# Patient Record
Sex: FEMALE | Race: WHITE | NOT HISPANIC OR LATINO | Employment: OTHER | RURAL
[De-identification: names, ages, dates, MRNs, and addresses within clinical notes are randomized per-mention and may not be internally consistent; named-entity substitution may affect disease eponyms.]

---

## 2017-01-06 ENCOUNTER — LAB (OUTPATIENT)
Dept: FAMILY MEDICINE CLINIC | Facility: CLINIC | Age: 67
End: 2017-01-06

## 2017-01-06 DIAGNOSIS — D64.9 ANEMIA, UNSPECIFIED TYPE: ICD-10-CM

## 2017-01-06 DIAGNOSIS — E87.6 HYPOPOTASSEMIA: ICD-10-CM

## 2017-01-06 DIAGNOSIS — D75.89 MACROCYTOSIS: Chronic | ICD-10-CM

## 2017-01-06 DIAGNOSIS — I26.09 OTHER ACUTE PULMONARY EMBOLISM WITH ACUTE COR PULMONALE (HCC): ICD-10-CM

## 2017-01-06 DIAGNOSIS — I10 ESSENTIAL HYPERTENSION: Primary | Chronic | ICD-10-CM

## 2017-01-06 LAB
BASOPHILS # BLD AUTO: 0.03 10*3/MM3 (ref 0–0.2)
BASOPHILS NFR BLD AUTO: 0.6 % (ref 0–2)
BUN SERPL-MCNC: 14 MG/DL (ref 5–21)
BUN/CREAT SERPL: 17.3 (ref 7–25)
CALCIUM SERPL-MCNC: 9.7 MG/DL (ref 8.4–10.4)
CHLORIDE SERPL-SCNC: 101 MMOL/L (ref 98–110)
CO2 SERPL-SCNC: 31 MMOL/L (ref 24–31)
CREAT SERPL-MCNC: 0.81 MG/DL (ref 0.5–1.4)
EOSINOPHIL # BLD AUTO: 0.14 10*3/MM3 (ref 0–0.7)
EOSINOPHIL NFR BLD AUTO: 2.9 % (ref 0–4)
ERYTHROCYTE [DISTWIDTH] IN BLOOD BY AUTOMATED COUNT: 13.4 % (ref 12–15)
FOLATE SERPL-MCNC: >20 NG/ML
GLUCOSE SERPL-MCNC: 98 MG/DL (ref 70–100)
HCT VFR BLD AUTO: 41.7 % (ref 37–47)
HGB BLD-MCNC: 13.5 G/DL (ref 12–16)
IMM GRANULOCYTES # BLD: 0.01 10*3/MM3 (ref 0–0.03)
IMM GRANULOCYTES NFR BLD: 0.2 % (ref 0–5)
IRON SERPL-MCNC: 69 MCG/DL (ref 42–180)
LYMPHOCYTES # BLD AUTO: 1.54 10*3/MM3 (ref 0.72–4.86)
LYMPHOCYTES NFR BLD AUTO: 32.1 % (ref 15–45)
MCH RBC QN AUTO: 33.1 PG (ref 28–32)
MCHC RBC AUTO-ENTMCNC: 32.4 G/DL (ref 33–36)
MCV RBC AUTO: 102.2 FL (ref 82–98)
MONOCYTES # BLD AUTO: 0.46 10*3/MM3 (ref 0.19–1.3)
MONOCYTES NFR BLD AUTO: 9.6 % (ref 4–12)
NEUTROPHILS # BLD AUTO: 2.62 10*3/MM3 (ref 1.87–8.4)
NEUTROPHILS NFR BLD AUTO: 54.6 % (ref 39–78)
PLATELET # BLD AUTO: 282 10*3/MM3 (ref 130–400)
POTASSIUM SERPL-SCNC: 4.1 MMOL/L (ref 3.5–5.3)
RBC # BLD AUTO: 4.08 10*6/MM3 (ref 4.2–5.4)
SODIUM SERPL-SCNC: 140 MMOL/L (ref 135–145)
VIT B12 SERPL-MCNC: 887 PG/ML (ref 239–931)
WBC # BLD AUTO: 4.8 10*3/MM3 (ref 4.8–10.8)

## 2017-01-09 ENCOUNTER — TELEPHONE (OUTPATIENT)
Dept: FAMILY MEDICINE CLINIC | Facility: CLINIC | Age: 67
End: 2017-01-09

## 2017-01-09 DIAGNOSIS — I26.09 OTHER ACUTE PULMONARY EMBOLISM WITH ACUTE COR PULMONALE (HCC): ICD-10-CM

## 2017-01-09 DIAGNOSIS — D64.9 ANEMIA, UNSPECIFIED TYPE: Primary | ICD-10-CM

## 2017-01-09 NOTE — TELEPHONE ENCOUNTER
----- Message from Rosas Berg MD sent at 10/25/2016  5:06 PM CDT -----  Be sure she knows mammogram B2 and sbe monthly; report problems and otherwise   Mammogram yearly    ----- Message -----     From: Sandy Wright Chon     Sent: 10/25/2016   4:01 PM       To: Rosas Berg MD

## 2017-01-11 ENCOUNTER — OFFICE VISIT (OUTPATIENT)
Dept: CARDIOLOGY | Facility: CLINIC | Age: 67
End: 2017-01-11

## 2017-01-11 VITALS
SYSTOLIC BLOOD PRESSURE: 124 MMHG | HEART RATE: 78 BPM | BODY MASS INDEX: 36.14 KG/M2 | HEIGHT: 63 IN | WEIGHT: 204 LBS | DIASTOLIC BLOOD PRESSURE: 80 MMHG

## 2017-01-11 DIAGNOSIS — I26.09 OTHER PULMONARY EMBOLISM WITH ACUTE COR PULMONALE, UNSPECIFIED CHRONICITY (HCC): Primary | ICD-10-CM

## 2017-01-11 DIAGNOSIS — I10 ESSENTIAL HYPERTENSION: Chronic | ICD-10-CM

## 2017-01-11 DIAGNOSIS — R53.82 CHRONIC FATIGUE: ICD-10-CM

## 2017-01-11 PROCEDURE — 99213 OFFICE O/P EST LOW 20 MIN: CPT | Performed by: INTERNAL MEDICINE

## 2017-01-11 NOTE — MR AVS SNAPSHOT
Lucy Harringtonell   1/11/2017 9:45 AM   Office Visit    Dept Phone:  [phone]   Encounter #:  [id number]    Provider:  Daniel Underwood MD   Department:  [facility] HEART [facility]                Your Full Care Plan              Today's Medication Changes          These changes are accurate as of: 1/11/17 10:25 AM.  If you have any questions, ask your nurse or doctor.               Stop taking medication(s)listed here:     amoxicillin 500 MG capsule   Commonly known as:  AMOXIL   Stopped by:  Daniel Underwood MD           HYDROcodone-acetaminophen 5-325 MG per tablet   Commonly known as:  NORCO   Stopped by:  Daniel Underwood MD                      Your Updated Medication List          This list is accurate as of: 1/11/17 10:25 AM.  Always use your most recent med list.                ALPRAZolam 0.5 MG tablet   Commonly known as:  XANAX   Take 1 tablet by mouth 2 (Two) Times a Day As Needed for anxiety.       cyanocobalamin 50 MCG tablet tablet   Commonly known as:  VITAMIN B-12       furosemide 20 MG tablet   Commonly known as:  LASIX   Take 1 tablet by mouth Daily.       losartan 50 MG tablet   Commonly known as:  COZAAR   Take 1 tablet by mouth Daily.       MULTI VITAMIN DAILY PO       omeprazole 20 MG capsule   Commonly known as:  priLOSEC       potassium chloride 10 MEQ CR capsule   Commonly known as:  MICRO-K   Take 2 capsules by mouth 2 (Two) Times a Day With Meals.       rivaroxaban 20 MG tablet   Commonly known as:  XARELTO   Take 1 tablet by mouth Daily With Dinner.       triamterene-hydrochlorothiazide 37.5-25 MG per capsule   Commonly known as:  DYAZIDE   Take 1 capsule by mouth Every Morning.       Vitamin D 2000 UNITS tablet       vitamin E 600 UNIT capsule               Instructions     None    Patient Instructions History      Upcoming Appointments     Visit Type Date Time Department    HOSPITAL FOLLOW UP 1/11/2017  9:45 AM [facility] HEART [facility]  "PAD    FOLLOW UP 3/2/2017  1:15 PM [facility]      [employer] Signup     [facility] [employer] allows you to send messages to your doctor, view your test results, renew your prescriptions, schedule appointments, and more. To sign up, go to [employer] and click on the Sign Up Now link in the New User? box. Enter your [employer] Activation Code exactly as it appears below along with the last four digits of your Social Security Number and your Date of Birth () to complete the sign-up process. If you do not sign up before the expiration date, you must request a new code.    [employer] Activation Code: [email or web address]  Expires: 2017  9:53 AM    If you have questions, you can email [email or web address] or call [phone] to talk to our [employer] staff. Remember, [employer] is NOT to be used for urgent needs. For medical emergencies, dial 911.               Other Info from Your Visit           Your Appointments     Mar 02, 2017  1:15 PM CST   Follow Up with Rosas Berg MD   [facility] (--)    [address] [place] [id number] [phone]           Arrive 15 minutes prior to appointment.              Allergies     No Known Allergies      Reason for Visit     Fatigue Says she stays tired all the time.    Congestive Heart Failure Follow up from 2016 hospital stay.      Vital Signs     Blood Pressure Pulse Height Weight Body Mass Index Smoking Status    124/80 (BP Location: Right arm, Patient Position: Sitting) 78 63\" (160 cm) 204 lb (92.5 kg) 36.14 kg/m2 Never Smoker        "

## 2017-01-17 PROBLEM — R53.82 CHRONIC FATIGUE: Status: ACTIVE | Noted: 2017-01-17

## 2017-01-17 PROCEDURE — 93000 ELECTROCARDIOGRAM COMPLETE: CPT | Performed by: INTERNAL MEDICINE

## 2017-01-18 NOTE — PROGRESS NOTES
Subjective:     Encounter Date:01/11/2017      Patient ID: Lucy Sousa is a 67 y.o. female.with history of bilateral, submassive PE and cardiac arrest, in November, s/p EKOS, here for routine follow-up.    Chief Complaint: Routine follow-up    Fatigue   This is a chronic problem. The problem occurs intermittently. The problem has been gradually improving. Associated symptoms include fatigue. Pertinent negatives include no abdominal pain, change in bowel habit, chest pain, chills, congestion, coughing, diaphoresis, fever, headaches, joint swelling, myalgias, nausea, numbness, sore throat, vomiting or weakness. Nothing aggravates the symptoms. She has tried nothing for the symptoms.     Pulmonary Embolism:  Diagnosed as submassive PE, during hospitalization - treated with EKOS.  No significant problems at that time with therapy.  Placed on long term Xarelto and doing well with this.    Patient notes that she has done well since being hospitalized with PE and EKOS therapy.   Breathing has improved.  Still with some generalized fatigue, but this seems to be improving over time.  No problems with anticoagulation.  No lightheadedness, dizziness, syncope.  No chest pain.        Current Outpatient Prescriptions:   •  ALPRAZolam (XANAX) 0.5 MG tablet, Take 1 tablet by mouth 2 (Two) Times a Day As Needed for anxiety., Disp: 30 tablet, Rfl: 0  •  Cholecalciferol (VITAMIN D) 2000 UNITS tablet, Take 2,000 Units by mouth daily., Disp: , Rfl:   •  cyanocobalamin (VITAMIN B-12) 50 MCG tablet tablet, Take 50 mcg by mouth 1 (one) time., Disp: , Rfl:   •  furosemide (LASIX) 20 MG tablet, Take 1 tablet by mouth Daily., Disp: 90 tablet, Rfl: 1  •  losartan (COZAAR) 50 MG tablet, Take 1 tablet by mouth Daily., Disp: 90 tablet, Rfl: 1  •  Multiple Vitamin (MULTI VITAMIN DAILY PO), Take 1 tablet by mouth daily., Disp: , Rfl:   •  omeprazole (PriLOSEC) 20 MG capsule, Take 20 mg by mouth daily as needed., Disp: , Rfl:   •  potassium  chloride (MICRO-K) 10 MEQ CR capsule, Take 2 capsules by mouth 2 (Two) Times a Day With Meals. (Patient taking differently: Take 10 mEq by mouth 2 (Two) Times a Day With Meals.), Disp: 30 capsule, Rfl: 5  •  rivaroxaban (XARELTO) 20 MG tablet, Take 1 tablet by mouth Daily With Dinner., Disp: 30 tablet, Rfl: 5  •  triamterene-hydrochlorothiazide (DYAZIDE) 37.5-25 MG per capsule, Take 1 capsule by mouth Every Morning., Disp: 90 capsule, Rfl: 1  •  vitamin E 600 UNIT capsule, Take 600 Units by mouth daily., Disp: , Rfl:     Social History   Substance Use Topics   • Smoking status: Never Smoker   • Smokeless tobacco: Never Used   • Alcohol use No     Family History   Problem Relation Age of Onset   • Coronary artery disease Mother    • Coronary artery disease Father      No Known Allergies    Review of Systems   Constitution: Positive for fatigue and malaise/fatigue. Negative for chills, diaphoresis, fever, weakness, night sweats and weight loss.   HENT: Negative for congestion, headaches and sore throat.    Cardiovascular: Negative for chest pain, claudication, dyspnea on exertion, irregular heartbeat, leg swelling, orthopnea, palpitations, paroxysmal nocturnal dyspnea and syncope.   Respiratory: Negative for cough, hemoptysis, shortness of breath and wheezing.    Endocrine: Negative for cold intolerance, heat intolerance, polydipsia and polyuria.   Hematologic/Lymphatic: Negative for bleeding problem. Does not bruise/bleed easily.   Musculoskeletal: Negative for joint swelling and myalgias.   Gastrointestinal: Negative for abdominal pain, change in bowel habit, hematemesis, hematochezia, melena, nausea and vomiting.   Genitourinary: Negative for bladder incontinence, dysuria and hematuria.   Neurological: Negative for dizziness, loss of balance, numbness, paresthesias and seizures.       ECG 12 Lead  Date/Time: 1/17/2017 9:52 PM  Performed by: RUDI WHITE  Authorized by: RUDI WHITE   Comparison:  "compared with previous ECG   Comparison to previous ECG: RBBB no longer present (compared to 11/2016)  Rhythm: sinus rhythm  Rate: normal  QRS axis: normal  Clinical impression: non-specific ECG  Comments: Nonspecific ST-T changes             Objective:     Physical Exam   Constitutional: She is oriented to person, place, and time. She appears well-developed and well-nourished. No distress.   HENT:   Head: Normocephalic and atraumatic.   Mouth/Throat: Oropharynx is clear and moist.   Eyes: EOM are normal. Pupils are equal, round, and reactive to light.   Neck: Normal range of motion. Neck supple. No JVD present. No thyromegaly present.   Cardiovascular: Normal rate, regular rhythm, S1 normal, S2 normal, normal heart sounds and intact distal pulses.  Exam reveals no gallop and no friction rub.    No murmur heard.  Pulmonary/Chest: Effort normal and breath sounds normal.   Abdominal: Soft. Bowel sounds are normal. She exhibits no distension. There is no tenderness.   Musculoskeletal: Normal range of motion. She exhibits no edema.   Neurological: She is alert and oriented to person, place, and time. No cranial nerve deficit.   Skin: Skin is warm and dry. No rash noted. No cyanosis or erythema. Nails show no clubbing.   Psychiatric: She has a normal mood and affect.   Vitals reviewed.    Visit Vitals   • /80 (BP Location: Right arm, Patient Position: Sitting)   • Pulse 78   • Ht 63\" (160 cm)   • Wt 204 lb (92.5 kg)   • BMI 36.14 kg/m2     Lab Review:       Echocardiogram 11/18/2016  · Left ventricular wall thickness is consistent with moderate concentric hypertrophy.  · Left ventricular function is normal.  · Left ventricular diastolic dysfunction (grade I) consistent with impaired relaxation.  · Right ventricular cavity is mildly dilated; however, RV function appears normal.  · No significant valvular abnormalities.    Assessment:          Diagnosis Plan   1. Other pulmonary embolism with acute cor pulmonale, " unspecified chronicity     2. Essential hypertension     3. Chronic fatigue          Plan:       1. History of bilateral submassive PE with cardiac arrest due to this - s/p EKOS:  Doing well at this time.  No clinically significant symptoms.  Tolerating Xarelto well at this time.  Given normal RV function s/p EKOS, no significant need for long term cardiac follow-up.  Will release back to Dr. Berg for further care.    2. Essential HTN:  BP is well controlled on current medications.  Continue current meds at this time.    3. Chronic fatigue: Seems to be improving over time.  Advised continued exercise and staying active.    Follow-up as needed    EMR Dragon/Transcription disclaimer: Much of this encounter note is an electronic transcription/translation of spoken language to printed text. The electronic translation of spoken language may permit erroneous, or at times, nonsensical words or phrases to be inadvertently transcribed; although I have reviewed the note for such errors, some may still exist.

## 2017-02-14 ENCOUNTER — TELEPHONE (OUTPATIENT)
Dept: FAMILY MEDICINE CLINIC | Facility: CLINIC | Age: 67
End: 2017-02-14

## 2017-02-14 RX ORDER — ALPRAZOLAM 0.5 MG/1
0.5 TABLET ORAL 2 TIMES DAILY PRN
Qty: 30 TABLET | Refills: 0 | OUTPATIENT
Start: 2017-02-14 | End: 2017-08-28 | Stop reason: SDUPTHER

## 2017-02-16 LAB
BASOPHILS # BLD AUTO: 0.04 10*3/MM3 (ref 0–0.2)
BASOPHILS NFR BLD AUTO: 0.9 % (ref 0–2)
EOSINOPHIL # BLD AUTO: 0.15 10*3/MM3 (ref 0–0.7)
EOSINOPHIL NFR BLD AUTO: 3.2 % (ref 0–4)
ERYTHROCYTE [DISTWIDTH] IN BLOOD BY AUTOMATED COUNT: 13.3 % (ref 12–15)
HCT VFR BLD AUTO: 44 % (ref 37–47)
HGB BLD-MCNC: 14.3 G/DL (ref 12–16)
IMM GRANULOCYTES # BLD: 0.01 10*3/MM3 (ref 0–0.03)
IMM GRANULOCYTES NFR BLD: 0.2 % (ref 0–5)
IRON SERPL-MCNC: 85 MCG/DL (ref 42–180)
LYMPHOCYTES # BLD AUTO: 1.46 10*3/MM3 (ref 0.72–4.86)
LYMPHOCYTES NFR BLD AUTO: 31.3 % (ref 15–45)
MCH RBC QN AUTO: 32.6 PG (ref 28–32)
MCHC RBC AUTO-ENTMCNC: 32.5 G/DL (ref 33–36)
MCV RBC AUTO: 100.2 FL (ref 82–98)
MONOCYTES # BLD AUTO: 0.48 10*3/MM3 (ref 0.19–1.3)
MONOCYTES NFR BLD AUTO: 10.3 % (ref 4–12)
NEUTROPHILS # BLD AUTO: 2.53 10*3/MM3 (ref 1.87–8.4)
NEUTROPHILS NFR BLD AUTO: 54.1 % (ref 39–78)
PLATELET # BLD AUTO: 274 10*3/MM3 (ref 130–400)
RBC # BLD AUTO: 4.39 10*6/MM3 (ref 4.2–5.4)
WBC # BLD AUTO: 4.67 10*3/MM3 (ref 4.8–10.8)

## 2017-03-01 PROBLEM — Z00.00 WELLNESS EXAMINATION: Status: ACTIVE | Noted: 2017-03-01

## 2017-03-02 ENCOUNTER — OFFICE VISIT (OUTPATIENT)
Dept: FAMILY MEDICINE CLINIC | Facility: CLINIC | Age: 67
End: 2017-03-02

## 2017-03-02 VITALS
RESPIRATION RATE: 18 BRPM | WEIGHT: 207 LBS | DIASTOLIC BLOOD PRESSURE: 84 MMHG | TEMPERATURE: 98.4 F | HEIGHT: 63 IN | HEART RATE: 112 BPM | BODY MASS INDEX: 36.68 KG/M2 | OXYGEN SATURATION: 98 % | SYSTOLIC BLOOD PRESSURE: 122 MMHG

## 2017-03-02 DIAGNOSIS — K21.9 GASTROESOPHAGEAL REFLUX DISEASE, ESOPHAGITIS PRESENCE NOT SPECIFIED: Chronic | ICD-10-CM

## 2017-03-02 DIAGNOSIS — E66.9 OBESITY, UNSPECIFIED OBESITY SEVERITY, UNSPECIFIED OBESITY TYPE: ICD-10-CM

## 2017-03-02 DIAGNOSIS — F41.9 ANXIETY: Chronic | ICD-10-CM

## 2017-03-02 DIAGNOSIS — D64.9 ANEMIA, UNSPECIFIED TYPE: ICD-10-CM

## 2017-03-02 DIAGNOSIS — F32.A DEPRESSION, UNSPECIFIED DEPRESSION TYPE: Chronic | ICD-10-CM

## 2017-03-02 DIAGNOSIS — I10 ESSENTIAL HYPERTENSION: Primary | Chronic | ICD-10-CM

## 2017-03-02 DIAGNOSIS — I26.09 OTHER PULMONARY EMBOLISM WITH ACUTE COR PULMONALE, UNSPECIFIED CHRONICITY (HCC): ICD-10-CM

## 2017-03-02 PROBLEM — Z79.01 ANTICOAGULATED: Status: ACTIVE | Noted: 2017-03-02

## 2017-03-02 PROCEDURE — 99213 OFFICE O/P EST LOW 20 MIN: CPT | Performed by: FAMILY MEDICINE

## 2017-03-02 NOTE — PATIENT INSTRUCTIONS
Willing to refer to cardiac rehab    Lab work 6m.     Control heartburn. (at least 8 weeks of prilosec and change Rx something stronger if does not work).

## 2017-03-03 NOTE — PROGRESS NOTES
Subjective   Lucy Sousa is a 67 y.o. female presenting with chief complaint of:   Chief Complaint   Patient presents with   • Hypertension   • Hyperlipidemia   • Anemia   • Anxiety       History of Present Illness :  Alone.  Has multiple chronic problems; interval appointment.  One of these problems is HTN.  The HTN has been present for years/it is chronic.  The HTN is assumed essential/without testing needed to look for other.  The HTN is controlled manifest by todays blood pressure and same home monitoring.   Other chronic problem/s to consider:  Anemia: This has been present for years/over a year.  It is chronic.  These has been GI evaulation in the past. There is no current melena, hematochezia. It has been bening to date and stable.   Anxiety: This has been present for years/over a year.  It is chronic.  It is stable.  It is associated with stressors-doing better.  Medications being used help   Depression: This has been present for years/over a year.  It is chronic.  It is stable.  It is associated with stressors-same  Medications being used help. No suicide ideation/intent.   GE reflux: This has been present for years/over a year.  It is chronic.   It is stable as there is no change in more frequent heartburn and no dysphagia  Obesity: This has been present for years/over a year.  It is chronic.     It is stable; there has been no recent major change in weight, or dieting  PE: cardiac arrest 11.2016 with PE; had to have EKOS.  Slowly less tired; no SOB.     The following portions of the patient's history were reviewed and updated as appropriate: allergies, current medications, past family history, past medical history, past social history, past surgical history and problem list.  TCC migrated if needed/reviewed.      Current Outpatient Prescriptions:   •  ALPRAZolam (XANAX) 0.5 MG tablet, Take 1 tablet by mouth 2 (Two) Times a Day As Needed for anxiety., Disp: 30 tablet, Rfl: 0  •  Cholecalciferol  (VITAMIN D) 2000 UNITS tablet, Take 1,000 Units by mouth Daily., Disp: , Rfl:   •  cyanocobalamin (VITAMIN B-12) 50 MCG tablet tablet, Take 1,000 mcg by mouth 1 (One) Time., Disp: , Rfl:   •  furosemide (LASIX) 20 MG tablet, Take 1 tablet by mouth Daily., Disp: 90 tablet, Rfl: 1  •  losartan (COZAAR) 50 MG tablet, Take 1 tablet by mouth Daily., Disp: 90 tablet, Rfl: 1  •  Multiple Vitamin (MULTI VITAMIN DAILY PO), Take 1 tablet by mouth daily., Disp: , Rfl:   •  omeprazole (PriLOSEC) 20 MG capsule, Take 20 mg by mouth daily as needed., Disp: , Rfl:   •  potassium chloride (MICRO-K) 10 MEQ CR capsule, Take 2 capsules by mouth 2 (Two) Times a Day With Meals. (Patient taking differently: Take 10 mEq by mouth 2 (Two) Times a Day With Meals.), Disp: 30 capsule, Rfl: 5  •  rivaroxaban (XARELTO) 20 MG tablet, Take 1 tablet by mouth Daily With Dinner., Disp: 30 tablet, Rfl: 5  •  triamterene-hydrochlorothiazide (DYAZIDE) 37.5-25 MG per capsule, Take 1 capsule by mouth Every Morning., Disp: 90 capsule, Rfl: 1  •  vitamin E 600 UNIT capsule, Take 400 Units by mouth Daily., Disp: , Rfl:     No Known Allergies    Review of Systems  GENERAL:  Inactive/slower with limits, speed, samni for age; working 3/d week. Sleep is ok; apnea denied. No fever.  ENDO:  No syncope, near or diaphoretic sweaty spells.  HEENT: No head injury  headache,  No vision change, Nohearing loss.  Ears without pain/drainage.  No sore throat.  No nasal/sinus congestion/drainage. No epistaxis.  CHEST: No chest wall tenderness or mass. No cough,  without wheeze, SOB; no hemoptysis.  CV: No chest pain, palpatations, trace LE ankle edema.  GI: No dysphagia.  No abdominal pain, diarrhea, constipation, rectal bleeding, or melena.  Frequent heartburn but not using Rx all the time.   :  Voids without dysuria, or incontience to completion.  ORTHO: No painful/swollen joints but various on /off sore.  No change rare sore neck or back.  No acute neck or back pain  without recent injury.   NEURO: No dizziness, weakness of extremities.  No numbness/parethesias.   PSYCH: No memory loss.  Mood good; less anxious, depressed but/and not suicidal.  Tolerated stress.     Results for orders placed or performed in visit on 01/09/17   Iron level   Result Value Ref Range    Iron 85 42 - 180 mcg/dL   CBC & AUTO Differential   Result Value Ref Range    WBC 4.67 (L) 4.80 - 10.80 10*3/mm3    RBC 4.39 4.20 - 5.40 10*6/mm3    Hemoglobin 14.3 12.0 - 16.0 g/dL    Hematocrit 44.0 37.0 - 47.0 %    .2 (H) 82.0 - 98.0 fL    MCH 32.6 (H) 28.0 - 32.0 pg    MCHC 32.5 (L) 33.0 - 36.0 g/dL    RDW 13.3 12.0 - 15.0 %    Platelets 274 130 - 400 10*3/mm3    Neutrophil Rel % 54.1 39.0 - 78.0 %    Lymphocyte Rel % 31.3 15.0 - 45.0 %    Monocyte Rel % 10.3 4.0 - 12.0 %    Eosinophil Rel % 3.2 0.0 - 4.0 %    Basophil Rel % 0.9 0.0 - 2.0 %    Neutrophils Absolute 2.53 1.87 - 8.40 10*3/mm3    Lymphocytes Absolute 1.46 0.72 - 4.86 10*3/mm3    Monocytes Absolute 0.48 0.19 - 1.30 10*3/mm3    Eosinophils Absolute 0.15 0.00 - 0.70 10*3/mm3    Basophils Absolute 0.04 0.00 - 0.20 10*3/mm3    Immature Granulocyte Rel % 0.2 0.0 - 5.0 %    Immature Grans Absolute 0.01 0.00 - 0.03 10*3/mm3       No results found for: HGBA1C    Lab Results   Component Value Date     01/06/2017     11/30/2016     11/21/2016    K 4.1 01/06/2017    K 4.0 11/30/2016    K 3.9 11/21/2016     01/06/2017    CL 95 (L) 11/30/2016    CL 99 11/21/2016    CO2 31.0 01/06/2017    CO2 29.0 11/30/2016    CO2 31.0 11/21/2016    GLUCOSE 93 11/21/2016    GLUCOSE 89 11/20/2016    GLUCOSE 89 11/19/2016    BUN 14 01/06/2017    BUN 16 11/30/2016    BUN 14 11/21/2016    CREATININE 0.81 01/06/2017    CREATININE 0.80 11/30/2016    CREATININE 0.75 11/21/2016    CALCIUM 9.7 01/06/2017    CALCIUM 10.0 11/30/2016    CALCIUM 8.8 11/21/2016       No results found for: PSA     Objective   Visit Vitals   • /84 (BP Location: Left arm,  "Patient Position: Sitting, Cuff Size: Large Adult)   • Pulse 112   • Temp 98.4 °F (36.9 °C) (Oral)   • Resp 18   • Ht 63\" (160 cm)   • Wt 207 lb (93.9 kg)   • SpO2 98%   • BMI 36.67 kg/m2       Physical Exam  GENERAL:  Well nourished/developed in no acute distress. Obese   SKIN: Turgor excellent, without wound, rash, lesion.  HEENT: Normal cephalic without trauma.  Pupils equal round reactive to light. Extraocular motions full without nystagmus.   External canals nonobstructive nontender without reddness. Tymphatic membranes sharri with shanika structures intact.   Oral cavity without growths, exudates, and moist.  Posterior pharnyx without mass, obstruction, reddness.  No thyroidmegaly, mass, tenderness, lymphadenopathy and supple.  CV: Regular rhythm.  No murmur, gallop, trace equal LE edema. Posterior pulses intact.  No carotid bruits.  CHEST: No chest wall tenderness or mass.   LUNGS: Symmetric motion with clear to auscultation.  No dullness to percussion  ABD: Soft, nontender without mass.   ORTHO: Symmetric extremities without swelling/point tenderness.  Full gross range of motion.    NEURO: CN 2-12 grossly intact.  Symmetric facies. 1/4 x bicpe knee equal reflexes.  UE/LE   3/5 strength throughout.  Nonfocal use extremities. Speech clear.    PSYCH: Oriented x 3.  Pleasant calm, well kept.  Purposeful/directed conservation with intact short/long gross memory.     Assessment/Plan     1. Essential hypertension  controlled    2. Other pulmonary embolism with acute cor pulmonale, unspecified chronicity  11.2016    3. Gastroesophageal reflux disease, esophagitis presence not specified  Too much heartburn    4. Depression, unspecified depression type  Chronic/better    5. Anxiety  same    6. Anemia, unspecified type  With above    7. Obesity, unspecified obesity severity, unspecified obesity type  ongoing      Rx: reviewed.  Same  LAB: reviewed/above; 6/12  Wrap-up/other instructions  Regular cardio exercise something " everyone should consider and try to do; discussed  Normal weight a goal for everyone; discussed  Healthy diet helpful for weight management, illness prevention; discussed  If over 50-screening exams include men PSA/rectal exam, women mammograms, and  everyone colonoscopy screening for colon cancer.   Patient Instructions   Willing to refer to cardiac rehab    Lab work 6m.     Control heartburn. (at least 8 weeks of prilosec and change Rx something stronger if does not work).       Follow up: Return in about 6 months (around 9/2/2017).

## 2017-03-22 DIAGNOSIS — I46.9 CARDIAC ARREST (HCC): Primary | ICD-10-CM

## 2017-05-01 ENCOUNTER — HOSPITAL ENCOUNTER (OUTPATIENT)
Dept: HOSPITAL 58 - CAR.REHAB | Age: 67
LOS: 30 days | End: 2017-05-31
Attending: FAMILY MEDICINE

## 2017-05-01 VITALS — BODY MASS INDEX: 36.8 KG/M2

## 2017-05-01 VITALS — TEMPERATURE: 98.1 F

## 2017-05-01 DIAGNOSIS — I46.9: Primary | ICD-10-CM

## 2017-05-01 PROCEDURE — 93797 PHYS/QHP OP CAR RHAB WO ECG: CPT

## 2017-05-05 ENCOUNTER — TELEPHONE (OUTPATIENT)
Dept: FAMILY MEDICINE CLINIC | Facility: CLINIC | Age: 67
End: 2017-05-05

## 2017-05-05 RX ORDER — TRIAMTERENE AND HYDROCHLOROTHIAZIDE 37.5; 25 MG/1; MG/1
1 CAPSULE ORAL EVERY MORNING
Qty: 90 CAPSULE | Refills: 1 | Status: SHIPPED | OUTPATIENT
Start: 2017-05-05 | End: 2017-11-14 | Stop reason: SDUPTHER

## 2017-05-05 RX ORDER — LOSARTAN POTASSIUM 50 MG/1
50 TABLET ORAL DAILY
Qty: 90 TABLET | Refills: 1 | Status: SHIPPED | OUTPATIENT
Start: 2017-05-05 | End: 2017-11-14 | Stop reason: SDUPTHER

## 2017-05-26 RX ORDER — POTASSIUM CHLORIDE 1500 MG/1
20 TABLET, FILM COATED, EXTENDED RELEASE ORAL 2 TIMES DAILY
Qty: 60 TABLET | Refills: 5 | Status: SHIPPED | OUTPATIENT
Start: 2017-05-26 | End: 2018-04-13 | Stop reason: SDUPTHER

## 2017-05-30 VITALS — SYSTOLIC BLOOD PRESSURE: 128 MMHG | DIASTOLIC BLOOD PRESSURE: 62 MMHG

## 2017-06-01 ENCOUNTER — HOSPITAL ENCOUNTER (OUTPATIENT)
Dept: HOSPITAL 58 - CAR.REHAB | Age: 67
LOS: 29 days | End: 2017-06-30
Attending: INTERNAL MEDICINE

## 2017-06-01 VITALS — BODY MASS INDEX: 36.8 KG/M2

## 2017-06-01 DIAGNOSIS — I46.9: Primary | ICD-10-CM

## 2017-06-01 PROCEDURE — 93797 PHYS/QHP OP CAR RHAB WO ECG: CPT

## 2017-06-30 VITALS — DIASTOLIC BLOOD PRESSURE: 58 MMHG | SYSTOLIC BLOOD PRESSURE: 128 MMHG

## 2017-07-03 ENCOUNTER — HOSPITAL ENCOUNTER (OUTPATIENT)
Dept: HOSPITAL 58 - CAR.REHAB | Age: 67
LOS: 28 days | End: 2017-07-31
Attending: INTERNAL MEDICINE

## 2017-07-03 VITALS — BODY MASS INDEX: 36.8 KG/M2

## 2017-07-03 DIAGNOSIS — I46.9: Primary | ICD-10-CM

## 2017-07-03 PROCEDURE — 93797 PHYS/QHP OP CAR RHAB WO ECG: CPT

## 2017-07-28 VITALS — DIASTOLIC BLOOD PRESSURE: 72 MMHG | SYSTOLIC BLOOD PRESSURE: 128 MMHG

## 2017-08-01 ENCOUNTER — HOSPITAL ENCOUNTER (OUTPATIENT)
Dept: HOSPITAL 58 - CAR.REHAB | Age: 67
LOS: 10 days | Discharge: HOME | End: 2017-08-11
Attending: INTERNAL MEDICINE

## 2017-08-01 VITALS — BODY MASS INDEX: 36.8 KG/M2

## 2017-08-01 DIAGNOSIS — I46.9: Primary | ICD-10-CM

## 2017-08-01 PROCEDURE — 93797 PHYS/QHP OP CAR RHAB WO ECG: CPT

## 2017-08-02 RX ORDER — FUROSEMIDE 20 MG/1
TABLET ORAL
Qty: 90 TABLET | Refills: 1 | Status: ON HOLD | OUTPATIENT
Start: 2017-08-02 | End: 2018-03-28

## 2017-08-11 VITALS — DIASTOLIC BLOOD PRESSURE: 62 MMHG | SYSTOLIC BLOOD PRESSURE: 118 MMHG

## 2017-08-28 DIAGNOSIS — F41.9 ANXIETY: Primary | Chronic | ICD-10-CM

## 2017-08-28 RX ORDER — ALPRAZOLAM 0.5 MG/1
0.5 TABLET ORAL 2 TIMES DAILY PRN
Qty: 30 TABLET | Refills: 0 | OUTPATIENT
Start: 2017-08-28 | End: 2017-11-20 | Stop reason: SDUPTHER

## 2017-08-29 DIAGNOSIS — F41.9 ANXIETY: Primary | Chronic | ICD-10-CM

## 2017-08-29 DIAGNOSIS — Z00.00 WELLNESS EXAMINATION: ICD-10-CM

## 2017-08-29 DIAGNOSIS — F32.A DEPRESSION, UNSPECIFIED DEPRESSION TYPE: Chronic | ICD-10-CM

## 2017-08-31 ENCOUNTER — RESULTS ENCOUNTER (OUTPATIENT)
Dept: FAMILY MEDICINE CLINIC | Facility: CLINIC | Age: 67
End: 2017-08-31

## 2017-08-31 DIAGNOSIS — F32.A DEPRESSION, UNSPECIFIED DEPRESSION TYPE: Chronic | ICD-10-CM

## 2017-08-31 DIAGNOSIS — F41.9 ANXIETY: Chronic | ICD-10-CM

## 2017-08-31 DIAGNOSIS — Z00.00 WELLNESS EXAMINATION: ICD-10-CM

## 2017-09-01 PROBLEM — Z01.89 LABORATORY TEST: Status: ACTIVE | Noted: 2017-09-01

## 2017-09-01 LAB
HCV AB S/CO SERPL IA: <0.1 S/CO RATIO (ref 0–0.9)
T4 SERPL-MCNC: 7.7 UG/DL (ref 4.5–12)
TSH SERPL DL<=0.005 MIU/L-ACNC: 3.47 MIU/ML (ref 0.47–4.68)

## 2017-09-05 ENCOUNTER — OFFICE VISIT (OUTPATIENT)
Dept: FAMILY MEDICINE CLINIC | Facility: CLINIC | Age: 67
End: 2017-09-05

## 2017-09-05 VITALS
TEMPERATURE: 98.3 F | OXYGEN SATURATION: 99 % | RESPIRATION RATE: 16 BRPM | BODY MASS INDEX: 37.56 KG/M2 | HEIGHT: 63 IN | HEART RATE: 106 BPM | WEIGHT: 212 LBS | SYSTOLIC BLOOD PRESSURE: 138 MMHG | DIASTOLIC BLOOD PRESSURE: 86 MMHG

## 2017-09-05 DIAGNOSIS — K21.9 GASTROESOPHAGEAL REFLUX DISEASE, ESOPHAGITIS PRESENCE NOT SPECIFIED: Chronic | ICD-10-CM

## 2017-09-05 DIAGNOSIS — Z86.711 HISTORY OF PULMONARY EMBOLISM: ICD-10-CM

## 2017-09-05 DIAGNOSIS — M19.90 OSTEOARTHRITIS, UNSPECIFIED OSTEOARTHRITIS TYPE, UNSPECIFIED SITE: Chronic | ICD-10-CM

## 2017-09-05 DIAGNOSIS — M54.9 CHRONIC BACK PAIN, UNSPECIFIED BACK LOCATION, UNSPECIFIED BACK PAIN LATERALITY: ICD-10-CM

## 2017-09-05 DIAGNOSIS — R06.02 SHORTNESS OF BREATH: ICD-10-CM

## 2017-09-05 DIAGNOSIS — D64.9 ANEMIA, UNSPECIFIED TYPE: ICD-10-CM

## 2017-09-05 DIAGNOSIS — I10 ESSENTIAL HYPERTENSION: Primary | Chronic | ICD-10-CM

## 2017-09-05 DIAGNOSIS — G89.29 CHRONIC BACK PAIN, UNSPECIFIED BACK LOCATION, UNSPECIFIED BACK PAIN LATERALITY: ICD-10-CM

## 2017-09-05 PROCEDURE — 99213 OFFICE O/P EST LOW 20 MIN: CPT | Performed by: FAMILY MEDICINE

## 2017-09-05 NOTE — PROGRESS NOTES
Subjective   Lucy Sousa is a 67 y.o. female presenting with chief complaint of:   Chief Complaint   Patient presents with   • Hypertension   • Anxiety       History of Present Illness :  Alone.   Has multiple chronic problems; interval appointment.  One of these problems is HTN.  The HTN has been present for years/it is chronic.  The HTN is assumed essential/without testing needed to look for other.  The HTN is controlled manifest by todays blood pressure and no home monitoring.   Other chronic problem/s to consider:   Anemia/ in past with PE: This has been present for years/over a year.  It is chronic.  These has been GI evaulation in the past. There is no current melena, hematochezia. It has been benign to date and stable/watching; recent iron normal when stopped. .  Anxiety: This has been present for years/over a year.  It is chronic.  It is stable.  It is associated with stressors on/off.  Medications being used help   Depression: This has been present for years/over a year.  It is chronic.  It is stable.  It is associated with stressors; same on/off.  Medications being used help. No suicide ideation/intent.   Chronic back pain:  The pain has been present for years/over a year.   It is chronic.   The pain is stable.  0-3 /10 pain most of time and usually is worse after activities.  The pain limits activities.  The problem has been evaulated and the patient has no desire for more testing/ change in approach to occ chiropracter.   Degenerative Joint Disease/arthritis:  This has been present for years/over a year.  It is chronic.  It causes on/off pain and joint stiffness .  There is no joint swelling.  GE reflux/heartburn: This has been present for years/over a year.  It is chronic.   It is stable as there is no change in infrequent heartburn and no dysphagia.  prilosec treated  Obesity/overweight: This has been present for years/over a year.  It is chronic.     It is stable; there has been no recent major  change in weight, or dieting  Anticoagulation: Requires anticoagulation with Xarelto for PE.   This needs to be continually monitored for risk/benefit.   SOB: On/off for years.  Worse with exertion.  No cough, wheeze.     Has an/another acute issue today: .    The following portions of the patient's history were reviewed and updated as appropriate: allergies, current medications, past family history, past medical history, past social history, past surgical history and problem list.  Records acquired and reviewed; TCC migrated.      Current Outpatient Prescriptions:   •  ALPRAZolam (XANAX) 0.5 MG tablet, Take 1 tablet by mouth 2 (Two) Times a Day As Needed for Anxiety., Disp: 30 tablet, Rfl: 0  •  Cholecalciferol (VITAMIN D) 2000 UNITS tablet, Take 1,000 Units by mouth Daily., Disp: , Rfl:   •  cyanocobalamin (VITAMIN B-12) 50 MCG tablet tablet, Take 1,000 mcg by mouth 1 (One) Time., Disp: , Rfl:   •  furosemide (LASIX) 20 MG tablet, TAKE ONE TABLET DAILY GENERIC FOR LASIX, Disp: 90 tablet, Rfl: 1  •  losartan (COZAAR) 50 MG tablet, Take 1 tablet by mouth Daily., Disp: 90 tablet, Rfl: 1  •  Multiple Vitamin (MULTI VITAMIN DAILY PO), Take 1 tablet by mouth daily., Disp: , Rfl:   •  omeprazole (PriLOSEC) 20 MG capsule, Take 20 mg by mouth daily as needed., Disp: , Rfl:   •  potassium chloride ER (K-TAB) 20 MEQ tablet controlled-release ER tablet, Take 1 tablet by mouth 2 (Two) Times a Day., Disp: 60 tablet, Rfl: 5  •  rivaroxaban (XARELTO) 20 MG tablet, Take 1 tablet by mouth Daily With Dinner., Disp: 90 tablet, Rfl: 1  •  triamterene-hydrochlorothiazide (DYAZIDE) 37.5-25 MG per capsule, Take 1 capsule by mouth Every Morning., Disp: 90 capsule, Rfl: 1  •  vitamin E 600 UNIT capsule, Take 400 Units by mouth Daily., Disp: , Rfl:     No Known Allergies    Review of Systems  GENERAL:  Inactive/slower with limits, speed, samni for age sob with exertion. Sleep is not good; sometimes difficult staying asleep; admits sometimes  worry. Denies apnea.   ENDO:  No syncope, near or diaphoretic sweaty spells. .  HEENT: No head injury headache,  No vision change, No hearing loss.  Ears without pain/drainage.  No sore throat.  No  nasal/sinus congestion/drainage. No epistaxis.  CHEST: No chest wall tenderness or mass. Occ cough,  without wheeze; frequent feels SOB; no hemoptysis.  CV: No chest pain, palpatations, occ end of of day bother legs ankle edema.  GI: No  dysphagia.  No abdominal pain, diarrhea, constipation, rectal bleeding, or melena.  Occ heartburn.   EGD+neg-healing ulcer/[facility]//1-22-10  Colonoscopy+polyp-div/[facility]//2.13.14/5y    GYN: Dr Billings for mammogram.   :  Voids without dysuria, or incontience to completion.  ORTHO: No painful/swollen joints but various on /off sore.  No sore neck or back.  No acute neck or back pain without recent injury.   NEURO: No dizziness, weakness of extremities.  No numbness/parethesias.   PSYCH: No memory loss.  Mood good; occ anxious, depressed but/and not suicidal.  Tolerated stress.     Results for orders placed or performed in visit on 08/31/17   T4   Result Value Ref Range    T4, Total 7.7 4.5 - 12.0 ug/dL   TSH   Result Value Ref Range    TSH 3.470 0.470 - 4.680 mIU/mL   Hepatitis C antibody   Result Value Ref Range    Hep C Virus Ab <0.1 0.0 - 0.9 s/co ratio       No results found for: HGBA1C    Lab Results   Component Value Date     01/06/2017     11/30/2016     11/21/2016    K 4.1 01/06/2017    K 4.0 11/30/2016    K 3.9 11/21/2016     01/06/2017    CL 95 (L) 11/30/2016    CL 99 11/21/2016    CO2 31.0 01/06/2017    CO2 29.0 11/30/2016    CO2 31.0 11/21/2016    GLUCOSE 93 11/21/2016    GLUCOSE 89 11/20/2016    GLUCOSE 89 11/19/2016    BUN 14 01/06/2017    BUN 16 11/30/2016    BUN 14 11/21/2016    CREATININE 0.81 01/06/2017    CREATININE 0.80 11/30/2016    CREATININE 0.75 11/21/2016    CALCIUM 9.7 01/06/2017    CALCIUM 10.0 11/30/2016    CALCIUM 8.8 11/21/2016       No  "results found for: PSA     Lab Results:  CBC:  Lab Results - Last 18 Months  Lab Units 02/16/17  0721 01/06/17  0733 11/30/16  1050 11/21/16  0908 11/19/16  1737 11/19/16  0503 11/18/16  0411 11/17/16  1843  08/29/16  0820   WBC 10*3/mm3 4.67* 4.80 7.55  --   --  8.87 8.03 10.78  --  4.93   HEMATOCRIT % 44.0 41.7 37.9 27.6* 25.4* 25.0* 31.9* 41.5  < > 45.1   < > = values in this interval not displayed.  CMP:  Lab Results - Last 18 Months  Lab Units 01/06/17  0733 11/30/16  1050 11/21/16  0326 11/20/16  0248 11/19/16  0503 11/18/16  0411 11/17/16  2241  08/29/16  0820   SODIUM mmol/L 140 137 137 137 138 138 136  < > 143   CHLORIDE mmol/L 101 95* 99 103 104 104 100  < > 102   CO2 mmol/L  --   --  31.0 29.0 27.0 28.0 27.0  < >  --    TOTAL CO2, ARTERIAL mmol/L 31.0 29.0  --   --   --   --   --   --  29   BUN mg/dL 14 16 14 11 12 12 11  < > 20   CREATININE mg/dL 0.81 0.80 0.75 0.77 0.72 0.74 0.69  < > 0.92   EGFR IF NONAFRICN AM mL/min/1.73 70 71 77 75 81 78 85  < > 61   EGFR IF AFRICN AM mL/min/1.73 85 86  --   --   --   --   --   --  74   CALCIUM mg/dL 9.7 10.0 8.8 8.5 8.4 8.1* 8.5  < > 10.2   < > = values in this interval not displayed.  THYROID:    Lab Results - Last 18 Months  Lab Units 08/31/17  0718 08/29/16  0820   TSH mIU/mL 3.470 5.220*     A1C:No results for input(s): HGBA1C in the last 03721 hours.    Objective   /86 (BP Location: Left arm, Patient Position: Sitting, Cuff Size: Large Adult)  Pulse 106  Temp 98.3 °F (36.8 °C) (Oral)   Resp 16  Ht 63\" (160 cm)  Wt 212 lb (96.2 kg)  SpO2 99%  BMI 37.55 kg/m2    Physical Exam  GENERAL:  Well nourished/developed in no acute distress. Obese   SKIN: Turgor excellent, without wound, rash, lesion.  HEENT: Normal cephalic without trauma.  Pupils equal round reactive to light. Extraocular motions full without nystagmus.   External canals nonobstructive nontender without reddness. Tymphatic membranes sharri with shanika structures intact.   Oral cavity without " growths, exudates, and moist.  Posterior pharnyx without mass, obstruction, reddness.  No thyroidmegaly, mass, tenderness, lymphadenopathy and supple.  CV: Regular rhythm.  No murmur, gallop,  edema. Posterior pulses intact.  No carotid bruits.  CHEST: No chest wall tenderness or mass.   LUNGS: Symmetric motion with clear to auscultation.  No dullness to percussion  ABD: Soft, nontender without mass.   ORTHO: Symmetric extremities without swelling/point tenderness.  Full gross range of motion.  NEURO: CN 2-12 grossly intact.  Symmetric facies. 2/4 x bicep knee equal reflexes.  UE/LE   3/5 strength throughout.  Nonfocal use extremities. Speech clear.  Intact light touch with monofilament, vibratory sensation with tuning fork; equal toes/distal feet.    PSYCH: Oriented x 3.  Pleasant calm, well kept.  Purposeful/directed conservation with intact short/long gross memory.     Assessment/Plan     1. Essential hypertension  controlled    2. Gastroesophageal reflux disease, esophagitis presence not specified  controlled    3. History of pulmonary embolism  xarelto treated; decision longer term    4. Osteoarthritis, unspecified osteoarthritis type, unspecified site  Mod/diffuse    5. Chronic back pain, unspecified back location, unspecified back pain laterality  ddd/djd    6. Anemia, unspecified type  With PE  - CBC & Differential  - Comprehensive Metabolic Panel  - Iron Profile  - Adult Transthoracic Echo Complete; Future    7. Shortness of breath  ? Cardiac status  - CBC & Differential  - Comprehensive Metabolic Panel  - Iron Profile  - Adult Transthoracic Echo Complete; Future      Rx: reviewed.  Any other changes above and:   LAB: reviewed/above.  Orders above and:   Wrap-up/other instructions: discussed as applicable  Regular cardio exercise something everyone should consider and try to do; even if health limitations (ie find that exercise UE/LE/cardio that they can tolerate).  Normal weight a goal for  everyone.  Healthy diet helpful for weight management, illness prevention.    Patient Instructions   Remember mammogram October      Follow up: Return for 1) Dr Berg 6m; lab today.  Future Appointments  Date Time Provider Department Center   2/27/2018 8:15 AM LAB [facility] None   3/6/2018 4:00 PM Rosas Berg MD [facility] None

## 2017-09-06 LAB
ALBUMIN SERPL-MCNC: 4.8 G/DL (ref 3.5–5)
ALBUMIN/GLOB SERPL: 1.8 G/DL (ref 1.1–2.5)
ALP SERPL-CCNC: 97 U/L (ref 24–120)
ALT SERPL-CCNC: 31 U/L (ref 0–54)
AST SERPL-CCNC: 29 U/L (ref 7–45)
BASOPHILS # BLD AUTO: 0.05 10*3/MM3 (ref 0–0.2)
BASOPHILS NFR BLD AUTO: 0.7 % (ref 0–2)
BILIRUB SERPL-MCNC: 0.5 MG/DL (ref 0.1–1)
BUN SERPL-MCNC: 13 MG/DL (ref 5–21)
BUN/CREAT SERPL: 17.8 (ref 7–25)
CALCIUM SERPL-MCNC: 10.1 MG/DL (ref 8.4–10.4)
CHLORIDE SERPL-SCNC: 99 MMOL/L (ref 98–110)
CO2 SERPL-SCNC: 26 MMOL/L (ref 24–31)
CREAT SERPL-MCNC: 0.73 MG/DL (ref 0.5–1.4)
EOSINOPHIL # BLD AUTO: 0.24 10*3/MM3 (ref 0–0.7)
EOSINOPHIL NFR BLD AUTO: 3.2 % (ref 0–4)
ERYTHROCYTE [DISTWIDTH] IN BLOOD BY AUTOMATED COUNT: 14.7 % (ref 12–15)
GLOBULIN SER CALC-MCNC: 2.7 GM/DL
GLUCOSE SERPL-MCNC: 35 MG/DL (ref 70–100)
HCT VFR BLD AUTO: 46.5 % (ref 37–47)
HGB BLD-MCNC: 14 G/DL (ref 12–16)
IMM GRANULOCYTES # BLD: 0.01 10*3/MM3 (ref 0–0.03)
IMM GRANULOCYTES NFR BLD: 0.1 % (ref 0–5)
IRON SATN MFR SERPL: 31 % (ref 20–45)
IRON SERPL-MCNC: 102 MCG/DL (ref 42–180)
LYMPHOCYTES # BLD AUTO: 2.02 10*3/MM3 (ref 0.72–4.86)
LYMPHOCYTES NFR BLD AUTO: 26.8 % (ref 15–45)
MCH RBC QN AUTO: 32.5 PG (ref 28–32)
MCHC RBC AUTO-ENTMCNC: 30.1 G/DL (ref 33–36)
MCV RBC AUTO: 107.9 FL (ref 82–98)
MONOCYTES # BLD AUTO: 0.75 10*3/MM3 (ref 0.19–1.3)
MONOCYTES NFR BLD AUTO: 10 % (ref 4–12)
NEUTROPHILS # BLD AUTO: 4.46 10*3/MM3 (ref 1.87–8.4)
NEUTROPHILS NFR BLD AUTO: 59.2 % (ref 39–78)
NRBC BLD AUTO-RTO: 0 /100 WBC (ref 0–0)
PLATELET # BLD AUTO: 319 10*3/MM3 (ref 130–400)
POTASSIUM SERPL-SCNC: 3.5 MMOL/L (ref 3.5–5.3)
PROT SERPL-MCNC: 7.5 G/DL (ref 6.3–8.7)
RBC # BLD AUTO: 4.31 10*6/MM3 (ref 4.2–5.4)
SODIUM SERPL-SCNC: 142 MMOL/L (ref 135–145)
TIBC SERPL-MCNC: 334 MCG/DL (ref 225–420)
UIBC SERPL-MCNC: 232 MCG/DL
WBC # BLD AUTO: 7.53 10*3/MM3 (ref 4.8–10.8)

## 2017-09-12 ENCOUNTER — HOSPITAL ENCOUNTER (OUTPATIENT)
Dept: CARDIOLOGY | Facility: HOSPITAL | Age: 67
Discharge: HOME | End: 2017-09-12
Attending: FAMILY MEDICINE | Admitting: FAMILY MEDICINE

## 2017-09-12 VITALS
DIASTOLIC BLOOD PRESSURE: 70 MMHG | WEIGHT: 212 LBS | HEIGHT: 63 IN | BODY MASS INDEX: 37.56 KG/M2 | SYSTOLIC BLOOD PRESSURE: 120 MMHG

## 2017-09-12 DIAGNOSIS — R79.9 ABNORMAL BLOOD CHEMISTRY: ICD-10-CM

## 2017-09-12 DIAGNOSIS — R06.02 SHORTNESS OF BREATH: ICD-10-CM

## 2017-09-12 DIAGNOSIS — D64.9 ANEMIA, UNSPECIFIED TYPE: ICD-10-CM

## 2017-09-12 DIAGNOSIS — E16.2 LOW BLOOD SUGAR: Primary | ICD-10-CM

## 2017-09-12 LAB
BH CV ECHO MEAS - AO MAX PG (FULL): 2.9 MMHG
BH CV ECHO MEAS - AO MAX PG: 6.7 MMHG
BH CV ECHO MEAS - AO MEAN PG (FULL): 1 MMHG
BH CV ECHO MEAS - AO MEAN PG: 3 MMHG
BH CV ECHO MEAS - AO ROOT AREA (BSA CORRECTED): 1.5
BH CV ECHO MEAS - AO ROOT AREA: 7.1 CM^2
BH CV ECHO MEAS - AO ROOT DIAM: 3 CM
BH CV ECHO MEAS - AO V2 MAX: 129 CM/SEC
BH CV ECHO MEAS - AO V2 MEAN: 78.1 CM/SEC
BH CV ECHO MEAS - AO V2 VTI: 29.5 CM
BH CV ECHO MEAS - AVA(I,A): 2.6 CM^2
BH CV ECHO MEAS - AVA(I,D): 2.6 CM^2
BH CV ECHO MEAS - AVA(V,A): 2.4 CM^2
BH CV ECHO MEAS - AVA(V,D): 2.4 CM^2
BH CV ECHO MEAS - BSA(HAYCOCK): 2.1 M^2
BH CV ECHO MEAS - BSA: 2 M^2
BH CV ECHO MEAS - BZI_BMI: 37.6 KILOGRAMS/M^2
BH CV ECHO MEAS - BZI_METRIC_HEIGHT: 160 CM
BH CV ECHO MEAS - BZI_METRIC_WEIGHT: 96.2 KG
BH CV ECHO MEAS - EDV(CUBED): 78.4 ML
BH CV ECHO MEAS - EDV(TEICH): 82.2 ML
BH CV ECHO MEAS - EF(CUBED): 82.8 %
BH CV ECHO MEAS - EF(TEICH): 76 %
BH CV ECHO MEAS - ESV(CUBED): 13.5 ML
BH CV ECHO MEAS - ESV(TEICH): 19.7 ML
BH CV ECHO MEAS - FS: 44.4 %
BH CV ECHO MEAS - IVS/LVPW: 0.93
BH CV ECHO MEAS - IVSD: 0.92 CM
BH CV ECHO MEAS - LA DIMENSION: 3.5 CM
BH CV ECHO MEAS - LA/AO: 1.2
BH CV ECHO MEAS - LAT PEAK E' VEL: 9 CM/SEC
BH CV ECHO MEAS - LV MASS(C)D: 133.3 GRAMS
BH CV ECHO MEAS - LV MASS(C)DI: 67.2 GRAMS/M^2
BH CV ECHO MEAS - LV MAX PG: 3.7 MMHG
BH CV ECHO MEAS - LV MEAN PG: 2 MMHG
BH CV ECHO MEAS - LV V1 MAX: 96.8 CM/SEC
BH CV ECHO MEAS - LV V1 MEAN: 65.4 CM/SEC
BH CV ECHO MEAS - LV V1 VTI: 24 CM
BH CV ECHO MEAS - LVIDD: 4.3 CM
BH CV ECHO MEAS - LVIDS: 2.4 CM
BH CV ECHO MEAS - LVOT AREA (M): 3.1 CM^2
BH CV ECHO MEAS - LVOT AREA: 3.1 CM^2
BH CV ECHO MEAS - LVOT DIAM: 2 CM
BH CV ECHO MEAS - LVPWD: 0.99 CM
BH CV ECHO MEAS - MED PEAK E' VEL: 6.31 CM/SEC
BH CV ECHO MEAS - MV A MAX VEL: 63.5 CM/SEC
BH CV ECHO MEAS - MV DEC TIME: 0.15 SEC
BH CV ECHO MEAS - MV E MAX VEL: 92.6 CM/SEC
BH CV ECHO MEAS - MV E/A: 1.5
BH CV ECHO MEAS - RAP SYSTOLE: 5 MMHG
BH CV ECHO MEAS - RVSP: 20.2 MMHG
BH CV ECHO MEAS - SI(AO): 105.2 ML/M^2
BH CV ECHO MEAS - SI(CUBED): 32.8 ML/M^2
BH CV ECHO MEAS - SI(LVOT): 38 ML/M^2
BH CV ECHO MEAS - SI(TEICH): 31.5 ML/M^2
BH CV ECHO MEAS - SV(AO): 208.5 ML
BH CV ECHO MEAS - SV(CUBED): 64.9 ML
BH CV ECHO MEAS - SV(LVOT): 75.4 ML
BH CV ECHO MEAS - SV(TEICH): 62.4 ML
BH CV ECHO MEAS - TR MAX VEL: 195 CM/SEC
E/E' RATIO: 14.7
LEFT ATRIUM VOLUME INDEX: 35.5 ML/M2
LEFT ATRIUM VOLUME: 70.2 CM3

## 2017-09-12 PROCEDURE — 93306 TTE W/DOPPLER COMPLETE: CPT | Performed by: INTERNAL MEDICINE

## 2017-09-12 PROCEDURE — 93306 TTE W/DOPPLER COMPLETE: CPT

## 2017-09-13 LAB
FOLATE SERPL-MCNC: >20 NG/ML
GLUCOSE 2H P MEAL SERPL-MCNC: 84 MG/DL (ref 70–100)
VIT B12 SERPL-MCNC: 724 PG/ML (ref 239–931)

## 2017-09-14 DIAGNOSIS — R06.02 SHORTNESS OF BREATH: Primary | ICD-10-CM

## 2017-09-14 NOTE — PROGRESS NOTES
Pt informed of all this. Referrals being done by [facility]. Pt informed to get an appt with Dr. Berg after all this is done.

## 2017-09-15 ENCOUNTER — RESULTS ENCOUNTER (OUTPATIENT)
Dept: FAMILY MEDICINE CLINIC | Facility: CLINIC | Age: 67
End: 2017-09-15

## 2017-09-15 DIAGNOSIS — E16.2 LOW BLOOD SUGAR: ICD-10-CM

## 2017-09-15 DIAGNOSIS — R79.9 ABNORMAL BLOOD CHEMISTRY: ICD-10-CM

## 2017-09-22 ENCOUNTER — HOSPITAL ENCOUNTER (OUTPATIENT)
Dept: PULMONOLOGY | Facility: HOSPITAL | Age: 67
Discharge: HOME | End: 2017-09-22
Attending: FAMILY MEDICINE | Admitting: FAMILY MEDICINE

## 2017-09-22 ENCOUNTER — HOSPITAL ENCOUNTER (OUTPATIENT)
Dept: CARDIOLOGY | Facility: HOSPITAL | Age: 67
Discharge: HOME | End: 2017-09-22
Attending: FAMILY MEDICINE

## 2017-09-22 VITALS — BODY MASS INDEX: 37.56 KG/M2 | HEIGHT: 63 IN | WEIGHT: 212 LBS

## 2017-09-22 VITALS — DIASTOLIC BLOOD PRESSURE: 70 MMHG | HEART RATE: 62 BPM | SYSTOLIC BLOOD PRESSURE: 122 MMHG

## 2017-09-22 DIAGNOSIS — R06.02 SHORTNESS OF BREATH: ICD-10-CM

## 2017-09-22 PROCEDURE — 93350 STRESS TTE ONLY: CPT | Performed by: INTERNAL MEDICINE

## 2017-09-22 PROCEDURE — 94060 EVALUATION OF WHEEZING: CPT

## 2017-09-22 PROCEDURE — 93352 ADMIN ECG CONTRAST AGENT: CPT | Performed by: INTERNAL MEDICINE

## 2017-09-22 PROCEDURE — 93017 CV STRESS TEST TRACING ONLY: CPT

## 2017-09-22 PROCEDURE — 93018 CV STRESS TEST I&R ONLY: CPT | Performed by: INTERNAL MEDICINE

## 2017-09-22 PROCEDURE — 25010000002 PERFLUTREN 6.52 MG/ML SUSPENSION: Performed by: INTERNAL MEDICINE

## 2017-09-22 PROCEDURE — 93350 STRESS TTE ONLY: CPT

## 2017-09-22 RX ORDER — ALBUTEROL SULFATE 2.5 MG/3ML
2.5 SOLUTION RESPIRATORY (INHALATION) ONCE
Status: COMPLETED | OUTPATIENT
Start: 2017-09-22 | End: 2017-09-22

## 2017-09-22 RX ADMIN — PERFLUTREN 8.48 MG: 6.52 INJECTION, SUSPENSION INTRAVENOUS at 10:46

## 2017-09-22 RX ADMIN — ALBUTEROL SULFATE 2.5 MG: 2.5 SOLUTION RESPIRATORY (INHALATION) at 10:25

## 2017-09-25 LAB
BH CV STRESS BP STAGE 2: NORMAL
BH CV STRESS DURATION MIN STAGE 1: 3
BH CV STRESS DURATION MIN STAGE 2: 1
BH CV STRESS DURATION SEC STAGE 1: 0
BH CV STRESS DURATION SEC STAGE 2: 8
BH CV STRESS GRADE STAGE 1: 10
BH CV STRESS GRADE STAGE 2: 12
BH CV STRESS HR STAGE 1: 135
BH CV STRESS HR STAGE 2: 150
BH CV STRESS METS STAGE 1: 5
BH CV STRESS METS STAGE 2: 7.5
BH CV STRESS PROTOCOL 1: NORMAL
BH CV STRESS RECOVERY BP: NORMAL MMHG
BH CV STRESS RECOVERY HR: 83 BPM
BH CV STRESS SPEED STAGE 1: 1.7
BH CV STRESS SPEED STAGE 2: 2.5
BH CV STRESS STAGE 1: 1
BH CV STRESS STAGE 2: 2
MAXIMAL PREDICTED HEART RATE: 151 BPM
PERCENT MAX PREDICTED HR: 99.34 %
STRESS BASELINE BP: NORMAL MMHG
STRESS BASELINE HR: 63 BPM
STRESS PERCENT HR: 117 %
STRESS POST EXERCISE DUR MIN: 4 MIN
STRESS POST EXERCISE DUR SEC: 8 SEC
STRESS POST PEAK BP: NORMAL MMHG
STRESS POST PEAK HR: 150 BPM
STRESS TARGET HR: 128 BPM

## 2017-09-27 ENCOUNTER — OFFICE VISIT (OUTPATIENT)
Dept: CARDIOLOGY | Facility: CLINIC | Age: 67
End: 2017-09-27

## 2017-09-27 VITALS
BODY MASS INDEX: 36.86 KG/M2 | HEART RATE: 60 BPM | DIASTOLIC BLOOD PRESSURE: 70 MMHG | SYSTOLIC BLOOD PRESSURE: 110 MMHG | WEIGHT: 208 LBS | HEIGHT: 63 IN

## 2017-09-27 DIAGNOSIS — Z79.01 ANTICOAGULATED: ICD-10-CM

## 2017-09-27 DIAGNOSIS — R06.02 SHORTNESS OF BREATH: Primary | ICD-10-CM

## 2017-09-27 DIAGNOSIS — I10 ESSENTIAL HYPERTENSION: Chronic | ICD-10-CM

## 2017-09-27 PROCEDURE — 99214 OFFICE O/P EST MOD 30 MIN: CPT | Performed by: INTERNAL MEDICINE

## 2017-09-27 PROCEDURE — 93000 ELECTROCARDIOGRAM COMPLETE: CPT | Performed by: INTERNAL MEDICINE

## 2017-09-27 NOTE — PROGRESS NOTES
Subjective:     Encounter Date:09/27/2017      Patient ID: Lucy Sousa is a 67 y.o. female with history of bilateral, submassive PE and cardiac arrest, in November 2016, s/p EKOS, here for routine follow-up.    Chief Complaint: Shortness of breath    Shortness of Breath   This is a chronic problem. The problem occurs intermittently. The problem has been waxing and waning. Associated symptoms include orthopnea (since 11/2016) and wheezing. Pertinent negatives include no abdominal pain, chest pain, claudication, fever, headaches, hemoptysis, leg swelling, neck pain, PND, sore throat, syncope or vomiting. The symptoms are aggravated by exercise. She has tried rest for the symptoms. The treatment provided significant relief. Her past medical history is significant for PE. There is no history of CAD, chronic lung disease, COPD, a heart failure, pneumonia or a recent surgery.   Hypertension   This is a chronic problem. The problem is unchanged. The problem is controlled. Associated symptoms include orthopnea (since 11/2016). Pertinent negatives include no anxiety, blurred vision, chest pain, headaches, malaise/fatigue, neck pain, palpitations, peripheral edema, PND, shortness of breath or sweats. Past treatments include angiotensin blockers and diuretics. The current treatment provides significant improvement. There are no compliance problems.  There is no history of angina, CAD/MI, CVA or heart failure.     The patient presents today for routine follow-up.  She has history of bilateral submassive pulmonary embolism with cardiac arrest at that time, now status post catheter directed thrombolytic is not chronically anticoagulated and she is tolerating this medication well.  She notes that since her PE diagnosis last November, she has had shortness of breath and dyspnea on exertion that has not necessarily gotten much better.  In addition, she continues to have what she describes as orthopnea, sleeping with the head  of the bed elevated.  She denies any PND or significant lower extremity edema.  She does have an intermittent cough and some intermittent wheezing.  She denies any lightheadedness, dizziness, sleeping, palpitations.  She was recently ordered a stress test and echocardiogram as well as pulmonary function tests by her primary care physician.  The results of these are referenced below.  She now returns today to discuss these results and to investigate for potential further cardiac causes of her shortness of breath.    Current Outpatient Prescriptions:   •  ALPRAZolam (XANAX) 0.5 MG tablet, Take 1 tablet by mouth 2 (Two) Times a Day As Needed for Anxiety., Disp: 30 tablet, Rfl: 0  •  Cholecalciferol (VITAMIN D) 2000 UNITS tablet, Take 1,000 Units by mouth Daily., Disp: , Rfl:   •  cyanocobalamin (VITAMIN B-12) 50 MCG tablet tablet, Take 1,000 mcg by mouth 1 (One) Time., Disp: , Rfl:   •  furosemide (LASIX) 20 MG tablet, TAKE ONE TABLET DAILY GENERIC FOR LASIX, Disp: 90 tablet, Rfl: 1  •  losartan (COZAAR) 50 MG tablet, Take 1 tablet by mouth Daily., Disp: 90 tablet, Rfl: 1  •  Multiple Vitamin (MULTI VITAMIN DAILY PO), Take 1 tablet by mouth daily., Disp: , Rfl:   •  omeprazole (PriLOSEC) 20 MG capsule, Take 20 mg by mouth daily as needed., Disp: , Rfl:   •  potassium chloride ER (K-TAB) 20 MEQ tablet controlled-release ER tablet, Take 1 tablet by mouth 2 (Two) Times a Day., Disp: 60 tablet, Rfl: 5  •  rivaroxaban (XARELTO) 20 MG tablet, Take 1 tablet by mouth Daily With Dinner., Disp: 90 tablet, Rfl: 1  •  triamterene-hydrochlorothiazide (DYAZIDE) 37.5-25 MG per capsule, Take 1 capsule by mouth Every Morning., Disp: 90 capsule, Rfl: 1  •  vitamin E 600 UNIT capsule, Take 400 Units by mouth Daily., Disp: , Rfl:     No Known Allergies    Social History   Substance Use Topics   • Smoking status: Never Smoker   • Smokeless tobacco: Never Used   • Alcohol use No     Review of Systems   Constitution: Negative for chills,  fever, weakness, malaise/fatigue, night sweats and weight loss.   HENT: Negative for congestion and sore throat.    Eyes: Negative for blurred vision.   Cardiovascular: Positive for dyspnea on exertion and orthopnea (since 11/2016). Negative for chest pain, claudication, irregular heartbeat, leg swelling, palpitations, paroxysmal nocturnal dyspnea and syncope.   Respiratory: Positive for wheezing. Negative for cough, hemoptysis and shortness of breath.    Endocrine: Negative for cold intolerance, heat intolerance, polydipsia and polyuria.   Hematologic/Lymphatic: Negative for bleeding problem. Does not bruise/bleed easily.   Musculoskeletal: Negative for neck pain.   Gastrointestinal: Negative for abdominal pain, hematemesis, hematochezia, melena, nausea and vomiting.   Genitourinary: Negative for bladder incontinence, dysuria and hematuria.   Neurological: Negative for dizziness, headaches, loss of balance, numbness, paresthesias and seizures.       ECG 12 Lead  Date/Time: 9/27/2017 9:31 AM  Performed by: RUDI WHITE  Authorized by: RUDI WHITE   Comparison: compared with previous ECG from 1/11/2017  Similar to previous ECG  Rhythm: sinus rhythm  Rate: normal  BPM: 60  Conduction: conduction normal  QRS axis: normal  Clinical impression: non-specific ECG  Comments: NSTT             Objective:     Physical Exam   Constitutional: She is oriented to person, place, and time. She appears well-developed and well-nourished. No distress.   HENT:   Head: Normocephalic and atraumatic.   Mouth/Throat: Oropharynx is clear and moist.   Eyes: EOM are normal. Pupils are equal, round, and reactive to light.   Neck: Normal range of motion. Neck supple. No JVD present. No thyromegaly present.   Cardiovascular: Normal rate, regular rhythm, S1 normal, S2 normal, normal heart sounds and intact distal pulses.  Exam reveals no gallop and no friction rub.    No murmur heard.  Pulmonary/Chest: Effort normal and breath  "sounds normal.   Abdominal: Soft. Bowel sounds are normal. She exhibits no distension. There is no tenderness.   Musculoskeletal: Normal range of motion. She exhibits no edema.   Neurological: She is alert and oriented to person, place, and time. No cranial nerve deficit.   Skin: Skin is warm and dry. No rash noted. No cyanosis or erythema. Nails show no clubbing.   Psychiatric: She has a normal mood and affect.   Vitals reviewed.    /70 (BP Location: Left arm, Patient Position: Sitting)  Pulse 60  Ht 63\" (160 cm)  Wt 208 lb (94.3 kg)  BMI 36.85 kg/m2    Data/Lab Review:     Stress echo 9/22/2017  · Normal stress echo with no significant echocardiographic evidence for myocardial ischemia.  · The patient reported shortness of breath during the stress test. The patient experienced no angina during the stress test.  · There was no ST segment deviation noted during stress.  · Fair exercise tolerance.    Echocardiogram 9/12/2017:  · Left ventricular systolic function is normal. Estimated EF appears to be in the range of 56 - 60%.  · Trace aortic valve regurgitation is present.  · Trace tricuspid valve regurgitation is present. Estimated right ventricular systolic pressure from tricuspid regurgitation is normal (<35 mmHg).      Assessment:          Diagnosis Plan   1. Shortness of breath     2. Essential hypertension     3. Anticoagulated-PE/cardiac arrest/xarelto            Plan:       1.  Shortness of breath: The patient's symptomatology may very well be multifactorial.  She has had recent pulmonary function test with no significant findings.  Symbicort was recommended by her primary care physician which she has not yet started.  Her echocardiogram shows no significant findings and no suggestion of pulmonary hypertension.  Her recent stress test showed fair exercise tolerance but a low risk for ischemia.  At this point, I do not feel that any further cardiac workup is indicated.  I think that given her history " of pulmonary embolus, there could be some concern for pulmonary hypertension that may be under appreciated by echocardiography however, I am not overly inclined at this point to go further with something like a right heart catheterization.  She is not in favor of this at the present time.  She says that she will talk with her primary care physician about starting Symbicort and if this helps, we would not go any further but if this does not help and she is still short of breath, I would suggest possibly a right heart catheterization to either rule in or out significant pulmonary hypertension.  She will call us an update us on her progress and we will plan to see her back in about 6 months otherwise.  In addition, I would encourage her to try to increase her activity level as much as possible, improve her diet, try to achieve some degree of weight loss, all in an effort to improve her overall respiratory status.    2.  Essential hypertension: The patient's blood pressure remains well-controlled.  Continue current medications.    3.  History of bilateral submassive pulmonary embolism status post catheter directed from lysis on chronic anticoagulation: The patient is tolerating anticoagulation well at this time without any significant bleeding difficulties.    Follow-up: 6 months unless needed sooner however the patient should call or return sooner if symptoms worsen or if she would like to proceed with right heart catheterization.    EMR Dragon/Transcription disclaimer: Much of this encounter note is an electronic transcription/translation of spoken language to printed text. The electronic translation of spoken language may permit erroneous, or at times, nonsensical words or phrases to be inadvertently transcribed; although I have reviewed the note for such errors, some may still exist.

## 2017-09-29 RX ORDER — BUDESONIDE AND FORMOTEROL FUMARATE DIHYDRATE 160; 4.5 UG/1; UG/1
2 AEROSOL RESPIRATORY (INHALATION)
Qty: 1 INHALER | Refills: 5 | Status: SHIPPED | OUTPATIENT
Start: 2017-09-29 | End: 2019-04-03

## 2017-10-09 ENCOUNTER — OFFICE VISIT (OUTPATIENT)
Dept: FAMILY MEDICINE CLINIC | Facility: CLINIC | Age: 67
End: 2017-10-09

## 2017-10-09 VITALS
RESPIRATION RATE: 18 BRPM | TEMPERATURE: 98.5 F | OXYGEN SATURATION: 98 % | SYSTOLIC BLOOD PRESSURE: 120 MMHG | HEIGHT: 63 IN | WEIGHT: 210 LBS | DIASTOLIC BLOOD PRESSURE: 80 MMHG | BODY MASS INDEX: 37.21 KG/M2 | HEART RATE: 110 BPM

## 2017-10-09 DIAGNOSIS — R06.02 SHORTNESS OF BREATH: ICD-10-CM

## 2017-10-09 DIAGNOSIS — Z23 NEED FOR VACCINATION: Primary | ICD-10-CM

## 2017-10-09 PROCEDURE — 99213 OFFICE O/P EST LOW 20 MIN: CPT | Performed by: FAMILY MEDICINE

## 2017-10-09 PROCEDURE — 90471 IMMUNIZATION ADMIN: CPT | Performed by: FAMILY MEDICINE

## 2017-10-09 PROCEDURE — 90472 IMMUNIZATION ADMIN EACH ADD: CPT | Performed by: FAMILY MEDICINE

## 2017-10-09 PROCEDURE — 90670 PCV13 VACCINE IM: CPT | Performed by: FAMILY MEDICINE

## 2017-10-09 PROCEDURE — 90686 IIV4 VACC NO PRSV 0.5 ML IM: CPT | Performed by: FAMILY MEDICINE

## 2017-10-09 NOTE — PROGRESS NOTES
Lucy Sousa, 1950,  Is here today to receive a Prevnar 13 vaccine.      PREVNAR 13 VACCINE CONSENT (VERBAL)      Verbal Consent:  I have been given a copy of the current Vaccine Information Statement published by the CDC and Prevention CDC and I have read the information about Prevnar 13 and the vaccine.  I have had an opportunity to ask questions, which were answered to my satisfaction.  I understand the benefits and risk of the Prevnar 13 vaccine as described and request that the Prevnar 13 vaccine be administered to me (or to the patient named below for whom I am legal guardian or for whom I hold power of  to make healthcare decisions).        Verbal Consent Give? [x] YES                [] NO      QUESTIONS    1. Do you have a moderate or severe acute illness with or without a fever?   No                                                                                                                              ,  2. For women, are you pregnant or do you expect to become pregnant during the month after your Prevnar 13 vaccination?   No    3. Are you allergic to eggs or egg products?   No    4. Are you allergic to latex?   No,    5. Are you allergic to thimerosal?   No    6.   After receiving a vaccination in the past, have you ever had a serious reation?   No,     7.   Have you developed a neurological illness (such as Guillain -[place] Syndrome) after getting vaccinated?   No     8.  Do you have any drug allergies? If the answer is YES, please inform Doctor or Nurse   No        Pt here for flu shot written consent given

## 2017-10-09 NOTE — PROGRESS NOTES
Subjective   Lucy Sousa is a 67 y.o. female presenting with chief complaint of:   Chief Complaint   Patient presents with   • Hypertension   • Shortness of Breath   • Anemia   • Depression   • Anxiety       History of Present Illness :  Alone.  Here for primarily an acute issue today; f/u sob eval.   Has multiple chronic problems to consider; not interval appointment.    Had visit with cardiology/roth and given ok to watch; he ? PH missed by echo and later need for R heart cath; EST/echo ok.  Echo:     · Left ventricular systolic function is normal. Estimated EF appears to be in the range of 56 - 60%.  · Trace aortic valve regurgitation is present.  · Trace tricuspid valve regurgitation is present. Estimated right ventricular systolic pressure from tricuspid regurgitation is normal (<35 mmHg).     PFTs minor changes; though she feels better with emperic symbicort    Other chronic problem/s to consider:   HTN.  The HTN has been present for years/it is chronic.  The HTN is assumed essential/without testing needed to look for other.  The HTN is controlled manifest by todays blood pressure and same home monitoring.  Associated illness below.   Anemia/iron deficiency: This has been present for years/over a year.  It is chronic.  These has been GI evaulation in the past. There is no current melena, hematochezia. It has been benign to date and stable/watching; it is not resolved.  Anxiety: This has been present for years/over a year.  It is chronic.  It is stable.  It is associated with stressors: more so in the past.  Medications being used help.  Medications/Rx change not requested.  Depression: This has been present for years/over a year.  It is chronic.  It is stable.  It is associated with stressors: same as anxiety.   Medications being used help.Medications/Rx change not requested.   No suicide ideation/intent.   GE reflux/heartburn: This has been present for years/over a year.  It is a chronic condition.    It is stable as there is no change in infrequent heartburn and no dysphagia.  Medication required to control symptoms.  Prilosec treated  Obesity/overweight: This has been present for years/over a year.  It is chronic.     It is stable; there has been no recent major change in weight, or dieting  Associated illnesses noted that are affected by this illness.     Has an/another acute issue today: .    The following portions of the patient's history were reviewed and updated as appropriate: allergies, current medications, past family history, past medical history, past social history, past surgical history and problem list.  Records acquired and reviewed; TCC migrated.      Current Outpatient Prescriptions:   •  ALPRAZolam (XANAX) 0.5 MG tablet, Take 1 tablet by mouth 2 (Two) Times a Day As Needed for Anxiety., Disp: 30 tablet, Rfl: 0  •  budesonide-formoterol (SYMBICORT) 160-4.5 MCG/ACT inhaler, Inhale 2 puffs 2 (Two) Times a Day., Disp: 1 inhaler, Rfl: 5  •  Cholecalciferol (VITAMIN D) 2000 UNITS tablet, Take 1,000 Units by mouth Daily., Disp: , Rfl:   •  cyanocobalamin (VITAMIN B-12) 50 MCG tablet tablet, Take 1,000 mcg by mouth 1 (One) Time., Disp: , Rfl:   •  furosemide (LASIX) 20 MG tablet, TAKE ONE TABLET DAILY GENERIC FOR LASIX, Disp: 90 tablet, Rfl: 1  •  losartan (COZAAR) 50 MG tablet, Take 1 tablet by mouth Daily., Disp: 90 tablet, Rfl: 1  •  Multiple Vitamin (MULTI VITAMIN DAILY PO), Take 1 tablet by mouth daily., Disp: , Rfl:   •  omeprazole (PriLOSEC) 20 MG capsule, Take 20 mg by mouth daily as needed., Disp: , Rfl:   •  potassium chloride ER (K-TAB) 20 MEQ tablet controlled-release ER tablet, Take 1 tablet by mouth 2 (Two) Times a Day., Disp: 60 tablet, Rfl: 5  •  rivaroxaban (XARELTO) 20 MG tablet, Take 1 tablet by mouth Daily With Dinner., Disp: 90 tablet, Rfl: 1  •  triamterene-hydrochlorothiazide (DYAZIDE) 37.5-25 MG per capsule, Take 1 capsule by mouth Every Morning., Disp: 90 capsule, Rfl: 1  •   vitamin E 600 UNIT capsule, Take 400 Units by mouth Daily., Disp: , Rfl:   No current facility-administered medications for this visit.     No Known Allergies    Review of Systems  GENERAL:  Active/slower with limits, speed, stamina for age; no regular exercise. Sleep is ok. No fever now.  ENDO:  No syncope, near or diaphoretic sweaty spells.  HEENT: No head injury or headache,  No vision change, No hearing loss.  Ears without pain/drainage.  No sore throat.  No significant nasal/sinus congestion/drainage. No epistaxis.  CHEST: No chest wall tenderness or mass. No choking/cough,  without wheeze, SOB; no hemoptysis.  CV: No chest pain, palpitations, ankle edema.  GI: No heartburn, dysphagia.  No abdominal pain, diarrhea, constipation, rectal bleeding, or melena.    :  Voids without dysuria, or incontinence to completion.  ORTHO: No painful/swollen joints but various on /off sore.  No sore neck or back.  No acute neck or back pain without recent injury.   NEURO: No dizziness, weakness of extremities.  No numbness/paresthesias.   PSYCH: No memory loss.  Mood good; not that anxious, depressed but/and not suicidal.  Tolerated stress.     Results for orders placed or performed during the hospital encounter of 09/22/17   Adult Stress Echo Only   Result Value Ref Range    BH CV STRESS PROTOCOL 1 Pramod     Stage 1 1     HR Stage 1 135     Duration Min Stage 1 3     Duration Sec Stage 1 0     Grade Stage 1 10     Speed Stage 1 1.7     BH CV STRESS METS STAGE 1 5     Stage 2 2     HR Stage 2 150     BP Stage 2 167/60     Duration Min Stage 2 1     Duration Sec Stage 2 8     Grade Stage 2 12     Speed Stage 2 2.5     BH CV STRESS METS STAGE 2 7.5     Baseline HR 63 bpm    Baseline /70 mmHg    Peak  bpm    Percent Max Pred HR 99.34 %    Percent Target  %    Peak /60 mmHg    Recovery HR 83 bpm    Recovery /65 mmHg    Target HR (85%) 128 bpm    Max. Pred. HR (100%) 151 bpm    Exercise duration (min)  4 min    Exercise duration (sec) 8 sec       No results found for: HGBA1C    Lab Results   Component Value Date     09/06/2017     01/06/2017     11/30/2016    K 3.5 09/06/2017    K 4.1 01/06/2017    K 4.0 11/30/2016    CL 99 09/06/2017     01/06/2017    CL 95 (L) 11/30/2016    CO2 26.0 09/06/2017    CO2 31.0 01/06/2017    CO2 29.0 11/30/2016    GLUCOSE 93 11/21/2016    GLUCOSE 89 11/20/2016    GLUCOSE 89 11/19/2016    BUN 13 09/06/2017    BUN 14 01/06/2017    BUN 16 11/30/2016    CREATININE 0.73 09/06/2017    CREATININE 0.81 01/06/2017    CREATININE 0.80 11/30/2016    CALCIUM 10.1 09/06/2017    CALCIUM 9.7 01/06/2017    CALCIUM 10.0 11/30/2016       No results found for: PSA     Lab Results:  CBC:  Lab Results - Last 18 Months  Lab Units 09/06/17  0657 02/16/17  0721 01/06/17  0733 11/30/16  1050 11/21/16  0908 11/19/16  1737 11/19/16  0503 11/18/16  0411 11/17/16  1843   WBC 10*3/mm3 7.53 4.67* 4.80 7.55  --   --  8.87 8.03 10.78   HEMATOCRIT % 46.5 44.0 41.7 37.9 27.6* 25.4* 25.0* 31.9* 41.5     CMP:  Lab Results - Last 18 Months  Lab Units 09/06/17  0657 01/06/17  0733 11/30/16  1050 11/21/16  0326 11/20/16  0248 11/19/16  0503 11/18/16  0411  08/29/16  0820   SODIUM mmol/L 142 140 137 137 137 138 138  < > 143   CHLORIDE mmol/L 99 101 95* 99 103 104 104  < > 102   CO2 mmol/L  --   --   --  31.0 29.0 27.0 28.0  < >  --    TOTAL CO2, ARTERIAL mmol/L 26.0 31.0 29.0  --   --   --   --   --  29   BUN mg/dL 13 14 16 14 11 12 12  < > 20   CREATININE mg/dL 0.73 0.81 0.80 0.75 0.77 0.72 0.74  < > 0.92   EGFR IF NONAFRICN AM mL/min/1.73 79 70 71 77 75 81 78  < > 61   EGFR IF AFRICN AM mL/min/1.73 96 85 86  --   --   --   --   --  74   CALCIUM mg/dL 10.1 9.7 10.0 8.8 8.5 8.4 8.1*  < > 10.2   < > = values in this interval not displayed.  THYROID:  Lab Results - Last 18 Months  Lab Units 08/31/17  0718 08/29/16  0820   TSH mIU/mL 3.470 5.220*     A1C:No results for input(s): HGBA1C in the last 54084  "hours.    Objective   /80  Pulse 110  Temp 98.5 °F (36.9 °C) (Oral)   Resp 18  Ht 63\" (160 cm)  Wt 210 lb (95.3 kg)  SpO2 98%  Breastfeeding? No  BMI 37.2 kg/m2    Physical Exam  GENERAL:  Well nourished/developed in no acute distress. Obese   SKIN: Turgor excellent, without wound, rash, lesion.  HEENT: Normal cephalic without trauma.  Pupils equal round reactive to light. Extraocular motions full without nystagmus.   External canals nonobstructive nontender without reddness. Tymphatic membranes sharri with shanika structures intact.   Oral cavity without growths, exudates, and moist.  Posterior pharynx without mass, obstruction, redness.  No thyromegaly, mass, tenderness, lymphadenopathy and supple.  CV: Regular rhythm.  No murmur, gallop, trace ankle edema. Posterior pulses intact.  No carotid bruits.  CHEST: No chest wall tenderness or mass.   LUNGS: Symmetric motion with clear to auscultation.    ABD: Soft, nontender without mass.   ORTHO: Symmetric extremities without swelling/point tenderness.  Full gross range of motion.  NEURO: CN 2-12 grossly intact.  Symmetric facies. 14 x bicep knee equal reflexes.  UE/LE   3/5 strength throughout.  Nonfocal use extremities. Speech clear.     PSYCH: Oriented x 3.  Pleasant calm, well kept.  Purposeful/directed conservation with intact short/long gross memory.     Assessment/Plan     1. Need for vaccination  - Flu Vaccine Quad PF 3YR+ (FLUARIX/FLUZONE [id number])  - pneumococcal conj. 13-valent (PREVNAR-13) vaccine 0.5 mL; Inject 0.5 mL into the shoulder, thigh, or buttocks 1 (One) Time.    2. Shortness of breath  Echo missed PH?; to watch.       Rx: reviewed.  Any other changes above and:   LAB: reviewed/above.  Orders above and:   Wrap-up/other instructions: discussed as applicable  Regular cardio exercise something everyone should consider and try to do; even if health limitations (ie find that exercise UE/LE/cardio that they can tolerate).  Normal weight a " goal for everyone.  Healthy diet helpful for weight management, illness prevention.  If over 50-screening exams include men PSA/rectal exam, women mammograms, and  everyone colonoscopy screening for colon cancer.     There are no Patient Instructions on file for this visit.    Follow up: No Follow-up on file.  Future Appointments  Date Time Provider Department Center   2/27/2018 8:15 AM [facility] None   3/6/2018 4:00 PM Rosas Berg MD [facility] None   3/27/2018 2:45 PM Daniel Underwood MD [facility] None

## 2017-10-30 ENCOUNTER — OFFICE VISIT (OUTPATIENT)
Dept: SURGERY | Age: 67
End: 2017-10-30
Payer: COMMERCIAL

## 2017-10-30 ENCOUNTER — HOSPITAL ENCOUNTER (OUTPATIENT)
Dept: WOMENS IMAGING | Age: 67
Discharge: HOME | End: 2017-10-30
Payer: COMMERCIAL

## 2017-10-30 VITALS
HEIGHT: 63 IN | DIASTOLIC BLOOD PRESSURE: 72 MMHG | HEART RATE: 80 BPM | BODY MASS INDEX: 37.03 KG/M2 | SYSTOLIC BLOOD PRESSURE: 124 MMHG | WEIGHT: 209 LBS

## 2017-10-30 DIAGNOSIS — Z12.31 VISIT FOR SCREENING MAMMOGRAM: ICD-10-CM

## 2017-10-30 DIAGNOSIS — Z12.31 VISIT FOR SCREENING MAMMOGRAM: Primary | ICD-10-CM

## 2017-10-30 PROCEDURE — 3014F SCREEN MAMMO DOC REV: CPT | Performed by: PHYSICIAN ASSISTANT

## 2017-10-30 PROCEDURE — G8484 FLU IMMUNIZE NO ADMIN: HCPCS | Performed by: PHYSICIAN ASSISTANT

## 2017-10-30 PROCEDURE — 1036F TOBACCO NON-USER: CPT | Performed by: PHYSICIAN ASSISTANT

## 2017-10-30 PROCEDURE — G8417 CALC BMI ABV UP PARAM F/U: HCPCS | Performed by: PHYSICIAN ASSISTANT

## 2017-10-30 PROCEDURE — 3017F COLORECTAL CA SCREEN DOC REV: CPT | Performed by: PHYSICIAN ASSISTANT

## 2017-10-30 PROCEDURE — G8400 PT W/DXA NO RESULTS DOC: HCPCS | Performed by: PHYSICIAN ASSISTANT

## 2017-10-30 PROCEDURE — G8427 DOCREV CUR MEDS BY ELIG CLIN: HCPCS | Performed by: PHYSICIAN ASSISTANT

## 2017-10-30 PROCEDURE — 77063 BREAST TOMOSYNTHESIS BI: CPT

## 2017-10-30 PROCEDURE — 1090F PRES/ABSN URINE INCON ASSESS: CPT | Performed by: PHYSICIAN ASSISTANT

## 2017-10-30 PROCEDURE — 1123F ACP DISCUSS/DSCN MKR DOCD: CPT | Performed by: PHYSICIAN ASSISTANT

## 2017-10-30 PROCEDURE — 99212 OFFICE O/P EST SF 10 MIN: CPT | Performed by: PHYSICIAN ASSISTANT

## 2017-10-30 PROCEDURE — 4040F PNEUMOC VAC/ADMIN/RCVD: CPT | Performed by: PHYSICIAN ASSISTANT

## 2017-10-30 RX ORDER — FUROSEMIDE 20 MG/1
TABLET ORAL
COMMUNITY
Start: 2017-08-02

## 2017-10-30 RX ORDER — BUDESONIDE AND FORMOTEROL FUMARATE DIHYDRATE 160; 4.5 UG/1; UG/1
2 AEROSOL RESPIRATORY (INHALATION)
COMMUNITY
Start: 2017-09-29

## 2017-10-30 RX ORDER — LOSARTAN POTASSIUM 50 MG/1
50 TABLET ORAL
COMMUNITY
Start: 2017-05-05

## 2017-10-31 ENCOUNTER — TELEPHONE (OUTPATIENT)
Dept: FAMILY MEDICINE CLINIC | Facility: CLINIC | Age: 67
End: 2017-10-31

## 2017-11-14 DIAGNOSIS — I10 ESSENTIAL HYPERTENSION: Primary | Chronic | ICD-10-CM

## 2017-11-14 RX ORDER — TRIAMTERENE AND HYDROCHLOROTHIAZIDE 37.5; 25 MG/1; MG/1
1 CAPSULE ORAL EVERY MORNING
Qty: 90 CAPSULE | Refills: 1 | Status: SHIPPED | OUTPATIENT
Start: 2017-11-14 | End: 2018-05-24 | Stop reason: SDUPTHER

## 2017-11-14 RX ORDER — LOSARTAN POTASSIUM 50 MG/1
50 TABLET ORAL DAILY
Qty: 90 TABLET | Refills: 1 | Status: SHIPPED | OUTPATIENT
Start: 2017-11-14 | End: 2018-05-24 | Stop reason: SDUPTHER

## 2017-11-20 DIAGNOSIS — F41.9 ANXIETY: Chronic | ICD-10-CM

## 2017-11-20 RX ORDER — ALPRAZOLAM 0.5 MG/1
0.5 TABLET ORAL 2 TIMES DAILY PRN
Qty: 30 TABLET | Refills: 0 | OUTPATIENT
Start: 2017-11-20 | End: 2017-12-22 | Stop reason: SDUPTHER

## 2017-12-01 ENCOUNTER — TELEPHONE (OUTPATIENT)
Dept: FAMILY MEDICINE CLINIC | Facility: CLINIC | Age: 67
End: 2017-12-01

## 2017-12-01 RX ORDER — BENZONATATE 100 MG/1
100 CAPSULE ORAL 3 TIMES DAILY PRN
Qty: 30 CAPSULE | Refills: 0 | Status: SHIPPED | OUTPATIENT
Start: 2017-12-01 | End: 2017-12-26 | Stop reason: SDUPTHER

## 2017-12-01 RX ORDER — AZITHROMYCIN 250 MG/1
TABLET, FILM COATED ORAL
Qty: 6 TABLET | Refills: 0 | Status: SHIPPED | OUTPATIENT
Start: 2017-12-01 | End: 2017-12-26 | Stop reason: SDUPTHER

## 2017-12-01 NOTE — TELEPHONE ENCOUNTER
PHONE CALL-NURSE       Lucy Sousa  1950      1. Your problem is , sinus drainage with clear to yellow with some blood, hoarseness, coughing non productive    2. Onset first sxs , last weekend    3. Fever , 100.0 at night mostly    4. If yes, taken or felt feverish    5. How high has it gone    6. Coming down now    7. Wants to be seen    8. Prefers per phone    9. Would come in if provider requests    10. Rx/Allergy list correct , yes    11. Cough , dry hack     12. SOB , with excertion    13. Wheezing , no    14. Nausea/Vomiting , just upset stomach    15. Diarrhea , no    16. If female, any chance of pregnancy    17. If no chance of pregnancy/why    18. Breast feeding    19. Anything else they want us to know , need something for the cough, the pearles?    20. Pharmacy , MD2      9.18.13 sore throat, later cough-dry, ear fullness, nasal congestion ? low grade temp  no wheeze, SOB, n&v, diarrhea  9.23.13 ph/ro encounter  Z pack      Verbal per Dr Berg ok to repeat last and tessalon perles 100mg tid prn #30, Zpack  notified pt if not better or worse to go to ER

## 2017-12-22 DIAGNOSIS — F41.9 ANXIETY: Chronic | ICD-10-CM

## 2017-12-22 RX ORDER — RIVAROXABAN 20 MG/1
TABLET, FILM COATED ORAL
Qty: 90 TABLET | Refills: 1 | Status: ON HOLD | OUTPATIENT
Start: 2017-12-22 | End: 2018-03-28

## 2017-12-22 RX ORDER — ALPRAZOLAM 0.5 MG/1
TABLET ORAL
Qty: 30 TABLET | Refills: 0 | Status: ON HOLD | OUTPATIENT
Start: 2017-12-22 | End: 2018-03-28

## 2017-12-26 ENCOUNTER — CLINICAL SUPPORT (OUTPATIENT)
Dept: FAMILY MEDICINE CLINIC | Facility: CLINIC | Age: 67
End: 2017-12-26

## 2017-12-26 ENCOUNTER — TELEPHONE (OUTPATIENT)
Dept: FAMILY MEDICINE CLINIC | Facility: CLINIC | Age: 67
End: 2017-12-26

## 2017-12-26 DIAGNOSIS — R68.89 FLU-LIKE SYMPTOMS: Primary | ICD-10-CM

## 2017-12-26 LAB
EXPIRATION DATE: NORMAL
FLUAV AG NPH QL: NORMAL
FLUBV AG NPH QL: NORMAL
INTERNAL CONTROL: NORMAL
Lab: NORMAL

## 2017-12-26 PROCEDURE — 87804 INFLUENZA ASSAY W/OPTIC: CPT | Performed by: FAMILY MEDICINE

## 2017-12-26 RX ORDER — AZITHROMYCIN 250 MG/1
TABLET, FILM COATED ORAL
Qty: 6 TABLET | Refills: 0 | Status: SHIPPED | OUTPATIENT
Start: 2017-12-26 | End: 2018-03-06

## 2017-12-26 RX ORDER — BENZONATATE 100 MG/1
100 CAPSULE ORAL 3 TIMES DAILY PRN
Qty: 30 CAPSULE | Refills: 0 | Status: SHIPPED | OUTPATIENT
Start: 2017-12-26 | End: 2018-03-06

## 2018-01-11 RX ORDER — CEFDINIR 300 MG/1
300 CAPSULE ORAL
Qty: 20 CAPSULE | Refills: 0 | Status: SHIPPED | OUTPATIENT
Start: 2018-01-11 | End: 2018-03-06

## 2018-02-26 DIAGNOSIS — F41.9 ANXIETY: Chronic | ICD-10-CM

## 2018-02-26 DIAGNOSIS — M54.9 CHRONIC BACK PAIN, UNSPECIFIED BACK LOCATION, UNSPECIFIED BACK PAIN LATERALITY: ICD-10-CM

## 2018-02-26 DIAGNOSIS — Z86.711 HISTORY OF PULMONARY EMBOLISM: ICD-10-CM

## 2018-02-26 DIAGNOSIS — R06.02 SHORTNESS OF BREATH: ICD-10-CM

## 2018-02-26 DIAGNOSIS — K92.1 BLOOD IN STOOL: ICD-10-CM

## 2018-02-26 DIAGNOSIS — D64.9 ANEMIA, UNSPECIFIED TYPE: ICD-10-CM

## 2018-02-26 DIAGNOSIS — F32.A DEPRESSION, UNSPECIFIED DEPRESSION TYPE: Chronic | ICD-10-CM

## 2018-02-26 DIAGNOSIS — Z00.00 WELLNESS EXAMINATION: ICD-10-CM

## 2018-02-26 DIAGNOSIS — M19.90 OSTEOARTHRITIS, UNSPECIFIED OSTEOARTHRITIS TYPE, UNSPECIFIED SITE: Chronic | ICD-10-CM

## 2018-02-26 DIAGNOSIS — R53.82 CHRONIC FATIGUE: ICD-10-CM

## 2018-02-26 DIAGNOSIS — E89.40 SURGICAL MENOPAUSE: Chronic | ICD-10-CM

## 2018-02-26 DIAGNOSIS — E55.9 VITAMIN D DEFICIENCY: ICD-10-CM

## 2018-02-26 DIAGNOSIS — Z79.01 ANTICOAGULATED: ICD-10-CM

## 2018-02-26 DIAGNOSIS — E87.6 HYPOPOTASSEMIA: ICD-10-CM

## 2018-02-26 DIAGNOSIS — G89.29 CHRONIC BACK PAIN, UNSPECIFIED BACK LOCATION, UNSPECIFIED BACK PAIN LATERALITY: ICD-10-CM

## 2018-02-26 DIAGNOSIS — I10 ESSENTIAL HYPERTENSION: Primary | Chronic | ICD-10-CM

## 2018-02-26 DIAGNOSIS — D75.89 MACROCYTOSIS: Chronic | ICD-10-CM

## 2018-02-26 DIAGNOSIS — B35.1 ONYCHOMYCOSIS: Chronic | ICD-10-CM

## 2018-02-27 ENCOUNTER — RESULTS ENCOUNTER (OUTPATIENT)
Dept: FAMILY MEDICINE CLINIC | Facility: CLINIC | Age: 68
End: 2018-02-27

## 2018-02-27 DIAGNOSIS — F32.A DEPRESSION, UNSPECIFIED DEPRESSION TYPE: Chronic | ICD-10-CM

## 2018-02-27 DIAGNOSIS — Z79.01 ANTICOAGULATED: ICD-10-CM

## 2018-02-27 DIAGNOSIS — E87.6 HYPOPOTASSEMIA: ICD-10-CM

## 2018-02-27 DIAGNOSIS — B35.1 ONYCHOMYCOSIS: Chronic | ICD-10-CM

## 2018-02-27 DIAGNOSIS — F41.9 ANXIETY: Chronic | ICD-10-CM

## 2018-02-27 DIAGNOSIS — I10 ESSENTIAL HYPERTENSION: Chronic | ICD-10-CM

## 2018-02-27 DIAGNOSIS — G89.29 CHRONIC BACK PAIN, UNSPECIFIED BACK LOCATION, UNSPECIFIED BACK PAIN LATERALITY: ICD-10-CM

## 2018-02-27 DIAGNOSIS — Z86.711 HISTORY OF PULMONARY EMBOLISM: ICD-10-CM

## 2018-02-27 DIAGNOSIS — Z00.00 WELLNESS EXAMINATION: ICD-10-CM

## 2018-02-27 DIAGNOSIS — E89.40 SURGICAL MENOPAUSE: Chronic | ICD-10-CM

## 2018-02-27 DIAGNOSIS — K92.1 BLOOD IN STOOL: ICD-10-CM

## 2018-02-27 DIAGNOSIS — R06.02 SHORTNESS OF BREATH: ICD-10-CM

## 2018-02-27 DIAGNOSIS — D75.89 MACROCYTOSIS: Chronic | ICD-10-CM

## 2018-02-27 DIAGNOSIS — D64.9 ANEMIA, UNSPECIFIED TYPE: ICD-10-CM

## 2018-02-27 DIAGNOSIS — M19.90 OSTEOARTHRITIS, UNSPECIFIED OSTEOARTHRITIS TYPE, UNSPECIFIED SITE: Chronic | ICD-10-CM

## 2018-02-27 DIAGNOSIS — R53.82 CHRONIC FATIGUE: ICD-10-CM

## 2018-02-27 DIAGNOSIS — M54.9 CHRONIC BACK PAIN, UNSPECIFIED BACK LOCATION, UNSPECIFIED BACK PAIN LATERALITY: ICD-10-CM

## 2018-02-27 DIAGNOSIS — E55.9 VITAMIN D DEFICIENCY: ICD-10-CM

## 2018-02-27 LAB
25(OH)D3+25(OH)D2 SERPL-MCNC: 26.8 NG/ML (ref 30–100)
ALBUMIN SERPL-MCNC: 3.7 G/DL (ref 3.5–5)
ALBUMIN/GLOB SERPL: 1.5 G/DL (ref 1.1–2.5)
ALP SERPL-CCNC: 66 U/L (ref 24–120)
ALT SERPL-CCNC: 30 U/L (ref 0–54)
AST SERPL-CCNC: 19 U/L (ref 7–45)
BASOPHILS # BLD AUTO: 0.05 10*3/MM3 (ref 0–0.2)
BASOPHILS NFR BLD AUTO: 0.8 % (ref 0–2)
BILIRUB SERPL-MCNC: 0.4 MG/DL (ref 0.1–1)
BUN SERPL-MCNC: 12 MG/DL (ref 5–21)
BUN/CREAT SERPL: 15.8 (ref 7–25)
CALCIUM SERPL-MCNC: 10 MG/DL (ref 8.4–10.4)
CHLORIDE SERPL-SCNC: 102 MMOL/L (ref 98–110)
CHOLEST SERPL-MCNC: 181 MG/DL (ref 130–200)
CO2 SERPL-SCNC: 29 MMOL/L (ref 24–31)
CREAT SERPL-MCNC: 0.76 MG/DL (ref 0.5–1.4)
EOSINOPHIL # BLD AUTO: 0.2 10*3/MM3 (ref 0–0.7)
EOSINOPHIL NFR BLD AUTO: 3.2 % (ref 0–4)
ERYTHROCYTE [DISTWIDTH] IN BLOOD BY AUTOMATED COUNT: 12.8 % (ref 12–15)
FERRITIN SERPL-MCNC: 52.9 NG/ML (ref 11.1–264)
GFR SERPLBLD CREATININE-BSD FMLA CKD-EPI: 75 ML/MIN/1.73
GFR SERPLBLD CREATININE-BSD FMLA CKD-EPI: 91 ML/MIN/1.73
GLOBULIN SER CALC-MCNC: 2.4 GM/DL
GLUCOSE SERPL-MCNC: 94 MG/DL (ref 70–100)
HCT VFR BLD AUTO: 41.5 % (ref 37–47)
HDLC SERPL-MCNC: 39 MG/DL
HGB BLD-MCNC: 13.8 G/DL (ref 12–16)
IMM GRANULOCYTES # BLD: 0.01 10*3/MM3 (ref 0–0.03)
IMM GRANULOCYTES NFR BLD: 0.2 % (ref 0–5)
IRON SATN MFR SERPL: 32 % (ref 20–45)
IRON SERPL-MCNC: 102 MCG/DL (ref 42–180)
LDLC SERPL CALC-MCNC: 93 MG/DL (ref 0–99)
LYMPHOCYTES # BLD AUTO: 1.62 10*3/MM3 (ref 0.72–4.86)
LYMPHOCYTES NFR BLD AUTO: 25.6 % (ref 15–45)
MCH RBC QN AUTO: 32.2 PG (ref 28–32)
MCHC RBC AUTO-ENTMCNC: 33.3 G/DL (ref 33–36)
MCV RBC AUTO: 96.7 FL (ref 82–98)
MONOCYTES # BLD AUTO: 0.5 10*3/MM3 (ref 0.19–1.3)
MONOCYTES NFR BLD AUTO: 7.9 % (ref 4–12)
NEUTROPHILS # BLD AUTO: 3.96 10*3/MM3 (ref 1.87–8.4)
NEUTROPHILS NFR BLD AUTO: 62.3 % (ref 39–78)
NRBC BLD AUTO-RTO: 0 /100 WBC (ref 0–0)
PLATELET # BLD AUTO: 300 10*3/MM3 (ref 130–400)
POTASSIUM SERPL-SCNC: 4.1 MMOL/L (ref 3.5–5.3)
PROT SERPL-MCNC: 6.1 G/DL (ref 6.3–8.7)
RBC # BLD AUTO: 4.29 10*6/MM3 (ref 4.2–5.4)
SODIUM SERPL-SCNC: 141 MMOL/L (ref 135–145)
T4 FREE SERPL-MCNC: 1 NG/DL (ref 0.78–2.19)
TIBC SERPL-MCNC: 314 MCG/DL (ref 225–420)
TRIGL SERPL-MCNC: 247 MG/DL (ref 0–149)
TSH SERPL DL<=0.005 MIU/L-ACNC: 3.04 MIU/ML (ref 0.47–4.68)
UIBC SERPL-MCNC: 212 MCG/DL
VLDLC SERPL CALC-MCNC: 49.4 MG/DL
WBC # BLD AUTO: 6.34 10*3/MM3 (ref 4.8–10.8)

## 2018-03-06 ENCOUNTER — OFFICE VISIT (OUTPATIENT)
Dept: FAMILY MEDICINE CLINIC | Facility: CLINIC | Age: 68
End: 2018-03-06

## 2018-03-06 VITALS
HEIGHT: 63 IN | TEMPERATURE: 98.4 F | SYSTOLIC BLOOD PRESSURE: 120 MMHG | BODY MASS INDEX: 37.56 KG/M2 | RESPIRATION RATE: 18 BRPM | DIASTOLIC BLOOD PRESSURE: 80 MMHG | WEIGHT: 212 LBS | HEART RATE: 102 BPM | OXYGEN SATURATION: 92 %

## 2018-03-06 DIAGNOSIS — F32.A DEPRESSION, UNSPECIFIED DEPRESSION TYPE: Chronic | ICD-10-CM

## 2018-03-06 DIAGNOSIS — Z79.01 ANTICOAGULATED: ICD-10-CM

## 2018-03-06 DIAGNOSIS — M19.90 OSTEOARTHRITIS, UNSPECIFIED OSTEOARTHRITIS TYPE, UNSPECIFIED SITE: Chronic | ICD-10-CM

## 2018-03-06 DIAGNOSIS — I10 HYPERTENSION, UNSPECIFIED TYPE: Primary | Chronic | ICD-10-CM

## 2018-03-06 DIAGNOSIS — Z86.711 HISTORY OF PULMONARY EMBOLISM: ICD-10-CM

## 2018-03-06 DIAGNOSIS — K21.9 GASTROESOPHAGEAL REFLUX DISEASE, ESOPHAGITIS PRESENCE NOT SPECIFIED: Chronic | ICD-10-CM

## 2018-03-06 DIAGNOSIS — D64.9 ANEMIA, UNSPECIFIED TYPE: ICD-10-CM

## 2018-03-06 DIAGNOSIS — F41.9 ANXIETY: Chronic | ICD-10-CM

## 2018-03-06 DIAGNOSIS — I50.30 DIASTOLIC CONGESTIVE HEART FAILURE, UNSPECIFIED CONGESTIVE HEART FAILURE CHRONICITY: Chronic | ICD-10-CM

## 2018-03-06 PROCEDURE — 99213 OFFICE O/P EST LOW 20 MIN: CPT | Performed by: FAMILY MEDICINE

## 2018-03-06 NOTE — PROGRESS NOTES
Subjective   Lucy Sousa is a 68 y.o. female presenting with chief complaint of:   Chief Complaint   Patient presents with   • Hypertension   • Sore Throat   • Shortness of Breath   • Heartburn   • Anemia   • Anxiety   • Depression       History of Present Illness :  Alone.   Has multiple chronic problems to consider that might have a bearing on today's issues;  an interval appointment.       1. Hypertension, unspecified type    2. History of PE-ekos tx    3. Gastroesophageal reflux disease, esophagitis presence not specified    4. Anticoagulated-PE/cardiac arrest/xarelto    5. Anxiety    6. Anemia, unspecified type    7. Osteoarthritis, unspecified osteoarthritis type, unspecified site    8. Depression, unspecified depression type    9. Diastolic congestive heart failure, unspecified congestive heart failure chronicity        Other chronic problem/s to consider:   HTN.  The HTN has been present for years/it is chronic.  The HTN is assumed essential/without testing needed to look for other.  The HTN is controlled manifest by todays blood pressure and same home monitoring.  Associated illness below.   Anxiety: This has been present for years/over a year.  It is chronic.  It is variable.  It is associated with stressors: home/work; son has had a lot of depression worrying her.  Medications being used help.   Medications/Rx change not requested.  Depression: This has been present for years/over a year.  It is chronic.  It is worse.  It is associated with stressors: same as anxiety.  Medications being used help.Medications/Rx change not requested. No suicide ideation/intent.   Congestive heart failure/echo changes: This has been present for years/over a year.  It is chronic.  The CHF had features on echo of diastolic dysfunction.  This has been associated with SOB, LE edema on/off.   Degenerative Joint Disease/arthritis:  This has been present for years/over a year.  It is a chronic condition.  It causes on/off pain  and joint stiffness.  There is no joint swelling;  mostly knees.   GE reflux/heartburn: This has been present for years/over a year.  It is a chronic condition.   It is stable as there is no change in infrequent heartburn and no dysphagia.  Medication required to control symptoms. Prilosec treated  Obesity/overweight: This has been present for years/over a year.  It is chronic.     It is stable; there has been no recent major change in weight, or dieting  Associated illnesses noted that are affected by this illness.   Anticoagulated:  Xarelto for PE/DVT; decision to leave on long term if/as tolerated. Hx EKOS    Has an/another acute issue today: none.    The following portions of the patient's history were reviewed and updated as appropriate: allergies, current medications, past family history, past medical history, past social history, past surgical history and problem list.  Records acquired and reviewed; TCC migrated.      Current Outpatient Prescriptions:   •  ALPRAZolam (XANAX) 0.5 MG tablet, TAKE ONE TABLET TWICE DAILY AS NEEDED FOR ANXIETY GENERIC FOR XANAX, Disp: 30 tablet, Rfl: 0  •  budesonide-formoterol (SYMBICORT) 160-4.5 MCG/ACT inhaler, Inhale 2 puffs 2 (Two) Times a Day. (Patient taking differently: Inhale 2 puffs 2 (Two) Times a Day As Needed (SOB/WHEEZE).), Disp: 1 inhaler, Rfl: 5  •  Cholecalciferol (VITAMIN D) 2000 UNITS tablet, Take 1,000 Units by mouth Daily., Disp: , Rfl:   •  cyanocobalamin (VITAMIN B-12) 50 MCG tablet tablet, Take 1,000 mcg by mouth 1 (One) Time., Disp: , Rfl:   •  furosemide (LASIX) 20 MG tablet, TAKE ONE TABLET DAILY GENERIC FOR LASIX, Disp: 90 tablet, Rfl: 1  •  losartan (COZAAR) 50 MG tablet, Take 1 tablet by mouth Daily., Disp: 90 tablet, Rfl: 1  •  Multiple Vitamin (MULTI VITAMIN DAILY PO), Take 1 tablet by mouth daily., Disp: , Rfl:   •  omeprazole (PriLOSEC) 20 MG capsule, Take 20 mg by mouth daily as needed., Disp: , Rfl:   •  potassium chloride ER (K-TAB) 20 MEQ  tablet controlled-release ER tablet, Take 1 tablet by mouth 2 (Two) Times a Day., Disp: 60 tablet, Rfl: 5  •  triamterene-hydrochlorothiazide (DYAZIDE) 37.5-25 MG per capsule, Take 1 capsule by mouth Every Morning., Disp: 90 capsule, Rfl: 1  •  vitamin E 600 UNIT capsule, Take 400 Units by mouth Daily., Disp: , Rfl:   •  XARELTO 20 MG tablet, TAKE ONE TABLET DAILY WITH DINNER, Disp: 90 tablet, Rfl: 1    No problems with medications.  Refills if needed done    No Known Allergies    Review of Systems  GENERAL:  Active/slower with limits, speed, stamina for age and desire; no regular exercise. Sleep is  Ok without apnea. No fever now.  SKIN: No  rash/skin lesion of concern:   ENDO:  No syncope, near or diaphoretic sweaty spells.  HEENT: No recent head injury; or change occ headache,  No vision change, No significant hearing loss.  Ears without pain/drainage. No significant nasal/sinus congestion/drainage. No epistaxis.  CHEST: No chest wall tenderness or mass. No cough,  without wheeze.  No SOB; no hemoptysis.  CV: No chest pain, palpitations, ankle edema.  GI: No heartburn, dysphagia.  No abdominal pain, diarrhea, constipation.  No rectal bleeding, or melena.    EGD+gastric ulcer-biop/[facility]//10-21-09  EGD+neg-healing ulcer/[facility]//1-22-10  Colonoscopy+polyp-div/[facility]//2.13.14/5y     :  Voids without dysuria, or  incontinence to completion.  ORTHO: No painful/swollen joints but various on /off sore.  No change occ sore neck or back.  No acute neck or back pain without recent injury.  NEURO: No dizziness, weakness of extremities.  No numbness/paresthesias.   PSYCH: No memory loss.  Mood good; often anxious, depressed but/and not suicidal.  Tries to tolerate stress .     Results for orders placed or performed in visit on 02/27/18   Comprehensive metabolic panel   Result Value Ref Range    Glucose 94 70 - 100 mg/dL    BUN 12 5 - 21 mg/dL    Creatinine 0.76 0.50 - 1.40 mg/dL    eGFR Non African Am 75 >60 mL/min/1.73     eGFR African Am 91 >60 mL/min/1.73    BUN/Creatinine Ratio 15.8 7.0 - 25.0    Sodium 141 135 - 145 mmol/L    Potassium 4.1 3.5 - 5.3 mmol/L    Chloride 102 98 - 110 mmol/L    Total CO2 29.0 24.0 - 31.0 mmol/L    Calcium 10.0 8.4 - 10.4 mg/dL    Total Protein 6.1 (L) 6.3 - 8.7 g/dL    Albumin 3.70 3.50 - 5.00 g/dL    Globulin 2.4 gm/dL    A/G Ratio 1.5 1.1 - 2.5 g/dL    Total Bilirubin 0.4 0.1 - 1.0 mg/dL    Alkaline Phosphatase 66 24 - 120 U/L    AST (SGOT) 19 7 - 45 U/L    ALT (SGPT) 30 0 - 54 U/L   Lipid panel   Result Value Ref Range    Total Cholesterol 181 130 - 200 mg/dL    Triglycerides 247 (H) 0 - 149 mg/dL    HDL Cholesterol 39 (L) >=50 mg/dL    VLDL Cholesterol 49.4 mg/dL    LDL Cholesterol  93 0 - 99 mg/dL   TSH   Result Value Ref Range    TSH 3.040 0.470 - 4.680 mIU/mL   T4, Free   Result Value Ref Range    Free T4 1.00 0.78 - 2.19 ng/dL   Vitamin D 25 Hydroxy   Result Value Ref Range    25 Hydroxy, Vitamin D 26.8 (L) 30.0 - 100.0 ng/ml   Iron and TIBC   Result Value Ref Range    TIBC 314 225 - 420 mcg/dL    UIBC 212 mcg/dL    Iron 102 42 - 180 mcg/dL    Iron Saturation 32 20 - 45 %   Ferritin   Result Value Ref Range    Ferritin 52.90 11.10 - 264.00 ng/mL   CBC and Differential   Result Value Ref Range    WBC 6.34 4.80 - 10.80 10*3/mm3    RBC 4.29 4.20 - 5.40 10*6/mm3    Hemoglobin 13.8 12.0 - 16.0 g/dL    Hematocrit 41.5 37.0 - 47.0 %    MCV 96.7 82.0 - 98.0 fL    MCH 32.2 (H) 28.0 - 32.0 pg    MCHC 33.3 33.0 - 36.0 g/dL    RDW 12.8 12.0 - 15.0 %    Platelets 300 130 - 400 10*3/mm3    Neutrophil Rel % 62.3 39.0 - 78.0 %    Lymphocyte Rel % 25.6 15.0 - 45.0 %    Monocyte Rel % 7.9 4.0 - 12.0 %    Eosinophil Rel % 3.2 0.0 - 4.0 %    Basophil Rel % 0.8 0.0 - 2.0 %    Neutrophils Absolute 3.96 1.87 - 8.40 10*3/mm3    Lymphocytes Absolute 1.62 0.72 - 4.86 10*3/mm3    Monocytes Absolute 0.50 0.19 - 1.30 10*3/mm3    Eosinophils Absolute 0.20 0.00 - 0.70 10*3/mm3    Basophils Absolute 0.05 0.00 - 0.20 10*3/mm3  "   Immature Granulocyte Rel % 0.2 0.0 - 5.0 %    Immature Grans Absolute 0.01 0.00 - 0.03 10*3/mm3    nRBC 0.0 0.0 - 0.0 /100 WBC       No results found for: PSA     Lab Results:  CBC:    Lab Results - Last 18 Months  Lab Units 02/27/18  0719 09/06/17  0657 02/16/17  0721 01/06/17  0733 11/30/16  1050 11/21/16  0908 11/19/16  1737 11/19/16  0503 11/18/16  0411   WBC 10*3/mm3 6.34 7.53 4.67* 4.80 7.55  --   --  8.87 8.03   HEMATOCRIT % 41.5 46.5 44.0 41.7 37.9 27.6* 25.4* 25.0* 31.9*     CMP:    Lab Results - Last 18 Months  Lab Units 02/27/18  0719 09/06/17  0657 01/06/17  0733 11/30/16  1050 11/21/16  0326 11/20/16  0248 11/19/16  0503   SODIUM mmol/L 141 142 140 137 137 137 138   CHLORIDE mmol/L 102 99 101 95* 99 103 104   CO2 mmol/L  --   --   --   --  31.0 29.0 27.0   TOTAL CO2, ARTERIAL mmol/L 29.0 26.0 31.0 29.0  --   --   --    BUN mg/dL 12 13 14 16 14 11 12   CREATININE mg/dL 0.76 0.73 0.81 0.80 0.75 0.77 0.72   EGFR IF NONAFRICN AM mL/min/1.73 75 79 70 71 77 75 81   EGFR IF AFRICN AM mL/min/1.73 91 96 85 86  --   --   --    CALCIUM mg/dL 10.0 10.1 9.7 10.0 8.8 8.5 8.4     HEPATIC:    Lab Results - Last 18 Months  Lab Units 02/27/18  0719 09/06/17  0657   ALT (SGPT) U/L 30 31   AST (SGOT) U/L 19 29     THYROID:    Lab Results - Last 18 Months  Lab Units 02/27/18  0719 08/31/17  0718   TSH mIU/mL 3.040 3.470   FREE T4 ng/dL 1.00  --      A1C:No results for input(s): HGBA1C in the last 74233 hours.  PSA:No results for input(s): PSA in the last 18801 hours.    Objective   /80  Pulse 102  Temp 98.4 °F (36.9 °C) (Oral)   Resp 18  Ht 160 cm (63\")  Wt 96.2 kg (212 lb)  SpO2 92%  Breastfeeding? No  BMI 37.55 kg/m2    Physical Exam  GENERAL:  Well nourished/developed in no acute distress. Body mass index is 37.55 kg/(m^2).  SKIN: Turgor excellent, without wound, rash, lesion  HEENT: Normal cephalic without trauma.  Pupils equal round reactive to light. Extraocular motions full without nystagmus.   " External canals nonobstructive nontender without reddness. Tymphatic membranes sharri with shanika structures intact.   Oral cavity without growths, exudates, and moist.  Posterior pharynx without mass, obstruction, redness.  No thyromegaly, mass, tenderness, lymphadenopathy and supple.  CV: Regular rhythm.  No murmur, gallop,  edema. Posterior pulses intact.  No carotid bruits.  CHEST: No chest wall tenderness or mass.   LUNGS: Symmetric motion with clear to auscultation.  No dullness to percussion  ABD: Soft, nontender without mass.   ORTHO: Symmetric extremities without swelling/point tenderness.  Full gross range of motion.  NEURO: CN 2-12 grossly intact.  Symmetric facies. 1/4 x bicep knee equal reflexes.  UE/LE   3/5 strength throughout.  Nonfocal use extremities. Speech clear. Intact light touch with monofilament, vibratory sensation with tuning fork; equal toes/distal feet.    PSYCH: Oriented x 3.  Pleasant calm, well kept.  Purposeful/directed conservation with intact short/long gross memory.     Assessment/Plan     1. Hypertension, unspecified type    2. History of PE-ekos tx    3. Gastroesophageal reflux disease, esophagitis presence not specified    4. Anticoagulated-PE/cardiac arrest/xarelto    5. Anxiety    6. Anemia, unspecified type    7. Osteoarthritis, unspecified osteoarthritis type, unspecified site    8. Depression, unspecified depression type    9. Diastolic congestive heart failure, unspecified congestive heart failure chronicity        Rx: reviewed/changes:  Same  Discussed; as long as risk/benefit there stay on Xarelto    LAB: reviewed/orders:   Drop 3m  6m CBC, CMP, TSH, fT4  12m CBC, CMP, LIPID, TSH, fT4, Vit D iron, ferritin, B12, folate     Discussions:   Patient's BMI is above normal parameters. Follow-up plan includes:  exercise counseling and nutrition counseling.  Advised weight loss/exercise  Non-smoker      There are no Patient Instructions on file for this visit.    Follow up: Return  for lab during/or just before next apt;, Dr Berg-, 6 m;.  Future Appointments  Date Time Provider Department Center   3/27/2018 2:45 PM Daniel Underwood MD [facility] None   9/7/2018 9:15 AM Rosas Berg MD [facility] None

## 2018-03-27 ENCOUNTER — APPOINTMENT (OUTPATIENT)
Dept: CT IMAGING | Facility: HOSPITAL | Age: 68
End: 2018-03-27

## 2018-03-27 ENCOUNTER — HOSPITAL ENCOUNTER (OUTPATIENT)
Facility: HOSPITAL | Age: 68
Setting detail: OBSERVATION
Discharge: HOME | End: 2018-03-28
Attending: FAMILY MEDICINE | Admitting: FAMILY MEDICINE

## 2018-03-27 DIAGNOSIS — R42 DIZZINESS: Primary | ICD-10-CM

## 2018-03-27 DIAGNOSIS — R11.0 NAUSEA: ICD-10-CM

## 2018-03-27 DIAGNOSIS — R51.9 HEADACHE ABOVE THE EYE REGION: ICD-10-CM

## 2018-03-27 LAB
ALBUMIN SERPL-MCNC: 3.9 G/DL (ref 3.5–5)
ALBUMIN/GLOB SERPL: 1.3 G/DL (ref 1.1–2.5)
ALP SERPL-CCNC: 67 U/L (ref 24–120)
ALT SERPL W P-5'-P-CCNC: 29 U/L (ref 0–54)
ANION GAP SERPL CALCULATED.3IONS-SCNC: 11 MMOL/L (ref 4–13)
APTT PPP: 36.5 SECONDS (ref 24.1–34.8)
AST SERPL-CCNC: 26 U/L (ref 7–45)
BASOPHILS # BLD AUTO: 0.05 10*3/MM3 (ref 0–0.2)
BASOPHILS NFR BLD AUTO: 0.8 % (ref 0–2)
BILIRUB SERPL-MCNC: 0.3 MG/DL (ref 0.1–1)
BUN BLD-MCNC: 16 MG/DL (ref 5–21)
BUN/CREAT SERPL: 20 (ref 7–25)
CALCIUM SPEC-SCNC: 9.9 MG/DL (ref 8.4–10.4)
CHLORIDE SERPL-SCNC: 104 MMOL/L (ref 98–110)
CO2 SERPL-SCNC: 26 MMOL/L (ref 24–31)
CREAT BLD-MCNC: 0.8 MG/DL (ref 0.5–1.4)
CRP SERPL-MCNC: 0.74 MG/DL (ref 0–0.99)
DEPRECATED RDW RBC AUTO: 45.4 FL (ref 40–54)
EOSINOPHIL # BLD AUTO: 0.2 10*3/MM3 (ref 0–0.7)
EOSINOPHIL NFR BLD AUTO: 3.4 % (ref 0–4)
ERYTHROCYTE [DISTWIDTH] IN BLOOD BY AUTOMATED COUNT: 13 % (ref 12–15)
ERYTHROCYTE [SEDIMENTATION RATE] IN BLOOD: 10 MM/HR (ref 0–20)
GFR SERPL CREATININE-BSD FRML MDRD: 71 ML/MIN/1.73
GLOBULIN UR ELPH-MCNC: 2.9 GM/DL
GLUCOSE BLD-MCNC: 108 MG/DL (ref 70–100)
HCT VFR BLD AUTO: 41.5 % (ref 37–47)
HGB BLD-MCNC: 14.2 G/DL (ref 12–16)
HOLD SPECIMEN: NORMAL
HOLD SPECIMEN: NORMAL
IMM GRANULOCYTES # BLD: 0.02 10*3/MM3 (ref 0–0.03)
IMM GRANULOCYTES NFR BLD: 0.3 % (ref 0–5)
INR PPP: 1.11 (ref 0.91–1.09)
LYMPHOCYTES # BLD AUTO: 1.76 10*3/MM3 (ref 0.72–4.86)
LYMPHOCYTES NFR BLD AUTO: 29.7 % (ref 15–45)
MCH RBC QN AUTO: 32.6 PG (ref 28–32)
MCHC RBC AUTO-ENTMCNC: 34.2 G/DL (ref 33–36)
MCV RBC AUTO: 95.2 FL (ref 82–98)
MONOCYTES # BLD AUTO: 0.56 10*3/MM3 (ref 0.19–1.3)
MONOCYTES NFR BLD AUTO: 9.4 % (ref 4–12)
NEUTROPHILS # BLD AUTO: 3.34 10*3/MM3 (ref 1.87–8.4)
NEUTROPHILS NFR BLD AUTO: 56.4 % (ref 39–78)
NRBC BLD MANUAL-RTO: 0 /100 WBC (ref 0–0)
PLATELET # BLD AUTO: 310 10*3/MM3 (ref 130–400)
PMV BLD AUTO: 9.3 FL (ref 6–12)
POTASSIUM BLD-SCNC: 3.7 MMOL/L (ref 3.5–5.3)
PROT SERPL-MCNC: 6.8 G/DL (ref 6.3–8.7)
PROTHROMBIN TIME: 14.7 SECONDS (ref 11.9–14.6)
RBC # BLD AUTO: 4.36 10*6/MM3 (ref 4.2–5.4)
SODIUM BLD-SCNC: 141 MMOL/L (ref 135–145)
TROPONIN I SERPL-MCNC: <0.012 NG/ML (ref 0–0.03)
WBC NRBC COR # BLD: 5.93 10*3/MM3 (ref 4.8–10.8)
WHOLE BLOOD HOLD SPECIMEN: NORMAL
WHOLE BLOOD HOLD SPECIMEN: NORMAL

## 2018-03-27 PROCEDURE — 36415 COLL VENOUS BLD VENIPUNCTURE: CPT

## 2018-03-27 PROCEDURE — 93010 ELECTROCARDIOGRAM REPORT: CPT | Performed by: INTERNAL MEDICINE

## 2018-03-27 PROCEDURE — 70450 CT HEAD/BRAIN W/O DYE: CPT

## 2018-03-27 PROCEDURE — G0378 HOSPITAL OBSERVATION PER HR: HCPCS

## 2018-03-27 PROCEDURE — 99284 EMERGENCY DEPT VISIT MOD MDM: CPT

## 2018-03-27 PROCEDURE — 96374 THER/PROPH/DIAG INJ IV PUSH: CPT

## 2018-03-27 PROCEDURE — 80053 COMPREHEN METABOLIC PANEL: CPT | Performed by: FAMILY MEDICINE

## 2018-03-27 PROCEDURE — 25010000002 ONDANSETRON PER 1 MG: Performed by: FAMILY MEDICINE

## 2018-03-27 PROCEDURE — 96375 TX/PRO/DX INJ NEW DRUG ADDON: CPT

## 2018-03-27 PROCEDURE — 84484 ASSAY OF TROPONIN QUANT: CPT | Performed by: FAMILY MEDICINE

## 2018-03-27 PROCEDURE — 85610 PROTHROMBIN TIME: CPT | Performed by: FAMILY MEDICINE

## 2018-03-27 PROCEDURE — 85025 COMPLETE CBC W/AUTO DIFF WBC: CPT | Performed by: FAMILY MEDICINE

## 2018-03-27 PROCEDURE — 85730 THROMBOPLASTIN TIME PARTIAL: CPT | Performed by: FAMILY MEDICINE

## 2018-03-27 PROCEDURE — 86140 C-REACTIVE PROTEIN: CPT | Performed by: FAMILY MEDICINE

## 2018-03-27 PROCEDURE — 93005 ELECTROCARDIOGRAM TRACING: CPT | Performed by: FAMILY MEDICINE

## 2018-03-27 PROCEDURE — 85651 RBC SED RATE NONAUTOMATED: CPT | Performed by: FAMILY MEDICINE

## 2018-03-27 RX ORDER — SODIUM CHLORIDE 0.9 % (FLUSH) 0.9 %
1-10 SYRINGE (ML) INJECTION AS NEEDED
Status: DISCONTINUED | OUTPATIENT
Start: 2018-03-27 | End: 2018-03-28 | Stop reason: HOSPADM

## 2018-03-27 RX ORDER — MORPHINE SULFATE 2 MG/ML
1 INJECTION, SOLUTION INTRAMUSCULAR; INTRAVENOUS EVERY 4 HOURS PRN
Status: DISCONTINUED | OUTPATIENT
Start: 2018-03-27 | End: 2018-03-28 | Stop reason: HOSPADM

## 2018-03-27 RX ORDER — PANTOPRAZOLE SODIUM 40 MG/1
40 TABLET, DELAYED RELEASE ORAL
Status: DISCONTINUED | OUTPATIENT
Start: 2018-03-28 | End: 2018-03-28 | Stop reason: HOSPADM

## 2018-03-27 RX ORDER — ONDANSETRON 2 MG/ML
4 INJECTION INTRAMUSCULAR; INTRAVENOUS ONCE
Status: COMPLETED | OUTPATIENT
Start: 2018-03-27 | End: 2018-03-27

## 2018-03-27 RX ORDER — LORAZEPAM 2 MG/ML
0.5 INJECTION INTRAMUSCULAR EVERY 6 HOURS PRN
Status: DISCONTINUED | OUTPATIENT
Start: 2018-03-27 | End: 2018-03-28 | Stop reason: HOSPADM

## 2018-03-27 RX ORDER — ALPRAZOLAM 0.5 MG/1
0.5 TABLET ORAL 2 TIMES DAILY PRN
Status: DISCONTINUED | OUTPATIENT
Start: 2018-03-27 | End: 2018-03-28 | Stop reason: HOSPADM

## 2018-03-27 RX ORDER — BUDESONIDE AND FORMOTEROL FUMARATE DIHYDRATE 160; 4.5 UG/1; UG/1
2 AEROSOL RESPIRATORY (INHALATION)
Status: DISCONTINUED | OUTPATIENT
Start: 2018-03-28 | End: 2018-03-28 | Stop reason: HOSPADM

## 2018-03-27 RX ORDER — HYDROCODONE BITARTRATE AND ACETAMINOPHEN 5; 325 MG/1; MG/1
1 TABLET ORAL EVERY 4 HOURS PRN
Status: DISCONTINUED | OUTPATIENT
Start: 2018-03-27 | End: 2018-03-28 | Stop reason: HOSPADM

## 2018-03-27 RX ORDER — SODIUM CHLORIDE 9 MG/ML
100 INJECTION, SOLUTION INTRAVENOUS CONTINUOUS
Status: DISCONTINUED | OUTPATIENT
Start: 2018-03-27 | End: 2018-03-28

## 2018-03-27 RX ORDER — PROMETHAZINE HYDROCHLORIDE 25 MG/1
12.5 TABLET ORAL EVERY 6 HOURS PRN
Status: DISCONTINUED | OUTPATIENT
Start: 2018-03-27 | End: 2018-03-28 | Stop reason: HOSPADM

## 2018-03-27 RX ORDER — NALOXONE HCL 0.4 MG/ML
0.4 VIAL (ML) INJECTION
Status: DISCONTINUED | OUTPATIENT
Start: 2018-03-27 | End: 2018-03-28 | Stop reason: HOSPADM

## 2018-03-27 RX ADMIN — ONDANSETRON HYDROCHLORIDE 4 MG: 2 INJECTION INTRAMUSCULAR; INTRAVENOUS at 22:19

## 2018-03-28 VITALS
TEMPERATURE: 97.6 F | HEIGHT: 63 IN | SYSTOLIC BLOOD PRESSURE: 102 MMHG | DIASTOLIC BLOOD PRESSURE: 52 MMHG | WEIGHT: 211.13 LBS | HEART RATE: 62 BPM | RESPIRATION RATE: 18 BRPM | BODY MASS INDEX: 37.41 KG/M2 | OXYGEN SATURATION: 96 %

## 2018-03-28 DIAGNOSIS — R42 DIZZINESS: Primary | ICD-10-CM

## 2018-03-28 LAB
ANION GAP SERPL CALCULATED.3IONS-SCNC: 7 MMOL/L (ref 4–13)
BACTERIA UR QL AUTO: ABNORMAL /HPF
BACTERIA UR QL AUTO: ABNORMAL /HPF
BASOPHILS # BLD AUTO: 0.04 10*3/MM3 (ref 0–0.2)
BASOPHILS NFR BLD AUTO: 0.7 % (ref 0–2)
BILIRUB UR QL STRIP: NEGATIVE
BILIRUB UR QL STRIP: NEGATIVE
BUN BLD-MCNC: 14 MG/DL (ref 5–21)
BUN/CREAT SERPL: 18.4 (ref 7–25)
CALCIUM SPEC-SCNC: 9.3 MG/DL (ref 8.4–10.4)
CHLORIDE SERPL-SCNC: 105 MMOL/L (ref 98–110)
CLARITY UR: ABNORMAL
CLARITY UR: CLEAR
CO2 SERPL-SCNC: 32 MMOL/L (ref 24–31)
COLOR UR: ABNORMAL
COLOR UR: YELLOW
CREAT BLD-MCNC: 0.76 MG/DL (ref 0.5–1.4)
DEPRECATED RDW RBC AUTO: 46.6 FL (ref 40–54)
EOSINOPHIL # BLD AUTO: 0.18 10*3/MM3 (ref 0–0.7)
EOSINOPHIL NFR BLD AUTO: 3 % (ref 0–4)
ERYTHROCYTE [DISTWIDTH] IN BLOOD BY AUTOMATED COUNT: 13 % (ref 12–15)
GFR SERPL CREATININE-BSD FRML MDRD: 75 ML/MIN/1.73
GLUCOSE BLD-MCNC: 79 MG/DL (ref 70–100)
GLUCOSE UR STRIP-MCNC: NEGATIVE MG/DL
GLUCOSE UR STRIP-MCNC: NEGATIVE MG/DL
GRAN CASTS URNS QL MICRO: ABNORMAL /LPF
HCT VFR BLD AUTO: 39.8 % (ref 37–47)
HGB BLD-MCNC: 13.2 G/DL (ref 12–16)
HGB UR QL STRIP.AUTO: NEGATIVE
HGB UR QL STRIP.AUTO: NEGATIVE
HYALINE CASTS UR QL AUTO: ABNORMAL /LPF
HYALINE CASTS UR QL AUTO: ABNORMAL /LPF
IMM GRANULOCYTES # BLD: 0.01 10*3/MM3 (ref 0–0.03)
IMM GRANULOCYTES NFR BLD: 0.2 % (ref 0–5)
KETONES UR QL STRIP: NEGATIVE
KETONES UR QL STRIP: NEGATIVE
LEUKOCYTE ESTERASE UR QL STRIP.AUTO: NEGATIVE
LEUKOCYTE ESTERASE UR QL STRIP.AUTO: NEGATIVE
LYMPHOCYTES # BLD AUTO: 1.94 10*3/MM3 (ref 0.72–4.86)
LYMPHOCYTES NFR BLD AUTO: 32.3 % (ref 15–45)
MCH RBC QN AUTO: 32.3 PG (ref 28–32)
MCHC RBC AUTO-ENTMCNC: 33.2 G/DL (ref 33–36)
MCV RBC AUTO: 97.3 FL (ref 82–98)
MONOCYTES # BLD AUTO: 0.66 10*3/MM3 (ref 0.19–1.3)
MONOCYTES NFR BLD AUTO: 11 % (ref 4–12)
NEUTROPHILS # BLD AUTO: 3.18 10*3/MM3 (ref 1.87–8.4)
NEUTROPHILS NFR BLD AUTO: 52.8 % (ref 39–78)
NITRITE UR QL STRIP: POSITIVE
NITRITE UR QL STRIP: POSITIVE
NRBC BLD MANUAL-RTO: 0 /100 WBC (ref 0–0)
PH UR STRIP.AUTO: 6 [PH] (ref 5–8)
PH UR STRIP.AUTO: <=5 [PH] (ref 5–8)
PLATELET # BLD AUTO: 283 10*3/MM3 (ref 130–400)
PMV BLD AUTO: 9.2 FL (ref 6–12)
POTASSIUM BLD-SCNC: 3.3 MMOL/L (ref 3.5–5.3)
PROT UR QL STRIP: NEGATIVE
PROT UR QL STRIP: NEGATIVE
RBC # BLD AUTO: 4.09 10*6/MM3 (ref 4.2–5.4)
RBC # UR: ABNORMAL /HPF
RBC # UR: ABNORMAL /HPF
REF LAB TEST METHOD: ABNORMAL
REF LAB TEST METHOD: ABNORMAL
SODIUM BLD-SCNC: 144 MMOL/L (ref 135–145)
SP GR UR STRIP: 1.02 (ref 1–1.03)
SP GR UR STRIP: 1.03 (ref 1–1.03)
SQUAMOUS #/AREA URNS HPF: ABNORMAL /HPF
SQUAMOUS #/AREA URNS HPF: ABNORMAL /HPF
UROBILINOGEN UR QL STRIP: ABNORMAL
UROBILINOGEN UR QL STRIP: ABNORMAL
WBC NRBC COR # BLD: 6.01 10*3/MM3 (ref 4.8–10.8)
WBC UR QL AUTO: ABNORMAL /HPF
WBC UR QL AUTO: ABNORMAL /HPF

## 2018-03-28 PROCEDURE — 25810000003 SODIUM CHLORIDE 0.9 % WITH KCL 20 MEQ 20-0.9 MEQ/L-% SOLUTION: Performed by: FAMILY MEDICINE

## 2018-03-28 PROCEDURE — 81001 URINALYSIS AUTO W/SCOPE: CPT | Performed by: FAMILY MEDICINE

## 2018-03-28 PROCEDURE — 80048 BASIC METABOLIC PNL TOTAL CA: CPT | Performed by: FAMILY MEDICINE

## 2018-03-28 PROCEDURE — 36415 COLL VENOUS BLD VENIPUNCTURE: CPT | Performed by: FAMILY MEDICINE

## 2018-03-28 PROCEDURE — 85025 COMPLETE CBC W/AUTO DIFF WBC: CPT | Performed by: FAMILY MEDICINE

## 2018-03-28 PROCEDURE — P9612 CATHETERIZE FOR URINE SPEC: HCPCS

## 2018-03-28 PROCEDURE — 87086 URINE CULTURE/COLONY COUNT: CPT | Performed by: FAMILY MEDICINE

## 2018-03-28 PROCEDURE — 96361 HYDRATE IV INFUSION ADD-ON: CPT

## 2018-03-28 PROCEDURE — 87077 CULTURE AEROBIC IDENTIFY: CPT | Performed by: FAMILY MEDICINE

## 2018-03-28 PROCEDURE — G0378 HOSPITAL OBSERVATION PER HR: HCPCS

## 2018-03-28 PROCEDURE — 87186 SC STD MICRODIL/AGAR DIL: CPT | Performed by: FAMILY MEDICINE

## 2018-03-28 PROCEDURE — 99204 OFFICE O/P NEW MOD 45 MIN: CPT | Performed by: PSYCHIATRY & NEUROLOGY

## 2018-03-28 PROCEDURE — 99235 HOSP IP/OBS SAME DATE MOD 70: CPT | Performed by: FAMILY MEDICINE

## 2018-03-28 RX ORDER — MECLIZINE HYDROCHLORIDE 25 MG/1
25 TABLET ORAL 3 TIMES DAILY PRN
Qty: 30 TABLET | Refills: 0 | Status: SHIPPED | OUTPATIENT
Start: 2018-03-28 | End: 2019-03-12

## 2018-03-28 RX ORDER — FUROSEMIDE 20 MG/1
20 TABLET ORAL 2 TIMES DAILY
COMMUNITY
End: 2018-05-02 | Stop reason: SDUPTHER

## 2018-03-28 RX ORDER — POTASSIUM CHLORIDE 750 MG/1
20 CAPSULE, EXTENDED RELEASE ORAL DAILY
Status: DISCONTINUED | OUTPATIENT
Start: 2018-03-28 | End: 2018-03-28 | Stop reason: HOSPADM

## 2018-03-28 RX ORDER — ALPRAZOLAM 0.5 MG/1
0.5 TABLET ORAL 2 TIMES DAILY PRN
COMMUNITY
End: 2018-08-08 | Stop reason: SDUPTHER

## 2018-03-28 RX ORDER — SODIUM CHLORIDE AND POTASSIUM CHLORIDE 150; 900 MG/100ML; MG/100ML
50 INJECTION, SOLUTION INTRAVENOUS CONTINUOUS
Status: DISCONTINUED | OUTPATIENT
Start: 2018-03-28 | End: 2018-03-28 | Stop reason: HOSPADM

## 2018-03-28 RX ADMIN — POTASSIUM CHLORIDE 20 MEQ: 750 CAPSULE, EXTENDED RELEASE ORAL at 10:04

## 2018-03-28 RX ADMIN — RIVAROXABAN 20 MG: 20 TABLET, FILM COATED ORAL at 17:08

## 2018-03-28 RX ADMIN — HYDROCODONE BITARTRATE AND ACETAMINOPHEN 1 TABLET: 5; 325 TABLET ORAL at 10:04

## 2018-03-28 RX ADMIN — RIVAROXABAN 20 MG: 20 TABLET, FILM COATED ORAL at 00:41

## 2018-03-28 RX ADMIN — PANTOPRAZOLE SODIUM 40 MG: 40 TABLET, DELAYED RELEASE ORAL at 06:09

## 2018-03-28 RX ADMIN — SODIUM CHLORIDE 100 ML/HR: 9 INJECTION, SOLUTION INTRAVENOUS at 00:41

## 2018-03-28 RX ADMIN — POTASSIUM CHLORIDE AND SODIUM CHLORIDE 50 ML/HR: 900; 150 INJECTION, SOLUTION INTRAVENOUS at 10:04

## 2018-03-28 NOTE — PLAN OF CARE
Problem: Patient Care Overview  Goal: Plan of Care Review  Outcome: Ongoing (interventions implemented as appropriate)   03/28/18 0342   Coping/Psychosocial   Plan of Care Reviewed With patient   Plan of Care Review   Progress no change   OTHER   Outcome Summary VSS, c/o mild headache rated 2 out of 10, has mild dizziness, SB-S 56-68 down to 45 on telemetry.       Problem: Arrhythmia/Dysrhythmia (Symptomatic) (Adult)  Goal: Signs and Symptoms of Listed Potential Problems Will be Absent, Minimized or Managed (Arrhythmia/Dysrhythmia)  Outcome: Ongoing (interventions implemented as appropriate)      Problem: Fall Risk (Adult)  Goal: Identify Related Risk Factors and Signs and Symptoms  Outcome: Ongoing (interventions implemented as appropriate)    Goal: Absence of Fall  Outcome: Ongoing (interventions implemented as appropriate)

## 2018-03-28 NOTE — DISCHARGE SUMMARY
"Patient ID: Lucy Sousa  MRN: [id number]     Acct:  [id number]    Admit Date: 3/27/2018   Discharge Date: 3/28/18  Date of service: 3/28/18    Consults:    IP CONSULT TO NEUROLOGY    The patient was seen and examined on the day of discharge and this discharge summary is in conjunction with any earlier note from day of discharge.     PATIENT PROFILE: The patient is a 67 y/o white  female resident of [place]; she was cooperative.      CHIEF COMPLAINT: \"dizziness\"     HISTORY OF PRESENT ILLNESS: Woke up acute feeling of unsteady, dizziness, lightheaded when trying to walk; had to hold on to things.  Persisted entire day and came to ED with neg CT head, labs and concern for CVA.   Admitted.   No recent head injury, fever.  No associated extremity numbness, weakness, slurred speech, facial-body asymmetry.  No palpitations, chest pain, diarrhea, vomiting.  Mild nausea on/off with this.  Hearing ok; occ tinnitis not an issue.  BP ED ? Mild orthostasis.      No Known Allergies     HOME MEDICATIONS:          Prior to Admission medications    Medication Sig Start Date End Date Taking? Authorizing Provider   ALPRAZolam (XANAX) 0.5 MG tablet TAKE ONE TABLET TWICE DAILY AS NEEDED FOR ANXIETY GENERIC FOR XANAX 12/22/17     Rosas Berg MD   budesonide-formoterol (SYMBICORT) 160-4.5 MCG/ACT inhaler Inhale 2 puffs 2 (Two) Times a Day.  Patient taking differently: Inhale 2 puffs 2 (Two) Times a Day As Needed (SOB/WHEEZE). 9/29/17     Rosas Berg MD   Cholecalciferol (VITAMIN D) 2000 UNITS tablet Take 1,000 Units by mouth Daily.       Historical Provider, MD   cyanocobalamin (VITAMIN B-12) 50 MCG tablet tablet Take 1,000 mcg by mouth 1 (One) Time.       Historical Provider, MD   furosemide (LASIX) 20 MG tablet TAKE ONE TABLET DAILY GENERIC FOR LASIX 8/2/17     Rosas Berg MD   losartan (COZAAR) 50 MG tablet Take 1 tablet by mouth Daily. 11/14/17     Rosas Berg MD   Multiple Vitamin " (MULTI VITAMIN DAILY PO) Take 1 tablet by mouth daily.       Historical Provider, MD   omeprazole (PriLOSEC) 20 MG capsule Take 20 mg by mouth daily as needed.       Historical Provider, MD   potassium chloride ER (K-TAB) 20 MEQ tablet controlled-release ER tablet Take 1 tablet by mouth 2 (Two) Times a Day. 17     Rosas Berg MD   triamterene-hydrochlorothiazide (DYAZIDE) 37.5-25 MG per capsule Take 1 capsule by mouth Every Morning. 17     Rosas Berg MD   vitamin E 600 UNIT capsule Take 400 Units by mouth Daily.       Historical Provider, MD   XARELTO 20 MG tablet TAKE ONE TABLET DAILY WITH DINNER 17     Rosas Berg MD         PAST HISTORY:  CHILDHOOD: unremarkable.      PROCEDURES:   SCANNED  [id number]  Cath/[facility]/2001  Colonoscopy-lipoma/[facility]/  EGD-distal ring-gastric dysmotility/  EGD+wzgd-hluqiwodo-re-/[facility]//08  Colonoscopy+div/[facility]//3-30-/5y  Cath-neg/[place]/[facility]/09  EGD+gastric ulcer-biop/[facility]//10-21-09  EGD+neg-healing ulcer/[facility]//1-22-10  Colonoscopy+polyp-div/[facility]//2.13.14/5y     SURGERIES:  R Inguinal Hernia/  SHUN+BSO/[place]/[facility]  Mammoplasty-reduction/[place]/[facility]/  L total knee/[facility]//08     FAMILY HISTORY:  Heart/m,f  DM/gf-p  HTN/m  CA-breast/gm-m  CA-colon/none  CA-other/none     HABITS:  Tobacco-smoker/none  Tobacco-2nd handed/28 yr/dc age 28  Alcohol/none  Drugs/none     SOCIAL HISTORY:  Children/2-1  /1969  Employment/sec- office/     HOSPITAL ADMITS:   [facility]:   9..07-9.07- cath  5.1.07-5.4.07-buttock cellulitis  8.21.09-8.21.09-cath  11.17.16-11.21.16 PE/cardiac arrest/ekos     [facility]:   none     [facility]:   None     GENERAL:  Active/slower with limits, speed, stamina for age before this; laid around yesterday. Sleep is ok. No fever.  ENDO:  No syncope, near or diaphoretic sweaty spells.  HEENT: No head injury same on/off headache,  No vision change, Nohearing loss.  Ears  "without pain/drainage.  No sore throat.  No significant  nasal/sinus congestion/drainage. No epistaxis.  CHEST: No chest wall tenderness or mass. No cough, wheeze, SOB; no hemoptysis.  CV: No chest pain, palpitations, ankle edema.  GI: No heartburn, dysphagia.  No abdominal pain, diarrhea, constipation, rectal bleeding, or melena.  On/off mild nausea  :  Voids without dysuria, or incontinence to completion.  ORTHO: No painful/swollen joints but various on /off sore.  No sore neck or back.  No acute neck or back pain without recent injury.   NEURO: No dizziness, weakness of extremities.  No numbness/paresthesias.   PSYCH: No memory loss.  Mood good; not that anxious, depressed but/and not suicidal.  Tolerated stress.         PHYSICAL EXAMINATION:  /61 (BP Location: Left arm, Patient Position: Lying)   Pulse 54   Temp 98.1 °F (36.7 °C) (Temporal Artery )   Resp 18   Ht 160 cm (63\")   Wt 95.8 kg (211 lb 2 oz)   SpO2 98%   BMI 37.40 kg/m²      GENERAL:  Well nourished/developed in no acute distress. Obese   SKIN: Turgor excellent, without wound, rash, lesion.  HEENT: Normal cephalic without trauma.  Pupils equal round reactive to light. Extraocular motions full without nystagmus.     External canals nonobstructive nontender without reddness.  Oral cavity without growths, exudates, and moist.  Posterior pharynx without mass, obstruction, redness.  No thyromegaly, mass, tenderness, lymphadenopathy and supple.  CV: Regular rhythm.  No murmur, gallop,  edema. Posterior pulses intact.  No carotid bruits.   CHEST: No chest wall tenderness or mass.   LUNGS: Symmetric motion with clear to auscultation.   ABD: Soft, nontender without mass.   ORTHO: Symmetric extremities without swelling/point tenderness.  Full gross range of motion.    NEURO: CN 2-12 grossly intact.  Symmetric facies. 1/4 x bicep  equal reflexes.  UE/LE   3/5 strength throughout.  Nonfocal use extremities. Speech clear.  Finger/nose " intact.  PSYCH: Oriented x 3.  Pleasant calm, well kept.  Purposeful/directed conservation with intact short/long gross memory.     ASSESSMENT/PROBLEM LIST:   A 68-year-old white female.   Allergies/intolerances, see above.   Procedural history, see above.   Family history, see above.   Hypertension, since age 36  Hx PE-EKOS  Anticoagulation PE,cardiac arrest/xarelto  CHF-diastolic changes echo  Obesity.   Previous hormone therapy.   Surgical menopause  History of gastric dysmotility.  Diverticular disease by colonoscopy.  Lipoma of the colon by colonoscopy 06/2003.  Irritable bowel,” Dr. Mckeon opinion.   Peptic disease.   GE reflux  Degenerative joint disease.  Chronic back pain   Congestive heart failure, diastolic on echocardiogram.   Chronic depression.   Chronic anxiety  History of headaches, at one time thought to be mixed pattern.   History of macromastia, status post breast reduction.  Onychomycosis  Hypopotassemia-diuretic influenced  macrocytosis     REASON FOR ADMISSION:    Dizziness  Ataxia-to decide  Vertigo-to decide  Gait difficulty-acute  Hypopotassemia 3.3-diuretic influenced (watch for arrthymia)  Abnormal UA-without symptoms    HOSPITAL COARSE: This really proved to be more vertigo and mild; resolving a great deal in the time she was here.  An MRI was considered; and then cancelled as neuro did not feel this was needed.  We attempted to get PT to teach Eply (Aamir) exercises but we were not able to get her seen; she did not want to stay another night to have this done. Her diuretics were held; she was give fluids with increase in KCL. She had no dysuria and a urine was requested by cath; cultures are pending.   All issues however were felt to be amendable to outpatient f/u.    Labs included:     Labs  Lab Results (last 24 hours)     Procedure Component Value Units Date/Time    Urinalysis With / Culture If Indicated - Urine, Catheter [[id number]]  (Abnormal) Collected:  03/28/18 1139     Specimen:  Urine from Urine, Catheter Updated:  03/28/18 1210     Color, UA Dark Yellow (A)     Appearance, UA Clear     pH, UA <=5.0     Specific Gravity, UA 1.027     Glucose, UA Negative     Ketones, UA Negative     Bilirubin, UA Negative     Blood, UA Negative     Protein, UA Negative     Leuk Esterase, UA Negative     Nitrite, UA Positive (A)     Urobilinogen, UA 0.2 E.U./dL    Urinalysis, Microscopic Only - Urine, Clean Catch [[id number]]  (Abnormal) Collected:  03/28/18 1131    Specimen:  Urine from Urine, Catheter Updated:  03/28/18 1210     RBC, UA 0-2 (A) /HPF      WBC, UA 3-5 (A) /HPF      Bacteria, UA 3+ (A) /HPF      Squamous Epithelial Cells, UA 0-2 /HPF      Hyaline Casts, UA 3-6 /LPF      Methodology Automated Microscopy    Urine Culture - Urine, Urine, Clean Catch [[id number]] Collected:  03/28/18 1131    Specimen:  Urine from Urine, Catheter Updated:  03/28/18 1143    Basic Metabolic Panel [[id number]]  (Abnormal) Collected:  03/28/18 0356    Specimen:  Blood Updated:  03/28/18 0459     Glucose 79 mg/dL      BUN 14 mg/dL      Creatinine 0.76 mg/dL      Sodium 144 mmol/L      Potassium 3.3 (L) mmol/L      Chloride 105 mmol/L      CO2 32.0 (H) mmol/L      Calcium 9.3 mg/dL      eGFR Non African Amer 75 mL/min/1.73      BUN/Creatinine Ratio 18.4     Anion Gap 7.0 mmol/L     Narrative:       GFR Normal >60  Chronic Kidney Disease <60  Kidney Failure <15    CBC & Differential [[id number]] Collected:  03/28/18 0356    Specimen:  Blood Updated:  03/28/18 0444    Narrative:       The following orders were created for panel order CBC & Differential.  Procedure                               Abnormality         Status                     ---------                               -----------         ------                     CBC Auto Differential[[id number]]        Abnormal            Final result                 Please view results for these tests on the individual orders.    CBC Auto Differential [[id number]]   (Abnormal) Collected:  03/28/18 0356    Specimen:  Blood Updated:  03/28/18 0444     WBC 6.01 10*3/mm3      RBC 4.09 (L) 10*6/mm3      Hemoglobin 13.2 g/dL      Hematocrit 39.8 %      MCV 97.3 fL      MCH 32.3 (H) pg      MCHC 33.2 g/dL      RDW 13.0 %      RDW-SD 46.6 fl      MPV 9.2 fL      Platelets 283 10*3/mm3      Neutrophil % 52.8 %      Lymphocyte % 32.3 %      Monocyte % 11.0 %      Eosinophil % 3.0 %      Basophil % 0.7 %      Immature Grans % 0.2 %      Neutrophils, Absolute 3.18 10*3/mm3      Lymphocytes, Absolute 1.94 10*3/mm3      Monocytes, Absolute 0.66 10*3/mm3      Eosinophils, Absolute 0.18 10*3/mm3      Basophils, Absolute 0.04 10*3/mm3      Immature Grans, Absolute 0.01 10*3/mm3      nRBC 0.0 /100 WBC     Urinalysis With / Culture If Indicated - [[id number]]  (Abnormal) Collected:  03/28/18 0041    Specimen:  Urine Updated:  03/28/18 0056     Color, UA Yellow     Appearance, UA Turbid (A)     pH, UA 6.0     Specific Gravity, UA 1.025     Glucose, UA Negative     Ketones, UA Negative     Bilirubin, UA Negative     Blood, UA Negative     Protein, UA Negative     Leuk Esterase, UA Negative     Nitrite, UA Positive (A)     Urobilinogen, UA 0.2 E.U./dL    Urinalysis, Microscopic Only - Urine, Clean Catch [[id number]]  (Abnormal) Collected:  03/28/18 0041    Specimen:  Urine from Urine, Clean Catch Updated:  03/28/18 0056     RBC, UA None Seen /HPF      WBC, UA 3-5 (A) /HPF      Bacteria, UA 4+ (A) /HPF      Squamous Epithelial Cells, UA 3-6 (A) /HPF      Hyaline Casts, UA 0-2 /LPF      Granular Casts, UA 0-2 /LPF      Methodology Automated Microscopy    Urine Culture - Urine, Urine, Clean Catch [[id number]] Collected:  03/28/18 0041    Specimen:  Urine from Urine, Clean Catch Updated:  03/28/18 0045    Troponin [[id number]]  (Normal) Collected:  03/27/18 2140    Specimen:  Blood Updated:  03/27/18 2257     Troponin I <0.012 ng/mL     C-reactive Protein [[id number]]  (Normal) Collected:  03/27/18 2140     Specimen:  Blood Updated:  03/27/18 2158     C-Reactive Protein 0.74 mg/dL     Sedimentation Rate [[id number]]  (Normal) Collected:  03/27/18 2140    Specimen:  Blood Updated:  03/27/18 2148     Sed Rate 10 mm/hr     [place] Draw [[id number]] Collected:  03/27/18 1810    Specimen:  Blood Updated:  03/27/18 1916    Narrative:       The following orders were created for panel order [place] Draw.  Procedure                               Abnormality         Status                     ---------                               -----------         ------                     Light Blue Top[[id number]]                                   Final result               Green Top (Gel)[[id number]]                                  Final result               Lavender Top[[id number]]                                     Final result               Red Top[[id number]]                                          Final result                 Please view results for these tests on the individual orders.    Lavender Top [[id number]] Collected:  03/27/18 1810    Specimen:  Blood Updated:  03/27/18 1916     Extra Tube hold for add-on     Comment: Auto resulted       Light Blue Top [[id number]] Collected:  03/27/18 1810    Specimen:  Blood Updated:  03/27/18 1916     Extra Tube hold for add-on     Comment: Auto resulted       Green Top (Gel) [[id number]] Collected:  03/27/18 1810    Specimen:  Blood Updated:  03/27/18 1916     Extra Tube Hold for add-ons.     Comment: Auto resulted.       Red Top [[id number]] Collected:  03/27/18 1810    Specimen:  Blood Updated:  03/27/18 1916     Extra Tube Hold for add-ons.     Comment: Auto resulted.       Comprehensive Metabolic Panel [[id number]]  (Abnormal) Collected:  03/27/18 1810    Specimen:  Blood Updated:  03/27/18 1849     Glucose 108 (H) mg/dL      BUN 16 mg/dL      Creatinine 0.80 mg/dL      Sodium 141 mmol/L      Potassium 3.7 mmol/L      Chloride 104 mmol/L      CO2 26.0 mmol/L      Calcium 9.9 mg/dL       Total Protein 6.8 g/dL      Albumin 3.90 g/dL      ALT (SGPT) 29 U/L      AST (SGOT) 26 U/L      Alkaline Phosphatase 67 U/L      Total Bilirubin 0.3 mg/dL      eGFR Non African Amer 71 mL/min/1.73      Globulin 2.9 gm/dL      A/G Ratio 1.3 g/dL      BUN/Creatinine Ratio 20.0     Anion Gap 11.0 mmol/L     Protime-INR [[id number]]  (Abnormal) Collected:  03/27/18 1810    Specimen:  Blood Updated:  03/27/18 1847     Protime 14.7 (H) Seconds      INR 1.11 (H)    aPTT [[id number]]  (Abnormal) Collected:  03/27/18 1810    Specimen:  Blood Updated:  03/27/18 1847     PTT 36.5 (H) seconds     CBC & Differential [[id number]] Collected:  03/27/18 1810    Specimen:  Blood Updated:  03/27/18 1843    Narrative:       The following orders were created for panel order CBC & Differential.  Procedure                               Abnormality         Status                     ---------                               -----------         ------                     CBC Auto Differential[[id number]]        Abnormal            Final result                 Please view results for these tests on the individual orders.    CBC Auto Differential [[id number]]  (Abnormal) Collected:  03/27/18 1810    Specimen:  Blood Updated:  03/27/18 1843     WBC 5.93 10*3/mm3      RBC 4.36 10*6/mm3      Hemoglobin 14.2 g/dL      Hematocrit 41.5 %      MCV 95.2 fL      MCH 32.6 (H) pg      MCHC 34.2 g/dL      RDW 13.0 %      RDW-SD 45.4 fl      MPV 9.3 fL      Platelets 310 10*3/mm3      Neutrophil % 56.4 %      Lymphocyte % 29.7 %      Monocyte % 9.4 %      Eosinophil % 3.4 %      Basophil % 0.8 %      Immature Grans % 0.3 %      Neutrophils, Absolute 3.34 10*3/mm3      Lymphocytes, Absolute 1.76 10*3/mm3      Monocytes, Absolute 0.56 10*3/mm3      Eosinophils, Absolute 0.20 10*3/mm3      Basophils, Absolute 0.05 10*3/mm3      Immature Grans, Absolute 0.02 10*3/mm3      nRBC 0.0 /100 WBC           Lab Results:  CBC:   Results from last 7 days  Lab Units  03/28/18  0356 03/27/18  1810   WBC 10*3/mm3 6.01 5.93   HEMOGLOBIN g/dL 13.2 14.2   HEMATOCRIT % 39.8 41.5   PLATELETS 10*3/mm3 283 310     BMP:  Results from last 7 days  Lab Units 03/28/18  0356 03/27/18  1810   SODIUM mmol/L 144 141   POTASSIUM mmol/L 3.3* 3.7   CHLORIDE mmol/L 105 104   CO2 mmol/L 32.0* 26.0   BUN mg/dL 14 16   CREATININE mg/dL 0.76 0.80   GLUCOSE mg/dL 79 108*   CALCIUM mg/dL 9.3 9.9   ALT (SGPT) U/L  --  29       Culture Results:  pending    DISCHARGE ASSESSMENT:  Reasons for admit/problems address while here:   Dizziness-BPV  Vertigo-BPV  Benign positional vertigo  Gait difficulty-acute  Hypopotassemia 3.3-diuretic influenced (watch for arrthymia)  Abnormal UA-bacteruria-without symptoms    Chronic problems affecting stay:    see above    PLAN:   See AVS    Discharge Disposition:  Home or Self Care    Discharge Medications:   Lucy Sousa   Home Medication Instructions LANA:[id number]    Printed on:03/28/18 5551   Medication Information                      ALPRAZolam (XANAX) 0.5 MG tablet  Take 0.5 mg by mouth 2 (Two) Times a Day As Needed for Anxiety.             budesonide-formoterol (SYMBICORT) 160-4.5 MCG/ACT inhaler  Inhale 2 puffs 2 (Two) Times a Day.             Cholecalciferol (VITAMIN D) 2000 UNITS tablet  Take 2,000 Units by mouth Daily.             cyanocobalamin (VITAMIN B-12) 50 MCG tablet tablet  Take 50 mcg by mouth 1 (One) Time.             furosemide (LASIX) 20 MG tablet  Take 20 mg by mouth 2 (Two) Times a Day.             losartan (COZAAR) 50 MG tablet  Take 1 tablet by mouth Daily.             meclizine (ANTIVERT) 25 MG tablet  Take 1 tablet by mouth 3 (Three) Times a Day As Needed for dizziness.             Multiple Vitamin (MULTI VITAMIN DAILY PO)  Take 1 tablet by mouth daily.             omeprazole (PriLOSEC) 20 MG capsule  Take 20 mg by mouth daily as needed.             potassium chloride ER (K-TAB) 20 MEQ tablet controlled-release ER tablet  Take 1 tablet by  mouth 2 (Two) Times a Day.             rivaroxaban (XARELTO) 20 MG tablet  Take 20 mg by mouth Daily With Dinner.             triamterene-hydrochlorothiazide (DYAZIDE) 37.5-25 MG per capsule  Take 1 capsule by mouth Every Morning.             vitamin E 600 UNIT capsule  Take 400 Units by mouth Daily.                 Discharge Care Plan/Instructions:   ACTIVITY:  Gradually increase activity   No driving until dizziness resolved 24 hr  No ladders, heights, operation of machinery until dizziness resolves  Elevate legs as much as can/avoid legs handing down   Return to work 4.2.18; call Dr Berg if problems and note can be extended    DIET:  Cardiac  Suggest no alcohol   Avoid caffeine   Fluid 60 oz/24 hr    MEDICATIONS:   Per AVS  If you have Rx that need signature; you should come to Dr Berg office to get that Rx   (nursing-you need to realize this Rx will be written at Dr Berg office)    EXPECT:   LAB with Dr Berg f/u    CALL:   If problems/questions    Follow-up Appointments:   Dr Berg one week; call for apt    Future Appointments  Date Time Provider Department Center   4/30/2018 3:15 PM Daniel Underwood MD [facility] None   9/7/2018 9:15 AM MD [facility] None       CONDITION: stable/improved    PROGNOSIS: good

## 2018-03-28 NOTE — DISCHARGE INSTRUCTIONS
ACTIVITY:  Gradually increase activity   No driving until dizziness resolved 24 hr  No ladders, heights, operation of machinery until dizziness resolves  Elevate legs as much as can/avoid legs handing down   Return to work 4.2.18; call Dr Berg if problems and note can be extended    DIET:  Cardiac  Suggest no alcohol   Avoid caffeine   Fluid 60 oz/24 hr    MEDICATIONS:   Per AVS  If you have Rx that need signature; you should come to Dr Berg office to get that Rx   (nursing-you need to realize this Rx will be written at Dr Berg office)    EXPECT:   LAB with Dr Berg f/u    CALL:   If problems/questions

## 2018-03-28 NOTE — PLAN OF CARE
Problem: Patient Care Overview  Goal: Plan of Care Review  Outcome: Ongoing (interventions implemented as appropriate)   03/28/18 0342 03/28/18 1628   Coping/Psychosocial   Plan of Care Reviewed With patient --    Plan of Care Review   Progress no change --    OTHER   Outcome Summary --  VSS. pt c/o headache- gave PRN med with relief. IVF with K+ ordered and infusing. MRI cancelled per Dr. Schultz- dizziness per inner ear. possible d/c tomorrow.       Problem: Arrhythmia/Dysrhythmia (Symptomatic) (Adult)  Goal: Signs and Symptoms of Listed Potential Problems Will be Absent, Minimized or Managed (Arrhythmia/Dysrhythmia)  Outcome: Ongoing (interventions implemented as appropriate)      Problem: Fall Risk (Adult)  Goal: Identify Related Risk Factors and Signs and Symptoms  Outcome: Ongoing (interventions implemented as appropriate)    Goal: Absence of Fall  Outcome: Ongoing (interventions implemented as appropriate)

## 2018-03-28 NOTE — H&P
"Patient ID: Lucy Sousa  MRN: [id number]     Acct:  [id number]  Admit Date: 3/27/2018   Date of service: 3/27/2018    SOURCE: The source of this information is prior knowledge of the patient, review of her office records, her current chart, as well as discussion with she,  and ER personal; she is considered reliable.      PATIENT PROFILE: The patient is a 67 y/o white  female resident of [facility]; she was cooperative.      CHIEF COMPLAINT: \"dizziness\"     HISTORY OF PRESENT ILLNESS: Woke up acute feeling of unsteady, dizziness, lightheaded when trying to walk; had to hold on to things.  Persisted entire day and came to ED with neg CT head, labs and concern for CVA.   Admitted.   No recent head injury, fever.  No associated extremity numbness, weakness, slurred speech, facial-body asymmetry.  No palpitations, chest pain, diarrhea, vomiting.  Mild nausea on/off with this.  Hearing ok; occ tinnitis not an issue.  BP ED ? Mild orthostasis.     No Known Allergies    HOME MEDICATIONS:  Prior to Admission medications    Medication Sig Start Date End Date Taking? Authorizing Provider   ALPRAZolam (XANAX) 0.5 MG tablet TAKE ONE TABLET TWICE DAILY AS NEEDED FOR ANXIETY GENERIC FOR XANAX 12/22/17   Rosas Berg MD   budesonide-formoterol (SYMBICORT) 160-4.5 MCG/ACT inhaler Inhale 2 puffs 2 (Two) Times a Day.  Patient taking differently: Inhale 2 puffs 2 (Two) Times a Day As Needed (SOB/WHEEZE). 9/29/17   Rosas Berg MD   Cholecalciferol (VITAMIN D) 2000 UNITS tablet Take 1,000 Units by mouth Daily.    Historical Provider, MD   cyanocobalamin (VITAMIN B-12) 50 MCG tablet tablet Take 1,000 mcg by mouth 1 (One) Time.    Historical Provider, MD   furosemide (LASIX) 20 MG tablet TAKE ONE TABLET DAILY GENERIC FOR LASIX 8/2/17   Rosas Berg MD   losartan (COZAAR) 50 MG tablet Take 1 tablet by mouth Daily. 11/14/17   Rosas Berg MD   Multiple Vitamin (MULTI VITAMIN DAILY PO) " Take 1 tablet by mouth daily.    Historical Provider, MD   omeprazole (PriLOSEC) 20 MG capsule Take 20 mg by mouth daily as needed.    Historical Provider, MD   potassium chloride ER (K-TAB) 20 MEQ tablet controlled-release ER tablet Take 1 tablet by mouth 2 (Two) Times a Day. 17   Rosas Berg MD   triamterene-hydrochlorothiazide (DYAZIDE) 37.5-25 MG per capsule Take 1 capsule by mouth Every Morning. 17   Rosas Berg MD   vitamin E 600 UNIT capsule Take 400 Units by mouth Daily.    Historical Provider, MD   XARELTO 20 MG tablet TAKE ONE TABLET DAILY WITH DINNER 17   Rosas Berg MD       PAST HISTORY:  CHILDHOOD: unremarkable.     PROCEDURES:   SCANNED  [id number]  Cath/[place]/2001  Colonoscopy-lipoma/[place]/  EGD-distal ring-gastric dysmotility/  EGD+byoy-xjbjpihti-qx-/[facility]//08  Colonoscopy+div/[facility]//3-30-/5y  Cath-neg/[place]/[facility]/09  EGD+gastric ulcer-biop/[facility]//10-21-09  EGD+neg-healing ulcer/[facility]//1-22-10  Colonoscopy+polyp-div/[facility]//2.13.14/5y    SURGERIES:  R Inguinal Hernia/  SHUN+BSO/[place]/[facility]  Mammoplasty-reduction/[place]/[facility]/  L total knee/[place]/[facility]/08    FAMILY HISTORY:  Heart/m,f  DM/gf-p  HTN/m  CA-breast/gm-m  CA-colon/none  CA-other/none    HABITS:  Tobacco-smoker/none  Tobacco-2nd handed/28 yr/dc age 28  Alcohol/none  Drugs/none    SOCIAL HISTORY:  Children/2-1  /  Employment/sec- office/[employer]    HOSPITAL ADMITS:   [facility]:   9.21.07-9..07- cath  5.1.07-5.4.07-buttock cellulitis  8.21.09-8.21.09-cath  11.17.16-11.21.16 PE/cardiac arrest/ekos    [facility]:   none    [facility]:   None    GENERAL:  Active/slower with limits, speed, stamina for age before this; laid around yesterday. Sleep is ok. No fever.  ENDO:  No syncope, near or diaphoretic sweaty spells.  HEENT: No head injury same on/off headache,  No vision change, Nohearing loss.  Ears without pain/drainage.  No sore throat.  No  "significant  nasal/sinus congestion/drainage. No epistaxis.  CHEST: No chest wall tenderness or mass. No cough, wheeze, SOB; no hemoptysis.  CV: No chest pain, palpitations, ankle edema.  GI: No heartburn, dysphagia.  No abdominal pain, diarrhea, constipation, rectal bleeding, or melena.  On/off mild nausea  :  Voids without dysuria, or incontinence to completion.  ORTHO: No painful/swollen joints but various on /off sore.  No sore neck or back.  No acute neck or back pain without recent injury.   NEURO: No dizziness, weakness of extremities.  No numbness/paresthesias.   PSYCH: No memory loss.  Mood good; not that anxious, depressed but/and not suicidal.  Tolerated stress.       PHYSICAL EXAMINATION:  /61 (BP Location: Left arm, Patient Position: Lying)   Pulse 54   Temp 98.1 °F (36.7 °C) (Temporal Artery )   Resp 18   Ht 160 cm (63\")   Wt 95.8 kg (211 lb 2 oz)   SpO2 98%   BMI 37.40 kg/m²     GENERAL:  Well nourished/developed in no acute distress. Obese   SKIN: Turgor excellent, without wound, rash, lesion.  HEENT: Normal cephalic without trauma.  Pupils equal round reactive to light. Extraocular motions full without nystagmus.     External canals nonobstructive nontender without reddness.  Oral cavity without growths, exudates, and moist.  Posterior pharynx without mass, obstruction, redness.  No thyromegaly, mass, tenderness, lymphadenopathy and supple.  CV: Regular rhythm.  No murmur, gallop,  edema. Posterior pulses intact.  No carotid bruits.   CHEST: No chest wall tenderness or mass.   LUNGS: Symmetric motion with clear to auscultation.   ABD: Soft, nontender without mass.   ORTHO: Symmetric extremities without swelling/point tenderness.  Full gross range of motion.    NEURO: CN 2-12 grossly intact.  Symmetric facies. 1/4 x bicep  equal reflexes.  UE/LE   3/5 strength throughout.  Nonfocal use extremities. Speech clear.  Finger/nose intact.  PSYCH: Oriented x 3.  Pleasant calm, well kept.  " Purposeful/directed conservation with intact short/long gross memory.     ASSESSMENT/PROBLEM LIST:   A 68-year-old white female.   Allergies/intolerances, see above.   Procedural history, see above.   Family history, see above.   Hypertension, since age 36  Hx PE-EKOS  Anticoagulation PE,cardiac arrest/xarelto  CHF-diastolic changes echo  Obesity.   Previous hormone therapy.   Surgical menopause  History of gastric dysmotility.  Diverticular disease by colonoscopy.  Lipoma of the colon by colonoscopy 06/2003.  Irritable bowel,” Dr. Mckeon opinion.   Peptic disease.   GE reflux  Degenerative joint disease.  Chronic back pain   Congestive heart failure, diastolic on echocardiogram.   Chronic depression.   Chronic anxiety  History of headaches, at one time thought to be mixed pattern.   History of macromastia, status post breast reduction.  Onychomycosis  Hypopotassemia-diuretic influenced  macrocytosis    REASON FOR ADMISSION:    Dizziness  Ataxia-to decide  Vertigo-to decide  Gait difficulty-acute  Hypopotassemia 3.3-diuretic influenced (watch for arrthymia)  Abnormal UA-without symptoms    PLANS:   Rx-reviewed; ordered as needed and will review daily/as needed.   Includes anticoagulation for DVT prevention/on xarelto  LAB-reviewed; ordered as needed and will review daily.  Particular:  Daily monitor  Imaging-reviewed; ordered as needed and will review daily.  Particular:  MRI head  Consults neuro  Diet: cardiac  Fluids: initial; slow and add KCL  Special issues: assure safe gait  DC planning: she expects home  Code status: full

## 2018-03-28 NOTE — CONSULTS
Neurology Consult Note    Referring Provider: Dr. Rosas Berg  Reason for Consultation: Dizzy      History of present illness:      This is a 68-year-old right-handed  female who complains of a 3 to four-day history of 5 out of 10 left retro-orbital headache.  It is associated with mild photophobia but no phonophobia.  She has no fevers, chills, nor neck stiffness associated with it.  She does get headaches rather frequently.  Currently the headache has improved to a 3 out of 10.  Yesterday morning when she woke up laying in bed she felt fine.  She set up on the side of the bed she had extreme vertigo.  She attempted to stand and walk but had some difficulty secondary to the vertigo.  This continued throughout the day and improved overnight.  She had no significant tinnitus.  She denies vision changes, facial drooping, speech changes, swallowing difficulties, unilateral weakness, unilateral numbness, and discoordination.  She currently is back to baseline.      Past Medical History:   Diagnosis Date   • Anxiety    • Depression    • DJD (degenerative joint disease)    • GERD (gastroesophageal reflux disease)    • HTN (hypertension)    • Pulmonary embolism     post surgical with cardiac arrest       No Known Allergies  No current facility-administered medications on file prior to encounter.      Current Outpatient Prescriptions on File Prior to Encounter   Medication Sig   • ALPRAZolam (XANAX) 0.5 MG tablet TAKE ONE TABLET TWICE DAILY AS NEEDED FOR ANXIETY GENERIC FOR XANAX   • budesonide-formoterol (SYMBICORT) 160-4.5 MCG/ACT inhaler Inhale 2 puffs 2 (Two) Times a Day. (Patient taking differently: Inhale 2 puffs 2 (Two) Times a Day As Needed (SOB/WHEEZE).)   • Cholecalciferol (VITAMIN D) 2000 UNITS tablet Take 1,000 Units by mouth Daily.   • cyanocobalamin (VITAMIN B-12) 50 MCG tablet tablet Take 1,000 mcg by mouth 1 (One) Time.   • furosemide (LASIX) 20 MG tablet TAKE ONE TABLET DAILY GENERIC FOR LASIX   •  losartan (COZAAR) 50 MG tablet Take 1 tablet by mouth Daily.   • Multiple Vitamin (MULTI VITAMIN DAILY PO) Take 1 tablet by mouth daily.   • omeprazole (PriLOSEC) 20 MG capsule Take 20 mg by mouth daily as needed.   • potassium chloride ER (K-TAB) 20 MEQ tablet controlled-release ER tablet Take 1 tablet by mouth 2 (Two) Times a Day.   • triamterene-hydrochlorothiazide (DYAZIDE) 37.5-25 MG per capsule Take 1 capsule by mouth Every Morning.   • vitamin E 600 UNIT capsule Take 400 Units by mouth Daily.   • XARELTO 20 MG tablet TAKE ONE TABLET DAILY WITH DINNER       Social History     Social History   • Marital status:      Spouse name: Nima   • Number of children: 1   • Years of education: 13     Occupational History   • [employer] de[employer]      Social History Main Topics   • Smoking status: Never Smoker   • Smokeless tobacco: Never Used   • Alcohol use No   • Drug use: No   • Sexual activity: Defer     Other Topics Concern   • Not on file     Social History Narrative   • No narrative on file     Family History   Problem Relation Age of Onset   • Coronary artery disease Mother    • Coronary artery disease Father    • Diabetes Brother    • Hypertension Brother    • Heart disease Maternal Grandfather        Review of Systems  A 14 point review of systems was reviewed and was negative except for dizziness    Vital Signs   Temp:  [97.2 °F (36.2 °C)-98.1 °F (36.7 °C)] 98.1 °F (36.7 °C)  Heart Rate:  [52-83] 54  Resp:  [13-18] 18  BP: (112-151)/(54-86) 112/61    General Exam:  Head:  Normal cephalic, atraumatic  HEENT:  Neck supple  Fundoscopic Exam:  No signs of disc edema  CVS:  Regular rate and rhythm.  No murmurs  Carotid Examination:  No bruits  Lungs:  Clear to auscultation  Abdomen:  Non-tender, Non-distended  Extremities:  No signs of peripheral edema  Skin:  No rashes    Neurologic Exam:    Mental Status:    -Awake, Alert, Oriented X 3  -No word finding difficulties  -No aphasia  -No  dysarthria  -Follows simple and complex commands    CN II:  Visual fields full.  Pupils equally reactive to light  CN III, IV, VI:  Extraocular Muscles full with no signs of nystagmus  CN V:  Facial sensory is symmetric with no asymetries.  CN VII:  Facial motor symmetric  CN VIII:  Gross hearing intact bilaterally  CN IX:  Palate elevates symmetrically  CN X:  Palate elevates symmetrically  CN XI:  Shoulder shrug symmetric  CN XII:  Tongue is midline on protrusion    Motor: (strength out of 5:  1= minimal movement, 2 = movement in plane of gravity, 3 = movement against gravity, 4 = movement against some resistance, 5 = full strength)    -Right Upper Ext: Proximal: 5 Distal: 5  -Left Upper Ext: Proximal: 5 Distal: 5    -Right Lower Ext: Proximal: 5 Distal: 5  -Left Lower Ext: Proximal: 5 Distal: 5    DTR:  -Right   Bicep: 2+ Tricep: 2+ Brachoradialis: 2+   Patella: 2+ Ankle: 2+ Neg Babinski  -Left   Bicep: 2+ Tricep: 2+ Brachoradialis: 2+   Patella: 2+ Ankle: 2+ Neg Babinski    Sensory:  -Intact to light touch, pinprick, temperature, pain, and proprioception    Coordination:  -Finger to nose intact  -Heel to shin intact  -No ataxia    Gait  -No signs of ataxia  -ambulates unassisted    Arnold-Hallpike maneuver is performed and is negative going to the right and positive going to the left.  When the Arnold-Hallpike is performed to the left she has some mild rotational nystagmus.  Results Review:  Lab Results (last 24 hours)     Procedure Component Value Units Date/Time    Basic Metabolic Panel [[id number]]  (Abnormal) Collected:  03/28/18 0356    Specimen:  Blood Updated:  03/28/18 0459     Glucose 79 mg/dL      BUN 14 mg/dL      Creatinine 0.76 mg/dL      Sodium 144 mmol/L      Potassium 3.3 (L) mmol/L      Chloride 105 mmol/L      CO2 32.0 (H) mmol/L      Calcium 9.3 mg/dL      eGFR Non African Amer 75 mL/min/1.73      BUN/Creatinine Ratio 18.4     Anion Gap 7.0 mmol/L     Narrative:       GFR Normal >60  Chronic Kidney  Disease <60  Kidney Failure <15    CBC & Differential [[id number]] Collected:  03/28/18 0356    Specimen:  Blood Updated:  03/28/18 0444    Narrative:       The following orders were created for panel order CBC & Differential.  Procedure                               Abnormality         Status                     ---------                               -----------         ------                     CBC Auto Differential[[id number]]        Abnormal            Final result                 Please view results for these tests on the individual orders.    CBC Auto Differential [[id number]]  (Abnormal) Collected:  03/28/18 0356    Specimen:  Blood Updated:  03/28/18 0444     WBC 6.01 10*3/mm3      RBC 4.09 (L) 10*6/mm3      Hemoglobin 13.2 g/dL      Hematocrit 39.8 %      MCV 97.3 fL      MCH 32.3 (H) pg      MCHC 33.2 g/dL      RDW 13.0 %      RDW-SD 46.6 fl      MPV 9.2 fL      Platelets 283 10*3/mm3      Neutrophil % 52.8 %      Lymphocyte % 32.3 %      Monocyte % 11.0 %      Eosinophil % 3.0 %      Basophil % 0.7 %      Immature Grans % 0.2 %      Neutrophils, Absolute 3.18 10*3/mm3      Lymphocytes, Absolute 1.94 10*3/mm3      Monocytes, Absolute 0.66 10*3/mm3      Eosinophils, Absolute 0.18 10*3/mm3      Basophils, Absolute 0.04 10*3/mm3      Immature Grans, Absolute 0.01 10*3/mm3      nRBC 0.0 /100 WBC     Urinalysis With / Culture If Indicated - [[id number]]  (Abnormal) Collected:  03/28/18 0041    Specimen:  Urine Updated:  03/28/18 0056     Color, UA Yellow     Appearance, UA Turbid (A)     pH, UA 6.0     Specific Gravity, UA 1.025     Glucose, UA Negative     Ketones, UA Negative     Bilirubin, UA Negative     Blood, UA Negative     Protein, UA Negative     Leuk Esterase, UA Negative     Nitrite, UA Positive (A)     Urobilinogen, UA 0.2 E.U./dL    Urinalysis, Microscopic Only - Urine, Clean Catch [[id number]]  (Abnormal) Collected:  03/28/18 0041    Specimen:  Urine from Urine, Clean Catch Updated:  03/28/18 0056      RBC, UA None Seen /HPF      WBC, UA 3-5 (A) /HPF      Bacteria, UA 4+ (A) /HPF      Squamous Epithelial Cells, UA 3-6 (A) /HPF      Hyaline Casts, UA 0-2 /LPF      Granular Casts, UA 0-2 /LPF      Methodology Automated Microscopy    Urine Culture - Urine, Urine, Clean Catch [[id number]] Collected:  03/28/18 0041    Specimen:  Urine from Urine, Clean Catch Updated:  03/28/18 0045    Troponin [[id number]]  (Normal) Collected:  03/27/18 2140    Specimen:  Blood Updated:  03/27/18 2257     Troponin I <0.012 ng/mL     C-reactive Protein [[id number]]  (Normal) Collected:  03/27/18 2140    Specimen:  Blood Updated:  03/27/18 2158     C-Reactive Protein 0.74 mg/dL     Sedimentation Rate [[id number]]  (Normal) Collected:  03/27/18 2140    Specimen:  Blood Updated:  03/27/18 2148     Sed Rate 10 mm/hr     [place] Draw [[id number]] Collected:  03/27/18 1810    Specimen:  Blood Updated:  03/27/18 1916    Narrative:       The following orders were created for panel order [place] Draw.  Procedure                               Abnormality         Status                     ---------                               -----------         ------                     Light Blue Top[[id number]]                                   Final result               Green Top (Gel)[[id number]]                                  Final result               Lavender Top[[id number]]                                     Final result               Red Top[[id number]]                                          Final result                 Please view results for these tests on the individual orders.    Lavender Top [[id number]] Collected:  03/27/18 1810    Specimen:  Blood Updated:  03/27/18 1916     Extra Tube hold for add-on     Comment: Auto resulted       Light Blue Top [[id number]] Collected:  03/27/18 1810    Specimen:  Blood Updated:  03/27/18 1916     Extra Tube hold for add-on     Comment: Auto resulted       Green Top (Gel) [[id number]] Collected:  03/27/18 1810     Specimen:  Blood Updated:  03/27/18 1916     Extra Tube Hold for add-ons.     Comment: Auto resulted.       Red Top [[id number]] Collected:  03/27/18 1810    Specimen:  Blood Updated:  03/27/18 1916     Extra Tube Hold for add-ons.     Comment: Auto resulted.       Comprehensive Metabolic Panel [[id number]]  (Abnormal) Collected:  03/27/18 1810    Specimen:  Blood Updated:  03/27/18 1849     Glucose 108 (H) mg/dL      BUN 16 mg/dL      Creatinine 0.80 mg/dL      Sodium 141 mmol/L      Potassium 3.7 mmol/L      Chloride 104 mmol/L      CO2 26.0 mmol/L      Calcium 9.9 mg/dL      Total Protein 6.8 g/dL      Albumin 3.90 g/dL      ALT (SGPT) 29 U/L      AST (SGOT) 26 U/L      Alkaline Phosphatase 67 U/L      Total Bilirubin 0.3 mg/dL      eGFR Non African Amer 71 mL/min/1.73      Globulin 2.9 gm/dL      A/G Ratio 1.3 g/dL      BUN/Creatinine Ratio 20.0     Anion Gap 11.0 mmol/L     Protime-INR [[id number]]  (Abnormal) Collected:  03/27/18 1810    Specimen:  Blood Updated:  03/27/18 1847     Protime 14.7 (H) Seconds      INR 1.11 (H)    aPTT [[id number]]  (Abnormal) Collected:  03/27/18 1810    Specimen:  Blood Updated:  03/27/18 1847     PTT 36.5 (H) seconds     CBC & Differential [[id number]] Collected:  03/27/18 1810    Specimen:  Blood Updated:  03/27/18 1843    Narrative:       The following orders were created for panel order CBC & Differential.  Procedure                               Abnormality         Status                     ---------                               -----------         ------                     CBC Auto Differential[[id number]]        Abnormal            Final result                 Please view results for these tests on the individual orders.    CBC Auto Differential [[id number]]  (Abnormal) Collected:  03/27/18 1810    Specimen:  Blood Updated:  03/27/18 1843     WBC 5.93 10*3/mm3      RBC 4.36 10*6/mm3      Hemoglobin 14.2 g/dL      Hematocrit 41.5 %      MCV 95.2 fL      MCH 32.6 (H) pg       MCHC 34.2 g/dL      RDW 13.0 %      RDW-SD 45.4 fl      MPV 9.3 fL      Platelets 310 10*3/mm3      Neutrophil % 56.4 %      Lymphocyte % 29.7 %      Monocyte % 9.4 %      Eosinophil % 3.4 %      Basophil % 0.8 %      Immature Grans % 0.3 %      Neutrophils, Absolute 3.34 10*3/mm3      Lymphocytes, Absolute 1.76 10*3/mm3      Monocytes, Absolute 0.56 10*3/mm3      Eosinophils, Absolute 0.20 10*3/mm3      Basophils, Absolute 0.05 10*3/mm3      Immature Grans, Absolute 0.02 10*3/mm3      nRBC 0.0 /100 WBC           .  Imaging Results (last 24 hours)     Procedure Component Value Units Date/Time    CT Head Without Contrast [[id number]] Collected:  03/27/18 1943     Updated:  03/27/18 1946    Narrative:       CT HEAD WO CONTRAST- 3/27/2018 7:30 PM CDT     HISTORY: headache       COMPARISON: 11/17/2016      DOSE LENGTH PRODUCT: 616 mGy cm. Automated exposure control was also  utilized to decrease patient radiation dose.     TECHNIQUE: Helical tomographic images of the brain were obtained without  the use of intravenous contrast.      FINDINGS:   Hemorrhage: None.     Mass effect: No midline shift or mass effect.     Ventricles: Normal and symmetric without hydrocephalus.     Basilar cisterns: Normal.     Sulci: Normal in configuration. No sulcal effacement.     Extra-axial fluid: No abnormal extra-axial fluid collections.     Grey and white matter differentiation: Normal.     Posterior fossa and brainstem: Normal.     Bones: No fractures or lesions.     Soft tissues: No acute process.     Sinuses and mastoid air cells: Clear.       Impression:       1. No acute intracranial process.        This report was finalized on 03/27/2018 19:43 by Dr. Lalito Downing MD.        CT Head reviewed by me:  No acute findings      Impression    1.  Benign paroxysmal positional vertigo  --No focality on examination to suggest an ischemic etiology  --Head CT without contrast shows no focal findings as well  --Given normal exam and a lack of  focal features to her history I would suggest against pursuing an MRI the brain.  --Her symptoms have already resolved.  If they were to return I would recommend Levi-Daroff exercises.  2.  Urinary Tract Infection    Plan    · Recommend Levi-Daroff exercises of symptoms do not improve  · Cancel MRI of brain  · Clear from neurology standpoint to be discharged home with follow-up as needed.    I discussed the patients findings and my recommendations with patient and family    Henry Schultz MD  03/28/18  9:48 AM

## 2018-03-28 NOTE — ED PROVIDER NOTES
[facility]  eMERGENCY dEPARTMENT eNCOUnter      Pt Name: Lucy Sousa  MRN: [id number]  Birthdate 1950  Date of evaluation: 3/27/2018      CHIEF COMPLAINT       Chief Complaint   Patient presents with   • Headache       Nurses Notes reviewed and I agree except as noted in the HPI.      HISTORY OF PRESENT ILLNESS    Lucy Sousa is a 68 y.o. female who presents     Patient presents for headache, dizziness and nausea.  Patient reports that she has had actually had a headache over the last several days.  Originally started in the left temporal area and then moved across her forehead it was persistent.  She states is not the worst headache of her life but was unusual for her most of her headaches are behind her eyes and her sinus in nature.  She reports now the headache is most completely resolved however this morning when she tried to get up out of the bed she became violently dizzy and very nauseous.  She states that when she sits still it is not quite as bad although she still dizzy.  She denies any movement making this worse other than if she is lying down she feels more nauseous and if she tries to sit up from laying down she becomes more dizzy.       REVIEW OF SYSTEMS     Review of Systems  CONSTITUION: No Fever, No chills, No activity change, No diaphoresis, No unexpected wt change.    HENT: No congestion, no dental problems, no ear pain or discharge,   EYES: No drainage, no itching, no photophobia, no visual disturbance  RESPIRATORY: No apnea, no chest tightness, no cough, no shortness of breath, no stridor, no wheezing  CARDIOVASCULAR: No chest pain, no palpitations, no leg swelling  GI: No abdominal distention, no abdominal pain, no rectal bleeding, no melena, no hematachezia, no diarrhea, no nausea, no vomiting  ENDOCRINE: No polydipsia, no polyphagia, no polyuria, no cold or heat intolerance  : No difficulty urinating, no dyspareunia, nodysuria, no flank pain, no frequency, no  genital sore, no hematuria, no menstrual problem if female, no decreased urniation, no hesitation of urination, no vaginal discharge if female, no vaginal pain if female no penile pain if male  ALLERG/IMMUNO:  No env or food allergies, not immunocompromised  NEUROLOGICAL: As per the HPI otherwise negative.  HEMATOLOGIC: No adenopathy, no unusual bleeding or bruising  MUSC: No arthralgia, no back pain, no joint swelling, no myalgias, no neck pain, no neck stiffness  SKIN: No rash, no color change, no pallor, no wound  PSYCH: No agitation, no behavior problem, no confusion, no decr concentration, no hallucinations, no suicidal ideation, no homicidal ideation, no self injury, no sleep disturbance      PAST MEDICAL HISTORY     Past Medical History:   Diagnosis Date   • Anxiety    • Depression    • DJD (degenerative joint disease)    • GERD (gastroesophageal reflux disease)    • HTN (hypertension)    • Pulmonary embolism     post surgical with cardiac arrest       SURGICAL HISTORY      has a past surgical history that includes Foot surgery; Hysterectomy; Cholecystectomy; Replacement total knee; EKOS Catheter Placement (Bilateral, 11/17/2016); and EKOS Catheter Removal (Bilateral, 11/18/2016).    CURRENT MEDICATIONS        Medication List      ASK your doctor about these medications    ALPRAZolam 0.5 MG tablet  Commonly known as:  XANAX  TAKE ONE TABLET TWICE DAILY AS NEEDED FOR ANXIETY GENERIC FOR XANAX     budesonide-formoterol 160-4.5 MCG/ACT inhaler  Commonly known as:  SYMBICORT  Inhale 2 puffs 2 (Two) Times a Day.     cyanocobalamin 50 MCG tablet tablet  Commonly known as:  VITAMIN B-12     furosemide 20 MG tablet  Commonly known as:  LASIX  TAKE ONE TABLET DAILY GENERIC FOR LASIX     losartan 50 MG tablet  Commonly known as:  COZAAR  Take 1 tablet by mouth Daily.     MULTI VITAMIN DAILY PO     omeprazole 20 MG capsule  Commonly known as:  priLOSEC     potassium chloride ER 20 MEQ tablet controlled-release ER  "tablet  Commonly known as:  K-TAB  Take 1 tablet by mouth 2 (Two) Times a Day.     triamterene-hydrochlorothiazide 37.5-25 MG per capsule  Commonly known as:  DYAZIDE  Take 1 capsule by mouth Every Morning.     Vitamin D 2000 units tablet     vitamin E 600 UNIT capsule     XARELTO 20 MG tablet  Generic drug:  rivaroxaban  TAKE ONE TABLET DAILY WITH DINNER          ALLERGIES     has No Known Allergies.    FAMILY HISTORY     indicated that her mother is . She indicated that her father is . She indicated that her brother is alive. She indicated that her maternal grandfather is .    family history includes Coronary artery disease in her father and mother; Diabetes in her brother; Heart disease in her maternal grandfather; Hypertension in her brother.    SOCIAL HISTORY      reports that she has never smoked. She has never used smokeless tobacco. She reports that she does not drink alcohol or use drugs.    PHYSICAL EXAM     INITIAL VITALS:  height is 160 cm (63\") and weight is 95.3 kg (210 lb). Her temporal artery  temperature is 97.5 °F (36.4 °C). Her blood pressure is 132/82 and her pulse is 56. Her respiration is 18 and oxygen saturation is 98%.    Physical Exam    CONSTITUTIONAL: Well developed, well nourished, not diaphoretic nor distressed  HENT: Normocephalic, atraumatic, oropharynx clear and moist  EYES: PERRL, EOM normal, no discharge, no scleral icterus  NECK: ROM normal, supple, no tracheal deviation nor JVD, no stridor  CARDIOVASCULAR: Normal rate and rhythm, heart sounds normal, no rub no gallop, intact distal pulses, normal cap refill  PULMONARY: Normal effort and breath sounds, no distress, no wheezes, rhonci or rales, no chest tenderness  ABDOMINAL: Soft, nontender, no guarding, no mass, no rebound, no hernia  GENITOURINARY/ANORECTAL: deferred  MUSCKULOSKELETAL: ROM normal, no tenderness nor deformity, no edema  NEUROLOGICAL: Alert, oriented x 3, DTRS normal, CN x 10 normal, normal " tone Hallpike negative  SKIN: Warm, dry, no erythema, no rash, normal color  PSYCH: Mood and affect normal, behavior normal, thought content and judgement normal.    DIFFERENTIAL DIAGNOSIS:       DIAGNOSTIC RESULTS     EKG: All EKG's are interpreted by the Emergency Department Physician who either signs or Co-signs this chart in the absence of a cardiologist.      RADIOLOGY: non-plain film images(s) such as CT, Ultrasound and MRI are read by the radiologist.  Plain radiographic images are visualized and preliminarily interpreted by the emergency physician unless otherwise stated below.  Ct Head Without Contrast    Result Date: 3/27/2018  Narrative: CT HEAD WO CONTRAST- 3/27/2018 7:30 PM CDT  HISTORY: headache   COMPARISON: 11/17/2016  DOSE LENGTH PRODUCT: 616 mGy cm. Automated exposure control was also utilized to decrease patient radiation dose.  TECHNIQUE: Helical tomographic images of the brain were obtained without the use of intravenous contrast.  FINDINGS: Hemorrhage: None.  Mass effect: No midline shift or mass effect.  Ventricles: Normal and symmetric without hydrocephalus.  Basilar cisterns: Normal.  Sulci: Normal in configuration. No sulcal effacement.  Extra-axial fluid: No abnormal extra-axial fluid collections.  Grey and white matter differentiation: Normal.  Posterior fossa and brainstem: Normal.  Bones: No fractures or lesions.  Soft tissues: No acute process.  Sinuses and mastoid air cells: Clear.      Impression: 1. No acute intracranial process.   This report was finalized on 03/27/2018 19:43 by Dr. Lalito Downing MD.             LABS:   Lab Results (last 24 hours)     Procedure Component Value Units Date/Time    CBC & Differential [[id number]] Collected:  03/27/18 1810    Specimen:  Blood Updated:  03/27/18 1843    Narrative:       The following orders were created for panel order CBC & Differential.  Procedure                               Abnormality         Status                     ---------                                -----------         ------                     CBC Auto Differential[[id number]]        Abnormal            Final result                 Please view results for these tests on the individual orders.    Comprehensive Metabolic Panel [[id number]]  (Abnormal) Collected:  03/27/18 1810    Specimen:  Blood Updated:  03/27/18 1849     Glucose 108 (H) mg/dL      BUN 16 mg/dL      Creatinine 0.80 mg/dL      Sodium 141 mmol/L      Potassium 3.7 mmol/L      Chloride 104 mmol/L      CO2 26.0 mmol/L      Calcium 9.9 mg/dL      Total Protein 6.8 g/dL      Albumin 3.90 g/dL      ALT (SGPT) 29 U/L      AST (SGOT) 26 U/L      Alkaline Phosphatase 67 U/L      Total Bilirubin 0.3 mg/dL      eGFR Non African Amer 71 mL/min/1.73      Globulin 2.9 gm/dL      A/G Ratio 1.3 g/dL      BUN/Creatinine Ratio 20.0     Anion Gap 11.0 mmol/L     Protime-INR [[id number]]  (Abnormal) Collected:  03/27/18 1810    Specimen:  Blood Updated:  03/27/18 1847     Protime 14.7 (H) Seconds      INR 1.11 (H)    aPTT [[id number]]  (Abnormal) Collected:  03/27/18 1810    Specimen:  Blood Updated:  03/27/18 1847     PTT 36.5 (H) seconds     CBC Auto Differential [[id number]]  (Abnormal) Collected:  03/27/18 1810    Specimen:  Blood Updated:  03/27/18 1843     WBC 5.93 10*3/mm3      RBC 4.36 10*6/mm3      Hemoglobin 14.2 g/dL      Hematocrit 41.5 %      MCV 95.2 fL      MCH 32.6 (H) pg      MCHC 34.2 g/dL      RDW 13.0 %      RDW-SD 45.4 fl      MPV 9.3 fL      Platelets 310 10*3/mm3      Neutrophil % 56.4 %      Lymphocyte % 29.7 %      Monocyte % 9.4 %      Eosinophil % 3.4 %      Basophil % 0.8 %      Immature Grans % 0.3 %      Neutrophils, Absolute 3.34 10*3/mm3      Lymphocytes, Absolute 1.76 10*3/mm3      Monocytes, Absolute 0.56 10*3/mm3      Eosinophils, Absolute 0.20 10*3/mm3      Basophils, Absolute 0.05 10*3/mm3      Immature Grans, Absolute 0.02 10*3/mm3      nRBC 0.0 /100 WBC     Sedimentation Rate [[id number]]  (Normal)  Collected:  03/27/18 2140    Specimen:  Blood Updated:  03/27/18 2148     Sed Rate 10 mm/hr     C-reactive Protein [[id number]]  (Normal) Collected:  03/27/18 2140    Specimen:  Blood Updated:  03/27/18 2158     C-Reactive Protein 0.74 mg/dL           EMERGENCY DEPARTMENT COURSE:   Vitals:    Vitals:    03/27/18 1846 03/27/18 1850 03/27/18 2031 03/27/18 2222   BP: 119/66 119/66 136/66 132/82   BP Location:  Right arm Right arm Left arm   Patient Position:  Sitting Sitting Sitting   Pulse: 52 52 55 56   Resp:  14 13 18   Temp:       TempSrc:       SpO2: 100% 100% 98% 98%   Weight:       Height:           The patient was given the following medications:  Medications   ondansetron (ZOFRAN) injection 4 mg (4 mg Intravenous Given 3/27/18 2219)       ED Course       CRITICAL CARE:  none    CONSULTS:  none    PROCEDURES:  None    FINAL IMPRESSION      1. Dizziness    2. Nausea    3. Headache above the eye region          DISPOSITION/PLAN   Patiently admitted to the service of Dr. Berg in stable condition.      PATIENT REFERRED TO:  No follow-up provider specified.    DISCHARGE MEDICATIONS:     Medication List      ASK your doctor about these medications    ALPRAZolam 0.5 MG tablet  Commonly known as:  XANAX  TAKE ONE TABLET TWICE DAILY AS NEEDED FOR ANXIETY GENERIC FOR XANAX     budesonide-formoterol 160-4.5 MCG/ACT inhaler  Commonly known as:  SYMBICORT  Inhale 2 puffs 2 (Two) Times a Day.     cyanocobalamin 50 MCG tablet tablet  Commonly known as:  VITAMIN B-12     furosemide 20 MG tablet  Commonly known as:  LASIX  TAKE ONE TABLET DAILY GENERIC FOR LASIX     losartan 50 MG tablet  Commonly known as:  COZAAR  Take 1 tablet by mouth Daily.     MULTI VITAMIN DAILY PO     omeprazole 20 MG capsule  Commonly known as:  priLOSEC     potassium chloride ER 20 MEQ tablet controlled-release ER tablet  Commonly known as:  K-TAB  Take 1 tablet by mouth 2 (Two) Times a Day.     triamterene-hydrochlorothiazide 37.5-25 MG per  capsule  Commonly known as:  DYAZIDE  Take 1 capsule by mouth Every Morning.     Vitamin D 2000 units tablet     vitamin E 600 UNIT capsule     XARELTO 20 MG tablet  Generic drug:  rivaroxaban  TAKE ONE TABLET DAILY WITH DINNER          (Please note that portions of this note were completed with a voice recognition program.)    Kaitlin Avila, DO Kaitlin Avila,   03/27/18 1294

## 2018-03-30 LAB
BACTERIA SPEC AEROBE CULT: ABNORMAL

## 2018-03-30 RX ORDER — AMOXICILLIN AND CLAVULANATE POTASSIUM 875; 125 MG/1; MG/1
1 TABLET, FILM COATED ORAL EVERY 12 HOURS SCHEDULED
Qty: 14 TABLET | Refills: 0 | Status: SHIPPED | OUTPATIENT
Start: 2018-03-30 | End: 2018-04-05 | Stop reason: SDUPTHER

## 2018-04-05 ENCOUNTER — OFFICE VISIT (OUTPATIENT)
Dept: FAMILY MEDICINE CLINIC | Facility: CLINIC | Age: 68
End: 2018-04-05

## 2018-04-05 VITALS
BODY MASS INDEX: 37.92 KG/M2 | OXYGEN SATURATION: 98 % | SYSTOLIC BLOOD PRESSURE: 122 MMHG | TEMPERATURE: 97.8 F | DIASTOLIC BLOOD PRESSURE: 74 MMHG | HEIGHT: 63 IN | RESPIRATION RATE: 18 BRPM | HEART RATE: 100 BPM | WEIGHT: 214 LBS

## 2018-04-05 DIAGNOSIS — I50.30 DIASTOLIC CONGESTIVE HEART FAILURE, UNSPECIFIED CONGESTIVE HEART FAILURE CHRONICITY: Chronic | ICD-10-CM

## 2018-04-05 DIAGNOSIS — Z79.01 ANTICOAGULATED: ICD-10-CM

## 2018-04-05 DIAGNOSIS — I10 HYPERTENSION, UNSPECIFIED TYPE: Chronic | ICD-10-CM

## 2018-04-05 DIAGNOSIS — R42 DIZZINESS: ICD-10-CM

## 2018-04-05 DIAGNOSIS — E87.6 HYPOPOTASSEMIA: ICD-10-CM

## 2018-04-05 DIAGNOSIS — R42 VERTIGO: Primary | ICD-10-CM

## 2018-04-05 PROCEDURE — 99214 OFFICE O/P EST MOD 30 MIN: CPT | Performed by: FAMILY MEDICINE

## 2018-04-05 RX ORDER — AMOXICILLIN AND CLAVULANATE POTASSIUM 875; 125 MG/1; MG/1
1 TABLET, FILM COATED ORAL EVERY 12 HOURS SCHEDULED
Qty: 14 TABLET | Refills: 0 | Status: SHIPPED | OUTPATIENT
Start: 2018-03-30 | End: 2018-04-06

## 2018-04-05 NOTE — PROGRESS NOTES
Subjective   Lucy Sousa is a 68 y.o. female presenting with chief complaint of:   Chief Complaint   Patient presents with   • Dizziness     [facility] F/U       History of Present Illness :  Alone.  Here for primarily an acute issue today; [facility] f/u.       Admit Date:    3/27/2018   Discharge Date: 3/28/18  Date of service: 3/28/18Reasons for admit/problems address while here:   Dizziness-BPV  Vertigo-BPV  Benign positional vertigo  Gait difficulty-acute  Hypopotassemia 3.3-diuretic influenced (watch for arrthymia)  Abnormal UA-bacteruria-without symptoms    Has multiple chronic problems to consider that might have a bearing on today's issues; not an interval appointment.       1. Vertigo    2. Hypopotassemia    3. Hypertension, unspecified type    4. Dizziness    5. Diastolic congestive heart failure, unspecified congestive heart failure chronicity    6. Anticoagulated-PE/cardiac arrest/xarelto         Other chronic problem/s to consider:   HTN.  The HTN has been present for years/it is chronic.  The HTN is assumed essential/without testing needed to look for other.  The HTN is controlled manifest by todays blood pressure and same home monitoring.  Associated illness below.   Congestive heart failure/echo changes: This has been present for years/over a year.  It is chronic.  The CHF had features on echo of diastolic dysfunction.  This has been associated with SOB, LE edema on/off. Sees cardiology.  Anticoagulation: Requires anticoagulation with xarelto for PE-cardiac arrest.   This needs to be continually monitored for risk/benefit.     Has an/another acute issue today: fatigue.    The following portions of the patient's history were reviewed and updated as appropriate: allergies, current medications, past family history, past medical history, past social history, past surgical history and problem list.  Records acquired and reviewed; TCC migrated.      Current Outpatient Prescriptions:   •  ALPRAZolam (XANAX) 0.5 MG  tablet, Take 0.5 mg by mouth 2 (Two) Times a Day As Needed for Anxiety., Disp: , Rfl:   •  amoxicillin-clavulanate (AUGMENTIN) 875-125 MG per tablet, Take 1 tablet by mouth Every 12 (Twelve) Hours for 7 days., Disp: 14 tablet, Rfl: 0  •  budesonide-formoterol (SYMBICORT) 160-4.5 MCG/ACT inhaler, Inhale 2 puffs 2 (Two) Times a Day., Disp: 1 inhaler, Rfl: 5  •  Cholecalciferol (VITAMIN D) 2000 UNITS tablet, Take 2,000 Units by mouth Daily., Disp: , Rfl:   •  cyanocobalamin (VITAMIN B-12) 50 MCG tablet tablet, Take 50 mcg by mouth 1 (One) Time., Disp: , Rfl:   •  furosemide (LASIX) 20 MG tablet, Take 20 mg by mouth 2 (Two) Times a Day., Disp: , Rfl:   •  losartan (COZAAR) 50 MG tablet, Take 1 tablet by mouth Daily., Disp: 90 tablet, Rfl: 1  •  meclizine (ANTIVERT) 25 MG tablet, Take 1 tablet by mouth 3 (Three) Times a Day As Needed for dizziness., Disp: 30 tablet, Rfl: 0  •  Multiple Vitamin (MULTI VITAMIN DAILY PO), Take 1 tablet by mouth daily., Disp: , Rfl:   •  omeprazole (PriLOSEC) 20 MG capsule, Take 20 mg by mouth daily as needed., Disp: , Rfl:   •  potassium chloride ER (K-TAB) 20 MEQ tablet controlled-release ER tablet, Take 1 tablet by mouth 2 (Two) Times a Day., Disp: 60 tablet, Rfl: 5  •  rivaroxaban (XARELTO) 20 MG tablet, Take 20 mg by mouth Daily With Dinner., Disp: , Rfl:   •  triamterene-hydrochlorothiazide (DYAZIDE) 37.5-25 MG per capsule, Take 1 capsule by mouth Every Morning., Disp: 90 capsule, Rfl: 1  •  vitamin E 600 UNIT capsule, Take 400 Units by mouth Daily., Disp: , Rfl:     No problems with medications for sure  Refills if needed done    No Known Allergies    Review of Systems  GENERAL:  Active/slower with limits, speed, stamina for age and exertional fatigue. Sleep is ok. No fever now.  SKIN: No  rash/skin lesion of concern:   ENDO:  No syncope, near or diaphoretic sweaty spells.  HEENT: No recent head injury; or change in occ headache,  No vision change, No significant hearing loss.  Ears  without pain/drainage.  No sore throat.  No significant nasal/sinus congestion/drainage. No epistaxis.  CHEST: No chest wall tenderness or mass. No cough,  without wheeze.  No SOB; no hemoptysis.  CV: No chest pain; same occ palpitations, ankle edema.  GI: No heartburn, dysphagia.  No abdominal pain, diarrhea, constipation.  No rectal bleeding, or melena.    :  Voids without dysuria, or  incontinence to completion.  ORTHO: No painful/swollen joints but various on /off sore.  No change occ/on-off  sore neck or back.  No acute neck or back pain without recent injury.  NEURO: Still on/off dizzy; less vertigo.  No weakness of extremities.  No numbness/paresthesias.   PSYCH: No memory loss.  Mood good; not anxious, depressed but/and not suicidal.  Tries to tolerate stress .     Results for orders placed or performed during the hospital encounter of 03/27/18   Urine Culture - Urine, Urine, Clean Catch   Result Value Ref Range    Urine Culture      Urine Culture (A)      >100,000 CFU/mL Klebsiella pneumoniae ssp pneumoniae       Susceptibility    Klebsiella pneumoniae ssp pneumoniae - MANOJ     Ampicillin >=32 Resistant ug/ml     Ampicillin + Sulbactam 4 Susceptible ug/ml     Cefazolin <=4 Susceptible ug/ml     Cefepime <=1 Susceptible ug/ml     Ceftriaxone <=1 Susceptible ug/ml     Ertapenem <=0.5 Susceptible ug/ml     ESBL Confirmation Test NEG Negative      Gentamicin <=1 Susceptible ug/ml     Levofloxacin <=0.12 Susceptible ug/ml     Meropenem <=0.25 Susceptible ug/ml     Nitrofurantoin 64 Intermediate ug/ml     Piperacillin + Tazobactam <=4 Susceptible ug/ml     Trimethoprim + Sulfamethoxazole <=20 Susceptible ug/ml   Urine Culture - Urine, Urine, Clean Catch   Result Value Ref Range    Urine Culture      Urine Culture (A)      >100,000 CFU/mL Klebsiella pneumoniae ssp pneumoniae       Susceptibility    Klebsiella pneumoniae ssp pneumoniae - MANOJ     Ampicillin >=32 Resistant ug/ml     Ampicillin + Sulbactam 4  Susceptible ug/ml     Cefazolin <=4 Susceptible ug/ml     Cefepime <=1 Susceptible ug/ml     Ceftriaxone <=1 Susceptible ug/ml     Ertapenem <=0.5 Susceptible ug/ml     ESBL Confirmation Test NEG Negative      Gentamicin <=1 Susceptible ug/ml     Levofloxacin <=0.12 Susceptible ug/ml     Meropenem <=0.25 Susceptible ug/ml     Nitrofurantoin 64 Intermediate ug/ml     Piperacillin + Tazobactam <=4 Susceptible ug/ml     Trimethoprim + Sulfamethoxazole <=20 Susceptible ug/ml   Comprehensive Metabolic Panel   Result Value Ref Range    Glucose 108 (H) 70 - 100 mg/dL    BUN 16 5 - 21 mg/dL    Creatinine 0.80 0.50 - 1.40 mg/dL    Sodium 141 135 - 145 mmol/L    Potassium 3.7 3.5 - 5.3 mmol/L    Chloride 104 98 - 110 mmol/L    CO2 26.0 24.0 - 31.0 mmol/L    Calcium 9.9 8.4 - 10.4 mg/dL    Total Protein 6.8 6.3 - 8.7 g/dL    Albumin 3.90 3.50 - 5.00 g/dL    ALT (SGPT) 29 0 - 54 U/L    AST (SGOT) 26 7 - 45 U/L    Alkaline Phosphatase 67 24 - 120 U/L    Total Bilirubin 0.3 0.1 - 1.0 mg/dL    eGFR Non African Amer 71 >60 mL/min/1.73    Globulin 2.9 gm/dL    A/G Ratio 1.3 1.1 - 2.5 g/dL    BUN/Creatinine Ratio 20.0 7.0 - 25.0    Anion Gap 11.0 4.0 - 13.0 mmol/L   Protime-INR   Result Value Ref Range    Protime 14.7 (H) 11.9 - 14.6 Seconds    INR 1.11 (H) 0.91 - 1.09   aPTT   Result Value Ref Range    PTT 36.5 (H) 24.1 - 34.8 seconds   CBC Auto Differential   Result Value Ref Range    WBC 5.93 4.80 - 10.80 10*3/mm3    RBC 4.36 4.20 - 5.40 10*6/mm3    Hemoglobin 14.2 12.0 - 16.0 g/dL    Hematocrit 41.5 37.0 - 47.0 %    MCV 95.2 82.0 - 98.0 fL    MCH 32.6 (H) 28.0 - 32.0 pg    MCHC 34.2 33.0 - 36.0 g/dL    RDW 13.0 12.0 - 15.0 %    RDW-SD 45.4 40.0 - 54.0 fl    MPV 9.3 6.0 - 12.0 fL    Platelets 310 130 - 400 10*3/mm3    Neutrophil % 56.4 39.0 - 78.0 %    Lymphocyte % 29.7 15.0 - 45.0 %    Monocyte % 9.4 4.0 - 12.0 %    Eosinophil % 3.4 0.0 - 4.0 %    Basophil % 0.8 0.0 - 2.0 %    Immature Grans % 0.3 0.0 - 5.0 %    Neutrophils,  Absolute 3.34 1.87 - 8.40 10*3/mm3    Lymphocytes, Absolute 1.76 0.72 - 4.86 10*3/mm3    Monocytes, Absolute 0.56 0.19 - 1.30 10*3/mm3    Eosinophils, Absolute 0.20 0.00 - 0.70 10*3/mm3    Basophils, Absolute 0.05 0.00 - 0.20 10*3/mm3    Immature Grans, Absolute 0.02 0.00 - 0.03 10*3/mm3    nRBC 0.0 0.0 - 0.0 /100 WBC   Sedimentation Rate   Result Value Ref Range    Sed Rate 10 0 - 20 mm/hr   C-reactive Protein   Result Value Ref Range    C-Reactive Protein 0.74 0.00 - 0.99 mg/dL   Troponin   Result Value Ref Range    Troponin I <0.012 0.000 - 0.034 ng/mL   Urinalysis With / Culture If Indicated -   Result Value Ref Range    Color, UA Yellow Yellow, Straw    Appearance, UA Turbid (A) Clear    pH, UA 6.0 5.0 - 8.0    Specific [place], UA 1.025 >1.005-<1.030    Glucose, UA Negative Negative    Ketones, UA Negative Negative    Bilirubin, UA Negative Negative    Blood, UA Negative Negative    Protein, UA Negative Negative    Leuk Esterase, UA Negative Negative    Nitrite, UA Positive (A) Negative    Urobilinogen, UA 0.2 E.U./dL 0.2 - 1.0 E.U./dL   Basic Metabolic Panel   Result Value Ref Range    Glucose 79 70 - 100 mg/dL    BUN 14 5 - 21 mg/dL    Creatinine 0.76 0.50 - 1.40 mg/dL    Sodium 144 135 - 145 mmol/L    Potassium 3.3 (L) 3.5 - 5.3 mmol/L    Chloride 105 98 - 110 mmol/L    CO2 32.0 (H) 24.0 - 31.0 mmol/L    Calcium 9.3 8.4 - 10.4 mg/dL    eGFR Non African Amer 75 >60 mL/min/1.73    BUN/Creatinine Ratio 18.4 7.0 - 25.0    Anion Gap 7.0 4.0 - 13.0 mmol/L   CBC Auto Differential   Result Value Ref Range    WBC 6.01 4.80 - 10.80 10*3/mm3    RBC 4.09 (L) 4.20 - 5.40 10*6/mm3    Hemoglobin 13.2 12.0 - 16.0 g/dL    Hematocrit 39.8 37.0 - 47.0 %    MCV 97.3 82.0 - 98.0 fL    MCH 32.3 (H) 28.0 - 32.0 pg    MCHC 33.2 33.0 - 36.0 g/dL    RDW 13.0 12.0 - 15.0 %    RDW-SD 46.6 40.0 - 54.0 fl    MPV 9.2 6.0 - 12.0 fL    Platelets 283 130 - 400 10*3/mm3    Neutrophil % 52.8 39.0 - 78.0 %    Lymphocyte % 32.3 15.0 - 45.0 %     Monocyte % 11.0 4.0 - 12.0 %    Eosinophil % 3.0 0.0 - 4.0 %    Basophil % 0.7 0.0 - 2.0 %    Immature Grans % 0.2 0.0 - 5.0 %    Neutrophils, Absolute 3.18 1.87 - 8.40 10*3/mm3    Lymphocytes, Absolute 1.94 0.72 - 4.86 10*3/mm3    Monocytes, Absolute 0.66 0.19 - 1.30 10*3/mm3    Eosinophils, Absolute 0.18 0.00 - 0.70 10*3/mm3    Basophils, Absolute 0.04 0.00 - 0.20 10*3/mm3    Immature Grans, Absolute 0.01 0.00 - 0.03 10*3/mm3    nRBC 0.0 0.0 - 0.0 /100 WBC   Urinalysis, Microscopic Only - Urine, Clean Catch   Result Value Ref Range    RBC, UA None Seen None Seen /HPF    WBC, UA 3-5 (A) None Seen /HPF    Bacteria, UA 4+ (A) None Seen /HPF    Squamous Epithelial Cells, UA 3-6 (A) None Seen, 0-2 /HPF    Hyaline Casts, UA 0-2 None Seen /LPF    Granular Casts, UA 0-2 None Seen /LPF    Methodology Automated Microscopy    Urinalysis With / Culture If Indicated - Urine, Catheter   Result Value Ref Range    Color, UA Dark Yellow (A) Yellow, Straw    Appearance, UA Clear Clear    pH, UA <=5.0 5.0 - 8.0    Specific Gravity, UA 1.027 1.005 - 1.030    Glucose, UA Negative Negative    Ketones, UA Negative Negative    Bilirubin, UA Negative Negative    Blood, UA Negative Negative    Protein, UA Negative Negative    Leuk Esterase, UA Negative Negative    Nitrite, UA Positive (A) Negative    Urobilinogen, UA 0.2 E.U./dL 0.2 - 1.0 E.U./dL   Urinalysis, Microscopic Only - Urine, Clean Catch   Result Value Ref Range    RBC, UA 0-2 (A) None Seen /HPF    WBC, UA 3-5 (A) None Seen /HPF    Bacteria, UA 3+ (A) None Seen /HPF    Squamous Epithelial Cells, UA 0-2 None Seen, 0-2 /HPF    Hyaline Casts, UA 3-6 None Seen /LPF    Methodology Automated Microscopy    Light Blue Top   Result Value Ref Range    Extra Tube hold for add-on    Green Top (Gel)   Result Value Ref Range    Extra Tube Hold for add-ons.    Lavender Top   Result Value Ref Range    Extra Tube hold for add-on    Red Top   Result Value Ref Range    Extra Tube Hold for add-ons.   "      No results found for: PSA     Lab Results:  CBC:  Lab Results - Last 18 Months  Lab Units 03/28/18  0356 03/27/18  1810 02/27/18  0719 09/06/17  0657 02/16/17  0721 01/06/17  0733 11/30/16  1050  11/20/16  0954   WBC 10*3/mm3 6.01 5.93 6.34 7.53 4.67* 4.80 7.55  --   --    HEMOGLOBIN g/dL 13.2 14.2 13.8 14.0 14.3 13.5 11.9*  < >  --    HEMATOCRIT % 39.8 41.5 41.5 46.5 44.0 41.7 37.9  < >  --    PLATELETS 10*3/mm3 283 310 300 319 274 282 423*  --   --    IRON mcg/dL  --   --  102 102 85 69  --   --  72   < > = values in this interval not displayed.   BMP/CMP:    Lab Results - Last 18 Months  Lab Units 03/28/18  0356 03/27/18 1810 02/27/18 0719 09/06/17  0657 01/06/17  0733 11/30/16  1050 11/21/16  0326   SODIUM mmol/L 144 141 141 142 140 137 137   POTASSIUM mmol/L 3.3* 3.7 4.1 3.5 4.1 4.0 3.9   CHLORIDE mmol/L 105 104 102 99 101 95* 99   CO2 mmol/L 32.0* 26.0  --   --   --   --  31.0   TOTAL CO2, ARTERIAL mmol/L  --   --  29.0 26.0 31.0 29.0  --    GLUCOSE mg/dL  --   --  94 35* 98 92  --    BUN mg/dL 14 16 12 13 14 16 14   CREATININE mg/dL 0.76 0.80 0.76 0.73 0.81 0.80 0.75   EGFR IF NONAFRICN AM mL/min/1.73 75 71 75 79 70 71 77   EGFR IF AFRICN AM mL/min/1.73  --   --  91 96 85 86  --    CALCIUM mg/dL 9.3 9.9 10.0 10.1 9.7 10.0 8.8     HEPATIC:    Lab Results - Last 18 Months  Lab Units 03/27/18 1810 02/27/18 0719 09/06/17  0657   ALT (SGPT) U/L 29 30 31   AST (SGOT) U/L 26 19 29   ALK PHOS U/L 67 66 97     THYROID:    Lab Results - Last 18 Months  Lab Units 02/27/18  0719 08/31/17  0718   TSH mIU/mL 3.040 3.470   FREE T4 ng/dL 1.00  --      A1C:No results for input(s): HGBA1C in the last 39643 hours.  PSA:No results for input(s): PSA in the last 65286 hours.    Objective   /74   Pulse 100   Temp 97.8 °F (36.6 °C) (Oral)   Resp 18   Ht 160 cm (63\")   Wt 97.1 kg (214 lb)   SpO2 98%   Breastfeeding? No   BMI 37.91 kg/m²   Body mass index is 37.91 kg/m².    Physical Exam  GENERAL:  Well " nourished/developed in no acute distress.   SKIN: Turgor excellent, without wound, rash, lesion  HEENT: Normal cephalic without trauma.  Pupils equal round reactive to light. Extraocular motions full without nystagmus.   External canals nonobstructive nontender without reddness. Tymphatic membranes sharri with shanika structures intact.   Oral cavity without growths, exudates, and moist.  Posterior pharynx without mass, obstruction, redness.  No thyromegaly, mass, tenderness, lymphadenopathy and supple.  CV: Regular rhythm.  No murmur, gallop, trace LE edema. Posterior pulses intact.  No carotid bruits.  CHEST: No chest wall tenderness or mass.   LUNGS: Symmetric motion with clear to auscultation.   ABD: Soft, nontender without mass.   ORTHO: Symmetric extremities without swelling/point tenderness.  Full gross range of motion.  NEURO: CN 2-12 grossly intact.  Symmetric facies and UE/LE. 3/5 strength throughout. 1/4 x bicep knee equal reflexes.  Nonfocal use extremities. Speech clear.  Reduced minimal light touch with monofilament, vibratory sensation with tuning fork; equal toes/distal feet.    PSYCH: Oriented x 3.  Pleasant calm, well kept.  Purposeful/directed conservation with intact short/long gross memory.     Assessment/Plan     1. Vertigo -neuro opinion migraine related.   From vertigo to more dizzy   2. Hypopotassemia -diuretic influenced/replacing   3. Hypertension, unspecified type -controlled   4. Dizziness -possible some affect polypharmacy with orthostasis/side effects   5. Diastolic congestive heart failure, unspecified congestive heart failure chronicity-    6. Anticoagulated-PE/cardiac arrest/xarelto        Rx: reviewed/changes:  Same  Decision that if forced; Rx changes could be tried but she agrees better as is for now.     LAB/Testing/Referrals: reviewed/orders:   Today: K soon  Orders Placed This Encounter   Procedures   • Ambulatory Referral to Physical Therapy Evaluate and treat (teach eply),  Vestibular     Usual:   6m CBC, CMP, TSH, fT4  12m CBC, CMP, LIPID, TSH, fT4, Vit D iron, ferritin, B12, folate     Discussions:   Body mass index is 37.91 kg/m².   Discussed the patient's BMI with her. BMI is above normal parameters. Follow-up plan includes:  exercise counseling and nutrition counseling.  Non-smoker      Patient Instructions   60 oz fluid a day.        Follow up: Return for lab/after antibiotic done.   otherwise lab/dr berg as planned.  Future Appointments  Date Time Provider Department Center   4/11/2018 8:40 AM LAB [facility] None   4/30/2018 3:15 PM Daniel Underwood MD [facility] None   9/7/2018 9:15 AM Rosas Berg MD [facility] None

## 2018-04-11 DIAGNOSIS — E87.6 HYPOPOTASSEMIA: Primary | ICD-10-CM

## 2018-04-12 DIAGNOSIS — N39.0 URINARY TRACT INFECTION WITHOUT HEMATURIA, SITE UNSPECIFIED: Primary | ICD-10-CM

## 2018-04-13 ENCOUNTER — RESULTS ENCOUNTER (OUTPATIENT)
Dept: FAMILY MEDICINE CLINIC | Facility: CLINIC | Age: 68
End: 2018-04-13

## 2018-04-13 DIAGNOSIS — N39.0 URINARY TRACT INFECTION WITHOUT HEMATURIA, SITE UNSPECIFIED: ICD-10-CM

## 2018-04-13 RX ORDER — POTASSIUM CHLORIDE 1500 MG/1
20 TABLET, FILM COATED, EXTENDED RELEASE ORAL 2 TIMES DAILY
Qty: 60 TABLET | Refills: 5 | Status: SHIPPED | OUTPATIENT
Start: 2018-04-13 | End: 2019-01-29 | Stop reason: SDUPTHER

## 2018-04-19 LAB
APPEARANCE UR: CLEAR
BACTERIA #/AREA URNS HPF: NORMAL /HPF
BACTERIA UR CULT: ABNORMAL
BACTERIA UR CULT: ABNORMAL
BILIRUB UR QL STRIP: NEGATIVE
COLOR UR: YELLOW
EPI CELLS #/AREA URNS HPF: NORMAL /HPF
GLUCOSE UR QL: NEGATIVE
HGB UR QL STRIP: NEGATIVE
KETONES UR QL STRIP: NEGATIVE
LEUKOCYTE ESTERASE UR QL STRIP: ABNORMAL
MICRO URNS: ABNORMAL
MUCOUS THREADS URNS QL MICRO: PRESENT /HPF
NITRITE UR QL STRIP: NEGATIVE
OTHER ANTIBIOTIC SUSC ISLT: ABNORMAL
PH UR STRIP: 7.5 [PH] (ref 5–7.5)
PROT UR QL STRIP: ABNORMAL
RBC #/AREA URNS HPF: NORMAL /HPF
SP GR UR: 1.02 (ref 1–1.03)
URINALYSIS REFLEX: ABNORMAL
UROBILINOGEN UR STRIP-MCNC: 1 MG/DL (ref 0.2–1)
WBC #/AREA URNS HPF: NORMAL /HPF

## 2018-04-30 ENCOUNTER — OFFICE VISIT (OUTPATIENT)
Dept: CARDIOLOGY | Facility: CLINIC | Age: 68
End: 2018-04-30

## 2018-04-30 VITALS
DIASTOLIC BLOOD PRESSURE: 82 MMHG | HEIGHT: 63 IN | WEIGHT: 208 LBS | SYSTOLIC BLOOD PRESSURE: 130 MMHG | HEART RATE: 83 BPM | BODY MASS INDEX: 36.86 KG/M2 | OXYGEN SATURATION: 99 %

## 2018-04-30 DIAGNOSIS — I10 ESSENTIAL HYPERTENSION: Chronic | ICD-10-CM

## 2018-04-30 DIAGNOSIS — R06.02 SHORTNESS OF BREATH: Primary | ICD-10-CM

## 2018-04-30 DIAGNOSIS — Z86.711 HISTORY OF PULMONARY EMBOLISM: ICD-10-CM

## 2018-04-30 PROCEDURE — 99214 OFFICE O/P EST MOD 30 MIN: CPT | Performed by: INTERNAL MEDICINE

## 2018-04-30 PROCEDURE — 93000 ELECTROCARDIOGRAM COMPLETE: CPT | Performed by: INTERNAL MEDICINE

## 2018-04-30 NOTE — PROGRESS NOTES
Subjective:     Encounter Date:04/30/2018      Patient ID: Lucy Sousa is a 68 y.o. female with history of bilateral, submassive PE and cardiac arrest, in November 2016, s/p EKOS, here for routine follow-up.    Chief Complaint: Follow-up    Shortness of Breath   This is a chronic problem. The problem occurs intermittently. The problem has been gradually improving. Pertinent negatives include no abdominal pain, chest pain, claudication, fever, headaches, hemoptysis, leg swelling, orthopnea, PND, syncope, vomiting or wheezing. The symptoms are aggravated by exercise. Treatments tried: weight loss. The treatment provided mild relief. There is no history of CAD or a heart failure.   Hypertension   This is a chronic problem. The problem is controlled. Associated symptoms include shortness of breath. Pertinent negatives include no chest pain, headaches, orthopnea, palpitations, peripheral edema or PND. Risk factors for coronary artery disease include obesity. Current antihypertension treatment includes diuretics and angiotensin blockers. The current treatment provides significant improvement. There are no compliance problems.  There is no history of angina, CAD/MI or heart failure.     The patient presents today for routine follow-up.  She has a history of a submassive pulmonary embolism, status post catheter directed from the lysis, now chronically anticoagulated with some degree of chronic shortness of breath and dyspnea on exertion.  Patient notes that she is essentially about the same as she was at last office visit 6 months ago.  She continues to have some mild shortness of breath and dyspnea on exertion.  She has lost about 8 pounds of weight through diet and exercise and notices that her energy level is somewhat better and her shortness of breath is less pronounced.  She denies orthopnea, PND, edema, lightheadedness, dizziness, syncope.  She also denies any chest pain.  She says that she has very rare  episodes of occasional wheezing but nothing significant.  Overall, she says that she is feeling reasonably well at this time.  Her blood pressure is well-controlled.  She has not had any trouble with her medications, including long-term anticoagulants.  She was recently hospitalized after an episode of dizziness but she has not had any recurrent dizziness since that time.  It was unclear of the exact etiology however she has been feeling well since that time.  There was some concern about possible inner ear issues versus orthostasis.  In any event, her symptoms have resolved.      Current Outpatient Prescriptions:   •  ALPRAZolam (XANAX) 0.5 MG tablet, Take 0.5 mg by mouth 2 (Two) Times a Day As Needed for Anxiety., Disp: , Rfl:   •  budesonide-formoterol (SYMBICORT) 160-4.5 MCG/ACT inhaler, Inhale 2 puffs 2 (Two) Times a Day., Disp: 1 inhaler, Rfl: 5  •  Cholecalciferol (VITAMIN D) 2000 UNITS tablet, Take 1,000 Units by mouth Daily., Disp: , Rfl:   •  cyanocobalamin (VITAMIN B-12) 50 MCG tablet tablet, Take 50 mcg by mouth 1 (One) Time., Disp: , Rfl:   •  furosemide (LASIX) 20 MG tablet, Take 20 mg by mouth 2 (Two) Times a Day., Disp: , Rfl:   •  losartan (COZAAR) 50 MG tablet, Take 1 tablet by mouth Daily., Disp: 90 tablet, Rfl: 1  •  meclizine (ANTIVERT) 25 MG tablet, Take 1 tablet by mouth 3 (Three) Times a Day As Needed for dizziness., Disp: 30 tablet, Rfl: 0  •  Multiple Vitamin (MULTI VITAMIN DAILY PO), Take 1 tablet by mouth daily., Disp: , Rfl:   •  omeprazole (PriLOSEC) 20 MG capsule, Take 20 mg by mouth daily as needed., Disp: , Rfl:   •  potassium chloride ER (K-TAB) 20 MEQ tablet controlled-release ER tablet, Take 1 tablet by mouth 2 (Two) Times a Day., Disp: 60 tablet, Rfl: 5  •  rivaroxaban (XARELTO) 20 MG tablet, Take 20 mg by mouth Daily With Dinner., Disp: , Rfl:   •  triamterene-hydrochlorothiazide (DYAZIDE) 37.5-25 MG per capsule, Take 1 capsule by mouth Every Morning., Disp: 90 capsule, Rfl:  1  •  vitamin E 600 UNIT capsule, Take 400 Units by mouth Daily., Disp: , Rfl:     No Known Allergies    Social History   Substance Use Topics   • Smoking status: Never Smoker   • Smokeless tobacco: Never Used   • Alcohol use No     Review of Systems   Constitution: Negative for chills, fever, weakness, night sweats and weight loss.   Cardiovascular: Positive for dyspnea on exertion. Negative for chest pain, claudication, irregular heartbeat, leg swelling, orthopnea, palpitations, paroxysmal nocturnal dyspnea and syncope.   Respiratory: Positive for shortness of breath. Negative for cough, hemoptysis and wheezing.    Hematologic/Lymphatic: Negative for bleeding problem. Does not bruise/bleed easily.   Gastrointestinal: Negative for abdominal pain, hematemesis, hematochezia, melena, nausea and vomiting.   Genitourinary: Negative for dysuria and hematuria.   Neurological: Negative for dizziness, headaches, loss of balance and numbness.       ECG 12 Lead  Date/Time: 4/30/2018 4:37 PM  Performed by: RUDI WHITE  Authorized by: RUDI WHITE   Comparison: compared with previous ECG from 3/27/2018  Similar to previous ECG  Rhythm: sinus rhythm  Rate: normal  BPM: 83  Conduction: conduction normal  QRS axis: normal  Other findings: PRWP  Clinical impression: abnormal ECG  Comments: NSTT             Objective:     Physical Exam   Constitutional: She is oriented to person, place, and time. She appears well-developed and well-nourished. No distress.   HENT:   Head: Normocephalic and atraumatic.   Mouth/Throat: Oropharynx is clear and moist.   Eyes: EOM are normal. Pupils are equal, round, and reactive to light.   Neck: Normal range of motion. Neck supple. No JVD present. No thyromegaly present.   Cardiovascular: Normal rate, regular rhythm, S1 normal, S2 normal, normal heart sounds and intact distal pulses.  Exam reveals no gallop and no friction rub.    No murmur heard.  Pulmonary/Chest: Effort normal and  "breath sounds normal.   Abdominal: Soft. Bowel sounds are normal. She exhibits no distension. There is no tenderness.   Musculoskeletal: Normal range of motion. She exhibits no edema.   Neurological: She is alert and oriented to person, place, and time. No cranial nerve deficit.   Skin: Skin is warm and dry. No rash noted. No cyanosis or erythema. Nails show no clubbing.   Psychiatric: She has a normal mood and affect.   Vitals reviewed.    /82 (BP Location: Left arm, Patient Position: Sitting)   Pulse 83   Ht 160 cm (63\")   Wt 94.3 kg (208 lb)   SpO2 99%   BMI 36.85 kg/m²     Lab Review:     Lipid panel on 2/27/18: LDL cholesterol 93, HDL cholesterol 39  TSH on 2/27/18:3.04 which is within normal limits and free T4 of 1.0 which is also within normal limits      Assessment:          Diagnosis Plan   1. Shortness of breath  ECG 12 Lead   2. Essential hypertension     3. History of PE-ekos tx            Plan:       1.  Shortness of breath: The patient continues to have some degree of shortness of breath and dyspnea on exertion although she notes that her symptoms are stable.  She has noticed some improvement after some weight loss recently.  She has lost about 8 pounds.  She was able to achieve this with diet and exercise.  Given no significant change in the patient's symptoms, no further cardiac workup at this particular time.     2.  Essential hypertension: Pressure is well-controlled.  Continue current medications.     3.  History of bilateral submassive pulmonary embolism status post catheter directed from lysis on chronic anticoagulation: The patient remains on anticoagulation and is tolerating this therapy well with no significant side effects.     Follow-up: Given the patient's clinical stability since last office visit, we will see her back in 12 months unless needed sooner.         "

## 2018-05-02 RX ORDER — FUROSEMIDE 20 MG/1
TABLET ORAL
Qty: 90 TABLET | Refills: 1 | Status: SHIPPED | OUTPATIENT
Start: 2018-05-02 | End: 2019-01-29 | Stop reason: SDUPTHER

## 2018-05-24 DIAGNOSIS — I10 ESSENTIAL HYPERTENSION: Chronic | ICD-10-CM

## 2018-05-24 RX ORDER — TRIAMTERENE AND HYDROCHLOROTHIAZIDE 37.5; 25 MG/1; MG/1
1 CAPSULE ORAL EVERY MORNING
Qty: 90 CAPSULE | Refills: 1 | Status: SHIPPED | OUTPATIENT
Start: 2018-05-24 | End: 2018-07-25 | Stop reason: SDUPTHER

## 2018-05-24 RX ORDER — LOSARTAN POTASSIUM 50 MG/1
50 TABLET ORAL DAILY
Qty: 90 TABLET | Refills: 1 | Status: SHIPPED | OUTPATIENT
Start: 2018-05-24 | End: 2018-11-27 | Stop reason: SDUPTHER

## 2018-07-25 DIAGNOSIS — I10 ESSENTIAL HYPERTENSION: Chronic | ICD-10-CM

## 2018-07-25 RX ORDER — TRIAMTERENE AND HYDROCHLOROTHIAZIDE 37.5; 25 MG/1; MG/1
CAPSULE ORAL
Qty: 30 CAPSULE | Refills: 5 | Status: SHIPPED | OUTPATIENT
Start: 2018-07-25 | End: 2019-01-29 | Stop reason: SDUPTHER

## 2018-08-08 DIAGNOSIS — F41.9 ANXIETY: Chronic | ICD-10-CM

## 2018-08-08 DIAGNOSIS — Z79.01 ANTICOAGULATED: Primary | ICD-10-CM

## 2018-08-08 RX ORDER — ALPRAZOLAM 0.5 MG/1
0.5 TABLET ORAL 2 TIMES DAILY PRN
Qty: 30 TABLET | Refills: 1 | Status: SHIPPED | OUTPATIENT
Start: 2018-08-08 | End: 2019-01-29 | Stop reason: SDUPTHER

## 2018-08-31 ENCOUNTER — TELEPHONE (OUTPATIENT)
Dept: SURGERY | Age: 68
End: 2018-08-31

## 2018-08-31 NOTE — TELEPHONE ENCOUNTER
Called patient and told her it was time to scheduled her mammogram, she is currently using [facility] and I told her we are not in network with them at this time. She will call her agent to verify and then she will try and schedule her mammogram in a location that is in network.

## 2018-09-07 ENCOUNTER — OFFICE VISIT (OUTPATIENT)
Dept: FAMILY MEDICINE CLINIC | Facility: CLINIC | Age: 68
End: 2018-09-07

## 2018-09-07 VITALS
HEIGHT: 63 IN | OXYGEN SATURATION: 96 % | SYSTOLIC BLOOD PRESSURE: 112 MMHG | TEMPERATURE: 97.7 F | WEIGHT: 192 LBS | DIASTOLIC BLOOD PRESSURE: 88 MMHG | RESPIRATION RATE: 16 BRPM | HEART RATE: 94 BPM | BODY MASS INDEX: 34.02 KG/M2

## 2018-09-07 DIAGNOSIS — M25.559 ARTHRALGIA OF HIP, UNSPECIFIED LATERALITY: ICD-10-CM

## 2018-09-07 DIAGNOSIS — E66.9 OBESITY, UNSPECIFIED CLASSIFICATION, UNSPECIFIED OBESITY TYPE, UNSPECIFIED WHETHER SERIOUS COMORBIDITY PRESENT: ICD-10-CM

## 2018-09-07 DIAGNOSIS — F41.9 ANXIETY: Chronic | ICD-10-CM

## 2018-09-07 DIAGNOSIS — I50.30 DIASTOLIC CONGESTIVE HEART FAILURE, UNSPECIFIED CONGESTIVE HEART FAILURE CHRONICITY: Chronic | ICD-10-CM

## 2018-09-07 DIAGNOSIS — K30 PEPTIC DISEASE: Chronic | ICD-10-CM

## 2018-09-07 DIAGNOSIS — Z12.31 ENCOUNTER FOR SCREENING MAMMOGRAM FOR BREAST CANCER: Primary | ICD-10-CM

## 2018-09-07 DIAGNOSIS — Z13.820 SCREENING FOR OSTEOPOROSIS: ICD-10-CM

## 2018-09-07 DIAGNOSIS — Z79.01 ANTICOAGULATED: ICD-10-CM

## 2018-09-07 DIAGNOSIS — F32.A DEPRESSION, UNSPECIFIED DEPRESSION TYPE: Chronic | ICD-10-CM

## 2018-09-07 DIAGNOSIS — M19.90 OSTEOARTHRITIS, UNSPECIFIED OSTEOARTHRITIS TYPE, UNSPECIFIED SITE: Chronic | ICD-10-CM

## 2018-09-07 DIAGNOSIS — K21.9 GASTROESOPHAGEAL REFLUX DISEASE, ESOPHAGITIS PRESENCE NOT SPECIFIED: Chronic | ICD-10-CM

## 2018-09-07 DIAGNOSIS — Z86.711 HISTORY OF PULMONARY EMBOLISM: ICD-10-CM

## 2018-09-07 DIAGNOSIS — I10 ESSENTIAL HYPERTENSION: Chronic | ICD-10-CM

## 2018-09-07 PROCEDURE — 99214 OFFICE O/P EST MOD 30 MIN: CPT | Performed by: FAMILY MEDICINE

## 2018-09-07 NOTE — PROGRESS NOTES
"Beka Sousa is a 68 y.o. female presenting with chief complaint of:   Chief Complaint   Patient presents with   • Congestive Heart Failure   • Hypertension   • Anxiety   • Hip Pain     \"my left hip, about a couple months\"       History of Present Illness :  Alone.   Has multiple chronic problems to consider that might have a bearing on today's issues;  an interval appointment.       Chronic/acute problems to review today:   1. Encounter for screening mammogram for breast cancer: chronic/ongoing risk with yearly mammogram need.  No breast lumps, nipple discharge.    2. Osteoarthritis, unspecified osteoarthritis type, unspecified site: chronic/variable joint discomforts tolerated; no joint swelling.     3. Peptic disease: chronic/stable as no heartburn, indigestion, abdominal/chest pain.  No melena/hematochezia with prilosec.    4. Diastolic congestive heart failure, unspecified congestive heart failure chronicity (CMS/HCC): chronic/asymptomatic echo findings as no sob, leg edema, orthopnea.    5. 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 Anxiety: chronic/variable same as depression.    7. Gastroesophageal reflux disease, esophagitis presence not specified: chronic/same as peptic   8. Essential hypertension: chronic/stable as bps here/home are same/normal pattern.    9. History of PE-ekos tx: chronic/stable as no signs of chest pain, sob   10. Anticoagulated-PE/cardiac arrest/xarelto: chronic/same reasons without significant bruising and no bleeding.    11. Obesity, unspecified classification, unspecified obesity type, unspecified whether serious comorbidity present: chronic/improved with years advised weight loss.    12. Arthralgia of hip, unspecified laterality: chronic/acute on/off more L hip pains without injury.     13. Screening for osteoporosis: chronic/ongoing risks.      Has an/another acute issue today: " none.    The following portions of the patient's history were reviewed and updated as appropriate: allergies, current medications, past family history, past medical history, past social history, past surgical history and problem list.  Records acquired and reviewed; TCC migrated.      Current Outpatient Prescriptions:   •  ALPRAZolam (XANAX) 0.5 MG tablet, Take 1 tablet by mouth 2 (Two) Times a Day As Needed for Anxiety., Disp: 30 tablet, Rfl: 1  •  budesonide-formoterol (SYMBICORT) 160-4.5 MCG/ACT inhaler, Inhale 2 puffs 2 (Two) Times a Day., Disp: 1 inhaler, Rfl: 5  •  Cholecalciferol (VITAMIN D) 2000 UNITS tablet, Take 1,000 Units by mouth Daily., Disp: , Rfl:   •  cyanocobalamin (VITAMIN B-12) 50 MCG tablet tablet, Take 50 mcg by mouth 1 (One) Time., Disp: , Rfl:   •  furosemide (LASIX) 20 MG tablet, TAKE ONE TABLET DAILY GENERIC FOR LASIX, Disp: 90 tablet, Rfl: 1  •  losartan (COZAAR) 50 MG tablet, Take 1 tablet by mouth Daily., Disp: 90 tablet, Rfl: 1  •  meclizine (ANTIVERT) 25 MG tablet, Take 1 tablet by mouth 3 (Three) Times a Day As Needed for dizziness., Disp: 30 tablet, Rfl: 0  •  Multiple Vitamin (MULTI VITAMIN DAILY PO), Take 1 tablet by mouth daily., Disp: , Rfl:   •  omeprazole (PriLOSEC) 20 MG capsule, Take 20 mg by mouth daily as needed., Disp: , Rfl:   •  potassium chloride ER (K-TAB) 20 MEQ tablet controlled-release ER tablet, Take 1 tablet by mouth 2 (Two) Times a Day., Disp: 60 tablet, Rfl: 5  •  rivaroxaban (XARELTO) 20 MG tablet, Take 1 tablet by mouth Daily With Dinner., Disp: 30 tablet, Rfl: 5  •  triamterene-hydrochlorothiazide (DYAZIDE) 37.5-25 MG per capsule, TAKE 1 CAPSULE EVERY MORNING GENERIC FOR DYAZIDE, Disp: 30 capsule, Rfl: 5  •  vitamin E 600 UNIT capsule, Take 400 Units by mouth Daily., Disp: , Rfl:     No problems with medications.  Refills if needed done    No Known Allergies    Review of Systems  GENERAL:  Active/slower with limits, speed, stamina for age and exertional  fatigue. Sleep is ok. No fever now.  SKIN: No  rash/skin lesion of concern:   ENDO:  No syncope, near or diaphoretic sweaty spells.  HEENT: No recent head injury; or change in occ headache,  No vision change, No significant hearing loss.  Ears without pain/drainage.  No sore throat.  No significant nasal/sinus congestion/drainage. No epistaxis.  CHEST: No chest wall tenderness or mass. No cough,  without wheeze.  No SOB; no hemoptysis.  CV: No chest pain; same occ palpitations, ankle edema.  GI: No heartburn, dysphagia.  No abdominal pain, diarrhea, constipation.  No rectal bleeding, or melena.    :  Voids without dysuria, or  incontinence to completion.  ORTHO: No painful/swollen joints but various on /off sore; especially L hip.  No change occ/on-off  sore neck or back.  No acute neck or back pain without recent injury.  NEURO: Still on/off dizzy; less vertigo.  No weakness of extremities.  No numbness/paresthesias.   PSYCH: No memory loss.  Mood good but variable anxious, depressed but/and not suicidal.  Tries to tolerate stress .   Screening:  Mammogram: 10.30.17-[facility]  Bone density: no bd-advised  Low dose CT chest: NA  GI:   EGD+neg-healing ulcer/WB//1-22-10  Colonoscopy+polyp-div/WB//2.13.14/5y  Prostate: NA  Usual lab order  6m CBC, CMP, TSH, fT4  12m CBC, CMP, LIPID, TSH, fT4, Vit D iron, ferritin, B12, folate     Results for orders placed or performed in visit on 04/13/18   Urine culture, Comprehensive   Result Value Ref Range    Urine Culture Final report (A)     Result 1 Comment (A)     Susceptibility Testing Comment    Urinalysis With / Culture If Indicated - Urine, Clean Catch   Result Value Ref Range    Specific Gravity, UA 1.024 1.005 - 1.030    pH, UA 7.5 5.0 - 7.5    Color, UA Yellow Yellow    Appearance, UA Clear Clear    Leukocytes, UA Trace (A) Negative    Protein Trace Negative/Trace    Glucose, UA Negative Negative    Ketones Negative Negative    Blood, UA Negative Negative     "Bilirubin, UA Negative Negative    Urobilinogen, UA 1.0 0.2 - 1.0 mg/dL    Nitrite, UA Negative Negative    Microscopic Examination See below:     Urinalysis Reflex Comment    Microscopic Examination   Result Value Ref Range    WBC, UA 0-5 0 - 5 /hpf    RBC, UA None seen 0 - 2 /hpf    Epithelial Cells (non renal) 0-10 0 - 10 /hpf    Mucus, UA Present Not Estab. /hpf    Bacteria, UA None seen None seen/Few /hpf       No results found for: PSA     Lab Results:  CBC:  Lab Results - Last 18 Months  Lab Units 03/28/18 0356 03/27/18 1810 02/27/18 0719 09/06/17  0657   WBC 10*3/mm3 6.01 5.93 6.34 7.53   HEMOGLOBIN g/dL 13.2 14.2 13.8 14.0   HEMATOCRIT % 39.8 41.5 41.5 46.5   PLATELETS 10*3/mm3 283 310 300 319   IRON mcg/dL  --   --  102 102      BMP/CMP:  Lab Results - Last 18 Months  Lab Units 03/28/18 0356 03/27/18 1810 02/27/18 0719 09/06/17  0657   SODIUM mmol/L 144 141 141 142   POTASSIUM mmol/L 3.3* 3.7 4.1 3.5   CHLORIDE mmol/L 105 104 102 99   CO2 mmol/L 32.0* 26.0  --   --    TOTAL CO2, ARTERIAL mmol/L  --   --  29.0 26.0   GLUCOSE mg/dL  --   --  94 35*   BUN mg/dL 14 16 12 13   CREATININE mg/dL 0.76 0.80 0.76 0.73   EGFR IF NONAFRICN AM mL/min/1.73 75 71 75 79   EGFR IF AFRICN AM mL/min/1.73  --   --  91 96   CALCIUM mg/dL 9.3 9.9 10.0 10.1     HEPATIC:  Lab Results - Last 18 Months  Lab Units 03/27/18 1810 02/27/18 0719 09/06/17  0657   ALT (SGPT) U/L 29 30 31   AST (SGOT) U/L 26 19 29   ALK PHOS U/L 67 66 97     THYROID:  Lab Results - Last 18 Months  Lab Units 02/27/18 0719 08/31/17  0718   TSH mIU/mL 3.040 3.470     A1C:No results for input(s): HGBA1C in the last 09556 hours.  PSA:No results for input(s): PSA in the last 11587 hours.    Objective   /88 (BP Location: Right arm, Patient Position: Sitting, Cuff Size: Large Adult)   Pulse 94   Temp 97.7 °F (36.5 °C) (Oral)   Resp 16   Ht 160 cm (63\")   Wt 87.1 kg (192 lb)   SpO2 96%   BMI 34.01 kg/m²   Body mass index is 34.01 " kg/m².    Physical Exam  GENERAL:  Well nourished/developed in no acute distress.   SKIN: Turgor excellent, without wound, rash, lesion  HEENT: Normal cephalic without trauma.  Pupils equal round reactive to light. Extraocular motions full without nystagmus.   External canals nonobstructive nontender without reddness. Tymphatic membranes sharri with shanika structures intact.   Oral cavity without growths, exudates, and moist.  Posterior pharynx without mass, obstruction, redness.  No thyromegaly, mass, tenderness, lymphadenopathy and supple.  CV: Regular rhythm.  No murmur, gallop, trace LE edema. Posterior pulses intact.  No carotid bruits.  CHEST: No chest wall tenderness or mass.   LUNGS: Symmetric motion with clear to auscultation.   ABD: Soft, nontender without mass.   ORTHO: Symmetric extremities without swelling/point tenderness.  Full gross range of motion.  Symmetric LE.  Neg straight leg raising.  Neg Patricks maneuver.  Back is straight/mild lordosis.  Lower back sore; no point tender.    NEURO: CN 2-12 grossly intact.  Symmetric facies and UE/LE. 3/5 strength throughout. 1/4 x bicep knee equal reflexes.  Nonfocal use extremities. Speech clear.  Reduced minimal light touch with monofilament, vibratory sensation with tuning fork; equal toes/distal feet.    PSYCH: Oriented x 3.  Pleasant calm, well kept.  Purposeful/directed conservation with intact short/long gross memory.     Assessment/Plan     1. Encounter for screening mammogram for breast cancer    2. Osteoarthritis, unspecified osteoarthritis type, unspecified site    3. Peptic disease    4. Diastolic congestive heart failure, unspecified congestive heart failure chronicity (CMS/HCC)    5. Depression, unspecified depression type    6. Anxiety    7. Gastroesophageal reflux disease, esophagitis presence not specified    8. Essential hypertension    9. History of PE-ekos tx    10. Anticoagulated-PE/cardiac arrest/xarelto    11. Obesity, unspecified  classification, unspecified obesity type, unspecified whether serious comorbidity present    12. Arthralgia of hip, unspecified laterality    13. Screening for osteoporosis        Rx: reviewed/changes:  same    LAB/Testing/Referrals: reviewed/orders:   Today: offered xrays ap pelvis, hips; will watch for now  Orders Placed This Encounter   Procedures   • DEXA Bone Density Axial   • Mammo Screening Digital Tomosynthesis Bilateral With CAD       Discussions:     Body mass index is 34.01 kg/m².   Patient's Body mass index is 34.01 kg/m². BMI is above normal parameters. Recommendations include: exercise counseling, nutrition counseling and as before.  Non-smoker      There are no Patient Instructions on file for this visit.    Follow up: Return for fasting lab time.   , lab during/or just before next apt;, Dr Berg-, 6 m;.  Future Appointments  Date Time Provider Department Center   10/5/2018 8:30 AM [facility] [place] [facility] None   3/5/2019 8:20 AM [facility] [place] [facility] None   3/7/2019 2:00 PM Rosas Berg MD [facility] None   4/30/2019 3:00 PM Daniel Underwood MD [facility]

## 2018-10-05 DIAGNOSIS — D75.89 MACROCYTOSIS: Chronic | ICD-10-CM

## 2018-10-05 DIAGNOSIS — R53.82 CHRONIC FATIGUE: Primary | ICD-10-CM

## 2018-10-05 DIAGNOSIS — E87.6 HYPOPOTASSEMIA: ICD-10-CM

## 2018-10-05 DIAGNOSIS — I50.9 CONGESTIVE HEART FAILURE, UNSPECIFIED HF CHRONICITY, UNSPECIFIED HEART FAILURE TYPE (HCC): Chronic | ICD-10-CM

## 2018-10-06 LAB
ALBUMIN SERPL-MCNC: 3.8 G/DL (ref 3.5–5)
ALBUMIN/GLOB SERPL: 1.5 G/DL (ref 1.1–2.5)
ALP SERPL-CCNC: 59 U/L (ref 24–120)
ALT SERPL-CCNC: 22 U/L (ref 0–54)
AST SERPL-CCNC: 20 U/L (ref 7–45)
BASOPHILS # BLD AUTO: 0.05 10*3/MM3 (ref 0–0.2)
BASOPHILS NFR BLD AUTO: 1.2 % (ref 0–2)
BILIRUB SERPL-MCNC: 0.6 MG/DL (ref 0.1–1)
BUN SERPL-MCNC: 15 MG/DL (ref 5–21)
BUN/CREAT SERPL: 19.7 (ref 7–25)
CALCIUM SERPL-MCNC: 9.7 MG/DL (ref 8.4–10.4)
CHLORIDE SERPL-SCNC: 100 MMOL/L (ref 98–110)
CO2 SERPL-SCNC: 31 MMOL/L (ref 24–31)
CREAT SERPL-MCNC: 0.76 MG/DL (ref 0.5–1.4)
EOSINOPHIL # BLD AUTO: 0.12 10*3/MM3 (ref 0–0.7)
EOSINOPHIL NFR BLD AUTO: 2.8 % (ref 0–4)
ERYTHROCYTE [DISTWIDTH] IN BLOOD BY AUTOMATED COUNT: 13.3 % (ref 12–15)
GLOBULIN SER CALC-MCNC: 2.5 GM/DL
GLUCOSE SERPL-MCNC: 95 MG/DL (ref 70–100)
HCT VFR BLD AUTO: 41.8 % (ref 37–47)
HGB BLD-MCNC: 14 G/DL (ref 12–16)
IMM GRANULOCYTES # BLD: 0.01 10*3/MM3 (ref 0–0.03)
IMM GRANULOCYTES NFR BLD: 0.2 % (ref 0–5)
LYMPHOCYTES # BLD AUTO: 1.36 10*3/MM3 (ref 0.72–4.86)
LYMPHOCYTES NFR BLD AUTO: 31.6 % (ref 15–45)
MCH RBC QN AUTO: 33.7 PG (ref 28–32)
MCHC RBC AUTO-ENTMCNC: 33.5 G/DL (ref 33–36)
MCV RBC AUTO: 100.7 FL (ref 82–98)
MONOCYTES # BLD AUTO: 0.45 10*3/MM3 (ref 0.19–1.3)
MONOCYTES NFR BLD AUTO: 10.4 % (ref 4–12)
NEUTROPHILS # BLD AUTO: 2.32 10*3/MM3 (ref 1.87–8.4)
NEUTROPHILS NFR BLD AUTO: 53.8 % (ref 39–78)
NRBC BLD AUTO-RTO: 0 /100 WBC (ref 0–0)
PLATELET # BLD AUTO: 290 10*3/MM3 (ref 130–400)
POTASSIUM SERPL-SCNC: 3.7 MMOL/L (ref 3.5–5.3)
PROT SERPL-MCNC: 6.3 G/DL (ref 6.3–8.7)
RBC # BLD AUTO: 4.15 10*6/MM3 (ref 4.2–5.4)
SODIUM SERPL-SCNC: 142 MMOL/L (ref 135–145)
T4 SERPL-MCNC: 8 UG/DL (ref 4.5–12)
TSH SERPL DL<=0.005 MIU/L-ACNC: 2.37 MIU/ML (ref 0.47–4.68)
WBC # BLD AUTO: 4.31 10*3/MM3 (ref 4.8–10.8)

## 2018-10-31 DIAGNOSIS — Z12.31 ENCOUNTER FOR SCREENING MAMMOGRAM FOR BREAST CANCER: ICD-10-CM

## 2018-11-27 ENCOUNTER — TELEPHONE (OUTPATIENT)
Dept: FAMILY MEDICINE CLINIC | Facility: CLINIC | Age: 68
End: 2018-11-27

## 2018-11-27 DIAGNOSIS — I10 ESSENTIAL HYPERTENSION: Chronic | ICD-10-CM

## 2018-11-27 RX ORDER — LOSARTAN POTASSIUM 50 MG/1
50 TABLET ORAL DAILY
Qty: 90 TABLET | Refills: 1 | Status: SHIPPED | OUTPATIENT
Start: 2018-11-27 | End: 2019-06-03 | Stop reason: SDUPTHER

## 2018-12-06 DIAGNOSIS — Z13.820 SCREENING FOR OSTEOPOROSIS: ICD-10-CM

## 2019-01-25 RX ORDER — POTASSIUM CHLORIDE 20 MEQ/1
TABLET, EXTENDED RELEASE ORAL
Qty: 60 TABLET | Refills: 5 | Status: SHIPPED | OUTPATIENT
Start: 2019-01-25 | End: 2020-03-24 | Stop reason: SDUPTHER

## 2019-01-29 DIAGNOSIS — F41.9 ANXIETY: Chronic | ICD-10-CM

## 2019-01-29 DIAGNOSIS — I10 ESSENTIAL HYPERTENSION: Chronic | ICD-10-CM

## 2019-01-29 RX ORDER — FUROSEMIDE 20 MG/1
TABLET ORAL
Qty: 90 TABLET | Refills: 1 | Status: SHIPPED | OUTPATIENT
Start: 2019-01-29 | End: 2019-09-23 | Stop reason: SDUPTHER

## 2019-01-29 RX ORDER — ALPRAZOLAM 0.5 MG/1
0.5 TABLET ORAL 2 TIMES DAILY PRN
Qty: 30 TABLET | Refills: 1 | Status: SHIPPED | OUTPATIENT
Start: 2019-01-29 | End: 2021-01-07

## 2019-01-29 RX ORDER — TRIAMTERENE AND HYDROCHLOROTHIAZIDE 37.5; 25 MG/1; MG/1
CAPSULE ORAL
Qty: 30 CAPSULE | Refills: 5 | Status: SHIPPED | OUTPATIENT
Start: 2019-01-29 | End: 2019-08-08 | Stop reason: SDUPTHER

## 2019-01-29 NOTE — TELEPHONE ENCOUNTER
marcella- pt called stating she is needing a refill on her xanax to MD[facility]2    Protocol last refill 8/8/18    ilpmp lena

## 2019-02-14 ENCOUNTER — OFFICE VISIT (OUTPATIENT)
Dept: GASTROENTEROLOGY | Facility: CLINIC | Age: 69
End: 2019-02-14

## 2019-02-14 VITALS
WEIGHT: 189 LBS | HEART RATE: 85 BPM | OXYGEN SATURATION: 98 % | SYSTOLIC BLOOD PRESSURE: 122 MMHG | BODY MASS INDEX: 33.49 KG/M2 | DIASTOLIC BLOOD PRESSURE: 80 MMHG | HEIGHT: 63 IN

## 2019-02-14 DIAGNOSIS — Z86.010 HX OF COLONIC POLYPS: Primary | ICD-10-CM

## 2019-02-14 DIAGNOSIS — K21.00 GASTROESOPHAGEAL REFLUX DISEASE WITH ESOPHAGITIS: ICD-10-CM

## 2019-02-14 DIAGNOSIS — R13.19 ESOPHAGEAL DYSPHAGIA: ICD-10-CM

## 2019-02-14 DIAGNOSIS — I10 HTN (HYPERTENSION), BENIGN: ICD-10-CM

## 2019-02-14 PROBLEM — Z86.0100 HX OF COLONIC POLYPS: Status: ACTIVE | Noted: 2019-02-14

## 2019-02-14 PROCEDURE — 99204 OFFICE O/P NEW MOD 45 MIN: CPT | Performed by: CLINICAL NURSE SPECIALIST

## 2019-02-14 RX ORDER — SODIUM, POTASSIUM,MAG SULFATES 17.5-3.13G
SOLUTION, RECONSTITUTED, ORAL ORAL
Qty: 2 BOTTLE | Refills: 0 | Status: SHIPPED | OUTPATIENT
Start: 2019-02-14 | End: 2019-03-12

## 2019-02-14 NOTE — PROGRESS NOTES
Lucy Sousa  1950 2/14/2019  Chief Complaint   Patient presents with   • Colonoscopy     Subjective   HPI  Lucy Sousa is a 69 y.o. female who presents as a referral for preventative maintenance. She has no complaints of nausea or vomiting. No change in bowels. No wt loss. No BRBPR. No melena. There is NO family hx for colon cancer. No abdominal pain.  She is also having GERD with dysphagia symptoms as well. She is getting choked more on bread and meat and then her liquids wont go down. She has not had any vomiting usually in time it will go on down. It is located mid esophageal region. Moderate to severe at times occurring daily. She is having more reflux and indigest as well with this. Moderate to severe at times. Burning up to her throat. No melena. No wt loss. Nausea or vomiting. She is on Prilosec for the management of this. Her last Endoscopy was in 2010. She has a hx of dilatation in the past which did help her.   Past Medical History:   Diagnosis Date   • Anxiety    • Depression    • DJD (degenerative joint disease)    • GERD (gastroesophageal reflux disease)    • HTN (hypertension)    • Hx of colonic polyp    • Pulmonary embolism (CMS/HCC)     post surgical with cardiac arrest     Past Surgical History:   Procedure Laterality Date   • CHOLECYSTECTOMY     • COLONOSCOPY W/ POLYPECTOMY  02/13/2014    Hyperplastic polyp at 20 cm, Diverticulosis repeat exam in 5 years   • Ekos catheter placement Bilateral 11/17/2016    Performed by Daniel Underwood MD at  [facility]   • Ekos catheter removal with stent placement Bilateral 11/18/2016    Performed by Daniel Underwood MD at  [facility]   • ENDOSCOPY  01/22/2010    Negative endoscopy noting a healed gastric ulcer   • ENDOSCOPY  10/21/2009    Superficial mucosal erosion, chronic active gastritis   • FOOT SURGERY     • HYSTERECTOMY     • REPLACEMENT TOTAL KNEE       Outpatient Medications Marked as  Taking for the 2/14/19 encounter (Office Visit) with Kathy Lainez APRN   Medication Sig Dispense Refill   • ALPRAZolam (XANAX) 0.5 MG tablet Take 1 tablet by mouth 2 (Two) Times a Day As Needed for Anxiety. 30 tablet 1   • budesonide-formoterol (SYMBICORT) 160-4.5 MCG/ACT inhaler Inhale 2 puffs 2 (Two) Times a Day. (Patient taking differently: Inhale 2 puffs 2 (Two) Times a Day As Needed (SOB).) 1 inhaler 5   • Cholecalciferol (VITAMIN D) 2000 UNITS tablet Take 1,000 Units by mouth Daily.     • cyanocobalamin (VITAMIN B-12) 50 MCG tablet tablet Take 50 mcg by mouth 1 (One) Time.     • furosemide (LASIX) 20 MG tablet TAKE ONE TABLET DAILY GENERIC FOR LASIX 90 tablet 1   • losartan (COZAAR) 50 MG tablet Take 1 tablet by mouth Daily. 90 tablet 1   • meclizine (ANTIVERT) 25 MG tablet Take 1 tablet by mouth 3 (Three) Times a Day As Needed for dizziness. 30 tablet 0   • Multiple Vitamin (MULTI VITAMIN DAILY PO) Take 1 tablet by mouth daily.     • omeprazole (PriLOSEC) 20 MG capsule Take 20 mg by mouth daily as needed.     • potassium chloride (K-DUR,KLOR-CON) 20 MEQ CR tablet TAKE 1 TABLET TWICE DAILY GENERIC FOR KDUR 60 tablet 5   • rivaroxaban (XARELTO) 20 MG tablet Take 1 tablet by mouth Daily With Dinner. 30 tablet 5   • triamterene-hydrochlorothiazide (DYAZIDE) 37.5-25 MG per capsule TAKE 1 CAPSULE EVERY MORNING GENERIC FOR DYAZIDE 30 capsule 5   • vitamin E 600 UNIT capsule Take 400 Units by mouth Daily.       No Known Allergies  Social History     Socioeconomic History   • Marital status:      Spouse name: Nima   • Number of children: 1   • Years of education: 13   • Highest education level: Not on file   Social Needs   • Financial resource strain: Not on file   • Food insecurity - worry: Not on file   • Food insecurity - inability: Not on file   • Transportation needs - medical: Not on file   • Transportation needs - non-medical: Not on file   Occupational History   • Occupation: [employer]  " dept   Tobacco Use   • Smoking status: Never Smoker   • Smokeless tobacco: Never Used   Substance and Sexual Activity   • Alcohol use: No   • Drug use: No   • Sexual activity: Defer   Other Topics Concern   • Not on file   Social History Narrative   • Not on file     Family History   Problem Relation Age of Onset   • Coronary artery disease Mother    • Coronary artery disease Father    • Diabetes Brother    • Hypertension Brother    • Colon polyps Brother    • Heart disease Maternal Grandfather    • Colon cancer Neg Hx      Health Maintenance   Topic Date Due   • ANNUAL PHYSICAL  01/09/1953   • TDAP/TD VACCINES (1 - Tdap) 01/09/1967   • ZOSTER VACCINE (1 of 2) 01/09/1998   • INFLUENZA VACCINE  08/01/2018   • PNEUMOCOCCAL VACCINES (65+ LOW/MEDIUM RISK) (2 of 2 - PPSV23) 10/09/2018   • MAMMOGRAM  10/31/2020   • COLONOSCOPY  02/13/2024   • HEPATITIS C SCREENING  Completed       REVIEW OF SYSTEMS  General: well appearing, no fever chills or sweats, no unexplained wt loss  HEENT: no acute visual or hearing disturbances  Cardiovascular: No chest pain or palpitations  Pulmonary: No shortness of breath, coughing, wheezing or hemoptysis  : No burning, urgency, hematuria, or dysuria  Musculoskeletal: No joint pain or stiffness  Peripheral: no edema  Skin: No lesions or rashes  Neuro: No dizziness, headaches, stroke, syncope  Endocrine: No hot or cold intolerances  Hematological: No blood dyscrasias    Objective   Vitals:    02/14/19 0900   BP: 122/80   Pulse: 85   SpO2: 98%   Weight: 85.7 kg (189 lb)   Height: 160 cm (63\")     Body mass index is 33.48 kg/m².  Patient's Body mass index is 33.48 kg/m². BMI is above normal parameters. Recommendations include: nutrition counseling.      PHYSICAL EXAM  General: age appropriate well nourished well appearing, no acute distress  Head: normocephalic and atraumatic  Global assessment-supple  Neck-No JVD noted, no lymphadenopathy  Pulmonary-clear to auscultation bilaterally, " normal respiratory effort  Cardiovascular-normal rate and rhythm, normal heart sounds, S1 and S2 noted  Abdomen-soft, non tender, non distended, normal bowel sounds all 4 quadrants, no hepatosplenomegaly noted  Extremities-No clubbing cyanosis or edema  Neuro-Non focal, converses appropriately, awake, alert, oriented    Assessment/Plan     Lucy was seen today for colonoscopy.    Diagnoses and all orders for this visit:    Hx of colonic polyps  -     Case Request; Standing  -     Follow Anesthesia Guidelines / Standing Orders; Future  -     Implement Anesthesia Orders Day of Procedure; Standing  -     Obtain Informed Consent; Standing  -     Verify bowel prep was successful; Standing  -     Case Request  -     SUPREP BOWEL PREP KIT 17.5-3.13-1.6 GM/177ML solution oral solution; Take as directed by office instructions provided    HTN (hypertension), benign  Comments:  cont BP medication the day of procedure    Esophageal dysphagia    Gastroesophageal reflux disease with esophagitis  Comments:  increase Prilosec to BID    I discussed non pharmaceutical treatment of gerd.  This includes gradually losing weight to achieve ideal body wt., elevation of the head of bed by 4-6 inches, nothing to eat or drink 3 hours prior to lying down, avoiding tight clothing, stress reduction, tobacco cessation, reduction of alcohol intake, and dietary restrictions (avoiding caffeine, coffee, fatty foods, mints, chocolate, spicy foods and tomato based sauces as much as possible).      Dante to hold per Dr Berg she takes for hx of PE  COLONOSCOPY WITH ANESTHESIA (N/A)  Body mass index is 33.48 kg/m².    Patient instructions on prep prior to procedure provided to the patient.    All risks, benefits, alternatives, and indications of colonoscopy procedure have been discussed with the patient. Risks to include perforation of the colon requiring possible surgery or colostomy, risk of bleeding from biopsies or removal of colon tissue,  possibility of missing a colon polyp or cancer, or adverse drug reaction.  Benefits to include the diagnosis and management of disease of the colon and rectum. Alternatives to include barium enema, radiographic evaluation, lab testing or no intervention. Pt verbalizes understanding and agrees.     Kathy Lainez, APRN  2019  9:31 AM      IF YOU SMOKE OR USE TOBACCO PLEASE READ THE FOLLOWIN minutes reading provided    Why is smoking bad for me?  Smoking increases the risk of heart disease, lung disease, vascular disease, stroke, and cancer.     If you smoke, STOP!    If you would like more information on quitting smoking, please visit the [facility] website: [email or web address]   This link will provide additional resources including the QUIT line and the [employer] the Pack support groups.     For more information:    Quit Now [place]  QUIT-NOW  [email or web address]/en-[email or web address]    Obesity, Adult  Obesity is the condition of having too much total body fat. Being overweight or obese means that your weight is greater than what is considered healthy for your body size. Obesity is determined by a measurement called BMI. BMI is an estimate of body fat and is calculated from height and weight. For adults, a BMI of 30 or higher is considered obese.  Obesity can eventually lead to other health concerns and major illnesses, including:  · Stroke.  · Coronary artery disease (CAD).  · Type 2 diabetes.  · Some types of cancer, including cancers of the colon, breast, uterus, and gallbladder.  · Osteoarthritis.  · High blood pressure (hypertension).  · High cholesterol.  · Sleep apnea.  · Gallbladder stones.  · Infertility problems.  What are the causes?  The main cause of obesity is taking in (consuming) more calories than your body uses for energy. Other factors that contribute to this condition may include:  · Being born with genes that make you more likely to  become obese.  · Having a medical condition that causes obesity. These conditions include:  ¨ Hypothyroidism.  ¨ Polycystic ovarian syndrome (PCOS).  ¨ Binge-eating disorder.  ¨ Cushing syndrome.  · Taking certain medicines, such as steroids, antidepressants, and seizure medicines.  · Not being physically active (sedentary lifestyle).  · Living where there are limited places to exercise safely or buy healthy foods.  · Not getting enough sleep.  What increases the risk?  The following factors may increase your risk of this condition:  · Having a family history of obesity.  · Being a woman of -American descent.  · Being a man of  descent.  What are the signs or symptoms?  Having excessive body fat is the main symptom of this condition.  How is this diagnosed?  This condition may be diagnosed based on:  · Your symptoms.  · Your medical history.  · A physical exam. Your health care provider may measure:  ¨ Your BMI. If you are an adult with a BMI between 25 and less than 30, you are considered overweight. If you are an adult with a BMI of 30 or higher, you are considered obese.  ¨ The distances around your hips and your waist (circumferences). These may be compared to each other to help diagnose your condition.  ¨ Your skinfold thickness. Your health care provider may gently pinch a fold of your skin and measure it.  How is this treated?  Treatment for this condition often includes changing your lifestyle. Treatment may include some or all of the following:  · Dietary changes. Work with your health care provider and a dietitian to set a weight-loss goal that is healthy and reasonable for you. Dietary changes may include eating:  ¨ Smaller portions. A portion size is the amount of a particular food that is healthy for you to eat at one time. This varies from person to person.  ¨ Low-calorie or low-fat options.  ¨ More whole grains, fruits, and vegetables.  · Regular physical activity. This may include  aerobic activity (cardio) and strength training.  · Medicine to help you lose weight. Your health care provider may prescribe medicine if you are unable to lose 1 pound a week after 6 weeks of eating more healthily and doing more physical activity.  · Surgery. Surgical options may include gastric banding and gastric bypass. Surgery may be done if:  ¨ Other treatments have not helped to improve your condition.  ¨ You have a BMI of 40 or higher.  ¨ You have life-threatening health problems related to obesity.  Follow these instructions at home:     Eating and drinking     · Follow recommendations from your health care provider about what you eat and drink. Your health care provider may advise you to:  ¨ Limit fast foods, sweets, and processed snack foods.  ¨ Choose low-fat options, such as low-fat milk instead of whole milk.  ¨ Eat 5 or more servings of fruits or vegetables every day.  ¨ Eat at home more often. This gives you more control over what you eat.  ¨ Choose healthy foods when you eat out.  ¨ Learn what a healthy portion size is.  ¨ Keep low-fat snacks on hand.  ¨ Avoid sugary drinks, such as soda, fruit juice, iced tea sweetened with sugar, and flavored milk.  ¨ Eat a healthy breakfast.  · Drink enough water to keep your urine clear or pale yellow.  · Do not go without eating for long periods of time (do not fast) or follow a fad diet. Fasting and fad diets can be unhealthy and even dangerous.  Physical Activity   · Exercise regularly, as told by your health care provider. Ask your health care provider what types of exercise are safe for you and how often you should exercise.  · Warm up and stretch before being active.  · Cool down and stretch after being active.  · Rest between periods of activity.  Lifestyle   · Limit the time that you spend in front of your TV, computer, or video game system.  · Find ways to reward yourself that do not involve food.  · Limit alcohol intake to no more than 1 drink a day  for nonpregnant women and 2 drinks a day for men. One drink equals 12 oz of beer, 5 oz of wine, or 1½ oz of hard liquor.  General instructions   · Keep a weight loss journal to keep track of the food you eat and how much you exercise you get.  · Take over-the-counter and prescription medicines only as told by your health care provider.  · Take vitamins and supplements only as told by your health care provider.  · Consider joining a support group. Your health care provider may be able to recommend a support group.  · Keep all follow-up visits as told by your health care provider. This is important.  Contact a health care provider if:  · You are unable to meet your weight loss goal after 6 weeks of dietary and lifestyle changes.  This information is not intended to replace advice given to you by your health care provider. Make sure you discuss any questions you have with your health care provider.  Document Released: 01/25/2006 Document Revised: 05/22/2017 Document Reviewed: 10/05/2016  [employer] Interactive Patient Education © 2017 [employer]

## 2019-03-01 DIAGNOSIS — D64.9 ANEMIA, UNSPECIFIED TYPE: ICD-10-CM

## 2019-03-01 DIAGNOSIS — D75.89 MACROCYTOSIS: Chronic | ICD-10-CM

## 2019-03-01 DIAGNOSIS — I10 HTN (HYPERTENSION), BENIGN: Chronic | ICD-10-CM

## 2019-03-01 DIAGNOSIS — E87.6 HYPOPOTASSEMIA: Primary | ICD-10-CM

## 2019-03-01 DIAGNOSIS — E89.40 SURGICAL MENOPAUSE: Chronic | ICD-10-CM

## 2019-03-01 DIAGNOSIS — M19.90 OSTEOARTHRITIS, UNSPECIFIED OSTEOARTHRITIS TYPE, UNSPECIFIED SITE: Chronic | ICD-10-CM

## 2019-03-05 ENCOUNTER — RESULTS ENCOUNTER (OUTPATIENT)
Dept: FAMILY MEDICINE CLINIC | Facility: CLINIC | Age: 69
End: 2019-03-05

## 2019-03-05 DIAGNOSIS — D75.89 MACROCYTOSIS: Chronic | ICD-10-CM

## 2019-03-05 DIAGNOSIS — E87.6 HYPOPOTASSEMIA: ICD-10-CM

## 2019-03-05 DIAGNOSIS — D64.9 ANEMIA, UNSPECIFIED TYPE: ICD-10-CM

## 2019-03-05 DIAGNOSIS — M19.90 OSTEOARTHRITIS, UNSPECIFIED OSTEOARTHRITIS TYPE, UNSPECIFIED SITE: Chronic | ICD-10-CM

## 2019-03-05 DIAGNOSIS — I10 HTN (HYPERTENSION), BENIGN: Chronic | ICD-10-CM

## 2019-03-05 DIAGNOSIS — E89.40 SURGICAL MENOPAUSE: Chronic | ICD-10-CM

## 2019-03-05 LAB
25(OH)D3+25(OH)D2 SERPL-MCNC: 41.4 NG/ML (ref 30–100)
ALBUMIN SERPL-MCNC: 3.8 G/DL (ref 3.5–5)
ALBUMIN/GLOB SERPL: 1.5 G/DL (ref 1.1–2.5)
ALP SERPL-CCNC: 63 U/L (ref 24–120)
ALT SERPL-CCNC: 28 U/L (ref 0–54)
AST SERPL-CCNC: 27 U/L (ref 7–45)
BASOPHILS # BLD AUTO: 0.06 10*3/MM3 (ref 0–0.2)
BASOPHILS NFR BLD AUTO: 1 % (ref 0–2)
BILIRUB SERPL-MCNC: 0.6 MG/DL (ref 0.1–1)
BUN SERPL-MCNC: 18 MG/DL (ref 5–21)
BUN/CREAT SERPL: 23.1 (ref 7–25)
CALCIUM SERPL-MCNC: 9.8 MG/DL (ref 8.4–10.4)
CHLORIDE SERPL-SCNC: 102 MMOL/L (ref 98–110)
CHOLEST SERPL-MCNC: 202 MG/DL (ref 130–200)
CO2 SERPL-SCNC: 30 MMOL/L (ref 24–31)
CREAT SERPL-MCNC: 0.78 MG/DL (ref 0.5–1.4)
EOSINOPHIL # BLD AUTO: 1.02 10*3/MM3 (ref 0–0.7)
EOSINOPHIL NFR BLD AUTO: 17 % (ref 0–4)
ERYTHROCYTE [DISTWIDTH] IN BLOOD BY AUTOMATED COUNT: 13.5 % (ref 12–15)
FERRITIN SERPL-MCNC: 56.6 NG/ML (ref 11.1–264)
FOLATE SERPL-MCNC: 20 NG/ML (ref 4.78–24.2)
GLOBULIN SER CALC-MCNC: 2.5 GM/DL
GLUCOSE SERPL-MCNC: 91 MG/DL (ref 70–100)
HCT VFR BLD AUTO: 41.1 % (ref 37–47)
HDLC SERPL-MCNC: 45 MG/DL
HGB BLD-MCNC: 13.5 G/DL (ref 12–16)
IMM GRANULOCYTES # BLD AUTO: 0.01 10*3/MM3 (ref 0–0.05)
IMM GRANULOCYTES NFR BLD AUTO: 0.2 % (ref 0–5)
IRON SERPL-MCNC: 130 MCG/DL (ref 42–180)
LDLC SERPL CALC-MCNC: 125 MG/DL (ref 0–99)
LYMPHOCYTES # BLD AUTO: 1.64 10*3/MM3 (ref 0.72–4.86)
LYMPHOCYTES NFR BLD AUTO: 27.4 % (ref 15–45)
MCH RBC QN AUTO: 32.4 PG (ref 28–32)
MCHC RBC AUTO-ENTMCNC: 32.8 G/DL (ref 33–36)
MCV RBC AUTO: 98.6 FL (ref 82–98)
MONOCYTES # BLD AUTO: 0.45 10*3/MM3 (ref 0.19–1.3)
MONOCYTES NFR BLD AUTO: 7.5 % (ref 4–12)
NEUTROPHILS # BLD AUTO: 2.81 10*3/MM3 (ref 1.87–8.4)
NEUTROPHILS NFR BLD AUTO: 46.9 % (ref 39–78)
NRBC BLD AUTO-RTO: 0 /100 WBC (ref 0–0)
PLATELET # BLD AUTO: 305 10*3/MM3 (ref 130–400)
POTASSIUM SERPL-SCNC: 4 MMOL/L (ref 3.5–5.3)
PROT SERPL-MCNC: 6.3 G/DL (ref 6.3–8.7)
RBC # BLD AUTO: 4.17 10*6/MM3 (ref 4.2–5.4)
SODIUM SERPL-SCNC: 139 MMOL/L (ref 135–145)
T4 FREE SERPL-MCNC: 0.84 NG/DL (ref 0.78–2.19)
TRIGL SERPL-MCNC: 161 MG/DL (ref 0–149)
TSH SERPL DL<=0.005 MIU/L-ACNC: 4.34 MIU/ML (ref 0.47–4.68)
VIT B12 SERPL-MCNC: 630 PG/ML (ref 239–931)
VLDLC SERPL CALC-MCNC: 32.2 MG/DL
WBC # BLD AUTO: 5.99 10*3/MM3 (ref 4.8–10.8)

## 2019-03-12 ENCOUNTER — OFFICE VISIT (OUTPATIENT)
Dept: FAMILY MEDICINE CLINIC | Facility: CLINIC | Age: 69
End: 2019-03-12

## 2019-03-12 VITALS
BODY MASS INDEX: 34.55 KG/M2 | DIASTOLIC BLOOD PRESSURE: 74 MMHG | SYSTOLIC BLOOD PRESSURE: 130 MMHG | OXYGEN SATURATION: 97 % | TEMPERATURE: 98.3 F | HEIGHT: 63 IN | RESPIRATION RATE: 18 BRPM | WEIGHT: 195 LBS | HEART RATE: 94 BPM

## 2019-03-12 DIAGNOSIS — I10 HTN (HYPERTENSION), BENIGN: Chronic | ICD-10-CM

## 2019-03-12 DIAGNOSIS — K21.00 GASTROESOPHAGEAL REFLUX DISEASE WITH ESOPHAGITIS: Chronic | ICD-10-CM

## 2019-03-12 DIAGNOSIS — F41.9 ANXIETY: Chronic | ICD-10-CM

## 2019-03-12 DIAGNOSIS — M54.9 CHRONIC BACK PAIN, UNSPECIFIED BACK LOCATION, UNSPECIFIED BACK PAIN LATERALITY: ICD-10-CM

## 2019-03-12 DIAGNOSIS — R06.02 SHORTNESS OF BREATH: ICD-10-CM

## 2019-03-12 DIAGNOSIS — I50.9 CONGESTIVE HEART FAILURE, UNSPECIFIED HF CHRONICITY, UNSPECIFIED HEART FAILURE TYPE (HCC): Primary | Chronic | ICD-10-CM

## 2019-03-12 DIAGNOSIS — Z79.01 ANTICOAGULATED: ICD-10-CM

## 2019-03-12 DIAGNOSIS — M19.90 OSTEOARTHRITIS, UNSPECIFIED OSTEOARTHRITIS TYPE, UNSPECIFIED SITE: Chronic | ICD-10-CM

## 2019-03-12 DIAGNOSIS — G89.29 CHRONIC BACK PAIN, UNSPECIFIED BACK LOCATION, UNSPECIFIED BACK PAIN LATERALITY: ICD-10-CM

## 2019-03-12 DIAGNOSIS — L98.9 SKIN LESIONS: ICD-10-CM

## 2019-03-12 DIAGNOSIS — F32.A DEPRESSION, UNSPECIFIED DEPRESSION TYPE: Chronic | ICD-10-CM

## 2019-03-12 PROCEDURE — 99213 OFFICE O/P EST LOW 20 MIN: CPT | Performed by: FAMILY MEDICINE

## 2019-03-12 NOTE — PROGRESS NOTES
Subjective   Lucy Sousa is a 69 y.o. female presenting with chief complaint of:   Chief Complaint   Patient presents with   • Anxiety     6 month follow-up.   • Hypertension       History of Present Illness :  Alone.   Has multiple chronic problems to consider that might have a bearing on today's issues;  an interval appointment.       Chronic/acute problems reviewed today:   1. Congestive heart failure, unspecified HF chronicity, unspecified heart failure type (CMS/HCC): Chronic/stable.  Denies significant sob, orthopnea, leg edema, weight gain.  Aware of influence diet/salt and watching weight at home.       2. HTN (hypertension), benign: Chronic/stable. Stable here/if home blood pressures.  No significant chest pain, SOB, LE edema, orthopnea, near syncope, dizziness/light headness.      3. SOB: #, hx PE, CHF: chronic/stable and tolerated. Mostly with significant exertion.    4. Gastroesophageal reflux disease with esophagitis: Chronic/stable.  Controlled heartburn, reflux; no dysphagia, melena.  Rx prilosec helps.     5. Osteoarthritis, unspecified osteoarthritis type, unspecified site: Chronic/stable.  Various on/off joint pains/soreness/stiffness.  Particular joint problems with knees.  No joint swelling.  Treats mainly with OTC Rx.     6. Depression, unspecified depression type: No significant mood swings, down moods, nervousness, difficulty with concentration to function home/work.  Others close have not been concerned.  No suicide ideation/intent.  Rx helps      7. Anxiety : Chronic/stable:  On/off anxiety tolerated well.  Rx helps and not interested in Rx change. Stress better than past.      8. Anticoagulated-PE/cardiac arrest/xarelto: Chronic/stable reason and use of.  Denies bleeding issues; especially epistaxis, melena, hematochezia.  Upper arms bruise easily.      9. Skin lesions- new several finger lesions; ? Warts.     10. Chronic back pain, unspecified back location, unspecified back pain  laterality: : chronic/variable 2-3/10 lower back pain with infrequent/same radiation to UE/LE.  No change LE numbness.  Has bladder/bowel control. No desire to change approach of care.        Has an/another acute issue today: .    The following portions of the patient's history were reviewed and updated as appropriate: allergies, current medications, past family history, past medical history, past social history, past surgical history and problem list.      Current Outpatient Medications:   •  ALPRAZolam (XANAX) 0.5 MG tablet, Take 1 tablet by mouth 2 (Two) Times a Day As Needed for Anxiety., Disp: 30 tablet, Rfl: 1  •  budesonide-formoterol (SYMBICORT) 160-4.5 MCG/ACT inhaler, Inhale 2 puffs 2 (Two) Times a Day. (Patient taking differently: Inhale 2 puffs 2 (Two) Times a Day As Needed (SOB).), Disp: 1 inhaler, Rfl: 5  •  Cholecalciferol (VITAMIN D) 2000 UNITS tablet, Take 1,000 Units by mouth Daily., Disp: , Rfl:   •  cyanocobalamin (VITAMIN B-12) 50 MCG tablet tablet, Take 50 mcg by mouth 1 (One) Time., Disp: , Rfl:   •  furosemide (LASIX) 20 MG tablet, TAKE ONE TABLET DAILY GENERIC FOR LASIX, Disp: 90 tablet, Rfl: 1  •  losartan (COZAAR) 50 MG tablet, Take 1 tablet by mouth Daily., Disp: 90 tablet, Rfl: 1  •  Multiple Vitamin (MULTI VITAMIN DAILY PO), Take 1 tablet by mouth daily., Disp: , Rfl:   •  omeprazole (PriLOSEC) 20 MG capsule, Take 20 mg by mouth daily as needed., Disp: , Rfl:   •  potassium chloride (K-DUR,KLOR-CON) 20 MEQ CR tablet, TAKE 1 TABLET TWICE DAILY GENERIC FOR KDUR, Disp: 60 tablet, Rfl: 5  •  rivaroxaban (XARELTO) 20 MG tablet, Take 1 tablet by mouth Daily With Dinner., Disp: 30 tablet, Rfl: 5  •  triamterene-hydrochlorothiazide (DYAZIDE) 37.5-25 MG per capsule, TAKE 1 CAPSULE EVERY MORNING GENERIC FOR DYAZIDE, Disp: 30 capsule, Rfl: 5  •  vitamin E 600 UNIT capsule, Take 400 Units by mouth Daily., Disp: , Rfl:     No problems with medications.  Refills if needed done    No Known  Allergies    Review of Systems  GENERAL:  Active/slower with limits, speed, stamina for age and exertional fatigue. Sleep is ok. No fever now.  SKIN: No  rash/skin lesion of concern:   ENDO:  No syncope, near or diaphoretic sweaty spells.  HEENT: No recent head injury; or change in occ headache,  No vision change, No significant hearing loss.  Ears without pain/drainage.  No sore throat.  No significant nasal/sinus congestion/drainage. No epistaxis.  CHEST: No chest wall tenderness or mass. No cough,  without wheeze.  No SOB; no hemoptysis.  CV: No chest pain; same occ palpitations, ankle edema.  GI: No heartburn, dysphagia.  No abdominal pain, diarrhea, constipation.  No rectal bleeding, or melena.    :  Voids without dysuria, or  incontinence to completion.  ORTHO: No painful/swollen joints but various on /off sore; especially L hip.  No change occ/on-off  sore neck or back.  No acute neck or back pain without recent injury.  NEURO: Still on/off dizzy; less vertigo.  No weakness of extremities.  No numbness/paresthesias.   PSYCH: No memory loss.  Mood good but variable anxious, depressed but/and not suicidal.  Tries to tolerate stress .   Screening:  Mammogram: 10.31.18  Bone density: 10.31.18/bone d-deca/Yolanda/Ts L1 +1.9, hip +0.3  Low dose CT chest: NA  GI:   EGD+neg-healing ulcer/[facility]//1-22-10-upcoming  Colonoscopy+polyp-div/[facility]//2.13.14/5y-upcoming  Prostate: NA  Usual lab order  6m CBC, CMP, TSH, fT4  12m CBC, CMP, LIPID, TSH, fT4, Vit D iron, ferritin, B12, folate        Lab Results:  Results for orders placed or performed in visit on 03/05/19   Comprehensive Metabolic Panel   Result Value Ref Range    Glucose 91 70 - 100 mg/dL    BUN 18 5 - 21 mg/dL    Creatinine 0.78 0.50 - 1.40 mg/dL    eGFR Non African Am 73 >60 mL/min/1.73    eGFR African Am 88 >60 mL/min/1.73    BUN/Creatinine Ratio 23.1 7.0 - 25.0    Sodium 139 135 - 145 mmol/L    Potassium 4.0 3.5 - 5.3 mmol/L    Chloride 102 98 - 110 mmol/L     Total CO2 30.0 24.0 - 31.0 mmol/L    Calcium 9.8 8.4 - 10.4 mg/dL    Total Protein 6.3 6.3 - 8.7 g/dL    Albumin 3.80 3.50 - 5.00 g/dL    Globulin 2.5 gm/dL    A/G Ratio 1.5 1.1 - 2.5 g/dL    Total Bilirubin 0.6 0.1 - 1.0 mg/dL    Alkaline Phosphatase 63 24 - 120 U/L    AST (SGOT) 27 7 - 45 U/L    ALT (SGPT) 28 0 - 54 U/L   Lipid Panel   Result Value Ref Range    Total Cholesterol 202 (H) 130 - 200 mg/dL    Triglycerides 161 (H) 0 - 149 mg/dL    HDL Cholesterol 45 (L) >=50 mg/dL    VLDL Cholesterol 32.2 mg/dL    LDL Cholesterol  125 (H) 0 - 99 mg/dL   TSH   Result Value Ref Range    TSH 4.340 0.470 - 4.680 mIU/mL   T4, free   Result Value Ref Range    Free T4 0.84 0.78 - 2.19 ng/dL   Vitamin D 25 Hydroxy   Result Value Ref Range    25 Hydroxy, Vitamin D 41.4 30.0 - 100.0 ng/ml   Iron   Result Value Ref Range    Iron 130 42 - 180 mcg/dL   Ferritin   Result Value Ref Range    Ferritin 56.60 11.10 - 264.00 ng/mL   Vitamin B12   Result Value Ref Range    Vitamin B-12 630 239 - 931 pg/mL   Folate   Result Value Ref Range    Folate 20.00 4.78 - 24.20 ng/mL   CBC & Differential   Result Value Ref Range    WBC 5.99 4.80 - 10.80 10*3/mm3    RBC 4.17 (L) 4.20 - 5.40 10*6/mm3    Hemoglobin 13.5 12.0 - 16.0 g/dL    Hematocrit 41.1 37.0 - 47.0 %    MCV 98.6 (H) 82.0 - 98.0 fL    MCH 32.4 (H) 28.0 - 32.0 pg    MCHC 32.8 (L) 33.0 - 36.0 g/dL    RDW 13.5 12.0 - 15.0 %    Platelets 305 130 - 400 10*3/mm3    Neutrophil Rel % 46.9 39.0 - 78.0 %    Lymphocyte Rel % 27.4 15.0 - 45.0 %    Monocyte Rel % 7.5 4.0 - 12.0 %    Eosinophil Rel % 17.0 (H) 0.0 - 4.0 %    Basophil Rel % 1.0 0.0 - 2.0 %    Neutrophils Absolute 2.81 1.87 - 8.40 10*3/mm3    Lymphocytes Absolute 1.64 0.72 - 4.86 10*3/mm3    Monocytes Absolute 0.45 0.19 - 1.30 10*3/mm3    Eosinophils Absolute 1.02 (H) 0.00 - 0.70 10*3/mm3    Basophils Absolute 0.06 0.00 - 0.20 10*3/mm3    Immature Granulocyte Rel % 0.2 0.0 - 5.0 %    Immature Grans Absolute 0.01 0.00 - 0.05  "10*3/mm3    nRBC 0.0 0.0 - 0.0 /100 WBC       A1C:No results for input(s): HGBA1C in the last 94160 hours.  PSA:No results for input(s): PSA in the last 27833 hours.  CBC:  Lab Results - Last 18 Months   Lab Units 03/05/19  0744 10/05/18  0718 03/28/18  0356 03/27/18  1810 02/27/18  0719   WBC 10*3/mm3 5.99 4.31* 6.01 5.93 6.34   HEMOGLOBIN g/dL 13.5 14.0 13.2 14.2 13.8   HEMATOCRIT % 41.1 41.8 39.8 41.5 41.5   PLATELETS 10*3/mm3 305 290 283 310 300   IRON mcg/dL 130  --   --   --  102      BMP/CMP:  Lab Results - Last 18 Months   Lab Units 03/05/19  0744 10/05/18  0718 03/28/18  0356 03/27/18  1810 02/27/18  0719   SODIUM mmol/L 139 142 144 141 141   POTASSIUM mmol/L 4.0 3.7 3.3* 3.7 4.1   CHLORIDE mmol/L 102 100 105 104 102   TOTAL CO2 mmol/L 30.0 31.0  --   --  29.0   CO2 mmol/L  --   --  32.0* 26.0  --    GLUCOSE mg/dL 91 95  --   --  94   BUN mg/dL 18 15 14 16 12   CREATININE mg/dL 0.78 0.76 0.76 0.80 0.76   EGFR IF NONAFRICN AM mL/min/1.73 73 75 75 71 75   EGFR IF AFRICN AM mL/min/1.73 88 91  --   --  91   CALCIUM mg/dL 9.8 9.7 9.3 9.9 10.0     HEPATIC:  Lab Results - Last 18 Months   Lab Units 03/05/19  0744 10/05/18  0718 03/27/18  1810 02/27/18  0719   ALT (SGPT) U/L 28 22 29 30   AST (SGOT) U/L 27 20 26 19   ALK PHOS U/L 63 59 67 66     THYROID:  Lab Results - Last 18 Months   Lab Units 03/05/19  0744 10/05/18  0718 02/27/18  0719   TSH mIU/mL 4.340 2.370 3.040       Objective   /74 (BP Location: Left arm, Patient Position: Sitting, Cuff Size: Adult)   Pulse 94   Temp 98.3 °F (36.8 °C) (Oral)   Resp 18   Ht 160 cm (63\")   Wt 88.5 kg (195 lb)   SpO2 97%   BMI 34.54 kg/m²   Body mass index is 34.54 kg/m².    Physical Exam  GENERAL:  Well nourished/developed in no acute distress.   SKIN: Turgor excellent, without wound, rash, lesion:     HEENT: Normal cephalic without trauma.  Pupils equal round reactive to light. Extraocular motions full without nystagmus.   External canals nonobstructive " nontender without reddness. Tymphatic membranes sharri with shanika structures intact.   Oral cavity without growths, exudates, and moist.  Posterior pharynx without mass, obstruction, redness.  No thyromegaly, mass, tenderness, lymphadenopathy and supple.  CV: Regular rhythm.  No murmur, gallop, trace LE edema. Posterior pulses intact.  No carotid bruits.  CHEST: No chest wall tenderness or mass.   LUNGS: Symmetric motion with clear to auscultation.   ABD: Soft, nontender without mass.   ORTHO: Symmetric extremities without swelling/point tenderness.  Full gross range of motion.  Symmetric LE.  Neg straight leg raising.  Neg Patricks maneuver.  Back is straight/mild lordosis.  Lower back sore; no point tender.    NEURO: CN 2-12 grossly intact.  Symmetric facies and UE/LE. 3/5 strength throughout. 1/4 x bicep knee equal reflexes.  Nonfocal use extremities. Speech clear.  Reduced minimal light touch with monofilament, vibratory sensation with tuning fork; equal toes/distal feet.    PSYCH: Oriented x 3.  Pleasant calm, well kept.  Purposeful/directed conservation with intact short/long gross memory.     Assessment/Plan     1. Congestive heart failure, unspecified HF chronicity, unspecified heart failure type (CMS/HCC)    2. HTN (hypertension), benign    3. SOB: #, hx PE, CHF,     4. Gastroesophageal reflux disease with esophagitis    5. Osteoarthritis, unspecified osteoarthritis type, unspecified site    6. Depression, unspecified depression type    7. Anxiety    8. Anticoagulated-PE/cardiac arrest/xarelto    9. Skin lesions-    10. Chronic back pain, unspecified back location, unspecified back pain laterality      Overall doing well.  Her medicines are helping her anxiety and depressions.  She is a good candidate to stay on chronic Xarelto as long as a risk versus benefit plays out.  At the same time is not unreasonable for holding hold this for 3 days prior to her upcoming colonoscopy.    Rx: reviewed/changes:  No  orders of the defined types were placed in this encounter.      LAB/Testing/Referrals: reviewed/orders:   Today:   No orders of the defined types were placed in this encounter.    Chronic/recurrent labs above or change to:   same     Discussions:   Excision date if she wants  Body mass index is 34.54 kg/m².  Patient's Body mass index is 34.54 kg/m². BMI is above normal parameters. Recommendations include: exercise counseling and nutrition counseling.    Tobacco use reviewed:  Non-smoker    There are no Patient Instructions on file for this visit.    Follow up: Return for nonemergent skin day ? 4.3.19; ..lab/ro 6m.  Future Appointments   Date Time Provider Department Center   4/3/2019  9:30 AM Rosas Berg MD [facility] None   4/30/2019  3:00 PM Daniel Underwood MD [facility]   9/10/2019  8:20 AM [facility] None   9/20/2019  2:00 PM Rosas Berg MD [facility] None

## 2019-04-01 ENCOUNTER — HOSPITAL ENCOUNTER (OUTPATIENT)
Dept: HOSPITAL 58 - SURG | Age: 69
Discharge: HOME | End: 2019-04-01
Attending: INTERNAL MEDICINE
Payer: COMMERCIAL

## 2019-04-01 ENCOUNTER — OUTSIDE FACILITY SERVICE (OUTPATIENT)
Dept: GASTROENTEROLOGY | Facility: CLINIC | Age: 69
End: 2019-04-01

## 2019-04-01 VITALS — DIASTOLIC BLOOD PRESSURE: 66 MMHG | SYSTOLIC BLOOD PRESSURE: 125 MMHG

## 2019-04-01 VITALS — BODY MASS INDEX: 36.8 KG/M2

## 2019-04-01 VITALS — TEMPERATURE: 97.6 F

## 2019-04-01 DIAGNOSIS — R13.10: ICD-10-CM

## 2019-04-01 DIAGNOSIS — K57.90: ICD-10-CM

## 2019-04-01 DIAGNOSIS — Z86.010: ICD-10-CM

## 2019-04-01 DIAGNOSIS — K21.9: Primary | ICD-10-CM

## 2019-04-01 PROCEDURE — 43235 EGD DIAGNOSTIC BRUSH WASH: CPT | Performed by: INTERNAL MEDICINE

## 2019-04-01 PROCEDURE — G0105 COLORECTAL SCRN; HI RISK IND: HCPCS | Performed by: INTERNAL MEDICINE

## 2019-04-01 PROCEDURE — 43450 DILATE ESOPHAGUS 1/MULT PASS: CPT | Performed by: INTERNAL MEDICINE

## 2019-04-02 NOTE — OP
PROCEDURE:  COLONOSCOPY TO THE CECUM.



ENDOSCOPIST:  JUAN RAMON KIDD M.D. 



INDICATION:  HISTORY OF POLYPS.



INSTRUMENT:  [employer]-190.



MEDICATION:  PER ANESTHESIA.



PROCEDURE:  The patient was positioned for colonoscopy.  The digital rectal 
exam was negative.  The colonoscope was inserted through the anus and advanced 
to the cecum. 



The cecum was identified using the ileocecal valve and the appendiceal orifice 
as landmarks.  The scope was slowly withdrawn through an adequately prepped 
colon.  



[place] Bowel Prep Score equal 9. 



Diverticulosis noted in the left colon. No evidence for polyp or mass. 
Retroflex exam otherwise normal. Withdrawal time 8 minutes and 8 seconds. 



PLAN:  

1.  Repeat exam in 5 years. 



CC:  DR. ELLY NEWBERRY

## 2019-04-02 NOTE — OP
PROCEDURE:  EGD (ESOPHAGOGASTRODUODENOSCOPY) WITH GONSALES DILATATION.



ENDOSCOPIST:  JUAN RAMON KIDD M.D. 



INDICATION:  DYSPHAGIA.



INSTRUMENT:  [employer]-190.



MEDICATION:  PER ANESTHESIA.



PROCEDURE:  The patient was positioned for endoscopy.  The oropharynx was 
sprayed with Cetacaine spray and the endoscope was advanced through the bite 
block into the esophagus and from there advanced to the duodenum.



The duodenum was normal.  The pylorus was patent.  The antrum was normal. 
Hiatal hernia noted on retroflex exam.



The z-line was noted at 30 cm.  Distal esophageal ring is noted. The remaining 
esophagus was normal. The scope withdrawn and a 44 French Gonsales is passed 
without difficulty.  



PLAN:

1.  Will change to Nexium given her symptoms and repeat endo as needed. 



CC:  DR. ELLY NEWBERRY

## 2019-04-03 ENCOUNTER — TELEPHONE (OUTPATIENT)
Dept: GASTROENTEROLOGY | Facility: CLINIC | Age: 69
End: 2019-04-03

## 2019-04-03 ENCOUNTER — PROCEDURE VISIT (OUTPATIENT)
Dept: FAMILY MEDICINE CLINIC | Facility: CLINIC | Age: 69
End: 2019-04-03

## 2019-04-03 VITALS
SYSTOLIC BLOOD PRESSURE: 104 MMHG | DIASTOLIC BLOOD PRESSURE: 80 MMHG | OXYGEN SATURATION: 95 % | WEIGHT: 200 LBS | HEART RATE: 90 BPM | BODY MASS INDEX: 35.44 KG/M2 | RESPIRATION RATE: 18 BRPM | HEIGHT: 63 IN | TEMPERATURE: 98.1 F

## 2019-04-03 DIAGNOSIS — L98.9 PAINFUL SKIN LESION: ICD-10-CM

## 2019-04-03 DIAGNOSIS — L98.9 CHANGING SKIN LESION: ICD-10-CM

## 2019-04-03 DIAGNOSIS — L98.9 SKIN LESIONS: ICD-10-CM

## 2019-04-03 PROCEDURE — 17110 DESTRUCTION B9 LES UP TO 14: CPT | Performed by: FAMILY MEDICINE

## 2019-04-03 PROCEDURE — 11102 TANGNTL BX SKIN SINGLE LES: CPT | Performed by: FAMILY MEDICINE

## 2019-04-03 PROCEDURE — 99214 OFFICE O/P EST MOD 30 MIN: CPT | Performed by: FAMILY MEDICINE

## 2019-04-03 RX ORDER — ESOMEPRAZOLE MAGNESIUM 40 MG/1
40 CAPSULE, DELAYED RELEASE ORAL 2 TIMES DAILY
COMMUNITY
End: 2020-04-14 | Stop reason: SDUPTHER

## 2019-04-03 NOTE — PATIENT INSTRUCTIONS
Please cleanse your treatment site/wound with soap and clean water 2-3 times a day or as needed.  Please report signs of infection such as reddness, increasing pain, drainage, splitting or obvious problems now or regrowth in the future.  Try to keep the area dry...showering is ok if brief and dry well when done. If drainage or the wound/site is going to be exposed to soilage; keep covered with bandage/bandaid as needed. Be aware there was a specimum/pathology taken today;  be sure and call us for results if not called by us in 7-10 days

## 2019-04-03 NOTE — PROGRESS NOTES
Subjective   Lucy Sousa is a 69 y.o. female presenting with chief complaint of:   Chief Complaint   Patient presents with   • Wart removal     Right hand. Removal of several warts.   • Suspicious Skin Lesion     Right hand.       History of Present Illness :  With .    Has a derm lesions.   Has had these > 6m.   They are slowly growing.  They are often sore as she is R handed and types a lot.   Treatment is requested.     3.12.19 R hand many warts; no PHOTOs  4.3.19 No photos  4.3.19 R thumb-brown/tgnqjr-1fa-hcqyajumerl-no path             R index finger-medial/cphdck-6ub-livbwxgmnbl-no path             R middle finger -lateral/grzeht-1zi-gfkakuxhadk-no path             R ring finger-brown-proximal-5ok-stjuxnvielg-dd path             R dorsal wrist-6mm-shave bx-shave/hyfrication-path    Other chronic problem/s to consider: none    Has an acute issue today:no other    The following portions of the patient's history were reviewed and updated as appropriate: allergies, current medications, past family history, past medical history, past social history, past surgical history and problem list.      Current Outpatient Medications:   •  ALPRAZolam (XANAX) 0.5 MG tablet, Take 1 tablet by mouth 2 (Two) Times a Day As Needed for Anxiety., Disp: 30 tablet, Rfl: 1  •  Cholecalciferol (VITAMIN D) 2000 UNITS tablet, Take 1,000 Units by mouth Daily., Disp: , Rfl:   •  cyanocobalamin (VITAMIN B-12) 50 MCG tablet tablet, Take 50 mcg by mouth 1 (One) Time., Disp: , Rfl:   •  esomeprazole (nexIUM) 40 MG capsule, Take 40 mg by mouth 2 (Two) Times a Day., Disp: , Rfl:   •  furosemide (LASIX) 20 MG tablet, TAKE ONE TABLET DAILY GENERIC FOR LASIX, Disp: 90 tablet, Rfl: 1  •  losartan (COZAAR) 50 MG tablet, Take 1 tablet by mouth Daily., Disp: 90 tablet, Rfl: 1  •  Multiple Vitamin (MULTI VITAMIN DAILY PO), Take 1 tablet by mouth daily., Disp: , Rfl:   •  potassium chloride (K-DUR,KLOR-CON) 20 MEQ CR tablet, TAKE 1 TABLET  TWICE DAILY GENERIC FOR KDUR, Disp: 60 tablet, Rfl: 5  •  rivaroxaban (XARELTO) 20 MG tablet, Take 1 tablet by mouth Daily With Dinner., Disp: 30 tablet, Rfl: 5  •  triamterene-hydrochlorothiazide (DYAZIDE) 37.5-25 MG per capsule, TAKE 1 CAPSULE EVERY MORNING GENERIC FOR DYAZIDE, Disp: 30 capsule, Rfl: 5  •  vitamin E 600 UNIT capsule, Take 400 Units by mouth Daily., Disp: , Rfl:     No Known Allergies    Review of Systems  GENERAL:  Active/slower with limits, speed, samni for age. Sleep is ok. No fever now.  CHEST: No chest wall tenderness or mass. No cough,  without wheeze, SOB.  CV: No chest pain, palpatations, same occ/mild LE ankle edema.     Results for orders placed or performed in visit on 04/03/19   Reference Histopathology   Result Value Ref Range    Conv .conv Comment     . Comment     . Comment     . Comment     . Comment     . Comment        LABS  CBC:  Lab Results - Last 18 Months   Lab Units 03/05/19  0744 10/05/18  0718 03/28/18  0356 03/27/18 1810 02/27/18 0719   WBC 10*3/mm3 5.99 4.31* 6.01 5.93 6.34   HEMATOCRIT % 41.1 41.8 39.8 41.5 41.5     CMP:  Lab Results - Last 18 Months   Lab Units 03/05/19  0744 10/05/18  0718 03/28/18  0356 03/27/18 1810 02/27/18  0719   SODIUM mmol/L 139 142 144 141 141   CHLORIDE mmol/L 102 100 105 104 102   TOTAL CO2 mmol/L 30.0 31.0  --   --  29.0   CO2 mmol/L  --   --  32.0* 26.0  --    BUN mg/dL 18 15 14 16 12   CREATININE mg/dL 0.78 0.76 0.76 0.80 0.76   EGFR IF NONAFRICN AM mL/min/1.73 73 75 75 71 75   EGFR IF AFRICN AM mL/min/1.73 88 91  --   --  91   CALCIUM mg/dL 9.8 9.7 9.3 9.9 10.0     HEPATIC:  Lab Results - Last 18 Months   Lab Units 03/05/19 0744 10/05/18  0718 03/27/18  1810 02/27/18  0719   ALT (SGPT) U/L 28 22 29 30     THYROID:  Lab Results - Last 18 Months   Lab Units 03/05/19  0744 10/05/18  0718 02/27/18  0719   TSH mIU/mL 4.340 2.370 3.040   FREE T4 ng/dL 0.84  --  1.00     A1C:No results for input(s): HGBA1C in the last 30062  "hours.  PSA:@(psa:7)@      Objective   /80 (BP Location: Left arm, Patient Position: Sitting, Cuff Size: Adult)   Pulse 90   Temp 98.1 °F (36.7 °C) (Oral)   Resp 18   Ht 160 cm (63\")   Wt 90.7 kg (200 lb)   SpO2 95%   BMI 35.43 kg/m²     Physical Exam  GENERAL:  Well nourished/developed in no acute distress. Obese   SKIN: Turgor excellent, without wound, rash, lesion other than warty/raised rough surfaced lesions over various areas of R fingers/hand.   CV: Regular rhythm.  No murmur, gallop,  edema. Posterior pulses intact.    LUNGS: Symmetric motion with clear to auscultation.  No dullness to percussion  PSYCH: Oriented x 3.  Pleasant calm, well kept.  Purposeful/directed conservation with intact short/long gross memory.     Assessment/Plan     1. Skin lesions-  All the lesions  - Reference Histopathology    2. Painful skin lesion  All the lesions on/off sore  - Reference Histopathology    3. Changing skin lesion  Maybe slowly growing.   - Reference Histopathology    4.3.19 R thumb-brown/xzfyti-3bh-qbtrmjrebmy-no path             R index finger-medial/zyrjmh-8ts-skjxpdoiyvc-no path             R middle finger -lateral/ircntu-5xf-sddkepouyut-no path             R ring finger-brown-proximal-3ie-gfvzqqhuipm-yf path             R dorsal wrist-6mm-shave bx-shave/hyfrication-path-    Patient gave verbal permission to the procedure as described below.  Was advised there are risk of pain, scaring, infection, regrowth, need for revision/additional treatment/referral  and possible additional costs of a pathology report.  The surgical/treatment area was cleansed with betadine; R dorsal wrist.  The lesion and surrounding tissue was injected with 1% xylocaine without epinephrine no more than 1cc.  Once the area was numb we used# 15 blade; outlined  lesion and deeply shaved into the subdermal tissue.   Size of lesion/wound was 6mm/circular  Lesion base/edges was hyfricated repeated until no bleeding and noting " some wound closure.    The patient appeared to tolerate the procedure well.  The wound was then cleansed with peroxide.  Triple antibiotic/equal was applied and bandage applied after that.  Shave    Patient gave verbal permission to the procedure as described below.  Was advised there are risk of pain, scaring, infection, regrowth, need for revision/additional treatment/referral  and possible additional costs of a pathology report.  The surgical/treatment area was cleansed with betadine; finger lesions above.   The lesion and surrounding tissue was injected with 1% xylocaine without epinephrine no more than 3cc.  Once the area was numb we clipped the lesion away superficially with sharp/sharp scissors then hyfricated/currettage repeated until clean base  One additional hyfrication performed; some wound closure and complete hemostasis.  Size of lesions were listed:   R thumb-brown/wvtgvb-2zg-si path  R index finger-medial/rkqhqn-3tl-wpunjsmbqth-no path  R middle finger -lateral/sdkgwk-3wu-fsuhpsmigao-no path  R ring finger-brown-proximal-7uz-tzpeveozrit-ke path    The patient appeared to tolerate the procedure well.  The wound was then cleansed with peroxide.  Triple antibiotic/equal was applied and bandage applied after that.  Hyfrication    Patient's Body mass index is 35.43 kg/m². BMI is above normal parameters. Recommendations include: exercise counseling, nutrition counseling and as before.    I advised Lucy of the risks of continuing to use tobacco, and I provided her with tobacco cessation educational materials in the After Visit Summary.     During this visit, I spent a minutes counseling the patient regarding tobacco cessation.      Patient Instructions   Please cleanse your treatment site/wound with soap and clean water 2-3 times a day or as needed.  Please report signs of infection such as reddness, increasing pain, drainage, splitting or obvious problems now or regrowth in the future.  Try to keep the  area dry...showering is ok if brief and dry well when done. If drainage or the wound/site is going to be exposed to soilage; keep covered with bandage/bandaid as needed. Be aware there was a specimum/pathology taken today;  be sure and call us for results if not called by us in 7-10 days            Follow up: Return for lab;, Dr Berg-, as planned;.

## 2019-04-08 LAB
DX ICD CODE: NORMAL
PATH REPORT.FINAL DX SPEC: NORMAL
PATH REPORT.GROSS SPEC: NORMAL
PATH REPORT.SITE OF ORIGIN SPEC: NORMAL
PATHOLOGIST NAME: NORMAL
PAYMENT PROCEDURE: NORMAL

## 2019-04-30 ENCOUNTER — OFFICE VISIT (OUTPATIENT)
Dept: CARDIOLOGY | Facility: CLINIC | Age: 69
End: 2019-04-30

## 2019-04-30 VITALS
SYSTOLIC BLOOD PRESSURE: 118 MMHG | OXYGEN SATURATION: 97 % | HEIGHT: 64 IN | DIASTOLIC BLOOD PRESSURE: 70 MMHG | BODY MASS INDEX: 34.31 KG/M2 | WEIGHT: 201 LBS | HEART RATE: 89 BPM

## 2019-04-30 DIAGNOSIS — R06.02 SHORTNESS OF BREATH: Primary | ICD-10-CM

## 2019-04-30 DIAGNOSIS — I10 HTN (HYPERTENSION), BENIGN: Chronic | ICD-10-CM

## 2019-04-30 DIAGNOSIS — Z86.711 HISTORY OF PULMONARY EMBOLISM: ICD-10-CM

## 2019-04-30 PROCEDURE — 93000 ELECTROCARDIOGRAM COMPLETE: CPT | Performed by: INTERNAL MEDICINE

## 2019-04-30 PROCEDURE — 99214 OFFICE O/P EST MOD 30 MIN: CPT | Performed by: INTERNAL MEDICINE

## 2019-05-23 RX ORDER — BUDESONIDE AND FORMOTEROL FUMARATE DIHYDRATE 160; 4.5 UG/1; UG/1
2 AEROSOL RESPIRATORY (INHALATION)
Qty: 1 INHALER | Refills: 5 | Status: SHIPPED | OUTPATIENT
Start: 2019-05-23 | End: 2021-01-07

## 2019-06-03 DIAGNOSIS — I10 ESSENTIAL HYPERTENSION: Chronic | ICD-10-CM

## 2019-06-03 RX ORDER — LOSARTAN POTASSIUM 50 MG/1
TABLET ORAL
Qty: 90 TABLET | Refills: 1 | Status: SHIPPED | OUTPATIENT
Start: 2019-06-03 | End: 2019-12-23

## 2019-06-07 ENCOUNTER — OFFICE VISIT (OUTPATIENT)
Dept: OBSTETRICS AND GYNECOLOGY | Facility: CLINIC | Age: 69
End: 2019-06-07

## 2019-06-07 VITALS
SYSTOLIC BLOOD PRESSURE: 122 MMHG | WEIGHT: 198 LBS | BODY MASS INDEX: 33.8 KG/M2 | HEIGHT: 64 IN | DIASTOLIC BLOOD PRESSURE: 80 MMHG

## 2019-06-07 DIAGNOSIS — N39.41 URGE INCONTINENCE: ICD-10-CM

## 2019-06-07 DIAGNOSIS — Z01.419 ENCOUNTER FOR GYNECOLOGICAL EXAMINATION WITHOUT ABNORMAL FINDING: Primary | ICD-10-CM

## 2019-06-07 PROCEDURE — G0101 CA SCREEN;PELVIC/BREAST EXAM: HCPCS | Performed by: OBSTETRICS & GYNECOLOGY

## 2019-06-07 RX ORDER — OXYBUTYNIN CHLORIDE 5 MG/1
5 TABLET, EXTENDED RELEASE ORAL DAILY
Qty: 30 TABLET | Refills: 2 | Status: SHIPPED | OUTPATIENT
Start: 2019-06-07 | End: 2019-08-14 | Stop reason: SDUPTHER

## 2019-06-07 NOTE — PROGRESS NOTES
Subjective   Lucy Sousa is a 69 y.o. female  YOB: 1950    Chief Complaint   Patient presents with   • Gynecologic Exam       68 yo para 1 LMP 1990 presents for annual examination. Patient reports that her last annual examination was 15 years ago. She reports that she previously had a hysterectomy in 1990 due to menorrhagia and had a SUHN, BSO. She reports no history of abnormal PAP smears. Reports that she is up to date on her colonoscopy as well as mammogram with last no being BIRAD 2. Patient reports that she does have incontinence 3 to 5 times a day when she gets the urge to go to the bathroom along with nocturia. Patient denies history of glaucoma. She reports a family history of breast cancer in her maternal grandmother.         No Known Allergies    Past Medical History:   Diagnosis Date   • Anxiety    • Depression    • DJD (degenerative joint disease)    • GERD (gastroesophageal reflux disease)    • HTN (hypertension)    • Hx of colonic polyp    • Pulmonary embolism (CMS/HCC)     post surgical with cardiac arrest       Family History   Problem Relation Age of Onset   • Coronary artery disease Mother    • Coronary artery disease Father    • Diabetes Brother    • Hypertension Brother    • Colon polyps Brother    • Heart disease Maternal Grandfather    • Colon cancer Neg Hx        Social History     Socioeconomic History   • Marital status:      Spouse name: Nima   • Number of children: 1   • Years of education: 13   • Highest education level: Not on file   Occupational History   • Occupation: [email or web address] de[employer]   Tobacco Use   • Smoking status: Never Smoker   • Smokeless tobacco: Never Used   Substance and Sexual Activity   • Alcohol use: No   • Drug use: No   • Sexual activity: Defer         Current Outpatient Medications:   •  ALPRAZolam (XANAX) 0.5 MG tablet, Take 1 tablet by mouth 2 (Two) Times a Day As Needed for Anxiety., Disp: 30 tablet, Rfl: 1  •  budesonide-formoterol  (SYMBICORT) 160-4.5 MCG/ACT inhaler, Inhale 2 puffs 2 (Two) Times a Day., Disp: 1 inhaler, Rfl: 5  •  Cholecalciferol (VITAMIN D) 2000 UNITS tablet, Take 1,000 Units by mouth Daily., Disp: , Rfl:   •  cyanocobalamin (VITAMIN B-12) 50 MCG tablet tablet, Take 50 mcg by mouth 1 (One) Time., Disp: , Rfl:   •  esomeprazole (nexIUM) 40 MG capsule, Take 40 mg by mouth 2 (Two) Times a Day., Disp: , Rfl:   •  furosemide (LASIX) 20 MG tablet, TAKE ONE TABLET DAILY GENERIC FOR LASIX, Disp: 90 tablet, Rfl: 1  •  losartan (COZAAR) 50 MG tablet, TAKE ONE TABLET DAILY GENERIC FOR COZAAR, Disp: 90 tablet, Rfl: 1  •  Multiple Vitamin (MULTI VITAMIN DAILY PO), Take 1 tablet by mouth daily., Disp: , Rfl:   •  potassium chloride (K-DUR,KLOR-CON) 20 MEQ CR tablet, TAKE 1 TABLET TWICE DAILY GENERIC FOR KDUR, Disp: 60 tablet, Rfl: 5  •  rivaroxaban (XARELTO) 20 MG tablet, Take 1 tablet by mouth Daily With Dinner., Disp: 30 tablet, Rfl: 5  •  triamterene-hydrochlorothiazide (DYAZIDE) 37.5-25 MG per capsule, TAKE 1 CAPSULE EVERY MORNING GENERIC FOR DYAZIDE, Disp: 30 capsule, Rfl: 5  •  vitamin E 600 UNIT capsule, Take 400 Units by mouth Daily., Disp: , Rfl:   •  oxybutynin XL (DITROPAN-XL) 5 MG 24 hr tablet, Take 1 tablet by mouth Daily., Disp: 30 tablet, Rfl: 2    No LMP recorded. Patient has had a hysterectomy.    Sexual History:         Could not be calculated    Past Surgical History:   Procedure Laterality Date   • CHOLECYSTECTOMY     • COLONOSCOPY W/ POLYPECTOMY  02/13/2014    Hyperplastic polyp at 20 cm, Diverticulosis repeat exam in 5 years   • EKOS CATHETER PLACEMENT Bilateral 11/17/2016    Procedure: Ekos catheter placement;  Surgeon: Daniel Underwood MD;  Location:  [facility];  Service:    • EKOS CATHETER REMOVAL Bilateral 11/18/2016    Procedure: Ekos catheter removal with stent placement;  Surgeon: Daniel Underwood MD;  Location:  [facility];  Service:    • ENDOSCOPY  01/22/2010     Negative endoscopy noting a healed gastric ulcer   • ENDOSCOPY  10/21/2009    Superficial mucosal erosion, chronic active gastritis   • FOOT SURGERY     • HYSTERECTOMY     • OOPHORECTOMY     • REPLACEMENT TOTAL KNEE     • TOTAL ABDOMINAL HYSTERECTOMY     • TUBAL ABDOMINAL LIGATION         Review of Systems   Constitutional: Negative for activity change and unexpected weight loss.   HENT: Negative for congestion.    Cardiovascular: Negative for chest pain.   Gastrointestinal: Negative for blood in stool, constipation and diarrhea.   Endocrine: Negative for cold intolerance and heat intolerance.   Genitourinary: Positive for urinary incontinence. Negative for dyspareunia, dysuria, pelvic pain and vaginal discharge.   Musculoskeletal: Negative for arthralgias, back pain, neck pain and neck stiffness.   Skin: Negative for rash.   Neurological: Negative for dizziness and headache.   Psychiatric/Behavioral: Negative for sleep disturbance. The patient is not nervous/anxious.      Objective   Physical Exam   Constitutional: She is oriented to person, place, and time. She appears well-developed and well-nourished. No distress.   HENT:   Head: Normocephalic and atraumatic.   Eyes: EOM are normal.   Neck: Normal range of motion. Neck supple. No thyromegaly present.   Cardiovascular: Normal rate and regular rhythm.   No murmur heard.  Pulmonary/Chest: Effort normal and breath sounds normal.   Abdominal: Soft. She exhibits no distension. There is no tenderness.   Genitourinary: Vagina normal. Pelvic exam was performed with patient supine. There is no tenderness or lesion on the right labia. There is no tenderness or lesion on the left labia. Right adnexum displays no tenderness and no fullness. Left adnexum displays no tenderness and no fullness. No bleeding in the vagina. No vaginal discharge found.   Genitourinary Comments: Pt s/p hysterectomy, uterus and cervix surgically absent, vaginal cuff unremarkable.   "  Musculoskeletal: Normal range of motion. She exhibits no edema.   Neurological: She is alert and oriented to person, place, and time.   Skin: Skin is warm and dry.   Psychiatric: She has a normal mood and affect. Her behavior is normal. Judgment normal.   Nursing note and vitals reviewed.        Vitals:    06/07/19 0823   BP: 122/80   Weight: 89.8 kg (198 lb)   Height: 162.6 cm (64\")       Lucy was seen today for gynecologic exam.    Diagnoses and all orders for this visit:    Encounter for gynecological examination without abnormal finding    Urge incontinence    Other orders  -     oxybutynin XL (DITROPAN-XL) 5 MG 24 hr tablet; Take 1 tablet by mouth Daily.    -Discussed with patient no need for PAP smear at this time with previous history of normal PAP smears.  -Discussed with patient starting her on Oxybutynin at this time. Risk, benefits discussed with patient.   -RTC in 4 weeks for recheck   -Will have patient follow up for urine culture.     Kaitlin Massey, DO       "

## 2019-06-13 ENCOUNTER — TELEPHONE (OUTPATIENT)
Dept: OBSTETRICS AND GYNECOLOGY | Facility: CLINIC | Age: 69
End: 2019-06-13

## 2019-06-13 NOTE — TELEPHONE ENCOUNTER
----- Message from Kaitlin Massey DO sent at 6/8/2019 12:22 PM CDT -----  Will you please have the patient give us a urine culture.

## 2019-08-08 DIAGNOSIS — I10 ESSENTIAL HYPERTENSION: Chronic | ICD-10-CM

## 2019-08-08 DIAGNOSIS — Z79.01 ANTICOAGULATED: ICD-10-CM

## 2019-08-08 RX ORDER — TRIAMTERENE AND HYDROCHLOROTHIAZIDE 37.5; 25 MG/1; MG/1
CAPSULE ORAL
Qty: 30 CAPSULE | Refills: 5 | Status: SHIPPED | OUTPATIENT
Start: 2019-08-08 | End: 2020-02-10

## 2019-08-08 RX ORDER — RIVAROXABAN 20 MG/1
TABLET, FILM COATED ORAL
Qty: 90 TABLET | Refills: 1 | Status: SHIPPED | OUTPATIENT
Start: 2019-08-08 | End: 2020-02-06

## 2019-08-14 ENCOUNTER — OFFICE VISIT (OUTPATIENT)
Dept: OBSTETRICS AND GYNECOLOGY | Facility: CLINIC | Age: 69
End: 2019-08-14

## 2019-08-14 VITALS
DIASTOLIC BLOOD PRESSURE: 72 MMHG | HEIGHT: 64 IN | BODY MASS INDEX: 33.8 KG/M2 | SYSTOLIC BLOOD PRESSURE: 128 MMHG | WEIGHT: 198 LBS

## 2019-08-14 DIAGNOSIS — R32 URINARY INCONTINENCE, UNSPECIFIED TYPE: Primary | ICD-10-CM

## 2019-08-14 PROCEDURE — 99213 OFFICE O/P EST LOW 20 MIN: CPT | Performed by: OBSTETRICS & GYNECOLOGY

## 2019-08-14 RX ORDER — OXYBUTYNIN CHLORIDE 5 MG/1
10 TABLET, EXTENDED RELEASE ORAL DAILY
Qty: 30 TABLET | Refills: 2 | Status: SHIPPED | OUTPATIENT
Start: 2019-08-14 | End: 2019-08-15 | Stop reason: DRUGHIGH

## 2019-08-14 NOTE — PROGRESS NOTES
"Subjective   Lucy Sousa is a 69 y.o. female.     Chief Complaint   Patient presents with   • Follow-up     Pt is here for a follow up for urge incontinence  PT did do a bit better then started noticing that her feet and ankles were swollen.  Pt did leave a urine for culture today        70 yo para 1 LMP 1990 presents for follow up examination on Oxybutynin. Patient reports that she previously got up several times a night. She now reports that she gets up once a night. She reports decreased about of  Patient reports that her incontinence during the day has decreased from previous when she leaked 3 to 5 times a day when she got the urge to go to the bathroom.       Review of Systems   Genitourinary: Positive for urinary incontinence. Negative for dysuria, vaginal bleeding and vaginal discharge.       Objective   /72   Ht 162.6 cm (64\")   Wt 89.8 kg (198 lb)   BMI 33.99 kg/m²   No LMP recorded. Patient has had a hysterectomy.  Physical Exam   Constitutional: She is oriented to person, place, and time. She appears well-developed and well-nourished. No distress.   HENT:   Head: Normocephalic and atraumatic.   Neck: Normal range of motion.   Cardiovascular: Normal rate and regular rhythm.   No murmur heard.  Pulmonary/Chest: Effort normal and breath sounds normal. She has no wheezes.   Musculoskeletal: Normal range of motion.   Neurological: She is alert and oriented to person, place, and time.   Skin: Skin is warm and dry.   Psychiatric: She has a normal mood and affect. Her behavior is normal. Judgment normal.   Nursing note and vitals reviewed.    Assessment/Plan   Problems Addressed this Visit     None      Visit Diagnoses     Urinary incontinence, unspecified type    -  Primary    Relevant Orders    Urine Culture - Urine, Urine, Clean Catch      -Patient reports improvement in symptoms at this time.  -Patient to be increased to Oxybutynin 10 mg. Counseled patient to of possible side effects with " increasing dosage   -RTC in 3 months for follow up examination.        Kaitlin Massey, DO

## 2019-08-15 RX ORDER — OXYBUTYNIN CHLORIDE 10 MG/1
10 TABLET, EXTENDED RELEASE ORAL DAILY
Qty: 30 TABLET | Refills: 2 | Status: SHIPPED | OUTPATIENT
Start: 2019-08-15 | End: 2020-04-06

## 2019-08-16 LAB
BACTERIA UR CULT: NORMAL
BACTERIA UR CULT: NORMAL

## 2019-08-22 ENCOUNTER — OFFICE VISIT (OUTPATIENT)
Dept: GASTROENTEROLOGY | Facility: CLINIC | Age: 69
End: 2019-08-22

## 2019-08-22 VITALS
SYSTOLIC BLOOD PRESSURE: 130 MMHG | WEIGHT: 200 LBS | OXYGEN SATURATION: 97 % | BODY MASS INDEX: 35.44 KG/M2 | DIASTOLIC BLOOD PRESSURE: 70 MMHG | HEART RATE: 71 BPM | HEIGHT: 63 IN

## 2019-08-22 DIAGNOSIS — R19.7 DIARRHEA, UNSPECIFIED TYPE: Primary | ICD-10-CM

## 2019-08-22 DIAGNOSIS — E66.9 OBESITY, UNSPECIFIED OBESITY SEVERITY, UNSPECIFIED OBESITY TYPE: ICD-10-CM

## 2019-08-22 DIAGNOSIS — Z78.9 NONSMOKER: ICD-10-CM

## 2019-08-22 PROCEDURE — 99212 OFFICE O/P EST SF 10 MIN: CPT | Performed by: CLINICAL NURSE SPECIALIST

## 2019-08-22 NOTE — PROGRESS NOTES
Lucy Sousa  1950 8/22/2019  Chief Complaint   Patient presents with   • GI Problem     Diarrhea after eating     Subjective   HPI  Lucy Sousa is a 69 y.o. female who presents with a complaint of diarrhea after meals up to 3 or more per day. Soft in consistency. With oral intake. She did not mention this prior to her colonoscopy and this was performed recently, normal. She does consume a lot of artificial sweeteners and dairy products. This occurs daily. No other triggers. No alleviating factors. No recent antibiotics. No rectal bleeding.    Past Medical History:   Diagnosis Date   • Anxiety    • Depression    • DJD (degenerative joint disease)    • GERD (gastroesophageal reflux disease)    • HTN (hypertension)    • Hx of colonic polyp    • Pulmonary embolism (CMS/HCC)     post surgical with cardiac arrest     Past Surgical History:   Procedure Laterality Date   • CHOLECYSTECTOMY     • COLONOSCOPY  04/01/2019    Diverticulosis otherwise normal exam repeat in 5 years   • COLONOSCOPY W/ POLYPECTOMY  02/13/2014    Hyperplastic polyp at 20 cm, Diverticulosis repeat exam in 5 years   • EKOS CATHETER PLACEMENT Bilateral 11/17/2016    Procedure: Ekos catheter placement;  Surgeon: Daniel Underwood MD;  Location:  [facility];  Service:    • EKOS CATHETER REMOVAL Bilateral 11/18/2016    Procedure: Ekos catheter removal with stent placement;  Surgeon: Daniel Underwood MD;  Location:  [facility];  Service:    • ENDOSCOPY  01/22/2010    Negative endoscopy noting a healed gastric ulcer   • ENDOSCOPY  10/21/2009    Superficial mucosal erosion, chronic active gastritis   • FOOT SURGERY     • HYSTERECTOMY     • OOPHORECTOMY     • REPLACEMENT TOTAL KNEE     • TOTAL ABDOMINAL HYSTERECTOMY     • TUBAL ABDOMINAL LIGATION         Outpatient Medications Marked as Taking for the 8/22/19 encounter (Office Visit) with Kathy Lainez APRN   Medication Sig Dispense Refill    • ALPRAZolam (XANAX) 0.5 MG tablet Take 1 tablet by mouth 2 (Two) Times a Day As Needed for Anxiety. 30 tablet 1   • budesonide-formoterol (SYMBICORT) 160-4.5 MCG/ACT inhaler Inhale 2 puffs 2 (Two) Times a Day. 1 inhaler 5   • Cholecalciferol (VITAMIN D) 2000 UNITS tablet Take 1,000 Units by mouth Daily.     • cyanocobalamin (VITAMIN B-12) 50 MCG tablet tablet Take 50 mcg by mouth 1 (One) Time.     • esomeprazole (nexIUM) 40 MG capsule Take 40 mg by mouth 2 (Two) Times a Day.     • furosemide (LASIX) 20 MG tablet TAKE ONE TABLET DAILY GENERIC FOR LASIX 90 tablet 1   • losartan (COZAAR) 50 MG tablet TAKE ONE TABLET DAILY GENERIC FOR COZAAR 90 tablet 1   • Multiple Vitamin (MULTI VITAMIN DAILY PO) Take 1 tablet by mouth daily.     • oxybutynin XL (DITROPAN XL) 10 MG 24 hr tablet Take 1 tablet by mouth Daily. 30 tablet 2   • potassium chloride (K-DUR,KLOR-CON) 20 MEQ CR tablet TAKE 1 TABLET TWICE DAILY GENERIC FOR KDUR 60 tablet 5   • triamterene-hydrochlorothiazide (DYAZIDE) 37.5-25 MG per capsule TAKE 1 CAPSULE EVERY MORNING GENERIC FOR DYAZIDE 30 capsule 5   • vitamin E 600 UNIT capsule Take 400 Units by mouth Daily.     • XARELTO 20 MG tablet TAKE ONE TABLET DAILY WITH DINNER 90 tablet 1     No Known Allergies  Social History     Socioeconomic History   • Marital status:      Spouse name: Nima   • Number of children: 1   • Years of education: 13   • Highest education level: Not on file   Occupational History   • Occupation: [employer] dept   Tobacco Use   • Smoking status: Never Smoker   • Smokeless tobacco: Never Used   Substance and Sexual Activity   • Alcohol use: No   • Drug use: No   • Sexual activity: Not Currently     Family History   Problem Relation Age of Onset   • Coronary artery disease Mother    • Coronary artery disease Father    • Diabetes Brother    • Hypertension Brother    • Colon polyps Brother    • Heart disease Maternal Grandfather    • Colon cancer [facility]  "Maintenance   Topic Date Due   • TDAP/TD VACCINES (1 - Tdap) 01/09/1967   • PNEUMOCOCCAL VACCINES (65+ LOW/MEDIUM RISK) (2 of 2 - PPSV23) 10/09/2018   • INFLUENZA VACCINE  08/01/2019   • ZOSTER VACCINE (1 of 2) 06/07/2020 (Originally 1/9/1998)   • ANNUAL PHYSICAL  04/04/2020   • MAMMOGRAM  10/31/2020   • COLONOSCOPY  04/01/2024   • HEPATITIS C SCREENING  Completed     Review of Systems   Constitutional: Negative for activity change, appetite change, chills, diaphoresis, fatigue, fever and unexpected weight change.   HENT: Negative for ear pain, hearing loss, mouth sores, sore throat, trouble swallowing and voice change.    Eyes: Negative.    Respiratory: Negative for cough, choking, shortness of breath and wheezing.    Cardiovascular: Negative for chest pain and palpitations.   Gastrointestinal: Positive for diarrhea. Negative for abdominal pain, blood in stool, constipation, nausea and vomiting.   Endocrine: Negative for cold intolerance and heat intolerance.   Genitourinary: Negative for decreased urine volume, dysuria, frequency, hematuria and urgency.   Musculoskeletal: Negative for back pain, gait problem and myalgias.   Skin: Negative for color change, pallor and rash.   Allergic/Immunologic: Negative for food allergies and immunocompromised state.   Neurological: Negative for dizziness, tremors, seizures, syncope, weakness, light-headedness, numbness and headaches.   Hematological: Negative for adenopathy. Does not bruise/bleed easily.   Psychiatric/Behavioral: Negative for agitation and confusion. The patient is not nervous/anxious.    All other systems reviewed and are negative.    Objective   Vitals:    08/22/19 0948   BP: 130/70   Pulse: 71   SpO2: 97%   Weight: 90.7 kg (200 lb)   Height: 160 cm (63\")     Body mass index is 35.43 kg/m².  Physical Exam   Constitutional: She is oriented to person, place, and time. She appears well-developed and well-nourished.   HENT:   Head: Normocephalic and atraumatic. "   Eyes: Pupils are equal, round, and reactive to light.   Neck: Normal range of motion. Neck supple. No tracheal deviation present.   Cardiovascular: Normal rate, regular rhythm and normal heart sounds. Exam reveals no gallop and no friction rub.   No murmur heard.  Pulmonary/Chest: Effort normal and breath sounds normal. No respiratory distress. She has no wheezes. She has no rales. She exhibits no tenderness.   Abdominal: Soft. Bowel sounds are normal. She exhibits no distension. There is no hepatosplenomegaly. There is no tenderness. There is no rigidity, no rebound and no guarding.   Musculoskeletal: Normal range of motion. She exhibits no edema, tenderness or deformity.   Neurological: She is alert and oriented to person, place, and time. She has normal reflexes.   Skin: Skin is warm and dry. No rash noted. No pallor.   Psychiatric: She has a normal mood and affect. Her behavior is normal. Judgment and thought content normal.     Assessment/Plan   Lucy was seen today for gi problem.    Diagnoses and all orders for this visit:    Diarrhea, unspecified type    Nonsmoker    Obesity, unspecified obesity severity, unspecified obesity type    She wasn't to try some dietary restrictions first before considering stool cultures. Colonoscopy reviewed unremarkable recently.       EMR Dragon/transcription disclaimer: Much of this encounter note is electronic transcription/translation of spoken language to printed text. The electronic translation of spoken language may be erroneous, or at times, nonsensical words or phrases may be inadvertently transcribed. Although I have reviewed the note for such errors, some may still exist.  Body mass index is 35.43 kg/m².  No Follow-up on file.    Patient's Body mass index is 35.43 kg/m². BMI is above normal parameters. Recommendations include: nutrition counseling.      All risks, benefits, alternatives, and indications of colonoscopy and/or Endoscopy procedure have been discussed  with the patient. Risks to include perforation of the colon requiring possible surgery or colostomy, risk of bleeding from biopsies or removal of colon tissue, possibility of missing a colon polyp or cancer, or adverse drug reaction.  Benefits to include the diagnosis and management of disease of the colon and rectum. Alternatives to include barium enema, radiographic evaluation, lab testing or no intervention. Pt verbalizes understanding and agrees.     Kathy Lainez, APRN  8/22/2019  10:20 AM      Obesity, Adult  Obesity is the condition of having too much total body fat. Being overweight or obese means that your weight is greater than what is considered healthy for your body size. Obesity is determined by a measurement called BMI. BMI is an estimate of body fat and is calculated from height and weight. For adults, a BMI of 30 or higher is considered obese.  Obesity can eventually lead to other health concerns and major illnesses, including:  · Stroke.  · Coronary artery disease (CAD).  · Type 2 diabetes.  · Some types of cancer, including cancers of the colon, breast, uterus, and gallbladder.  · Osteoarthritis.  · High blood pressure (hypertension).  · High cholesterol.  · Sleep apnea.  · Gallbladder stones.  · Infertility problems.  What are the causes?  The main cause of obesity is taking in (consuming) more calories than your body uses for energy. Other factors that contribute to this condition may include:  · Being born with genes that make you more likely to become obese.  · Having a medical condition that causes obesity. These conditions include:  ¨ Hypothyroidism.  ¨ Polycystic ovarian syndrome (PCOS).  ¨ Binge-eating disorder.  ¨ Cushing syndrome.  · Taking certain medicines, such as steroids, antidepressants, and seizure medicines.  · Not being physically active (sedentary lifestyle).  · Living where there are limited places to exercise safely or buy healthy foods.  · Not getting enough  sleep.  What increases the risk?  The following factors may increase your risk of this condition:  · Having a family history of obesity.  · Being a woman of -American descent.  · Being a man of  descent.  What are the signs or symptoms?  Having excessive body fat is the main symptom of this condition.  How is this diagnosed?  This condition may be diagnosed based on:  · Your symptoms.  · Your medical history.  · A physical exam. Your health care provider may measure:  ¨ Your BMI. If you are an adult with a BMI between 25 and less than 30, you are considered overweight. If you are an adult with a BMI of 30 or higher, you are considered obese.  ¨ The distances around your hips and your waist (circumferences). These may be compared to each other to help diagnose your condition.  ¨ Your skinfold thickness. Your health care provider may gently pinch a fold of your skin and measure it.  How is this treated?  Treatment for this condition often includes changing your lifestyle. Treatment may include some or all of the following:  · Dietary changes. Work with your health care provider and a dietitian to set a weight-loss goal that is healthy and reasonable for you. Dietary changes may include eating:  ¨ Smaller portions. A portion size is the amount of a particular food that is healthy for you to eat at one time. This varies from person to person.  ¨ Low-calorie or low-fat options.  ¨ More whole grains, fruits, and vegetables.  · Regular physical activity. This may include aerobic activity (cardio) and strength training.  · Medicine to help you lose weight. Your health care provider may prescribe medicine if you are unable to lose 1 pound a week after 6 weeks of eating more healthily and doing more physical activity.  · Surgery. Surgical options may include gastric banding and gastric bypass. Surgery may be done if:  ¨ Other treatments have not helped to improve your condition.  ¨ You have a BMI of 40 or  higher.  ¨ You have life-threatening health problems related to obesity.  Follow these instructions at home:     Eating and drinking     · Follow recommendations from your health care provider about what you eat and drink. Your health care provider may advise you to:  ¨ Limit fast foods, sweets, and processed snack foods.  ¨ Choose low-fat options, such as low-fat milk instead of whole milk.  ¨ Eat 5 or more servings of fruits or vegetables every day.  ¨ Eat at home more often. This gives you more control over what you eat.  ¨ Choose healthy foods when you eat out.  ¨ Learn what a healthy portion size is.  ¨ Keep low-fat snacks on hand.  ¨ Avoid sugary drinks, such as soda, fruit juice, iced tea sweetened with sugar, and flavored milk.  ¨ Eat a healthy breakfast.  · Drink enough water to keep your urine clear or pale yellow.  · Do not go without eating for long periods of time (do not fast) or follow a fad diet. Fasting and fad diets can be unhealthy and even dangerous.  Physical Activity   · Exercise regularly, as told by your health care provider. Ask your health care provider what types of exercise are safe for you and how often you should exercise.  · Warm up and stretch before being active.  · Cool down and stretch after being active.  · Rest between periods of activity.  Lifestyle   · Limit the time that you spend in front of your TV, computer, or video game system.  · Find ways to reward yourself that do not involve food.  · Limit alcohol intake to no more than 1 drink a day for nonpregnant women and 2 drinks a day for men. One drink equals 12 oz of beer, 5 oz of wine, or 1½ oz of hard liquor.  General instructions   · Keep a weight loss journal to keep track of the food you eat and how much you exercise you get.  · Take over-the-counter and prescription medicines only as told by your health care provider.  · Take vitamins and supplements only as told by your health care provider.  · Consider joining a  support group. Your health care provider may be able to recommend a support group.  · Keep all follow-up visits as told by your health care provider. This is important.  Contact a health care provider if:  · You are unable to meet your weight loss goal after 6 weeks of dietary and lifestyle changes.  This information is not intended to replace advice given to you by your health care provider. Make sure you discuss any questions you have with your health care provider.  Document Released: 01/25/2006 Document Revised: 05/22/2017 Document Reviewed: 10/05/2016  [employer] Interactive Patient Education © 2017 [employer]      If you smoke or use tobacco, 4 minutes reading provided  Steps to Quit Smoking  Smoking tobacco can be harmful to your health and can affect almost every organ in your body. Smoking puts you, and those around you, at risk for developing many serious chronic diseases. Quitting smoking is difficult, but it is one of the best things that you can do for your health. It is never too late to quit.  What are the benefits of quitting smoking?  When you quit smoking, you lower your risk of developing serious diseases and conditions, such as:  · Lung cancer or lung disease, such as COPD.  · Heart disease.  · Stroke.  · Heart attack.  · Infertility.  · Osteoporosis and bone fractures.  Additionally, symptoms such as coughing, wheezing, and shortness of breath may get better when you quit. You may also find that you get sick less often because your body is stronger at fighting off colds and infections. If you are pregnant, quitting smoking can help to reduce your chances of having a baby of low birth weight.  How do I get ready to quit?  When you decide to quit smoking, create a plan to make sure that you are successful. Before you quit:  · Pick a date to quit. Set a date within the next two weeks to give you time to prepare.  · Write down the reasons why you are quitting. Keep this list in places where you  will see it often, such as on your bathroom mirror or in your car or wallet.  · Identify the people, places, things, and activities that make you want to smoke (triggers) and avoid them. Make sure to take these actions:  ¨ Throw away all cigarettes at home, at work, and in your car.  ¨ Throw away smoking accessories, such as ashtrays and lighters.  ¨ Clean your car and make sure to empty the ashtray.  ¨ Clean your home, including curtains and carpets.  · Tell your family, friends, and coworkers that you are quitting. Support from your loved ones can make quitting easier.  · Talk with your health care provider about your options for quitting smoking.  · Find out what treatment options are covered by your health insurance.  What strategies can I use to quit smoking?  Talk with your healthcare provider about different strategies to quit smoking. Some strategies include:  · Quitting smoking altogether instead of gradually lessening how much you smoke over a period of time. Research shows that quitting “cold turkey” is more successful than gradually quitting.  · Attending in-person counseling to help you build problem-solving skills. You are more likely to have success in quitting if you attend several counseling sessions. Even short sessions of 10 minutes can be effective.  · Finding resources and support systems that can help you to quit smoking and remain smoke-free after you quit. These resources are most helpful when you use them often. They can include:  ¨ Online chats with a counselor.  ¨ Telephone quitlines.  ¨ Printed self-help materials.  ¨ Support groups or group counseling.  ¨ Text messaging programs.  ¨ Mobile phone applications.  · Taking medicines to help you quit smoking. (If you are pregnant or breastfeeding, talk with your health care provider first.) Some medicines contain nicotine and some do not. Both types of medicines help with cravings, but the medicines that include nicotine help to relieve  withdrawal symptoms. Your health care provider may recommend:  ¨ Nicotine patches, gum, or lozenges.  ¨ Nicotine inhalers or sprays.  ¨ Non-nicotine medicine that is taken by mouth.  Talk with your health care provider about combining strategies, such as taking medicines while you are also receiving in-person counseling. Using these two strategies together makes you more likely to succeed in quitting than if you used either strategy on its own.  If you are pregnant or breastfeeding, talk with your health care provider about finding counseling or other support strategies to quit smoking. Do not take medicine to help you quit smoking unless told to do so by your health care provider.  What things can I do to make it easier to quit?  Quitting smoking might feel overwhelming at first, but there is a lot that you can do to make it easier. Take these important actions:  · Reach out to your family and friends and ask that they support and encourage you during this time. Call telephone quitlines, reach out to support groups, or work with a counselor for support.  · Ask people who smoke to avoid smoking around you.  · Avoid places that trigger you to smoke, such as bars, parties, or smoke-break areas at work.  · Spend time around people who do not smoke.  · Lessen stress in your life, because stress can be a smoking trigger for some people. To lessen stress, try:  ¨ Exercising regularly.  ¨ Deep-breathing exercises.  ¨ Yoga.  ¨ Meditating.  ¨ Performing a body scan. This involves closing your eyes, scanning your body from head to toe, and noticing which parts of your body are particularly tense. Purposefully relax the muscles in those areas.  · Download or purchase mobile phone or tablet apps (applications) that can help you stick to your quit plan by providing reminders, tips, and encouragement. There are many free apps, such as QuitGuide from the CDC (Centers for Disease Control and Prevention). You can find other support  for quitting smoking (smoking cessation) through smokefree.gov and other websites.  How will I feel when I quit smoking?  Within the first 24 hours of quitting smoking, you may start to feel some withdrawal symptoms. These symptoms are usually most noticeable 2-3 days after quitting, but they usually do not last beyond 2-3 weeks. Changes or symptoms that you might experience include:  · Mood swings.  · Restlessness, anxiety, or irritation.  · Difficulty concentrating.  · Dizziness.  · Strong cravings for sugary foods in addition to nicotine.  · Mild weight gain.  · Constipation.  · Nausea.  · Coughing or a sore throat.  · Changes in how your medicines work in your body.  · A depressed mood.  · Difficulty sleeping (insomnia).  After the first 2-3 weeks of quitting, you may start to notice more positive results, such as:  · Improved sense of smell and taste.  · Decreased coughing and sore throat.  · Slower heart rate.  · Lower blood pressure.  · Clearer skin.  · The ability to breathe more easily.  · Fewer sick days.  Quitting smoking is very challenging for most people. Do not get discouraged if you are not successful the first time. Some people need to make many attempts to quit before they achieve long-term success. Do your best to stick to your quit plan, and talk with your health care provider if you have any questions or concerns.  This information is not intended to replace advice given to you by your health care provider. Make sure you discuss any questions you have with your health care provider.  Document Released: 12/12/2002 Document Revised: 08/15/2017 Document Reviewed: 05/03/2016  [employer] Interactive Patient Education © 2017 [employer]

## 2019-09-04 DIAGNOSIS — E66.01 CLASS 2 SEVERE OBESITY WITH SERIOUS COMORBIDITY AND BODY MASS INDEX (BMI) OF 35.0 TO 35.9 IN ADULT, UNSPECIFIED OBESITY TYPE (HCC): ICD-10-CM

## 2019-09-04 DIAGNOSIS — I10 ESSENTIAL HYPERTENSION: Primary | ICD-10-CM

## 2019-09-10 ENCOUNTER — RESULTS ENCOUNTER (OUTPATIENT)
Dept: FAMILY MEDICINE CLINIC | Facility: CLINIC | Age: 69
End: 2019-09-10

## 2019-09-10 DIAGNOSIS — E66.01 CLASS 2 SEVERE OBESITY WITH SERIOUS COMORBIDITY AND BODY MASS INDEX (BMI) OF 35.0 TO 35.9 IN ADULT, UNSPECIFIED OBESITY TYPE (HCC): ICD-10-CM

## 2019-09-10 DIAGNOSIS — I10 ESSENTIAL HYPERTENSION: ICD-10-CM

## 2019-09-11 LAB
ALBUMIN SERPL-MCNC: 4.2 G/DL (ref 3.5–5.2)
ALBUMIN/GLOB SERPL: 1.9 G/DL
ALP SERPL-CCNC: 63 U/L (ref 39–117)
ALT SERPL-CCNC: 15 U/L (ref 1–33)
AST SERPL-CCNC: 18 U/L (ref 1–32)
BASOPHILS # BLD AUTO: 0.04 10*3/MM3 (ref 0–0.2)
BASOPHILS NFR BLD AUTO: 0.8 % (ref 0–1.5)
BILIRUB SERPL-MCNC: 0.5 MG/DL (ref 0.2–1.2)
BUN SERPL-MCNC: 12 MG/DL (ref 8–23)
BUN/CREAT SERPL: 13.5 (ref 7–25)
CALCIUM SERPL-MCNC: 9.5 MG/DL (ref 8.6–10.5)
CHLORIDE SERPL-SCNC: 99 MMOL/L (ref 98–107)
CO2 SERPL-SCNC: 28.4 MMOL/L (ref 22–29)
CREAT SERPL-MCNC: 0.89 MG/DL (ref 0.57–1)
EOSINOPHIL # BLD AUTO: 0.13 10*3/MM3 (ref 0–0.4)
EOSINOPHIL NFR BLD AUTO: 2.6 % (ref 0.3–6.2)
ERYTHROCYTE [DISTWIDTH] IN BLOOD BY AUTOMATED COUNT: 13.3 % (ref 12.3–15.4)
GLOBULIN SER CALC-MCNC: 2.2 GM/DL
GLUCOSE SERPL-MCNC: 94 MG/DL (ref 65–99)
HCT VFR BLD AUTO: 43.8 % (ref 34–46.6)
HGB BLD-MCNC: 14 G/DL (ref 12–15.9)
IMM GRANULOCYTES # BLD AUTO: 0.01 10*3/MM3 (ref 0–0.05)
IMM GRANULOCYTES NFR BLD AUTO: 0.2 % (ref 0–0.5)
LYMPHOCYTES # BLD AUTO: 1.62 10*3/MM3 (ref 0.7–3.1)
LYMPHOCYTES NFR BLD AUTO: 32.7 % (ref 19.6–45.3)
MCH RBC QN AUTO: 33.2 PG (ref 26.6–33)
MCHC RBC AUTO-ENTMCNC: 32 G/DL (ref 31.5–35.7)
MCV RBC AUTO: 103.8 FL (ref 79–97)
MONOCYTES # BLD AUTO: 0.44 10*3/MM3 (ref 0.1–0.9)
MONOCYTES NFR BLD AUTO: 8.9 % (ref 5–12)
NEUTROPHILS # BLD AUTO: 2.71 10*3/MM3 (ref 1.7–7)
NEUTROPHILS NFR BLD AUTO: 54.8 % (ref 42.7–76)
NRBC BLD AUTO-RTO: 0 /100 WBC (ref 0–0.2)
PLATELET # BLD AUTO: 308 10*3/MM3 (ref 140–450)
POTASSIUM SERPL-SCNC: 3.9 MMOL/L (ref 3.5–5.2)
PROT SERPL-MCNC: 6.4 G/DL (ref 6–8.5)
RBC # BLD AUTO: 4.22 10*6/MM3 (ref 3.77–5.28)
SODIUM SERPL-SCNC: 142 MMOL/L (ref 136–145)
T4 FREE SERPL-MCNC: 1.21 NG/DL (ref 0.93–1.7)
TSH SERPL DL<=0.005 MIU/L-ACNC: 3.97 UIU/ML (ref 0.27–4.2)
WBC # BLD AUTO: 4.95 10*3/MM3 (ref 3.4–10.8)

## 2019-09-23 RX ORDER — FUROSEMIDE 20 MG/1
TABLET ORAL
Qty: 90 TABLET | Refills: 1 | Status: SHIPPED | OUTPATIENT
Start: 2019-09-23 | End: 2019-09-23 | Stop reason: SDUPTHER

## 2019-09-23 RX ORDER — FUROSEMIDE 20 MG/1
TABLET ORAL
Qty: 90 TABLET | Refills: 1 | Status: SHIPPED | OUTPATIENT
Start: 2019-09-23 | End: 2020-04-09 | Stop reason: SDUPTHER

## 2019-09-30 ENCOUNTER — TELEPHONE (OUTPATIENT)
Dept: FAMILY MEDICINE CLINIC | Facility: CLINIC | Age: 69
End: 2019-09-30

## 2019-09-30 ENCOUNTER — OFFICE VISIT (OUTPATIENT)
Dept: FAMILY MEDICINE CLINIC | Facility: CLINIC | Age: 69
End: 2019-09-30

## 2019-09-30 VITALS
RESPIRATION RATE: 16 BRPM | BODY MASS INDEX: 34.66 KG/M2 | SYSTOLIC BLOOD PRESSURE: 136 MMHG | HEIGHT: 64 IN | HEART RATE: 105 BPM | OXYGEN SATURATION: 98 % | TEMPERATURE: 98.2 F | DIASTOLIC BLOOD PRESSURE: 88 MMHG | WEIGHT: 203 LBS

## 2019-09-30 DIAGNOSIS — K21.00 GASTROESOPHAGEAL REFLUX DISEASE WITH ESOPHAGITIS: Chronic | ICD-10-CM

## 2019-09-30 DIAGNOSIS — M19.90 OSTEOARTHRITIS, UNSPECIFIED OSTEOARTHRITIS TYPE, UNSPECIFIED SITE: Chronic | ICD-10-CM

## 2019-09-30 DIAGNOSIS — I50.9 CONGESTIVE HEART FAILURE, UNSPECIFIED HF CHRONICITY, UNSPECIFIED HEART FAILURE TYPE (HCC): Chronic | ICD-10-CM

## 2019-09-30 DIAGNOSIS — Z79.01 ANTICOAGULATED: ICD-10-CM

## 2019-09-30 DIAGNOSIS — G89.29 CHRONIC PAIN OF RIGHT ANKLE: Primary | ICD-10-CM

## 2019-09-30 DIAGNOSIS — D64.9 ANEMIA, UNSPECIFIED TYPE: ICD-10-CM

## 2019-09-30 DIAGNOSIS — E87.6 HYPOPOTASSEMIA: ICD-10-CM

## 2019-09-30 DIAGNOSIS — F41.9 ANXIETY: Chronic | ICD-10-CM

## 2019-09-30 DIAGNOSIS — G89.29 CHRONIC PAIN OF RIGHT ANKLE: ICD-10-CM

## 2019-09-30 DIAGNOSIS — Z12.31 ENCOUNTER FOR SCREENING MAMMOGRAM FOR BREAST CANCER: ICD-10-CM

## 2019-09-30 DIAGNOSIS — M25.571 CHRONIC PAIN OF RIGHT ANKLE: ICD-10-CM

## 2019-09-30 DIAGNOSIS — M25.571 CHRONIC PAIN OF RIGHT ANKLE: Primary | ICD-10-CM

## 2019-09-30 DIAGNOSIS — I10 HTN (HYPERTENSION), BENIGN: Chronic | ICD-10-CM

## 2019-09-30 DIAGNOSIS — F32.A DEPRESSION, UNSPECIFIED DEPRESSION TYPE: Chronic | ICD-10-CM

## 2019-09-30 DIAGNOSIS — R06.02 SHORTNESS OF BREATH: ICD-10-CM

## 2019-09-30 DIAGNOSIS — Z86.711 HISTORY OF PULMONARY EMBOLISM: ICD-10-CM

## 2019-09-30 DIAGNOSIS — B35.1 ONYCHOMYCOSIS: Chronic | ICD-10-CM

## 2019-09-30 DIAGNOSIS — M25.471 SWELLING OF ANKLE JOINT, RIGHT: ICD-10-CM

## 2019-09-30 PROCEDURE — 99214 OFFICE O/P EST MOD 30 MIN: CPT | Performed by: FAMILY MEDICINE

## 2019-09-30 RX ORDER — TERBINAFINE HYDROCHLORIDE 250 MG/1
250 TABLET ORAL DAILY
Qty: 42 TABLET | Refills: 1 | Status: SHIPPED | OUTPATIENT
Start: 2019-09-30 | End: 2020-07-01

## 2019-09-30 NOTE — PROGRESS NOTES
Subjective   Lucy Sousa is a 69 y.o. female presenting with chief complaint of:   Chief Complaint   Patient presents with   • Congestive Heart Failure   • Foot Swelling     right foot       History of Present Illness :  Alone.       Has multiple chronic problems to consider that might have a bearing on today's issues;  an interval appointment.       Chronic/acute problems reviewed today:   1. Chronic pain of right ankle: chronic/over a year.  Worse walking.  Over dorsal/anterior ankle. No instability/recent injury.  Swells occ.    2. HTN (hypertension), benign: Chronic/stable. Stable here/if home blood pressures.  No significant chest pain, SOB, LE edema, orthopnea, near syncope, dizziness/light headness.      3. Congestive heart failure, unspecified HF chronicity, unspecified heart failure type (CMS/HCC): Chronic/stable.  Denies significant sob, orthopnea, leg edema, weight gain.  Aware of influence diet/salt and watching weight at home.        4. SOB: #, hx PE, CHF: Tonic/stable.  Minor shortness of breath with exertion no worse than usual.  No cough or trouble swallowing.   5. Gastroesophageal reflux disease with esophagitis: Chronic/stable.  Controlled heartburn, reflux; no dysphagia, melena.  Rx helps.     6. Osteoarthritis, unspecified osteoarthritis type, unspecified site: Chronic/stable.  Various on/off joint pains/soreness/stiffness.  Particular joint problems with R ankle.  No joint swelling.  Treats mainly with reduced activity, Rx listed,Tylenol.     7. Anemia, unspecified type: Chronic/variable: This has been present for years/over a year.  It is chronic.  There has been GI evaulation in the past. There is no current melena, hematochezia. It has been benign to date and stable/watching.     8. Hypopotassemia: Chronic/variable.  Diuretic influence.  Laboratories followed.  Denies significant muscle fatigue.   9. Anxiety: Chronic/variable:  On/off anxiety tolerated well.  Rx helps and not interested  in Rx change. Stress ongoing changes at work.      10. Anticoagulated-PE/cardiac arrest/xarelto: Chronic/stable reason and use of.  Denies bleeding issues; especially epistaxis, melena, hematochezia.  Upper arms/others bruise easily.  No falls.      11. Depression, unspecified depression type: No significant  mood swings, down moods, nervousness, difficulty with concentration to function home/work.  Others close have not been concerned.  No suicide ideation/intent.  Rx helps.      12. History of PE-ekos tx: Chronic/stable reason and use of xarelto.  Denies bleeding issues; especially epistaxis, melena, hematochezia.  Upper arms/others bruise easily.  No falls. No chest pain.     13. Onychomycosis: chronic/recurrent.  Treated 2012; problem back.    14. Encounter for screening mammogram for breast cancer: Chronic/ongoing yearly need to review for breast cancer.  No breast pain, masses, discharge.         Has an/another acute issue today: none.    The following portions of the patient's history were reviewed and updated as appropriate: allergies, current medications, past family history, past medical history, past social history, past surgical history and problem list.      Current Outpatient Medications:   •  ALPRAZolam (XANAX) 0.5 MG tablet, Take 1 tablet by mouth 2 (Two) Times a Day As Needed for Anxiety., Disp: 30 tablet, Rfl: 1  •  budesonide-formoterol (SYMBICORT) 160-4.5 MCG/ACT inhaler, Inhale 2 puffs 2 (Two) Times a Day., Disp: 1 inhaler, Rfl: 5  •  Cholecalciferol (VITAMIN D) 2000 UNITS tablet, Take 1,000 Units by mouth Daily., Disp: , Rfl:   •  cyanocobalamin (VITAMIN B-12) 50 MCG tablet tablet, Take 50 mcg by mouth 1 (One) Time., Disp: , Rfl:   •  esomeprazole (nexIUM) 40 MG capsule, Take 40 mg by mouth 2 (Two) Times a Day., Disp: , Rfl:   •  furosemide (LASIX) 20 MG tablet, TAKE ONE TABLET DAILY GENERIC FOR LASIX, Disp: 90 tablet, Rfl: 1  •  losartan (COZAAR) 50 MG tablet, TAKE ONE TABLET DAILY GENERIC FOR  COZAAR, Disp: 90 tablet, Rfl: 1  •  Multiple Vitamin (MULTI VITAMIN DAILY PO), Take 1 tablet by mouth daily., Disp: , Rfl:   •  oxybutynin XL (DITROPAN XL) 10 MG 24 hr tablet, Take 1 tablet by mouth Daily., Disp: 30 tablet, Rfl: 2  •  potassium chloride (K-DUR,KLOR-CON) 20 MEQ CR tablet, TAKE 1 TABLET TWICE DAILY GENERIC FOR KDUR, Disp: 60 tablet, Rfl: 5  •  triamterene-hydrochlorothiazide (DYAZIDE) 37.5-25 MG per capsule, TAKE 1 CAPSULE EVERY MORNING GENERIC FOR DYAZIDE, Disp: 30 capsule, Rfl: 5  •  vitamin E 600 UNIT capsule, Take 400 Units by mouth Daily., Disp: , Rfl:   •  XARELTO 20 MG tablet, TAKE ONE TABLET DAILY WITH DINNER, Disp: 90 tablet, Rfl: 1    No problems with medications.  Refills if needed done    No Known Allergies    Review of Systems  GENERAL:  Active/slower with limits, speed, stamina for age and exertional fatigue. Sleep is ok. No fever now.  SKIN: No  rash/skin lesion of concern other than toenails.   ENDO:  No syncope, near or diaphoretic sweaty spells.  HEENT: No recent head injury; or change in occ headache.   No vision change.  No significant hearing loss.  Ears without pain/drainage.  No sore throat.  No significant nasal/sinus congestion/drainage. No epistaxis.  CHEST: No chest wall tenderness or mass. No cough,  without wheeze.  No SOB; no hemoptysis.  CV: No chest pain; same occ palpitations, ankle edema.  GI: No heartburn, dysphagia.  No abdominal pain, diarrhea, constipation.  No rectal bleeding, or melena.    :  Voids without dysuria, or  incontinence to completion.  ORTHO: No painful/swollen joints but various on /off sore. .  No change occ/on-off  sore neck or back.  No acute neck or back pain without recent injury.  NEURO: Still on/off dizzy; less vertigo.  No weakness of extremities.  No numbness/paresthesias.   PSYCH: No memory loss.  Mood good but variable anxious, depressed but/and not suicidal.  Tries to tolerate stress .   Screening:  Gyne:  2018  Mammogram: 10.31.18  Bone density: 10.31.18/bone d-deca/Yolanda/Ts L1 +1.9, hip +0.3  Low dose CT chest:Tobacco-smoker/none: NA  GI:   Egd-hh-ring (d)//[facility]/4.1.19/prn  Colon-div//[facility]/4.1.19/5y  Prostate: NA  Usual lab order  6m CBC, CMP, TSH, fT4  12m CBC, CMP, LIPID, TSH, fT4, Vit D iron, ferritin, B12, folate        Lab Results:  Results for orders placed or performed in visit on 09/10/19   Comprehensive metabolic panel   Result Value Ref Range    Glucose 94 65 - 99 mg/dL    BUN 12 8 - 23 mg/dL    Creatinine 0.89 0.57 - 1.00 mg/dL    eGFR Non African Am 63 >60 mL/min/1.73    eGFR African Am 76 >60 mL/min/1.73    BUN/Creatinine Ratio 13.5 7.0 - 25.0    Sodium 142 136 - 145 mmol/L    Potassium 3.9 3.5 - 5.2 mmol/L    Chloride 99 98 - 107 mmol/L    Total CO2 28.4 22.0 - 29.0 mmol/L    Calcium 9.5 8.6 - 10.5 mg/dL    Total Protein 6.4 6.0 - 8.5 g/dL    Albumin 4.20 3.50 - 5.20 g/dL    Globulin 2.2 gm/dL    A/G Ratio 1.9 g/dL    Total Bilirubin 0.5 0.2 - 1.2 mg/dL    Alkaline Phosphatase 63 39 - 117 U/L    AST (SGOT) 18 1 - 32 U/L    ALT (SGPT) 15 1 - 33 U/L   TSH   Result Value Ref Range    TSH 3.970 0.270 - 4.200 uIU/mL   T4, free   Result Value Ref Range    Free T4 1.21 0.93 - 1.70 ng/dL   CBC & Differential   Result Value Ref Range    WBC 4.95 3.40 - 10.80 10*3/mm3    RBC 4.22 3.77 - 5.28 10*6/mm3    Hemoglobin 14.0 12.0 - 15.9 g/dL    Hematocrit 43.8 34.0 - 46.6 %    .8 (H) 79.0 - 97.0 fL    MCH 33.2 (H) 26.6 - 33.0 pg    MCHC 32.0 31.5 - 35.7 g/dL    RDW 13.3 12.3 - 15.4 %    Platelets 308 140 - 450 10*3/mm3    Neutrophil Rel % 54.8 42.7 - 76.0 %    Lymphocyte Rel % 32.7 19.6 - 45.3 %    Monocyte Rel % 8.9 5.0 - 12.0 %    Eosinophil Rel % 2.6 0.3 - 6.2 %    Basophil Rel % 0.8 0.0 - 1.5 %    Neutrophils Absolute 2.71 1.70 - 7.00 10*3/mm3    Lymphocytes Absolute 1.62 0.70 - 3.10 10*3/mm3    Monocytes Absolute 0.44 0.10 - 0.90 10*3/mm3    Eosinophils Absolute 0.13 0.00 - 0.40 10*3/mm3    Basophils  "Absolute 0.04 0.00 - 0.20 10*3/mm3    Immature Granulocyte Rel % 0.2 0.0 - 0.5 %    Immature Grans Absolute 0.01 0.00 - 0.05 10*3/mm3    nRBC 0.0 0.0 - 0.2 /100 WBC       A1C:No results for input(s): HGBA1C in the last 50538 hours.  PSA:No results for input(s): PSA in the last 03725 hours.  CBC:  Lab Results - Last 18 Months   Lab Units 09/10/19  0724 03/05/19  0744 10/05/18  0718   WBC 10*3/mm3 4.95 5.99 4.31*   HEMOGLOBIN g/dL 14.0 13.5 14.0   HEMATOCRIT % 43.8 41.1 41.8   PLATELETS 10*3/mm3 308 305 290   IRON mcg/dL  --  130  --       BMP/CMP:  Lab Results - Last 18 Months   Lab Units 09/10/19  0724 03/05/19  0744 10/05/18  0718   SODIUM mmol/L 142 139 142   POTASSIUM mmol/L 3.9 4.0 3.7   CHLORIDE mmol/L 99 102 100   TOTAL CO2 mmol/L 28.4 30.0 31.0   GLUCOSE mg/dL 94 91 95   BUN mg/dL 12 18 15   CREATININE mg/dL 0.89 0.78 0.76   EGFR IF NONAFRICN AM mL/min/1.73 63 73 75   EGFR IF AFRICN AM mL/min/1.73 76 88 91   CALCIUM mg/dL 9.5 9.8 9.7     HEPATIC:  Lab Results - Last 18 Months   Lab Units 09/10/19  0724 03/05/19  0744 10/05/18  0718   ALT (SGPT) U/L 15 28 22   AST (SGOT) U/L 18 27 20   ALK PHOS U/L 63 63 59     THYROID:  Lab Results - Last 18 Months   Lab Units 09/10/19  0724 03/05/19  0744 10/05/18  0718   TSH uIU/mL 3.970 4.340 2.370       Objective   /88   Pulse 105   Temp 98.2 °F (36.8 °C)   Resp 16   Ht 162.6 cm (64\")   Wt 92.1 kg (203 lb)   SpO2 98%   Breastfeeding? No   BMI 34.84 kg/m²   Body mass index is 34.84 kg/m².    Physical Exam  GENERAL:  Well nourished/developed in no acute distress.   SKIN: Turgor excellent, without wound, rash, lesion (toenails painted)    HEENT: Normal cephalic without trauma.  Pupils equal round reactive to light. Extraocular motions full without nystagmus.   External canals nonobstructive nontender without reddness. Tymphatic membranes sharri with shanika structures intact.   Oral cavity without growths, exudates, and moist.  Posterior pharynx without mass, " obstruction, redness.  No thyromegaly, mass, tenderness, lymphadenopathy and supple.  CV: Regular rhythm.  No murmur, gallop, trace LE edema. Posterior pulses intact.  No carotid bruits.  CHEST: No chest wall tenderness or mass.   LUNGS: Symmetric motion with clear to auscultation.   ABD: Soft, nontender without mass.   ORTHO: Symmetric extremities without swelling/point tenderness; even anterior R ankle.  Full gross range of motion.  NEURO: CN 2-12 grossly intact.  Symmetric facies and UE/LE. 3/5 strength throughout. 1/4 x bicep knee equal reflexes.  Nonfocal use extremities. Speech clear.  Reduced minimal light touch with monofilament, vibratory sensation with tuning fork; equal toes/distal feet.    PSYCH: Oriented x 3.  Pleasant calm, well kept.  Purposeful/directed conservation with intact short/long gross memory.     Assessment/Plan     1. Chronic pain of right ankle    2. HTN (hypertension), benign    3. Congestive heart failure, unspecified HF chronicity, unspecified heart failure type (CMS/HCC)    4. SOB: #, hx PE, CHF,     5. Gastroesophageal reflux disease with esophagitis    6. Osteoarthritis, unspecified osteoarthritis type, unspecified site    7. Anemia, unspecified type    8. Hypopotassemia    9. Anxiety    10. Anticoagulated-PE/cardiac arrest/xarelto    11. Depression, unspecified depression type    12. History of PE-ekos tx    13. Onychomycosis    14. Encounter for screening mammogram for breast cancer        Rx: reviewed/changes:  New Medications Ordered This Visit   Medications   • terbinafine (LAMISIL) 250 MG tablet     Sig: Take 1 tablet by mouth Daily.     Dispense:  42 tablet     Refill:  1       LAB/Testing/Referrals: reviewed/orders:   Today:   Orders Placed This Encounter   Procedures   • Mammo Screening Digital Tomosynthesis Bilateral With CAD     Chronic/recurrent labs above or change to:   Extra liver profile 6w around 11.15.19  6m CBC, CMP, TSH, fT4  12m CBC, CMP, LIPID, TSH, fT4, Vit D  iron, ferritin, B12, folate     Discussions:   Liver risks with lamsil; still wanted to take  Xray R ankle to start  Body mass index is 34.84 kg/m².  Patient's Body mass index is 34.84 kg/m². BMI is above normal parameters. Recommendations include: exercise counseling, nutrition counseling and as before.      Tobacco use reviewed:    Non-smoker      There are no Patient Instructions on file for this visit.    Follow up: Return for lab 6w and lab/Dr Berg 6m.  Future Appointments   Date Time Provider Department Center   11/11/2019  8:35 AM LAB [facility] None   11/15/2019  8:00 AM Kaitlin Massey DO [facility] None   3/30/2020  8:30 AM LAB [facility] None   4/6/2020 10:30 AM Rosas Berg MD [facility] None

## 2019-10-02 DIAGNOSIS — M25.571 CHRONIC PAIN OF RIGHT ANKLE: Primary | ICD-10-CM

## 2019-10-02 DIAGNOSIS — G89.29 CHRONIC PAIN OF RIGHT ANKLE: Primary | ICD-10-CM

## 2019-10-02 DIAGNOSIS — G89.29 CHRONIC PAIN OF RIGHT ANKLE: ICD-10-CM

## 2019-10-02 DIAGNOSIS — S92.909S CLOSED FRACTURE OF FOOT, UNSPECIFIED LATERALITY, SEQUELA: ICD-10-CM

## 2019-10-02 DIAGNOSIS — M25.571 CHRONIC PAIN OF RIGHT ANKLE: ICD-10-CM

## 2019-10-02 DIAGNOSIS — M25.471 SWELLING OF ANKLE JOINT, RIGHT: ICD-10-CM

## 2019-10-03 ENCOUNTER — TELEPHONE (OUTPATIENT)
Dept: PODIATRY | Facility: CLINIC | Age: 69
End: 2019-10-03

## 2019-10-03 NOTE — TELEPHONE ENCOUNTER
Called and notified patient that Dr. Berg has referred her over to Dr. Abebe Cunningham. Patient is now scheduled for October 14, 2019 at 10:30am. Reminder letter to be mailed. Ms. Sousa gave verbal understanding of what was discussed and did not have any further questions.

## 2019-10-11 ENCOUNTER — TELEPHONE (OUTPATIENT)
Dept: PODIATRY | Facility: CLINIC | Age: 69
End: 2019-10-11

## 2019-10-14 ENCOUNTER — OFFICE VISIT (OUTPATIENT)
Dept: PODIATRY | Facility: CLINIC | Age: 69
End: 2019-10-14

## 2019-10-14 VITALS
SYSTOLIC BLOOD PRESSURE: 130 MMHG | WEIGHT: 207 LBS | HEART RATE: 81 BPM | DIASTOLIC BLOOD PRESSURE: 84 MMHG | OXYGEN SATURATION: 97 % | BODY MASS INDEX: 35.34 KG/M2 | HEIGHT: 64 IN

## 2019-10-14 DIAGNOSIS — M25.571 CHRONIC PAIN OF RIGHT ANKLE: Primary | ICD-10-CM

## 2019-10-14 DIAGNOSIS — M19.071 ARTHRITIS OF ANKLE, RIGHT: ICD-10-CM

## 2019-10-14 DIAGNOSIS — M79.671 FOOT PAIN, RIGHT: ICD-10-CM

## 2019-10-14 DIAGNOSIS — G89.29 CHRONIC PAIN OF RIGHT ANKLE: Primary | ICD-10-CM

## 2019-10-14 DIAGNOSIS — M20.5X1 HALLUX LIMITUS, ACQUIRED, RIGHT: ICD-10-CM

## 2019-10-14 PROCEDURE — 99203 OFFICE O/P NEW LOW 30 MIN: CPT | Performed by: PODIATRIST

## 2019-10-14 NOTE — PROGRESS NOTES
"    [facility] - PODIATRY    Today's Date: 10/14/19    Patient Name: Lucy Sousa  MRN: [id number]  CSN: [id number]  PCP: Rosas Berg MD  Referring Provider: Rosas Berg MD    SUBJECTIVE     Chief Complaint   Patient presents with   • Establish Care     referral from Dr. Berg for right ankle pain- pt c/o pain and swelling in right foot/ ankle for ~ 12 months- (pt stated pain is on the top of her foot ) pain scale 6/10-  pt admits to falling over 5 years ago \"twisting her ankle\" - PCP Dr. Berg last visit 9/30/19- non-diabetic      HPI: Lucy Sousa, a 69 y.o.female, comes to clinic as a(n) new patient complaining of ankle pain. Patient has h/o anxiety, depression, DJD, GERD, HTN, PE. States that she had a fall ~5 year ago injuring her right foot and ankle. It was deemed a sprain but she has had some pain in the ankle ever since which has gotten mildly worse over the past year. Denies additional injury. Notes increased pain with increase activity or motion. Admits pain at 6/10 level and described as aching, dull and swelling. Denies previous treatment. Denies any constitutional symptoms. No other pedal complaints at this time.    Past Medical History:   Diagnosis Date   • Anxiety    • Depression    • DJD (degenerative joint disease)    • GERD (gastroesophageal reflux disease)    • HTN (hypertension)    • Hx of colonic polyp    • Pulmonary embolism (CMS/HCC)     post surgical with cardiac arrest     Past Surgical History:   Procedure Laterality Date   • CHOLECYSTECTOMY     • COLONOSCOPY  04/01/2019    Diverticulosis otherwise normal exam repeat in 5 years   • COLONOSCOPY W/ POLYPECTOMY  02/13/2014    Hyperplastic polyp at 20 cm, Diverticulosis repeat exam in 5 years   • EKOS CATHETER PLACEMENT Bilateral 11/17/2016    Procedure: Ekos catheter placement;  Surgeon: Daniel Underwood MD;  Location: [facility] INVASIVE LOCATION;  Service:    • EKOS CATHETER REMOVAL Bilateral " 11/18/2016    Procedure: Ekos catheter removal with stent placement;  Surgeon: Daniel Underwood MD;  Location: [facility] CATH INVASIVE LOCATION;  Service:    • ENDOSCOPY  01/22/2010    Negative endoscopy noting a healed gastric ulcer   • ENDOSCOPY  10/21/2009    Superficial mucosal erosion, chronic active gastritis   • FOOT SURGERY     • HYSTERECTOMY     • OOPHORECTOMY     • REPLACEMENT TOTAL KNEE     • TOTAL ABDOMINAL HYSTERECTOMY     • TUBAL ABDOMINAL LIGATION       Family History   Problem Relation Age of Onset   • Coronary artery disease Mother    • Coronary artery disease Father    • Diabetes Brother    • Hypertension Brother    • Colon polyps Brother    • Heart disease Maternal Grandfather    • Colon cancer Neg Hx      Social History     Socioeconomic History   • Marital status:      Spouse name: Nima   • Number of children: 1   • Years of education: 13   • Highest education level: Not on file   Occupational History   • Occupation: [employer] de[employer]   Tobacco Use   • Smoking status: Never Smoker   • Smokeless tobacco: Never Used   Substance and Sexual Activity   • Alcohol use: No   • Drug use: No   • Sexual activity: Not Currently     No Known Allergies  Current Outpatient Medications   Medication Sig Dispense Refill   • ALPRAZolam (XANAX) 0.5 MG tablet Take 1 tablet by mouth 2 (Two) Times a Day As Needed for Anxiety. 30 tablet 1   • budesonide-formoterol (SYMBICORT) 160-4.5 MCG/ACT inhaler Inhale 2 puffs 2 (Two) Times a Day. 1 inhaler 5   • Cholecalciferol (VITAMIN D) 2000 UNITS tablet Take 1,000 Units by mouth Daily.     • cyanocobalamin (VITAMIN B-12) 50 MCG tablet tablet Take 50 mcg by mouth 1 (One) Time.     • esomeprazole (nexIUM) 40 MG capsule Take 40 mg by mouth 2 (Two) Times a Day.     • furosemide (LASIX) 20 MG tablet TAKE ONE TABLET DAILY GENERIC FOR LASIX 90 tablet 1   • losartan (COZAAR) 50 MG tablet TAKE ONE TABLET DAILY GENERIC FOR COZAAR 90 tablet 1   • Multiple Vitamin  (MULTI VITAMIN DAILY PO) Take 1 tablet by mouth daily.     • oxybutynin XL (DITROPAN XL) 10 MG 24 hr tablet Take 1 tablet by mouth Daily. 30 tablet 2   • potassium chloride (K-DUR,KLOR-CON) 20 MEQ CR tablet TAKE 1 TABLET TWICE DAILY GENERIC FOR KDUR 60 tablet 5   • terbinafine (LAMISIL) 250 MG tablet Take 1 tablet by mouth Daily. 42 tablet 1   • triamterene-hydrochlorothiazide (DYAZIDE) 37.5-25 MG per capsule TAKE 1 CAPSULE EVERY MORNING GENERIC FOR DYAZIDE 30 capsule 5   • vitamin E 600 UNIT capsule Take 400 Units by mouth Daily.     • XARELTO 20 MG tablet TAKE ONE TABLET DAILY WITH DINNER 90 tablet 1     No current facility-administered medications for this visit.      Review of Systems   Constitutional: Negative for chills and fever.   HENT: Negative for congestion.    Respiratory: Negative for shortness of breath.    Cardiovascular: Negative for chest pain and leg swelling.   Gastrointestinal: Negative for constipation, diarrhea, nausea and vomiting.   Musculoskeletal: Positive for arthralgias.        Foot pain   Skin: Negative for wound.   Neurological: Negative for numbness.       OBJECTIVE     Vitals:    10/14/19 1033   BP: 130/84   Pulse: 81   SpO2: 97%       PHYSICAL EXAM  GEN:   Accompanied by .     General Exam  Appearance: appears stated age and healthy   Orientation: alert and oriented to person, place, and time   Affect: appropriate   Gait: unimpaired   Assistance: independent   Shoe Gear: casual shoes      Foot/Ankle Exam  Inspection and Palpation  Ecchymosis - Right: none Left: none  Tenderness - Right: great toe metatarsophalangeal joint  (Anterior ankle) Left: none   Swelling - Right: none   Left: none    Arch - Right: normal Left: normal  Hallux Valgus - Right: no Left: no  Hallux Limitus - Right: yes (Mild crepitus with ROM) Left: no  Skin - Right: skin intact  Left: skin intact   Nails -  Right: normal Left: normal     Vascular  Dorsalis Pedis - Right: 2+ Left: 2+  Posterior Tibial -  Right: 2+ Left: 2+  Skin Temperature -  Right: warm  Left: warm    CFT - Right: 3 Left: 3  Pedal Hair Growth - Right: present Left: present  Varicosities -  Right: no varicosities  Left: no varicosities      Neurologic  Saphenous Nerve Sensation  - Right: normal Left: normal  Tibial Nerve Sensation - Right: normal Left: normal  Superficial Peroneal Nerve Sensation - Right: normal Left: normal  Deep Peroneal Nerve Sensation - Right: normal Left: normal  Sural Nerve Sensation - Right: normal Left: normal  Protective Sensation using [place]-Joyce Monofilament - Right: 10 Left: 10    Muscle Strength  Dorsiflexion - Right: 5 Left: 5  Plantar Flexion - Right: 5 Left: 5  Eversion - Right: 5 Left: 5  Inversion - Right: 5 Left: 5    Range of Motion  Normal right ankle ROM  Normal left ankle ROM  Passive  Passive Dorsiflexion - Right: painPassive dorsiflexion: Mild crepitus.    Passive Plantar Flexion - Right: pain   Passive 1st MTP Extension - Right: pain   Passive 1st MTP Flexion - Right: pain               RADIOLOGY/NUCLEAR:  No results found.    LABORATORY/CULTURE RESULTS:      PATHOLOGY RESULTS:       ASSESSMENT/PLAN     Lucy was seen today for establish care.    Diagnoses and all orders for this visit:    Chronic pain of right ankle    Foot pain, right    Hallux limitus, acquired, right    Arthritis of ankle, right      Comprehensive lower extremity examination and evaluation was performed.  Discussed findings and treatment plan including risks, benefits, and treatment options with patient in detail. Patient agreed with treatment plan.  Reviewed xrays. Mild arthritic changes to areas of pain with noted small fragment of bone at right anterior ankle.   Initially will remain conservative with rest, icing, NSAIDs and OTC ankle brace to limit motion.   Discussed injection, PT, CMO or surgical options of conservative therapy fails.   An After Visit Summary was printed and given to the patient at discharge, including (if  requested) any available informative/educational handouts regarding diagnosis, treatment, or medications. All questions were answered to patient/family satisfaction. Should symptoms fail to improve or worsen they agree to call or return to clinic or to go to the Emergency Department. Discussed the importance of following up with any needed screening tests/labs/specialist appointments and any requested follow-up recommended by me today. Importance of maintaining follow-up discussed and patient accepts that missed appointments can delay diagnosis and potentially lead to worsening of conditions.  Return if symptoms worsen or fail to improve., or sooner if acute issues arise.    Lab Frequency Next Occurrence   Follow Anesthesia Guidelines / Standing Orders Once 02/14/2019   Mammo Screening Digital Tomosynthesis Bilateral With CAD Once 10/30/2019       This document has been electronically signed by Sanjeev Cunningham DPM on October 14, 2019 11:07 AM

## 2019-10-14 NOTE — PATIENT INSTRUCTIONS
Ankle Pain    Many things can cause ankle pain, including an injury to the area and overuse of the ankle. The ankle joint holds your body weight and allows you to move around. Ankle pain can occur on either side or the back of one ankle or both ankles. Ankle pain may be sharp and burning or dull and aching. There may be tenderness, stiffness, redness, or warmth around the ankle.  Follow these instructions at home:  Activity  · Rest your ankle as told by your health care provider. Avoid any activities that cause ankle pain.  · Do exercises as told by your health care provider.  · Ask your health care provider if you can drive.  Using a brace, a bandage, or crutches  · If you were given a brace:  ? Wear it as told by your health care provider.  ? Remove it when you take a bath or a shower.  ? Try not to move your ankle very much, but wiggle your toes from time to time. This helps to prevent swelling.  · If you were given an elastic bandage:  ? Remove it when you take a bath or a shower.  ? Try not to move your ankle very much, but wiggle your toes from time to time. This helps to prevent swelling.  ? Adjust the bandage to make it more comfortable if it feels too tight.  ? Loosen the bandage if you have numbness or tingling in your foot or if your foot turns cold and blue.  · If you have crutches, use them as told by your health care provider. Continue to use them until you can walk without feeling pain in your ankle.  Managing pain, stiffness, and swelling  · Raise (elevate) your ankle above the level of your heart while you are sitting or lying down.  · If directed, apply ice to the area:  ? Put ice in a plastic bag.  ? Place a towel between your skin and the bag.  ? Leave the ice on for 20 minutes, 2-3 times per day.  General instructions  · Keep all follow-up visits as told by your health care provider. This is important.  · Record this information that may be helpful for you and your health care provider:  ? How  often you have ankle pain.  ? Where the pain is located.  ? What the pain feels like.  · Take over-the-counter and prescription medicines only as told by your health care provider.  Contact a health care provider if:  · Your pain gets worse.  · Your pain is not relieved with medicines.  · You have a fever or chills.  · You are having more trouble with walking.  · You have new symptoms.  Get help right away if:  · Your foot, leg, toes, or ankle tingles or becomes numb.  · Your foot, leg, toes, or ankle becomes swollen.  · Your foot, leg, toes, or ankle turns pale or blue.  This information is not intended to replace advice given to you by your health care provider. Make sure you discuss any questions you have with your health care provider.  Document Released: 06/07/2011 Document Revised: 08/18/2017 Document Reviewed: 07/19/2016  [employer] Interactive Patient Education © 2019 [employer]

## 2019-10-28 ENCOUNTER — TELEPHONE (OUTPATIENT)
Dept: FAMILY MEDICINE CLINIC | Facility: CLINIC | Age: 69
End: 2019-10-28

## 2019-10-29 NOTE — TELEPHONE ENCOUNTER
Some risk; never zero  Not extremely high risk however and assuming this tooth issue is really high I would personally accept the risk; needs to get back on the xarelto just as soon as dental will allow  Xarelto is held usually one day OR what the dentist wants

## 2019-10-29 NOTE — TELEPHONE ENCOUNTER
Called Chyna Diamond office and advised and requested noted be faxed to her at [phone] and done

## 2019-10-29 NOTE — TELEPHONE ENCOUNTER
"Helio from Dr Grossman office called \"she needs a tooth extracted and want to see if her xarelto could be stopped for it?\"  "

## 2019-11-01 ENCOUNTER — OFFICE VISIT (OUTPATIENT)
Dept: FAMILY MEDICINE CLINIC | Facility: CLINIC | Age: 69
End: 2019-11-01

## 2019-11-01 VITALS
WEIGHT: 205 LBS | HEART RATE: 66 BPM | SYSTOLIC BLOOD PRESSURE: 128 MMHG | TEMPERATURE: 98 F | BODY MASS INDEX: 35 KG/M2 | OXYGEN SATURATION: 98 % | HEIGHT: 64 IN | DIASTOLIC BLOOD PRESSURE: 82 MMHG

## 2019-11-01 DIAGNOSIS — B02.9 HERPES ZOSTER WITHOUT COMPLICATION: Primary | ICD-10-CM

## 2019-11-01 PROCEDURE — 99213 OFFICE O/P EST LOW 20 MIN: CPT | Performed by: NURSE PRACTITIONER

## 2019-11-01 RX ORDER — AMOXICILLIN 250 MG/1
250 CAPSULE ORAL 4 TIMES DAILY
COMMUNITY
End: 2020-03-02

## 2019-11-01 RX ORDER — VALACYCLOVIR HYDROCHLORIDE 1 G/1
1000 TABLET, FILM COATED ORAL 3 TIMES DAILY
Qty: 21 TABLET | Refills: 0 | Status: SHIPPED | OUTPATIENT
Start: 2019-11-01 | End: 2019-11-08

## 2019-11-01 NOTE — PROGRESS NOTES
SUBJECTIVE    Chief Complaint:  Painful blisters    History of Present Illness:  This 69 y.o. female was seen in the office today for painful blisters of the right breast.  Acute onset today.  She woke up with home.  She is reporting having some increased stress recently.  She did have chickenpox as a child, denies any shingles outbreaks prior.    Past Medical, Surgical, Social, and Family History:  Past Medical History:   Diagnosis Date   • Anxiety    • Depression    • DJD (degenerative joint disease)    • GERD (gastroesophageal reflux disease)    • HTN (hypertension)    • Hx of colonic polyp    • Pulmonary embolism (CMS/HCC)     post surgical with cardiac arrest     Past Surgical History:   Procedure Laterality Date   • CHOLECYSTECTOMY     • COLONOSCOPY  04/01/2019    Diverticulosis otherwise normal exam repeat in 5 years   • COLONOSCOPY W/ POLYPECTOMY  02/13/2014    Hyperplastic polyp at 20 cm, Diverticulosis repeat exam in 5 years   • EKOS CATHETER PLACEMENT Bilateral 11/17/2016    Procedure: Ekos catheter placement;  Surgeon: Daniel Underwood MD;  Location:  [facility];  Service:    • EKOS CATHETER REMOVAL Bilateral 11/18/2016    Procedure: Ekos catheter removal with stent placement;  Surgeon: Daniel Underwood MD;  Location:  [facility];  Service:    • ENDOSCOPY  01/22/2010    Negative endoscopy noting a healed gastric ulcer   • ENDOSCOPY  10/21/2009    Superficial mucosal erosion, chronic active gastritis   • FOOT SURGERY     • HYSTERECTOMY     • OOPHORECTOMY     • REPLACEMENT TOTAL KNEE     • TOTAL ABDOMINAL HYSTERECTOMY     • TUBAL ABDOMINAL LIGATION       Social History     Socioeconomic History   • Marital status:      Spouse name: Nima   • Number of children: 1   • Years of education: 13   • Highest education level: Not on file   Occupational History   • Occupation: [employer] de[employer]   Tobacco Use   • Smoking status: Never Smoker   •  "Smokeless tobacco: Never Used   Substance and Sexual Activity   • Alcohol use: No   • Drug use: No   • Sexual activity: Not Currently     Family History   Problem Relation Age of Onset   • Coronary artery disease Mother    • Coronary artery disease Father    • Diabetes Brother    • Hypertension Brother    • Colon polyps Brother    • Heart disease Maternal Grandfather    • Colon cancer Neg Hx      Review of Systems   Constitutional: Negative for chills, diaphoresis and fatigue.   HENT: Negative for congestion, ear discharge, ear pain, rhinorrhea and sinus pressure.    Eyes: Negative for pain, discharge and itching.   Respiratory: Negative for cough, chest tightness, shortness of breath and wheezing.    Cardiovascular: Negative for chest pain and palpitations.   Gastrointestinal: Negative for abdominal distention and abdominal pain.   Endocrine: Negative for cold intolerance and heat intolerance.   Genitourinary: Negative for dysuria, flank pain, hematuria and urgency.   Musculoskeletal: Negative for arthralgias and myalgias.   Skin: Positive for color change (blisters under right breast). Negative for rash and bruise.   Allergic/Immunologic: Negative for environmental allergies and food allergies.   Neurological: Negative for dizziness, speech difficulty and numbness.   Hematological: Negative for adenopathy. Does not bruise/bleed easily.   Psychiatric/Behavioral: Negative for agitation, behavioral problems, self-injury, suicidal ideas, depressed mood and stress. The patient is not nervous/anxious.      OBJECTIVE    Vital Signs:  /82 (BP Location: Left arm)   Pulse 66   Temp 98 °F (36.7 °C) (Tympanic)   Ht 162.6 cm (64\")   Wt 93 kg (205 lb)   SpO2 98%   BMI 35.19 kg/m²   Physical Exam   Constitutional: She is oriented to person, place, and time. No distress.   HENT:   Head: Normocephalic and atraumatic.   Mouth/Throat: No oropharyngeal exudate.   Eyes: Conjunctivae and EOM are normal. Pupils are equal, " round, and reactive to light.   Neck: Normal range of motion. Neck supple. No JVD present. No tracheal deviation present. No thyromegaly present.   Cardiovascular: Normal rate, regular rhythm, normal heart sounds and intact distal pulses. Exam reveals no gallop and no friction rub.   No murmur heard.  Pulmonary/Chest: Effort normal and breath sounds normal. No respiratory distress. She has no rales.   Abdominal: Soft. Bowel sounds are normal. She exhibits no distension and no mass. There is no tenderness. There is no rebound and no guarding.   Musculoskeletal: Normal range of motion. She exhibits no edema, tenderness or deformity.   Lymphadenopathy:     She has no cervical adenopathy.   Neurological: She is alert and oriented to person, place, and time.   Skin: Skin is warm and dry. Capillary refill takes less than 2 seconds. No rash noted. She is not diaphoretic. No cyanosis. Nails show no clubbing.   Area or under her right breast has a cluster of blisters.  Zoster form appearance.   Psychiatric: She has a normal mood and affect. Her behavior is normal. Judgment and thought content normal.   Nursing note and vitals reviewed.      ASSESSMENT/PLAN    Diagnoses and all orders for this visit:    1. Herpes zoster without complication (Primary)  -     valACYclovir (VALTREX) 1000 MG tablet; Take 1 tablet by mouth 3 (Three) Times a Day for 7 days.  Dispense: 21 tablet; Refill: 0    Discussion:  Advised and educated plan of care.  Advised to consider differential of fungal rash, fungal rash she would not have the blisters in clusters, she describes as painful versus itching.  Also has stinging sensation to the area.  Zoster form is the more likely diagnosis.  Advised to keep the area clean and dry.  Offered gabapentin for nerve pain, she declined but if it worsens she can call us.    Follow-up:  Return if symptoms worsen or fail to improve.    Note e-Signed by LANRE Hernandez on 11/01/2019 at 3:15 PM

## 2019-11-11 DIAGNOSIS — R53.82 CHRONIC FATIGUE: Primary | ICD-10-CM

## 2019-11-12 LAB
ALBUMIN SERPL-MCNC: 4 G/DL (ref 3.5–5.2)
ALP SERPL-CCNC: 65 U/L (ref 39–117)
ALT SERPL-CCNC: 19 U/L (ref 1–33)
AST SERPL-CCNC: 21 U/L (ref 1–32)
BILIRUB DIRECT SERPL-MCNC: <0.2 MG/DL (ref 0.2–0.3)
BILIRUB SERPL-MCNC: 0.2 MG/DL (ref 0.2–1.2)
PROT SERPL-MCNC: 6.2 G/DL (ref 6–8.5)

## 2019-12-23 DIAGNOSIS — I10 ESSENTIAL HYPERTENSION: Chronic | ICD-10-CM

## 2019-12-23 DIAGNOSIS — Z12.31 ENCOUNTER FOR SCREENING MAMMOGRAM FOR BREAST CANCER: ICD-10-CM

## 2019-12-23 RX ORDER — LOSARTAN POTASSIUM 50 MG/1
TABLET ORAL
Qty: 90 TABLET | Refills: 3 | Status: SHIPPED | OUTPATIENT
Start: 2019-12-23 | End: 2021-01-04 | Stop reason: SDUPTHER

## 2020-02-06 DIAGNOSIS — Z79.01 ANTICOAGULATED: ICD-10-CM

## 2020-02-06 RX ORDER — RIVAROXABAN 20 MG/1
TABLET, FILM COATED ORAL
Qty: 90 TABLET | Refills: 1 | Status: SHIPPED | OUTPATIENT
Start: 2020-02-06 | End: 2020-08-06 | Stop reason: SDUPTHER

## 2020-02-10 DIAGNOSIS — I10 ESSENTIAL HYPERTENSION: Chronic | ICD-10-CM

## 2020-02-10 RX ORDER — TRIAMTERENE AND HYDROCHLOROTHIAZIDE 37.5; 25 MG/1; MG/1
CAPSULE ORAL
Qty: 30 CAPSULE | Refills: 5 | Status: SHIPPED | OUTPATIENT
Start: 2020-02-10 | End: 2020-06-15 | Stop reason: SDUPTHER

## 2020-03-02 ENCOUNTER — OFFICE VISIT (OUTPATIENT)
Dept: FAMILY MEDICINE CLINIC | Facility: CLINIC | Age: 70
End: 2020-03-02

## 2020-03-02 VITALS
HEIGHT: 64 IN | RESPIRATION RATE: 14 BRPM | TEMPERATURE: 98 F | WEIGHT: 203 LBS | HEART RATE: 84 BPM | OXYGEN SATURATION: 98 % | SYSTOLIC BLOOD PRESSURE: 126 MMHG | DIASTOLIC BLOOD PRESSURE: 74 MMHG | BODY MASS INDEX: 34.66 KG/M2

## 2020-03-02 DIAGNOSIS — R30.0 DYSURIA: Primary | ICD-10-CM

## 2020-03-02 PROCEDURE — 99213 OFFICE O/P EST LOW 20 MIN: CPT | Performed by: NURSE PRACTITIONER

## 2020-03-02 RX ORDER — CIPROFLOXACIN 500 MG/1
500 TABLET, FILM COATED ORAL 2 TIMES DAILY
Qty: 14 TABLET | Refills: 0 | Status: SHIPPED | OUTPATIENT
Start: 2020-03-02 | End: 2020-03-09

## 2020-03-02 NOTE — PROGRESS NOTES
Subjective   Chief Complaint:  Difficulty Urinating (Pressure, started thursday night)   Urinary Urgency      History of Present Illness:  This 70 y.o. female was seen in the office today for difficulty urinating and urgency starting Thursday - Friday night.  No bleeding, or fever.    No Known Allergies   Current Outpatient Medications on File Prior to Visit   Medication Sig   • ALPRAZolam (XANAX) 0.5 MG tablet Take 1 tablet by mouth 2 (Two) Times a Day As Needed for Anxiety.   • budesonide-formoterol (SYMBICORT) 160-4.5 MCG/ACT inhaler Inhale 2 puffs 2 (Two) Times a Day.   • Cholecalciferol (VITAMIN D) 2000 UNITS tablet Take 1,000 Units by mouth Daily.   • cyanocobalamin (VITAMIN B-12) 50 MCG tablet tablet Take 50 mcg by mouth 1 (One) Time.   • esomeprazole (nexIUM) 40 MG capsule Take 40 mg by mouth 2 (Two) Times a Day.   • furosemide (LASIX) 20 MG tablet TAKE ONE TABLET DAILY GENERIC FOR LASIX   • losartan (COZAAR) 50 MG tablet TAKE ONE TABLET DAILY GENERIC FOR COZAAR   • Multiple Vitamin (MULTI VITAMIN DAILY PO) Take 1 tablet by mouth daily.   • oxybutynin XL (DITROPAN XL) 10 MG 24 hr tablet Take 1 tablet by mouth Daily.   • potassium chloride (K-DUR,KLOR-CON) 20 MEQ CR tablet TAKE 1 TABLET TWICE DAILY GENERIC FOR KDUR   • terbinafine (LAMISIL) 250 MG tablet Take 1 tablet by mouth Daily.   • triamterene-hydrochlorothiazide (DYAZIDE) 37.5-25 MG per capsule TAKE 1 CAPSULE EVERY MORNING GENERIC FOR DYAZIDE   • vitamin E 600 UNIT capsule Take 400 Units by mouth Daily.   • XARELTO 20 MG tablet TAKE ONE TABLET DAILY WITH DINNER   • [DISCONTINUED] amoxicillin (AMOXIL) 250 MG capsule Take 250 mg by mouth 4 (Four) Times a Day.     No current facility-administered medications on file prior to visit.       Past Medical, Surgical, Social, and Family History:  Past Medical History:   Diagnosis Date   • Anxiety    • Depression    • DJD (degenerative joint disease)    • GERD (gastroesophageal reflux disease)    • HTN  (hypertension)    • Hx of colonic polyp    • Pulmonary embolism (CMS/HCC)     post surgical with cardiac arrest     Past Surgical History:   Procedure Laterality Date   • CHOLECYSTECTOMY     • COLONOSCOPY  04/01/2019    Diverticulosis otherwise normal exam repeat in 5 years   • COLONOSCOPY W/ POLYPECTOMY  02/13/2014    Hyperplastic polyp at 20 cm, Diverticulosis repeat exam in 5 years   • EKOS CATHETER PLACEMENT Bilateral 11/17/2016    Procedure: Ekos catheter placement;  Surgeon: Daniel Underwood MD;  Location:  [facility];  Service:    • EKOS CATHETER REMOVAL Bilateral 11/18/2016    Procedure: Ekos catheter removal with stent placement;  Surgeon: Daniel Underwood MD;  Location:  [facility];  Service:    • ENDOSCOPY  01/22/2010    Negative endoscopy noting a healed gastric ulcer   • ENDOSCOPY  10/21/2009    Superficial mucosal erosion, chronic active gastritis   • FOOT SURGERY     • HYSTERECTOMY     • OOPHORECTOMY     • REPLACEMENT TOTAL KNEE     • TOTAL ABDOMINAL HYSTERECTOMY     • TUBAL ABDOMINAL LIGATION       Social History     Socioeconomic History   • Marital status:      Spouse name: Nima   • Number of children: 1   • Years of education: 13   • Highest education level: Not on file   Occupational History   • Occupation: [employer] de[employer]   Tobacco Use   • Smoking status: Never Smoker   • Smokeless tobacco: Never Used   Substance and Sexual Activity   • Alcohol use: No   • Drug use: No   • Sexual activity: Not Currently     Family History   Problem Relation Age of Onset   • Coronary artery disease Mother    • Coronary artery disease Father    • Diabetes Brother    • Hypertension Brother    • Colon polyps Brother    • Heart disease Maternal Grandfather    • Colon cancer Neg Hx      Review of Systems   Constitutional: Negative for appetite change, chills and fever.   Respiratory: Negative for cough and shortness of breath.    Cardiovascular:  "Negative for chest pain and palpitations.   Genitourinary: Positive for dysuria, frequency and urgency.   Musculoskeletal: Negative for arthralgias and myalgias.     Objective   Physical Exam   Constitutional: She is oriented to person, place, and time. No distress.   HENT:   Head: Normocephalic and atraumatic.   Nose: Nose normal.   Eyes: Pupils are equal, round, and reactive to light. Conjunctivae and EOM are normal.   Neck: Normal range of motion. Neck supple. No JVD present. No tracheal deviation present. No thyromegaly present.   Cardiovascular: Normal rate, regular rhythm, normal heart sounds and intact distal pulses. Exam reveals no gallop and no friction rub.   No murmur heard.  Pulmonary/Chest: Effort normal and breath sounds normal. No respiratory distress. She has no wheezes.   Abdominal: Soft. Bowel sounds are normal. There is no tenderness. There is no guarding.   Genitourinary:   Genitourinary Comments: Right CV tenderness with percussion   Musculoskeletal: Normal range of motion. She exhibits no edema, tenderness or deformity.   Lymphadenopathy:     She has no cervical adenopathy.   Neurological: She is alert and oriented to person, place, and time.   Skin: Skin is warm and dry. Capillary refill takes less than 2 seconds. She is not diaphoretic.   Psychiatric: She has a normal mood and affect. Her behavior is normal. Judgment and thought content normal.   Nursing note and vitals reviewed.  /74 (BP Location: Left arm)   Pulse 84   Temp 98 °F (36.7 °C) (Temporal)   Resp 14   Ht 162.6 cm (64\")   Wt 92.1 kg (203 lb)   SpO2 98%   BMI 34.84 kg/m²     Assessment/Plan   Diagnoses and all orders for this visit:    1. Dysuria (Primary)  -     ciprofloxacin (CIPRO) 500 MG tablet; Take 1 tablet by mouth 2 (Two) Times a Day for 7 days.  Dispense: 14 tablet; Refill: 0    Discussion:  Advised and educated plan of care.  ABT = risk v benefits of starting treatment explained.    Follow-up:  Return for " Will call results and coordinate next steps..    Note e-Signed by LANRE Hernandez on 03/02/2020 at 4:12 PM

## 2020-03-05 LAB
APPEARANCE UR: ABNORMAL
BACTERIA #/AREA URNS HPF: ABNORMAL /HPF
BACTERIA UR CULT: ABNORMAL
BACTERIA UR CULT: ABNORMAL
BILIRUB UR QL STRIP: NEGATIVE
COLOR UR: YELLOW
EPI CELLS #/AREA URNS HPF: ABNORMAL /HPF (ref 0–10)
GLUCOSE UR QL: NEGATIVE
HGB UR QL STRIP: ABNORMAL
KETONES UR QL STRIP: NEGATIVE
LEUKOCYTE ESTERASE UR QL STRIP: ABNORMAL
MICRO URNS: ABNORMAL
MUCOUS THREADS URNS QL MICRO: PRESENT /HPF
NITRITE UR QL STRIP: NEGATIVE
OTHER ANTIBIOTIC SUSC ISLT: ABNORMAL
PH UR STRIP: 6.5 [PH] (ref 5–7.5)
PROT UR QL STRIP: ABNORMAL
RBC #/AREA URNS HPF: ABNORMAL /HPF (ref 0–2)
SP GR UR: 1.02 (ref 1–1.03)
URINALYSIS REFLEX: ABNORMAL
UROBILINOGEN UR STRIP-MCNC: 0.2 MG/DL (ref 0.2–1)
WBC #/AREA URNS HPF: >30 /HPF (ref 0–5)

## 2020-03-06 RX ORDER — NITROFURANTOIN 25; 75 MG/1; MG/1
100 CAPSULE ORAL 2 TIMES DAILY
Qty: 10 CAPSULE | Refills: 0 | Status: SHIPPED | OUTPATIENT
Start: 2020-03-06 | End: 2020-03-10

## 2020-03-10 ENCOUNTER — TELEPHONE (OUTPATIENT)
Dept: FAMILY MEDICINE CLINIC | Facility: CLINIC | Age: 70
End: 2020-03-10

## 2020-03-10 RX ORDER — SULFAMETHOXAZOLE AND TRIMETHOPRIM 800; 160 MG/1; MG/1
1 TABLET ORAL 2 TIMES DAILY
Qty: 6 TABLET | Refills: 0 | Status: SHIPPED | OUTPATIENT
Start: 2020-03-10 | End: 2020-03-13

## 2020-03-10 NOTE — TELEPHONE ENCOUNTER
PT CALLED IN REGARDS TO HER MEDICATION FOR A KIDNEY INFECTION. SHE HAS A RASH THAT SHE BELIEVES IS CAUSED BY THE MEDICATION AND WOULD LIKE TO KNOW IF THERE IS ANOTHER A MEDICATION SHE COULD TAKE.    PLEASE ADVISE.      PT CALLBACK NUMBER: [phone]

## 2020-03-10 NOTE — TELEPHONE ENCOUNTER
You put her on the Macrobid on 3/6/20 and that is the one that has caused a rash now. I have D/C's it and added it to her list.

## 2020-03-10 NOTE — TELEPHONE ENCOUNTER
Spoke with pt and she wants to try the Bactrim and if it does the same thing then she will come in and get the shots.

## 2020-03-10 NOTE — TELEPHONE ENCOUNTER
I see, yes, the Cipro was resistant and we started the macrobid.  Bactrim DS 1 PO BID x3 days to finish up this infection.  This is the last oral option on this culture, 10-20% patients can have itching and rash with bactrim so we can proceed with caution.  The other option with be for her to come in and get rocephin 1 gm daily x 3 days, there would be less likely to have a reaction.

## 2020-03-11 ENCOUNTER — TELEPHONE (OUTPATIENT)
Dept: FAMILY MEDICINE CLINIC | Facility: CLINIC | Age: 70
End: 2020-03-11

## 2020-03-11 NOTE — TELEPHONE ENCOUNTER
PATIENT IS WAITING TO DISCUSS OPTIONS FOR THE RASH THAT WAS CAUSED FROM MEDICATION REACTION.     PLEASE CONTACT AND ADVISED

## 2020-03-11 NOTE — TELEPHONE ENCOUNTER
Spoke with pt and she she just wanted to know of an over the counter ointment or cream to help with the itching.  Per Jef Clotrimazole or with it being in groin and under armpits, she can try Lotrison as well.

## 2020-03-24 DIAGNOSIS — E87.6 HYPOPOTASSEMIA: Primary | ICD-10-CM

## 2020-03-24 RX ORDER — POTASSIUM CHLORIDE 20 MEQ/1
20 TABLET, EXTENDED RELEASE ORAL 2 TIMES DAILY
Qty: 180 TABLET | Refills: 1 | Status: SHIPPED | OUTPATIENT
Start: 2020-03-24 | End: 2021-02-09

## 2020-03-24 NOTE — TELEPHONE ENCOUNTER
Pt called and wanted to request to have Rx written for the following:    potassium chloride (K-DUR,KLOR-CON) 20 MEQ CR tablet    [facility]- confirmed      Please call pt once Rx called in @ [phone]

## 2020-03-27 DIAGNOSIS — Z79.01 ANTICOAGULATED: ICD-10-CM

## 2020-03-27 DIAGNOSIS — E66.01 CLASS 2 SEVERE OBESITY WITH SERIOUS COMORBIDITY AND BODY MASS INDEX (BMI) OF 35.0 TO 35.9 IN ADULT, UNSPECIFIED OBESITY TYPE (HCC): ICD-10-CM

## 2020-03-27 DIAGNOSIS — Z12.31 ENCOUNTER FOR SCREENING MAMMOGRAM FOR BREAST CANCER: ICD-10-CM

## 2020-03-27 DIAGNOSIS — E87.6 HYPOPOTASSEMIA: Primary | ICD-10-CM

## 2020-03-27 DIAGNOSIS — R53.82 CHRONIC FATIGUE: ICD-10-CM

## 2020-03-27 DIAGNOSIS — D75.89 MACROCYTOSIS: ICD-10-CM

## 2020-03-27 DIAGNOSIS — E55.9 VITAMIN D DEFICIENCY: ICD-10-CM

## 2020-03-27 DIAGNOSIS — D64.9 ANEMIA, UNSPECIFIED TYPE: ICD-10-CM

## 2020-03-27 DIAGNOSIS — I10 ESSENTIAL HYPERTENSION: ICD-10-CM

## 2020-03-27 DIAGNOSIS — E53.8 VITAMIN B12 DEFICIENCY: ICD-10-CM

## 2020-03-27 DIAGNOSIS — Z00.00 WELLNESS EXAMINATION: ICD-10-CM

## 2020-03-30 ENCOUNTER — RESULTS ENCOUNTER (OUTPATIENT)
Dept: FAMILY MEDICINE CLINIC | Facility: CLINIC | Age: 70
End: 2020-03-30

## 2020-03-30 DIAGNOSIS — E55.9 VITAMIN D DEFICIENCY: ICD-10-CM

## 2020-03-30 DIAGNOSIS — E53.8 VITAMIN B12 DEFICIENCY: ICD-10-CM

## 2020-03-30 DIAGNOSIS — D75.89 MACROCYTOSIS: ICD-10-CM

## 2020-03-30 DIAGNOSIS — I10 ESSENTIAL HYPERTENSION: ICD-10-CM

## 2020-03-30 DIAGNOSIS — Z79.01 ANTICOAGULATED: ICD-10-CM

## 2020-03-30 DIAGNOSIS — Z00.00 WELLNESS EXAMINATION: ICD-10-CM

## 2020-03-30 DIAGNOSIS — D64.9 ANEMIA, UNSPECIFIED TYPE: ICD-10-CM

## 2020-03-30 DIAGNOSIS — R53.82 CHRONIC FATIGUE: ICD-10-CM

## 2020-03-30 DIAGNOSIS — E87.6 HYPOPOTASSEMIA: ICD-10-CM

## 2020-03-30 DIAGNOSIS — E66.01 CLASS 2 SEVERE OBESITY WITH SERIOUS COMORBIDITY AND BODY MASS INDEX (BMI) OF 35.0 TO 35.9 IN ADULT, UNSPECIFIED OBESITY TYPE (HCC): ICD-10-CM

## 2020-03-30 DIAGNOSIS — I50.9 CONGESTIVE HEART FAILURE, UNSPECIFIED HF CHRONICITY, UNSPECIFIED HEART FAILURE TYPE (HCC): Chronic | ICD-10-CM

## 2020-03-30 DIAGNOSIS — E87.6 HYPOPOTASSEMIA: Primary | ICD-10-CM

## 2020-03-30 DIAGNOSIS — M19.90 OSTEOARTHRITIS, UNSPECIFIED OSTEOARTHRITIS TYPE, UNSPECIFIED SITE: ICD-10-CM

## 2020-03-31 LAB
25(OH)D3+25(OH)D2 SERPL-MCNC: 30.9 NG/ML (ref 30–100)
ALBUMIN SERPL-MCNC: 4.3 G/DL (ref 3.5–5.2)
ALBUMIN/GLOB SERPL: 1.8 G/DL
ALP SERPL-CCNC: 69 U/L (ref 39–117)
ALT SERPL-CCNC: 17 U/L (ref 1–33)
APPEARANCE UR: CLEAR
AST SERPL-CCNC: 15 U/L (ref 1–32)
BACTERIA #/AREA URNS HPF: NORMAL /HPF
BASOPHILS # BLD AUTO: 0.06 10*3/MM3 (ref 0–0.2)
BASOPHILS NFR BLD AUTO: 1.2 % (ref 0–1.5)
BILIRUB SERPL-MCNC: 0.5 MG/DL (ref 0.2–1.2)
BILIRUB UR QL STRIP: NEGATIVE
BUN SERPL-MCNC: 15 MG/DL (ref 8–23)
BUN/CREAT SERPL: 15.5 (ref 7–25)
CALCIUM SERPL-MCNC: 10.5 MG/DL (ref 8.6–10.5)
CHLORIDE SERPL-SCNC: 98 MMOL/L (ref 98–107)
CHOLEST SERPL-MCNC: 233 MG/DL (ref 0–200)
CO2 SERPL-SCNC: 30.4 MMOL/L (ref 22–29)
COLOR UR: YELLOW
CREAT SERPL-MCNC: 0.97 MG/DL (ref 0.57–1)
EOSINOPHIL # BLD AUTO: 0.15 10*3/MM3 (ref 0–0.4)
EOSINOPHIL NFR BLD AUTO: 3 % (ref 0.3–6.2)
EPI CELLS #/AREA URNS HPF: NORMAL /HPF (ref 0–10)
ERYTHROCYTE [DISTWIDTH] IN BLOOD BY AUTOMATED COUNT: 12.3 % (ref 12.3–15.4)
FERRITIN SERPL-MCNC: 138 NG/ML (ref 13–150)
FOLATE SERPL-MCNC: 14.9 NG/ML (ref 4.78–24.2)
GLOBULIN SER CALC-MCNC: 2.4 GM/DL
GLUCOSE SERPL-MCNC: 92 MG/DL (ref 65–99)
GLUCOSE UR QL: NEGATIVE
HCT VFR BLD AUTO: 44.5 % (ref 34–46.6)
HDLC SERPL-MCNC: 48 MG/DL (ref 40–60)
HGB BLD-MCNC: 15.1 G/DL (ref 12–15.9)
HGB UR QL STRIP: NEGATIVE
IMM GRANULOCYTES # BLD AUTO: 0.01 10*3/MM3 (ref 0–0.05)
IMM GRANULOCYTES NFR BLD AUTO: 0.2 % (ref 0–0.5)
IRON SERPL-MCNC: 121 MCG/DL (ref 37–145)
KETONES UR QL STRIP: NEGATIVE
LDLC SERPL CALC-MCNC: 145 MG/DL (ref 0–100)
LEUKOCYTE ESTERASE UR QL STRIP: NEGATIVE
LYMPHOCYTES # BLD AUTO: 1.7 10*3/MM3 (ref 0.7–3.1)
LYMPHOCYTES NFR BLD AUTO: 33.7 % (ref 19.6–45.3)
MCH RBC QN AUTO: 33.6 PG (ref 26.6–33)
MCHC RBC AUTO-ENTMCNC: 33.9 G/DL (ref 31.5–35.7)
MCV RBC AUTO: 99.1 FL (ref 79–97)
MICRO URNS: NORMAL
MICRO URNS: NORMAL
MONOCYTES # BLD AUTO: 0.5 10*3/MM3 (ref 0.1–0.9)
MONOCYTES NFR BLD AUTO: 9.9 % (ref 5–12)
MUCOUS THREADS URNS QL MICRO: PRESENT /HPF
NEUTROPHILS # BLD AUTO: 2.62 10*3/MM3 (ref 1.7–7)
NEUTROPHILS NFR BLD AUTO: 52 % (ref 42.7–76)
NITRITE UR QL STRIP: NEGATIVE
NRBC BLD AUTO-RTO: 0 /100 WBC (ref 0–0.2)
PH UR STRIP: 7.5 [PH] (ref 5–7.5)
PLATELET # BLD AUTO: 320 10*3/MM3 (ref 140–450)
POTASSIUM SERPL-SCNC: 3.7 MMOL/L (ref 3.5–5.2)
PROT SERPL-MCNC: 6.7 G/DL (ref 6–8.5)
PROT UR QL STRIP: NEGATIVE
RBC # BLD AUTO: 4.49 10*6/MM3 (ref 3.77–5.28)
RBC #/AREA URNS HPF: NORMAL /HPF (ref 0–2)
SODIUM SERPL-SCNC: 142 MMOL/L (ref 136–145)
SP GR UR: 1.01 (ref 1–1.03)
T4 FREE SERPL-MCNC: 1.22 NG/DL (ref 0.93–1.7)
TRIGL SERPL-MCNC: 200 MG/DL (ref 0–150)
TSH SERPL DL<=0.005 MIU/L-ACNC: 3.82 UIU/ML (ref 0.27–4.2)
URINALYSIS REFLEX: NORMAL
UROBILINOGEN UR STRIP-MCNC: 0.2 MG/DL (ref 0.2–1)
VIT B12 SERPL-MCNC: 563 PG/ML (ref 211–946)
VLDLC SERPL CALC-MCNC: 40 MG/DL
WBC # BLD AUTO: 5.04 10*3/MM3 (ref 3.4–10.8)
WBC #/AREA URNS HPF: NORMAL /HPF (ref 0–5)

## 2020-04-06 ENCOUNTER — OFFICE VISIT (OUTPATIENT)
Dept: FAMILY MEDICINE CLINIC | Facility: CLINIC | Age: 70
End: 2020-04-06

## 2020-04-06 DIAGNOSIS — K21.9 GASTROESOPHAGEAL REFLUX DISEASE, ESOPHAGITIS PRESENCE NOT SPECIFIED: Chronic | ICD-10-CM

## 2020-04-06 DIAGNOSIS — M19.90 OSTEOARTHRITIS, UNSPECIFIED OSTEOARTHRITIS TYPE, UNSPECIFIED SITE: Chronic | ICD-10-CM

## 2020-04-06 DIAGNOSIS — M54.9 CHRONIC BACK PAIN, UNSPECIFIED BACK LOCATION, UNSPECIFIED BACK PAIN LATERALITY: ICD-10-CM

## 2020-04-06 DIAGNOSIS — Z79.01 ANTICOAGULATED: ICD-10-CM

## 2020-04-06 DIAGNOSIS — F32.A DEPRESSION, UNSPECIFIED DEPRESSION TYPE: Chronic | ICD-10-CM

## 2020-04-06 DIAGNOSIS — F41.9 ANXIETY: Chronic | ICD-10-CM

## 2020-04-06 DIAGNOSIS — G89.29 CHRONIC BACK PAIN, UNSPECIFIED BACK LOCATION, UNSPECIFIED BACK PAIN LATERALITY: ICD-10-CM

## 2020-04-06 DIAGNOSIS — R06.02 SHORTNESS OF BREATH: ICD-10-CM

## 2020-04-06 DIAGNOSIS — I10 HTN (HYPERTENSION), BENIGN: Primary | Chronic | ICD-10-CM

## 2020-04-06 DIAGNOSIS — I50.9 CONGESTIVE HEART FAILURE, UNSPECIFIED HF CHRONICITY, UNSPECIFIED HEART FAILURE TYPE (HCC): Chronic | ICD-10-CM

## 2020-04-06 DIAGNOSIS — E78.5 HYPERLIPIDEMIA, UNSPECIFIED HYPERLIPIDEMIA TYPE: ICD-10-CM

## 2020-04-06 DIAGNOSIS — E87.6 HYPOPOTASSEMIA: ICD-10-CM

## 2020-04-06 PROCEDURE — 99422 OL DIG E/M SVC 11-20 MIN: CPT | Performed by: FAMILY MEDICINE

## 2020-04-06 RX ORDER — ATORVASTATIN CALCIUM 20 MG/1
20 TABLET, FILM COATED ORAL DAILY
Qty: 30 TABLET | Refills: 5 | Status: SHIPPED | OUTPATIENT
Start: 2020-04-06 | End: 2020-10-21

## 2020-04-06 NOTE — PROGRESS NOTES
Subjective   Lucy Sousa is a 70 y.o. female presenting with chief complaint of:   Chief Complaint   Patient presents with   • Congestive Heart Failure       History of Present Illness :  Alone; telephone visit due to COVID-19 emergency.       Has multiple chronic problems to consider that might have a bearing on today's issues;  an interval appointment.       Chronic/acute problems reviewed today:   1. HTN (hypertension), benign: Chronic/stable. Stable here/if home blood pressures.  No significant chest pain, SOB, LE edema, orthopnea, near syncope, dizziness/light headness.  Still occ BP variable mildly.      2. Congestive heart failure, unspecified HF chronicity, unspecified heart failure type (CMS/HCC): Chronic/stable.  Denies significant sob, orthopnea, leg edema, weight gain.  Aware of influence diet/salt and watching weight at home.       3. SOB: #, hx PE, CHF: Chronic/stable:  SOB worse with exertion/ok at rest.  Contributing diagnosis: those listed.     4. Gastroesophageal reflux disease, esophagitis presence not specified:  Chronic/stable.  Controlled heartburn, reflux without dysphagia, melena.  Rx used regularily needed-doing ok.      5. Chronic back pain, unspecified back location, unspecified back pain laterality: : chronic/variable 0-3/10 lower back pain with infrequent/no radiation to LE.  No change LE numbness.  Has bladder/bowel control. No desire to change approach of care. Rare OTC Rx.      6. Osteoarthritis, unspecified osteoarthritis type, unspecified site: Chronic/stable.  Various on/off joint pains/soreness/stiffness.  Particular joint problems with spine.  No joint swelling.  Treats mainly with reduced activity, Rx listed, Tylenol, rare NSAIDs, no current injections.      7. Hypopotassemia-diuretic: chronic/stable monitoring of K due to diuretic and history low; no warning muscle weakness unexplained.      8. Depression, unspecified depression type: chronic/stable as no significant  mood  swings, down moods, nervousness, difficulty with concentration to function home/work.  Others close have not been concerned.  No suicide ideation/intent.  Rx not needed       9. Anticoagulated-PE/cardiac arrest/xarelto    10. Anxiety: Chronic/stable:  On/off anxiety tolerated well.  Rx helps and not interested in Rx change. Stress ongoing tolerated.      11.    Hyperlipidemia: Chronic/stable: prefered no statin or not recommended to this point.  Other illness           ? Good candidate for today.          Has an/another acute issue today: mild cough, sinus; usually gets in spring.    The following portions of the patient's history were reviewed and updated as appropriate: allergies, current medications, past family history, past medical history, past social history, past surgical history and problem list.      Current Outpatient Medications:   •  ALPRAZolam (XANAX) 0.5 MG tablet, Take 1 tablet by mouth 2 (Two) Times a Day As Needed for Anxiety., Disp: 30 tablet, Rfl: 1  •  budesonide-formoterol (SYMBICORT) 160-4.5 MCG/ACT inhaler, Inhale 2 puffs 2 (Two) Times a Day. (Patient taking differently: Inhale 2 puffs 2 (Two) Times a Day As Needed.), Disp: 1 inhaler, Rfl: 5  •  Cholecalciferol (VITAMIN D) 2000 UNITS tablet, Take 1,000 Units by mouth Daily., Disp: , Rfl:   •  cyanocobalamin (VITAMIN B-12) 50 MCG tablet tablet, Take 50 mcg by mouth Daily., Disp: , Rfl:   •  esomeprazole (nexIUM) 40 MG capsule, Take 40 mg by mouth 2 (Two) Times a Day., Disp: , Rfl:   •  furosemide (LASIX) 20 MG tablet, TAKE ONE TABLET DAILY GENERIC FOR LASIX, Disp: 90 tablet, Rfl: 1  •  losartan (COZAAR) 50 MG tablet, TAKE ONE TABLET DAILY GENERIC FOR COZAAR, Disp: 90 tablet, Rfl: 3  •  Multiple Vitamin (MULTI VITAMIN DAILY PO), Take 1 tablet by mouth daily., Disp: , Rfl:   •  potassium chloride (K-DUR,KLOR-CON) 20 MEQ CR tablet, Take 1 tablet by mouth 2 (Two) Times a Day., Disp: 180 tablet, Rfl: 1  •  terbinafine (LAMISIL) 250 MG tablet, Take  1 tablet by mouth Daily. (Patient taking differently: Take 250 mg by mouth Daily As Needed.), Disp: 42 tablet, Rfl: 1  •  triamterene-hydrochlorothiazide (DYAZIDE) 37.5-25 MG per capsule, TAKE 1 CAPSULE EVERY MORNING GENERIC FOR DYAZIDE, Disp: 30 capsule, Rfl: 5  •  vitamin E 600 UNIT capsule, Take 400 Units by mouth Daily., Disp: , Rfl:   •  XARELTO 20 MG tablet, TAKE ONE TABLET DAILY WITH DINNER, Disp: 90 tablet, Rfl: 1  •  atorvastatin (Lipitor) 20 MG tablet, Take 1 tablet by mouth Daily., Disp: 30 tablet, Rfl: 5    No problems with medications.  Refills if needed done    Allergies   Allergen Reactions   • Macrobid [Nitrofurantoin Macrocrystal] Rash       Review of Systems  GENERAL:  Active/slower with limits, speed, stamina for age and exertional fatigue. Sleep is ok. No fever now.  SKIN: No  rash/skin lesion of concern other than toenails.   ENDO:  No syncope, near or diaphoretic sweaty spells.  HEENT: No recent head injury; or change in occ headache.   No vision change.  No significant hearing loss.  Ears without pain/drainage.  No sore throat.  No significant nasal/sinus congestion/drainage. No epistaxis.  CHEST: No chest wall tenderness or mass.  Occ/as now cough, without wheeze.  No SOB; no hemoptysis.  CV: No chest pain; same occ palpitations, ankle edema.  GI: No heartburn, dysphagia.  No abdominal pain, diarrhea, constipation.  No rectal bleeding, or melena.    :  Voids without dysuria, or  incontinence to completion.  ORTHO: No painful/swollen joints but various on /off sore. .  No change occ/on-off  sore neck or back.  No acute neck or back pain without recent injury.  NEURO: Still on/off dizzy; less vertigo.  No weakness of extremities.  No numbness/paresthesias.   PSYCH: No memory loss.  Mood good but variable anxious, depressed but/and not suicidal.  Tries to tolerate stress .   Screening:  Gyne: 2018  Mammogram: 12.20.19  Bone density: 10.31.18/bone d-deca/Yolanda/Ts L1 +1.9, hip +0.3  Low dose  CT chest:Tobacco-smoker/none: NA  GI:   Egd-hh-ring (d)//[facility]/4.1.19/prn  Colon-div//[facility]/4.1.19/5y  Prostate: NA  Usual lab order  6m CBC, CMP, TSH, fT4  12m CBC, CMP, LIPID, TSH, fT4, Vit D iron, ferritin, B12, folate        Lab Results:  Results for orders placed or performed in visit on 03/30/20   Comprehensive metabolic panel   Result Value Ref Range    Glucose 92 65 - 99 mg/dL    BUN 15 8 - 23 mg/dL    Creatinine 0.97 0.57 - 1.00 mg/dL    eGFR Non African Am 56 (L) >60 mL/min/1.73    eGFR African Am 68 >60 mL/min/1.73    BUN/Creatinine Ratio 15.5 7.0 - 25.0    Sodium 142 136 - 145 mmol/L    Potassium 3.7 3.5 - 5.2 mmol/L    Chloride 98 98 - 107 mmol/L    Total CO2 30.4 (H) 22.0 - 29.0 mmol/L    Calcium 10.5 8.6 - 10.5 mg/dL    Total Protein 6.7 6.0 - 8.5 g/dL    Albumin 4.30 3.50 - 5.20 g/dL    Globulin 2.4 gm/dL    A/G Ratio 1.8 g/dL    Total Bilirubin 0.5 0.2 - 1.2 mg/dL    Alkaline Phosphatase 69 39 - 117 U/L    AST (SGOT) 15 1 - 32 U/L    ALT (SGPT) 17 1 - 33 U/L   Lipid panel   Result Value Ref Range    Total Cholesterol 233 (H) 0 - 200 mg/dL    Triglycerides 200 (H) 0 - 150 mg/dL    HDL Cholesterol 48 40 - 60 mg/dL    VLDL Cholesterol 40 mg/dL    LDL Cholesterol  145 (H) 0 - 100 mg/dL   TSH   Result Value Ref Range    TSH 3.820 0.270 - 4.200 uIU/mL   T4, free   Result Value Ref Range    Free T4 1.22 0.93 - 1.70 ng/dL   Vitamin D 25 Hydroxy   Result Value Ref Range    25 Hydroxy, Vitamin D 30.9 30.0 - 100.0 ng/ml   Iron   Result Value Ref Range    Iron 121 37 - 145 mcg/dL   Ferritin   Result Value Ref Range    Ferritin 138.00 13.00 - 150.00 ng/mL   Vitamin B12   Result Value Ref Range    Vitamin B-12 563 211 - 946 pg/mL   Folate   Result Value Ref Range    Folate 14.90 4.78 - 24.20 ng/mL   Urinalysis With Culture If Indicated - Urine, Clean Catch   Result Value Ref Range    Specific Gravity, UA 1.009 1.005 - 1.030    pH, UA 7.5 5.0 - 7.5    Color, UA Yellow Yellow    Appearance, UA Clear Clear     Leukocytes, UA Negative Negative    Protein Negative Negative/Trace    Glucose, UA Negative Negative    Ketones Negative Negative    Blood, UA Negative Negative    Bilirubin, UA Negative Negative    Urobilinogen, UA 0.2 0.2 - 1.0 mg/dL    Nitrite, UA Negative Negative    Microscopic Examination Comment     Microscopic Examination See below:     Urinalysis Reflex Comment    Microscopic Examination -   Result Value Ref Range    WBC, UA None seen 0 - 5 /hpf    RBC, UA 0-2 0 - 2 /hpf    Epithelial Cells (non renal) 0-10 0 - 10 /hpf    Mucus, UA Present Not Estab. /hpf    Bacteria, UA None seen None seen/Few /hpf   CBC & Differential   Result Value Ref Range    WBC 5.04 3.40 - 10.80 10*3/mm3    RBC 4.49 3.77 - 5.28 10*6/mm3    Hemoglobin 15.1 12.0 - 15.9 g/dL    Hematocrit 44.5 34.0 - 46.6 %    MCV 99.1 (H) 79.0 - 97.0 fL    MCH 33.6 (H) 26.6 - 33.0 pg    MCHC 33.9 31.5 - 35.7 g/dL    RDW 12.3 12.3 - 15.4 %    Platelets 320 140 - 450 10*3/mm3    Neutrophil Rel % 52.0 42.7 - 76.0 %    Lymphocyte Rel % 33.7 19.6 - 45.3 %    Monocyte Rel % 9.9 5.0 - 12.0 %    Eosinophil Rel % 3.0 0.3 - 6.2 %    Basophil Rel % 1.2 0.0 - 1.5 %    Neutrophils Absolute 2.62 1.70 - 7.00 10*3/mm3    Lymphocytes Absolute 1.70 0.70 - 3.10 10*3/mm3    Monocytes Absolute 0.50 0.10 - 0.90 10*3/mm3    Eosinophils Absolute 0.15 0.00 - 0.40 10*3/mm3    Basophils Absolute 0.06 0.00 - 0.20 10*3/mm3    Immature Granulocyte Rel % 0.2 0.0 - 0.5 %    Immature Grans Absolute 0.01 0.00 - 0.05 10*3/mm3    nRBC 0.0 0.0 - 0.2 /100 WBC       A1C:No results for input(s): HGBA1C in the last 68011 hours.  PSA:No results for input(s): PSA in the last 85703 hours.  CBC:  Lab Results - Last 18 Months   Lab Units 03/30/20  0741 09/10/19  0724 03/05/19  0744   WBC 10*3/mm3 5.04 4.95 5.99   HEMOGLOBIN g/dL 15.1 14.0 13.5   HEMATOCRIT % 44.5 43.8 41.1   PLATELETS 10*3/mm3 320 308 305   IRON mcg/dL 121  --  130      BMP/CMP:  Lab Results - Last 18 Months   Lab Units  03/30/20  0741 09/10/19  0724 03/05/19  0744   SODIUM mmol/L 142 142 139   POTASSIUM mmol/L 3.7 3.9 4.0   CHLORIDE mmol/L 98 99 102   TOTAL CO2 mmol/L 30.4* 28.4 30.0   GLUCOSE mg/dL 92 94 91   BUN mg/dL 15 12 18   CREATININE mg/dL 0.97 0.89 0.78   EGFR IF NONAFRICN AM mL/min/1.73 56* 63 73   EGFR IF AFRICN AM mL/min/1.73 68 76 88   CALCIUM mg/dL 10.5 9.5 9.8     HEPATIC:  Lab Results - Last 18 Months   Lab Units 03/30/20  0741 11/11/19  0730 09/10/19  0724 03/05/19  0744   ALT (SGPT) U/L 17 19 15 28   AST (SGOT) U/L 15 21 18 27   ALK PHOS U/L 69 65 63 63     THYROID:  Lab Results - Last 18 Months   Lab Units 03/30/20  0741 09/10/19  0724 03/05/19  0744   TSH uIU/mL 3.820 3.970 4.340       Objective   Breastfeeding No   There is no height or weight on file to calculate BMI.    Physical Exam  None; tele/health    Assessment/Plan     1. HTN (hypertension), benign    2. Congestive heart failure, unspecified HF chronicity, unspecified heart failure type (CMS/HCC)    3. SOB: #, hx PE, CHF,     4. Gastroesophageal reflux disease, esophagitis presence not specified    5. Chronic back pain, unspecified back location, unspecified back pain laterality    6. Osteoarthritis, unspecified osteoarthritis type, unspecified site    7. Hypopotassemia-diuretic    8. Depression, unspecified depression type    9. Anticoagulated-PE/cardiac arrest/xarelto    10. Anxiety    11. Hyperlipidemia, unspecified hyperlipidemia type        Rx: reviewed/changes:  New Medications Ordered This Visit   Medications   • atorvastatin (Lipitor) 20 MG tablet     Sig: Take 1 tablet by mouth Daily.     Dispense:  30 tablet     Refill:  5       LAB/Testing/Referrals: reviewed/orders:   Today:   No orders of the defined types were placed in this encounter.    Chronic/recurrent labs above or change to:   same    Discussions:   COVID-19; masks advised and others  Lipid Rx discussed; agreed to try  Exercise discussed  Call if cough, sinus gets worse; any temp  100.4 higher  Tobacco use reviewed:    Non-smoker  Lucy Sousa  reports that she has never smoked. She has never used smokeless tobacco..   There are no Patient Instructions on file for this visit.    Follow up: No follow-ups on file.  No future appointments.    This visit has been rescheduled as a phone visit to comply with patient safety concerns in accordance with CDC recommendations. Total time of discussion was 16 minutes.

## 2020-04-09 RX ORDER — FUROSEMIDE 20 MG/1
20 TABLET ORAL DAILY
Qty: 90 TABLET | Refills: 1 | Status: SHIPPED | OUTPATIENT
Start: 2020-04-09 | End: 2020-10-16 | Stop reason: SDUPTHER

## 2020-04-09 NOTE — TELEPHONE ENCOUNTER
PT CALLED IN WANTING TO RECEIVE A REFILL    PT CONTACT: [phone]    PT MEDS: furosemide (LASIX) 20 MG tablet

## 2020-04-13 ENCOUNTER — TELEPHONE (OUTPATIENT)
Dept: FAMILY MEDICINE CLINIC | Facility: CLINIC | Age: 70
End: 2020-04-13

## 2020-04-13 DIAGNOSIS — K21.9 GASTROESOPHAGEAL REFLUX DISEASE, ESOPHAGITIS PRESENCE NOT SPECIFIED: Primary | ICD-10-CM

## 2020-04-13 NOTE — TELEPHONE ENCOUNTER
Patient called in requesting a refill on esomeprazole (nexIUM) 40 MG capsule.     Pharmacy confirmed.

## 2020-04-14 RX ORDER — ESOMEPRAZOLE MAGNESIUM 40 MG/1
40 CAPSULE, DELAYED RELEASE ORAL 2 TIMES DAILY
Qty: 180 CAPSULE | Refills: 1 | Status: SHIPPED | OUTPATIENT
Start: 2020-04-14 | End: 2021-02-09

## 2020-06-15 DIAGNOSIS — I10 ESSENTIAL HYPERTENSION: Chronic | ICD-10-CM

## 2020-06-15 RX ORDER — TRIAMTERENE AND HYDROCHLOROTHIAZIDE 37.5; 25 MG/1; MG/1
1 CAPSULE ORAL EVERY MORNING
Qty: 30 CAPSULE | Refills: 5 | Status: SHIPPED | OUTPATIENT
Start: 2020-06-15 | End: 2020-12-11 | Stop reason: SDUPTHER

## 2020-06-15 NOTE — TELEPHONE ENCOUNTER
Called in to request RX refill of triamterene-hydrochlorothiazide (DYAZIDE) 37.5-25 MG per capsule    Pharmacy confirmed - [facility] - [place], [place] - [address] [phone] [facility] [phone]   [phone]    Please Advise

## 2020-07-01 ENCOUNTER — OFFICE VISIT (OUTPATIENT)
Dept: FAMILY MEDICINE CLINIC | Facility: CLINIC | Age: 70
End: 2020-07-01

## 2020-07-01 VITALS
WEIGHT: 207 LBS | SYSTOLIC BLOOD PRESSURE: 124 MMHG | OXYGEN SATURATION: 99 % | HEIGHT: 64 IN | BODY MASS INDEX: 35.34 KG/M2 | DIASTOLIC BLOOD PRESSURE: 78 MMHG | TEMPERATURE: 97.7 F | HEART RATE: 83 BPM | RESPIRATION RATE: 16 BRPM

## 2020-07-01 DIAGNOSIS — I10 HTN (HYPERTENSION), BENIGN: Chronic | ICD-10-CM

## 2020-07-01 DIAGNOSIS — I50.9 CONGESTIVE HEART FAILURE, UNSPECIFIED HF CHRONICITY, UNSPECIFIED HEART FAILURE TYPE (HCC): Chronic | ICD-10-CM

## 2020-07-01 DIAGNOSIS — Z23 NEED FOR PNEUMOCOCCAL VACCINATION: ICD-10-CM

## 2020-07-01 DIAGNOSIS — E66.09 CLASS 2 OBESITY DUE TO EXCESS CALORIES WITHOUT SERIOUS COMORBIDITY WITH BODY MASS INDEX (BMI) OF 35.0 TO 35.9 IN ADULT: ICD-10-CM

## 2020-07-01 DIAGNOSIS — Z71.89 ADVANCED CARE PLANNING/COUNSELING DISCUSSION: ICD-10-CM

## 2020-07-01 DIAGNOSIS — Z00.00 MEDICARE ANNUAL WELLNESS VISIT, SUBSEQUENT: ICD-10-CM

## 2020-07-01 PROCEDURE — 90732 PPSV23 VACC 2 YRS+ SUBQ/IM: CPT | Performed by: NURSE PRACTITIONER

## 2020-07-01 PROCEDURE — G0439 PPPS, SUBSEQ VISIT: HCPCS | Performed by: NURSE PRACTITIONER

## 2020-07-01 PROCEDURE — G0009 ADMIN PNEUMOCOCCAL VACCINE: HCPCS | Performed by: NURSE PRACTITIONER

## 2020-07-01 NOTE — PROGRESS NOTES
The ABCs of the Annual Wellness Visit  Subsequent Medicare Wellness Visit    Chief Complaint   Patient presents with   • Medicare Wellness-subsequent       Subjective   History of Present Illness:  Lucy Sousa is a 70 y.o. female who presents for a Subsequent Medicare Wellness Visit.  She reports health is going well physically and emotionally.  She has gained some weight but has recently recovered from a loss.  We did have a discussion about vaccines.  She has had the Pneumovax 13 twice she has never had the 23.  She is willing to discuss getting advanced directive or living will.  She does not currently have anybody appointed as a healthcare power of .  With her CHF, hypertension cardiac history this is important.    HEALTH RISK ASSESSMENT    Recent Hospitalizations:  No hospitalization(s) within the last year.    Current Medical Providers:  Patient Care Team:  Rosas Berg MD as PCP - General (Family Medicine)  Rosas Berg MD as PCP - Family Medicine  Rosas Berg MD as Referring Physician (Family Medicine)  Daniel Underwood MD as Cardiologist (Cardiology)  Heath Mckeon MD as Consulting Physician (Gastroenterology)    Smoking Status:  Social History     Tobacco Use   Smoking Status Never Smoker   Smokeless Tobacco Never Used       Alcohol Consumption:  Social History     Substance and Sexual Activity   Alcohol Use No       Depression Screen:   PHQ-2/PHQ-9 Depression Screening 7/1/2020   Little interest or pleasure in doing things 0   Feeling down, depressed, or hopeless 0   Total Score 0       Fall Risk Screen:  MICHAELADI Fall Risk Assessment was completed, and patient is at LOW risk for falls.Assessment completed on:7/1/2020    Health Habits and Functional and Cognitive Screening:  Functional & Cognitive Status 7/1/2020   Do you have difficulty preparing food and eating? No   Do you have difficulty bathing yourself, getting dressed or grooming yourself? No   Do you  have difficulty using the toilet? No   Do you have difficulty moving around from place to place? No   Do you have trouble with steps or getting out of a bed or a chair? No   Current Diet Well Balanced Diet   Dental Exam Up to date   Eye Exam Up to date   Exercise (times per week) 2 times per week   Current Exercise Activities Include Cardiovasular Workout on Exercise Equipment   Do you need help using the phone?  No   Are you deaf or do you have serious difficulty hearing?  No   Do you need help with transportation? No   Do you need help shopping? No   Do you need help preparing meals?  No   Do you need help with housework?  No   Do you need help with laundry? No   Do you need help taking your medications? No   Do you need help managing money? No   Do you ever drive or ride in a car without wearing a seat belt? No   Have you felt unusual stress, anger or loneliness in the last month? No   Who do you live with? Spouse   If you need help, do you have trouble finding someone available to you? No   Have you been bothered in the last four weeks by sexual problems? No   Do you have difficulty concentrating, remembering or making decisions? No         Does the patient have evidence of cognitive impairment? No    Asprin use counseling:Does not need ASA (and currently is not on it)    Age-appropriate Screening Schedule:  Refer to the list below for future screening recommendations based on patient's age, sex and/or medical conditions. Orders for these recommended tests are listed in the plan section. The patient has been provided with a written plan.    Health Maintenance   Topic Date Due   • TDAP/TD VACCINES (1 - Tdap) 01/09/1961   • ZOSTER VACCINE (1 of 2) 01/09/1998   • INFLUENZA VACCINE  08/01/2020   • LIPID PANEL  03/30/2021   • MAMMOGRAM  12/20/2021   • COLONOSCOPY  04/01/2024          The following portions of the patient's history were reviewed and updated as appropriate: allergies, current medications, past family  history, past medical history, past social history, past surgical history and problem list.    Outpatient Medications Prior to Visit   Medication Sig Dispense Refill   • ALPRAZolam (XANAX) 0.5 MG tablet Take 1 tablet by mouth 2 (Two) Times a Day As Needed for Anxiety. 30 tablet 1   • atorvastatin (Lipitor) 20 MG tablet Take 1 tablet by mouth Daily. 30 tablet 5   • budesonide-formoterol (SYMBICORT) 160-4.5 MCG/ACT inhaler Inhale 2 puffs 2 (Two) Times a Day. (Patient taking differently: Inhale 2 puffs 2 (Two) Times a Day As Needed.) 1 inhaler 5   • Cholecalciferol (VITAMIN D) 2000 UNITS tablet Take 1,000 Units by mouth Daily.     • cyanocobalamin (VITAMIN B-12) 50 MCG tablet tablet Take 50 mcg by mouth Daily.     • esomeprazole (nexIUM) 40 MG capsule Take 1 capsule by mouth 2 (Two) Times a Day. 180 capsule 1   • furosemide (LASIX) 20 MG tablet Take 1 tablet by mouth Daily. 90 tablet 1   • losartan (COZAAR) 50 MG tablet TAKE ONE TABLET DAILY GENERIC FOR COZAAR 90 tablet 3   • Multiple Vitamin (MULTI VITAMIN DAILY PO) Take 1 tablet by mouth daily.     • potassium chloride (K-DUR,KLOR-CON) 20 MEQ CR tablet Take 1 tablet by mouth 2 (Two) Times a Day. 180 tablet 1   • triamterene-hydrochlorothiazide (DYAZIDE) 37.5-25 MG per capsule Take 1 capsule by mouth Every Morning. 30 capsule 5   • vitamin E 600 UNIT capsule Take 400 Units by mouth Daily.     • XARELTO 20 MG tablet TAKE ONE TABLET DAILY WITH DINNER 90 tablet 1   • terbinafine (LAMISIL) 250 MG tablet Take 1 tablet by mouth Daily. (Patient taking differently: Take 250 mg by mouth Daily As Needed.) 42 tablet 1     No facility-administered medications prior to visit.        Patient Active Problem List   Diagnosis   • Diffuse cystic mastopathy   • FH breast; [place]   • Peptic disease   • Degenerative joint disease   • Congestive heart failure (CMS/HCC)   • Depression   • Anxiety   • Gastroesophageal reflux disease   • HTN (hypertension), benign   • Onychomycosis   •  Macrocytosis- B12/folate-ok   • Surgical menopause: [id number] to 5   • Cardiac arrest (CMS/HCC)- PE   • History of PE-ekos tx   • Anemia   • Blood in stool   • Hypopotassemia-diuretic   • Other specified postprocedural states   • Chronic fatigue   • Wellness examination-done-no gyne   • Anticoagulated-PE/cardiac arrest/xarelto   • Obesity   • Laboratory test*   • Chronic back pain-chiropracter   • SOB: #, hx PE, CHF,    • Dizziness   • Hx of colonic polyps   • Esophageal dysphagia   • Skin lesions-   • S/P laparoscopic cholecystectomy   • S/P reduction mammoplasty   • Diarrhea   • Hyperlipemia-statin       Advanced Care Planning:  ACP discussion was held with the patient during this visit. Patient does not have an advance directive, information provided.    Review of Systems   Constitutional: Negative for chills, diaphoresis, fatigue and fever.   HENT: Negative for congestion, ear pain, sinus pressure and sinus pain.    Eyes: Negative for pain, discharge, redness and itching.   Respiratory: Positive for shortness of breath (occaisional). Negative for cough and wheezing.    Cardiovascular: Negative for chest pain, palpitations and leg swelling.   Gastrointestinal: Negative for abdominal distention, abdominal pain, constipation, diarrhea and nausea.   Endocrine: Negative for cold intolerance and heat intolerance.   Genitourinary: Negative for difficulty urinating, dysuria, flank pain, frequency and urgency.   Musculoskeletal: Negative for arthralgias and myalgias.   Skin: Negative for rash and wound.   Allergic/Immunologic: Negative for environmental allergies and food allergies.   Neurological: Negative for dizziness, weakness, numbness and headaches.   Hematological: Negative for adenopathy. Does not bruise/bleed easily.   Psychiatric/Behavioral: Negative for agitation, behavioral problems, sleep disturbance and suicidal ideas.       Compared to one year ago, the patient feels her physical health is the  "same.  Compared to one year ago, the patient feels her mental health is the same.    Reviewed chart for potential of high risk medication in the elderly: yes  Reviewed chart for potential of harmful drug interactions in the elderly:yes    Objective         Vitals:    07/01/20 0852   BP: 124/78   Pulse: 83   Resp: 16   Temp: 97.7 °F (36.5 °C)   TempSrc: Temporal   SpO2: 99%   Weight: 93.9 kg (207 lb)   Height: 162.6 cm (64\")   PainSc: 0-No pain       Body mass index is 35.53 kg/m².  Discussed the patient's BMI with her. The BMI is above average; BMI management plan is completed.    Physical Exam   Constitutional: She is oriented to person, place, and time. No distress.   HENT:   Head: Normocephalic and atraumatic.   Nose: Nose normal.   Eyes: Pupils are equal, round, and reactive to light. Conjunctivae and EOM are normal.   Neck: Normal range of motion. Neck supple. No JVD present. No tracheal deviation present. No thyromegaly present.   Cardiovascular: Normal rate, regular rhythm, normal heart sounds and intact distal pulses. Exam reveals no gallop and no friction rub.   No murmur heard.  Pulmonary/Chest: Effort normal and breath sounds normal. No respiratory distress. She has no wheezes.   Abdominal: Soft. Bowel sounds are normal. There is no tenderness. There is no guarding.   Musculoskeletal: Normal range of motion. She exhibits no edema, tenderness or deformity.   Lymphadenopathy:     She has no cervical adenopathy.   Neurological: She is alert and oriented to person, place, and time.   Skin: Skin is warm and dry. Capillary refill takes less than 2 seconds. She is not diaphoretic.   Psychiatric: She has a normal mood and affect. Her behavior is normal. Judgment and thought content normal.   Nursing note and vitals reviewed.            Assessment/Plan   Medicare Risks and Personalized Health Plan  CMS Preventative Services Quick Reference  Advance Directive Discussion  Immunizations Discussed/Encouraged (specific " immunizations; Pneumococcal 23 and Shingrix )  Obesity/Overweight     She was unsure if she had chickenpox or not-we will defer the shingles vaccine now.  She has never had an outbreak.  She was advised that if she truly has never had the chickenpox that a varicella vaccine is actually recommended.  She is in agreement to get the pneumonia 23 vaccine today.  We discussed BMI being greater than 30.  She is already doing diet and exercise.    Advanced care plannin minutes spent discussing need for living will or and appointed healthcare power of .  Resources provided, written instructions provided.    The above risks/problems have been discussed with the patient.  Pertinent information has been shared with the patient in the After Visit Summary.  Follow up plans and orders are seen below in the Assessment/Plan Section.    Diagnoses and all orders for this visit:    1. Need for pneumococcal vaccination  -     pneumococcal polysaccharide 23-valent (PNEUMOVAX-23) vaccine 0.5 mL    2. Medicare annual wellness visit, subsequent    3. Advanced care planning/counseling discussion    4. Congestive heart failure, unspecified HF chronicity, unspecified heart failure type (CMS/[facility])    5. HTN (hypertension), benign    6. Class 2 obesity due to excess calories without serious comorbidity with body mass index (BMI) of 35.0 to 35.9 in adult      Follow Up:  Return for Next scheduled follow up.     An After Visit Summary and PPPS were given to the patient.

## 2020-08-06 DIAGNOSIS — Z79.01 ANTICOAGULATED: ICD-10-CM

## 2020-08-06 RX ORDER — RIVAROXABAN 20 MG/1
TABLET, FILM COATED ORAL
Qty: 90 TABLET | Refills: 1 | Status: SHIPPED | OUTPATIENT
Start: 2020-08-06 | End: 2021-02-09

## 2020-08-06 NOTE — TELEPHONE ENCOUNTER
Patient called and requested a refill on the following:    XARELTO 20 MG tablet    Verified [facility].    Patient can be contacted best at [phone].

## 2020-10-05 DIAGNOSIS — I50.9 CONGESTIVE HEART FAILURE, UNSPECIFIED HF CHRONICITY, UNSPECIFIED HEART FAILURE TYPE (HCC): ICD-10-CM

## 2020-10-05 DIAGNOSIS — D64.9 ANEMIA, UNSPECIFIED TYPE: ICD-10-CM

## 2020-10-05 DIAGNOSIS — I10 ESSENTIAL HYPERTENSION: ICD-10-CM

## 2020-10-05 DIAGNOSIS — R53.82 CHRONIC FATIGUE: ICD-10-CM

## 2020-10-05 DIAGNOSIS — E87.6 HYPOPOTASSEMIA: ICD-10-CM

## 2020-10-05 DIAGNOSIS — Z79.01 ANTICOAGULATED: Primary | ICD-10-CM

## 2020-10-06 ENCOUNTER — RESULTS ENCOUNTER (OUTPATIENT)
Dept: FAMILY MEDICINE CLINIC | Facility: CLINIC | Age: 70
End: 2020-10-06

## 2020-10-06 ENCOUNTER — LAB (OUTPATIENT)
Dept: FAMILY MEDICINE CLINIC | Facility: CLINIC | Age: 70
End: 2020-10-06

## 2020-10-06 DIAGNOSIS — R53.82 CHRONIC FATIGUE: ICD-10-CM

## 2020-10-06 DIAGNOSIS — D64.9 ANEMIA, UNSPECIFIED TYPE: ICD-10-CM

## 2020-10-06 DIAGNOSIS — I10 ESSENTIAL HYPERTENSION: ICD-10-CM

## 2020-10-06 DIAGNOSIS — E87.6 HYPOPOTASSEMIA: ICD-10-CM

## 2020-10-06 DIAGNOSIS — Z79.01 ANTICOAGULATED: ICD-10-CM

## 2020-10-06 DIAGNOSIS — I50.9 CONGESTIVE HEART FAILURE, UNSPECIFIED HF CHRONICITY, UNSPECIFIED HEART FAILURE TYPE (HCC): ICD-10-CM

## 2020-10-07 LAB
ALBUMIN SERPL-MCNC: 4.4 G/DL (ref 3.5–5.2)
ALBUMIN/GLOB SERPL: 2.2 G/DL
ALP SERPL-CCNC: 69 U/L (ref 39–117)
ALT SERPL-CCNC: 17 U/L (ref 1–33)
AST SERPL-CCNC: 18 U/L (ref 1–32)
BASOPHILS # BLD AUTO: 0.06 10*3/MM3 (ref 0–0.2)
BASOPHILS NFR BLD AUTO: 1.3 % (ref 0–1.5)
BILIRUB SERPL-MCNC: 0.4 MG/DL (ref 0–1.2)
BUN SERPL-MCNC: 12 MG/DL (ref 8–23)
BUN/CREAT SERPL: 14.1 (ref 7–25)
CALCIUM SERPL-MCNC: 9.5 MG/DL (ref 8.6–10.5)
CHLORIDE SERPL-SCNC: 102 MMOL/L (ref 98–107)
CO2 SERPL-SCNC: 28.5 MMOL/L (ref 22–29)
CREAT SERPL-MCNC: 0.85 MG/DL (ref 0.57–1)
EOSINOPHIL # BLD AUTO: 0.17 10*3/MM3 (ref 0–0.4)
EOSINOPHIL NFR BLD AUTO: 3.6 % (ref 0.3–6.2)
ERYTHROCYTE [DISTWIDTH] IN BLOOD BY AUTOMATED COUNT: 12.4 % (ref 12.3–15.4)
GLOBULIN SER CALC-MCNC: 2 GM/DL
GLUCOSE SERPL-MCNC: 88 MG/DL (ref 65–99)
HCT VFR BLD AUTO: 40.2 % (ref 34–46.6)
HGB BLD-MCNC: 13.9 G/DL (ref 12–15.9)
IMM GRANULOCYTES # BLD AUTO: 0.01 10*3/MM3 (ref 0–0.05)
IMM GRANULOCYTES NFR BLD AUTO: 0.2 % (ref 0–0.5)
LYMPHOCYTES # BLD AUTO: 1.66 10*3/MM3 (ref 0.7–3.1)
LYMPHOCYTES NFR BLD AUTO: 34.9 % (ref 19.6–45.3)
MCH RBC QN AUTO: 33.7 PG (ref 26.6–33)
MCHC RBC AUTO-ENTMCNC: 34.6 G/DL (ref 31.5–35.7)
MCV RBC AUTO: 97.3 FL (ref 79–97)
MONOCYTES # BLD AUTO: 0.39 10*3/MM3 (ref 0.1–0.9)
MONOCYTES NFR BLD AUTO: 8.2 % (ref 5–12)
NEUTROPHILS # BLD AUTO: 2.46 10*3/MM3 (ref 1.7–7)
NEUTROPHILS NFR BLD AUTO: 51.8 % (ref 42.7–76)
NRBC BLD AUTO-RTO: 0 /100 WBC (ref 0–0.2)
PLATELET # BLD AUTO: 318 10*3/MM3 (ref 140–450)
POTASSIUM SERPL-SCNC: 3.8 MMOL/L (ref 3.5–5.2)
PROT SERPL-MCNC: 6.4 G/DL (ref 6–8.5)
RBC # BLD AUTO: 4.13 10*6/MM3 (ref 3.77–5.28)
SODIUM SERPL-SCNC: 142 MMOL/L (ref 136–145)
T4 FREE SERPL-MCNC: 1.25 NG/DL (ref 0.93–1.7)
TSH SERPL DL<=0.005 MIU/L-ACNC: 3.74 UIU/ML (ref 0.27–4.2)
WBC # BLD AUTO: 4.75 10*3/MM3 (ref 3.4–10.8)

## 2020-10-12 ENCOUNTER — OFFICE VISIT (OUTPATIENT)
Dept: FAMILY MEDICINE CLINIC | Facility: CLINIC | Age: 70
End: 2020-10-12

## 2020-10-12 VITALS
WEIGHT: 206 LBS | HEIGHT: 64 IN | HEART RATE: 88 BPM | TEMPERATURE: 97.7 F | BODY MASS INDEX: 35.17 KG/M2 | SYSTOLIC BLOOD PRESSURE: 134 MMHG | RESPIRATION RATE: 18 BRPM | OXYGEN SATURATION: 98 % | DIASTOLIC BLOOD PRESSURE: 88 MMHG

## 2020-10-12 DIAGNOSIS — F41.9 ANXIETY: Chronic | ICD-10-CM

## 2020-10-12 DIAGNOSIS — Z23 NEED FOR IMMUNIZATION AGAINST INFLUENZA: Primary | ICD-10-CM

## 2020-10-12 DIAGNOSIS — I50.9 CONGESTIVE HEART FAILURE, UNSPECIFIED HF CHRONICITY, UNSPECIFIED HEART FAILURE TYPE (HCC): Chronic | ICD-10-CM

## 2020-10-12 DIAGNOSIS — M54.9 CHRONIC BACK PAIN, UNSPECIFIED BACK LOCATION, UNSPECIFIED BACK PAIN LATERALITY: ICD-10-CM

## 2020-10-12 DIAGNOSIS — F32.A DEPRESSION, UNSPECIFIED DEPRESSION TYPE: Chronic | ICD-10-CM

## 2020-10-12 DIAGNOSIS — K21.9 GASTROESOPHAGEAL REFLUX DISEASE, UNSPECIFIED WHETHER ESOPHAGITIS PRESENT: Chronic | ICD-10-CM

## 2020-10-12 DIAGNOSIS — E78.2 MIXED HYPERLIPIDEMIA: ICD-10-CM

## 2020-10-12 DIAGNOSIS — Z12.31 ENCOUNTER FOR SCREENING MAMMOGRAM FOR BREAST CANCER: ICD-10-CM

## 2020-10-12 DIAGNOSIS — G89.29 CHRONIC BACK PAIN, UNSPECIFIED BACK LOCATION, UNSPECIFIED BACK PAIN LATERALITY: ICD-10-CM

## 2020-10-12 DIAGNOSIS — Z79.01 ANTICOAGULATED: ICD-10-CM

## 2020-10-12 DIAGNOSIS — M19.90 OSTEOARTHRITIS, UNSPECIFIED OSTEOARTHRITIS TYPE, UNSPECIFIED SITE: Chronic | ICD-10-CM

## 2020-10-12 DIAGNOSIS — E87.6 HYPOPOTASSEMIA: ICD-10-CM

## 2020-10-12 DIAGNOSIS — I10 HTN (HYPERTENSION), BENIGN: Chronic | ICD-10-CM

## 2020-10-12 DIAGNOSIS — R06.02 SHORTNESS OF BREATH: ICD-10-CM

## 2020-10-12 PROCEDURE — 99213 OFFICE O/P EST LOW 20 MIN: CPT | Performed by: FAMILY MEDICINE

## 2020-10-12 PROCEDURE — 90694 VACC AIIV4 NO PRSRV 0.5ML IM: CPT | Performed by: FAMILY MEDICINE

## 2020-10-12 PROCEDURE — 90471 IMMUNIZATION ADMIN: CPT | Performed by: FAMILY MEDICINE

## 2020-10-12 NOTE — PROGRESS NOTES
"Subjective   Lucy Sousa is a 70 y.o. female presenting with chief complaint of:   Chief Complaint   Patient presents with   • Follow-up     \"i had labs last week\"       History of Present Illness :  Alone.       Has multiple chronic problems to consider that might have a bearing on today's issues;  an interval appointment.       Chronic/acute problems reviewed today:   1. Need for immunization against influenza Chronic/ongoing need to review pro/cons for various vaccinations based on age, health issues.  Needs vaccination today for zoster, Tdap, flu     2. Encounter for screening mammogram for breast cancer Chronic/ongoing yearly need to review for breast cancer.  No breast pain, masses, discharge.       3. Congestive heart failure, unspecified HF chronicity, unspecified heart failure type (CMS/HCC) Chronic/stable.  Denies significant sob, orthopnea, leg edema, weight gain.  Aware of influence diet/salt and watching weight at home.       4. HTN (hypertension), benign Chronic/stable. Stable here past/no recent home blood pressures.  No significant chest pain, SOB, LE edema, orthopnea, near syncope, dizziness/light headness.   Recent Vitals       4/6/2020 7/1/2020 10/12/2020       BP:  --  124/78  134/88      Not able to get vitals       Pulse:  --  83  88     Temp:  --  97.7 °F (36.5 °C)  97.7 °F (36.5 °C)     Weight:  --  93.9 kg (207 lb)  93.4 kg (206 lb)     BMI (Calculated):  --  35.5  35.3            5. Mixed hyperlipidemia Chronic/stable.  Tolerated use of Rx with labs showing improved lipid values and tolerant liver labs. No muscle aches unexpected.      6. SOB: #, hx PE, CHF,  Chronic/variable:  SOB worse with exertion/ok at rest.  Contributing diagnosis: those listed (not using inhaler much and it helps).     7. Gastroesophageal reflux disease, unspecified whether esophagitis present Chronic/stable.  Controlled heartburn, reflux without dysphagia, melena.  Rx used, periods not used proven needed with " symptoms -currently doing ok.      8. Chronic back pain, unspecified back location, unspecified back pain laterality : chronic/variable 0-2/10 lower back pain with infrequent/same radiation to UE/LE.  No change LE numbness.  Has bladder/bowel control. No desire to change approach of care.      9. Osteoarthritis, unspecified osteoarthritis type, unspecified site Chronic/stable.  Various on/off joint pains/soreness/stiffness.  Particular joint problems with various.  No joint swelling.  Treats mainly with reduced activity, Rx listed, Tylenol.  No pattern NSAIDs, and no injections.      10. Depression, unspecified depression type chronic/stable without significant  mood swings, down moods, nervousness, difficulty with concentration to function home/work.  Others close have not been concerned.  No suicide ideation/intent.  Rx helps      11. Anxiety Chronic/stable:   On/off anxiety tolerated somewhat.  Rx helps and not interested in Rx change. Stress ongoing tolerated.      12. 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  Requires lab monitoring.  No unexplained muscle wasting or weakness.   13. Anticoagulated-PE/cardiac arrest/xarelto Chronic/stable reason and use of.  Denies bleeding issues; especially epistaxis, melena, hematochezia.  Upper arms/others do not bruise easily.  No significant bleeding or falls.        Has an/another acute issue today: None.    The following portions of the patient's history were reviewed and updated as appropriate: allergies, current medications, past family history, past medical history, past social history, past surgical history and problem list.      Current Outpatient Medications:   •  ALPRAZolam (XANAX) 0.5 MG tablet, Take 1 tablet by mouth 2 (Two) Times a Day As Needed for Anxiety., Disp: 30 tablet, Rfl: 1  •  budesonide-formoterol (SYMBICORT) 160-4.5 MCG/ACT inhaler, Inhale 2 puffs 2 (Two) Times a Day. (Patient taking differently: Inhale 2 puffs 2  (Two) Times a Day As Needed.), Disp: 1 inhaler, Rfl: 5  •  Cholecalciferol (VITAMIN D) 2000 UNITS tablet, Take 1,000 Units by mouth Daily., Disp: , Rfl:   •  cyanocobalamin (VITAMIN B-12) 50 MCG tablet tablet, Take 50 mcg by mouth Daily., Disp: , Rfl:   •  esomeprazole (nexIUM) 40 MG capsule, Take 1 capsule by mouth 2 (Two) Times a Day., Disp: 180 capsule, Rfl: 1  •  furosemide (LASIX) 20 MG tablet, Take 1 tablet by mouth Daily., Disp: 90 tablet, Rfl: 1  •  losartan (COZAAR) 50 MG tablet, TAKE ONE TABLET DAILY GENERIC FOR COZAAR, Disp: 90 tablet, Rfl: 3  •  Multiple Vitamin (MULTI VITAMIN DAILY PO), Take 1 tablet by mouth daily., Disp: , Rfl:   •  potassium chloride (K-DUR,KLOR-CON) 20 MEQ CR tablet, Take 1 tablet by mouth 2 (Two) Times a Day., Disp: 180 tablet, Rfl: 1  •  triamterene-hydrochlorothiazide (DYAZIDE) 37.5-25 MG per capsule, Take 1 capsule by mouth Every Morning., Disp: 30 capsule, Rfl: 5  •  vitamin E 600 UNIT capsule, Take 400 Units by mouth Daily., Disp: , Rfl:   •  XARELTO 20 MG tablet, TAKE ONE TABLET DAILY WITH DINNER, Disp: 90 tablet, Rfl: 1  •  atorvastatin (Lipitor) 20 MG tablet, Take 1 tablet by mouth Daily., Disp: 30 tablet, Rfl: 5    No problems with medications.  Refills if needed done    Allergies   Allergen Reactions   • Macrobid [Nitrofurantoin Macrocrystal] Rash       Review of Systems  GENERAL:  Active/slower with limits, speed, stamina for age and exertional fatigue. Sleep is ok. No fever now/recent.  SKIN: No  rash/skin lesion of concern unless mentioned above/below.   ENDO:  No syncope, near or diaphoretic sweaty spells.  HEENT: No recent head injury; or change in occ headache.   No vision change.  No significant hearing loss.  Ears without pain/drainage.  No sore throat.  No significant nasal/sinus congestion/drainage. No epistaxis.  CHEST: No chest wall tenderness or mass.  Occ/as now cough, without wheeze.  Occ/mild SOB; no hemoptysis.  CV: No chest pain; same occ palpitations,  ankle edema.  GI: No heartburn, dysphagia.  No abdominal pain, diarrhea, constipation.  No rectal bleeding, or melena.    :  Voids without dysuria, or  incontinence to completion.  ORTHO: No painful/swollen joints but various on /off sore. .  No change occ/on-off  sore neck or back.  No acute neck or back pain without recent injury.  NEURO: Still on/off dizzy; less vertigo.  No weakness of extremities.  No numbness/paresthesias.   PSYCH: No memory loss.  Mood good but variable anxious, depressed but/and not suicidal.  Tries to tolerate stress .   Screening:  Gyne: 2018  Mammogram: 12.20.19  Bone density: 10.31.18/bone d-deca/Yolanda/Ts L1 +1.9, hip +0.3  Low dose CT chest:Tobacco-smoker/none: NA  GI:   Egd-hh-ring (d)//[facility]/4.1.19/prn  Colon-div//MM/4.1.19/5y  Prostate: NA  Usual lab order  6m CBC, CMP, TSH, fT4  12m CBC, CMP, LIPID, TSH, fT4, Vit D iron, ferritin, B12, folate     Lab Results:  Results for orders placed or performed in visit on 10/06/20   Comprehensive metabolic panel    Specimen: Blood   Result Value Ref Range    Glucose 88 65 - 99 mg/dL    BUN 12 8 - 23 mg/dL    Creatinine 0.85 0.57 - 1.00 mg/dL    eGFR Non African Am 66 >60 mL/min/1.73    eGFR African Am 80 >60 mL/min/1.73    BUN/Creatinine Ratio 14.1 7.0 - 25.0    Sodium 142 136 - 145 mmol/L    Potassium 3.8 3.5 - 5.2 mmol/L    Chloride 102 98 - 107 mmol/L    Total CO2 28.5 22.0 - 29.0 mmol/L    Calcium 9.5 8.6 - 10.5 mg/dL    Total Protein 6.4 6.0 - 8.5 g/dL    Albumin 4.40 3.50 - 5.20 g/dL    Globulin 2.0 gm/dL    A/G Ratio 2.2 g/dL    Total Bilirubin 0.4 0.0 - 1.2 mg/dL    Alkaline Phosphatase 69 39 - 117 U/L    AST (SGOT) 18 1 - 32 U/L    ALT (SGPT) 17 1 - 33 U/L   TSH    Specimen: Blood   Result Value Ref Range    TSH 3.740 0.270 - 4.200 uIU/mL   T4, free    Specimen: Blood   Result Value Ref Range    Free T4 1.25 0.93 - 1.70 ng/dL   CBC & Differential    Specimen: Blood   Result Value Ref Range    WBC 4.75 3.40 - 10.80 10*3/mm3     RBC 4.13 3.77 - 5.28 10*6/mm3    Hemoglobin 13.9 12.0 - 15.9 g/dL    Hematocrit 40.2 34.0 - 46.6 %    MCV 97.3 (H) 79.0 - 97.0 fL    MCH 33.7 (H) 26.6 - 33.0 pg    MCHC 34.6 31.5 - 35.7 g/dL    RDW 12.4 12.3 - 15.4 %    Platelets 318 140 - 450 10*3/mm3    Neutrophil Rel % 51.8 42.7 - 76.0 %    Lymphocyte Rel % 34.9 19.6 - 45.3 %    Monocyte Rel % 8.2 5.0 - 12.0 %    Eosinophil Rel % 3.6 0.3 - 6.2 %    Basophil Rel % 1.3 0.0 - 1.5 %    Neutrophils Absolute 2.46 1.70 - 7.00 10*3/mm3    Lymphocytes Absolute 1.66 0.70 - 3.10 10*3/mm3    Monocytes Absolute 0.39 0.10 - 0.90 10*3/mm3    Eosinophils Absolute 0.17 0.00 - 0.40 10*3/mm3    Basophils Absolute 0.06 0.00 - 0.20 10*3/mm3    Immature Granulocyte Rel % 0.2 0.0 - 0.5 %    Immature Grans Absolute 0.01 0.00 - 0.05 10*3/mm3    nRBC 0.0 0.0 - 0.2 /100 WBC       A1C:No results for input(s): HGBA1C in the last 98387 hours.  PSA:No results for input(s): PSA in the last 97242 hours.  CBC:  Lab Results - Last 18 Months   Lab Units 10/06/20  0712 03/30/20  0741 09/10/19  0724   WBC 10*3/mm3 4.75 5.04 4.95   HEMOGLOBIN g/dL 13.9 15.1 14.0   HEMATOCRIT % 40.2 44.5 43.8   PLATELETS 10*3/mm3 318 320 308   IRON mcg/dL  --  121  --       BMP/CMP:  Lab Results - Last 18 Months   Lab Units 10/06/20  0712 03/30/20  0741 09/10/19  0724   SODIUM mmol/L 142 142 142   POTASSIUM mmol/L 3.8 3.7 3.9   CHLORIDE mmol/L 102 98 99   TOTAL CO2 mmol/L 28.5 30.4* 28.4   GLUCOSE mg/dL 88 92 94   BUN mg/dL 12 15 12   CREATININE mg/dL 0.85 0.97 0.89   EGFR IF NONAFRICN AM mL/min/1.73 66 56* 63   EGFR IF AFRICN AM mL/min/1.73 80 68 76   CALCIUM mg/dL 9.5 10.5 9.5     HEPATIC:  Lab Results - Last 18 Months   Lab Units 10/06/20  0712 03/30/20  0741 11/11/19  0730 09/10/19  0724   ALT (SGPT) U/L 17 17 19 15   AST (SGOT) U/L 18 15 21 18   ALK PHOS U/L 69 69 65 63     THYROID:  Lab Results - Last 18 Months   Lab Units 10/06/20  0712 03/30/20  0741 09/10/19  0724   TSH uIU/mL 3.740 3.820 3.970       Objective  "  /88 (BP Location: Right arm, Patient Position: Sitting, Cuff Size: Adult)   Pulse 88   Temp 97.7 °F (36.5 °C) (Infrared)   Resp 18   Ht 162.6 cm (64\")   Wt 93.4 kg (206 lb)   SpO2 98%   Breastfeeding No   BMI 35.36 kg/m²   Body mass index is 35.36 kg/m².    Recent Vitals       4/6/2020 7/1/2020 10/12/2020       BP:  --  124/78  134/88      Not able to get vitals       Pulse:  --  83  88     Temp:  --  97.7 °F (36.5 °C)  97.7 °F (36.5 °C)     Weight:  --  93.9 kg (207 lb)  93.4 kg (206 lb)     BMI (Calculated):  --  35.5  35.3           Physical Exam  GENERAL:  Well nourished/developed in no acute distress.   SKIN: Turgor excellent, without wound, rash, lesion (toenails painted)    HEENT: Normal cephalic without trauma.  Pupils equal round reactive to light. Extraocular motions full without nystagmus.   External canals nonobstructive nontender without reddness. Tymphatic membranes sharri with shanika structures intact.   Oral cavity without growths, exudates, and moist.  Posterior pharynx without mass, obstruction, redness.  No thyromegaly, mass, tenderness, lymphadenopathy and supple.  CV: Regular rhythm.  No murmur, gallop, trace LE edema. Posterior pulses intact.  No carotid bruits.  CHEST: No chest wall tenderness or mass.   LUNGS: Symmetric motion with clear to auscultation.   ABD: Soft, nontender without mass.   ORTHO: Symmetric extremities without swelling/point tenderness; even anterior R ankle.  Full gross range of motion.  NEURO: CN 2-12 grossly intact.  Symmetric facies and UE/LE. 3/5 strength throughout. 1/4 x bicep knee equal reflexes.  Nonfocal use extremities. Speech clear.  Reduced minimal light touch with monofilament, vibratory sensation with tuning fork; equal toes/distal feet.    PSYCH: Oriented x 3.  Pleasant calm, well kept.  Purposeful/directed conservation with intact short/long gross memory.     Assessment/Plan     1. Need for immunization against influenza    2. Encounter for " screening mammogram for breast cancer    3. Congestive heart failure, unspecified HF chronicity, unspecified heart failure type (CMS/[facility])    4. HTN (hypertension), benign    5. Mixed hyperlipidemia    6. SOB: #, hx PE, CHF,     7. Gastroesophageal reflux disease, unspecified whether esophagitis present    8. Chronic back pain, unspecified back location, unspecified back pain laterality    9. Osteoarthritis, unspecified osteoarthritis type, unspecified site    10. Depression, unspecified depression type    11. Anxiety    12. Hypopotassemia-diuretic    13. Anticoagulated-PE/cardiac arrest/xarelto        Discussions:   Use symbicort more often  Option of going back to Dr Cunningham when she is ready  Mammogram    Rx: reviewed/changes:  No orders of the defined types were placed in this encounter.    LAB/Testing/Referrals: reviewed/orders:   Today:   Orders Placed This Encounter   Procedures   • Mammo Screening Digital Tomosynthesis Bilateral With CAD   • Fluad Quad 65+ yrs (8705-1193)     Chronic/recurrent labs above or change to:   same     Body mass index is 35.36 kg/m².  Patient's Body mass index is 35.36 kg/m². BMI is above normal parameters. Recommendations include: exercise counseling and nutrition counseling.      Tobacco use reviewed:    Lucy Sousa  reports that she has never smoked. She has never used smokeless tobacco..       Patient Instructions   A new shingles vaccine (a shot to lower your chance of catching shingles) is now available (shingrix).  This vaccine is the second vaccine created for this purpose; we have had zostavax for years.  Shingrix provides a much better and longer immunity for shingles than zostavax.  For this and other reasons Shingrix can be started at age 50.  If you have had zostavax in the past; you can still take Shingrix.      This vaccine is not paid for in a doctor's office by medicare, medicaid and probably most insurances.  Like zostavax; this is covered in drug stores.  This  is a vaccine that if you chose to get you need to get at a drug store that gives vaccines (like [facility], [facility] and [facility].      If you get this vaccine; please let us know so we can record this on your record here.     #################################        Follow up: Return for 2w Tdap;   then lab/Dr Berg 6m.  Future Appointments   Date Time Provider Department Center   10/15/2020  1:30 PM Ari Lee PA [facility] None   10/26/2020  9:00 [facility] None   4/12/2021  8:20 AM LAB PC [facility] None   4/14/2021  9:00 AM Rosas Berg MD [facility] None       Procedures none

## 2020-10-12 NOTE — PATIENT INSTRUCTIONS
A new shingles vaccine (a shot to lower your chance of catching shingles) is now available (shingrix).  This vaccine is the second vaccine created for this purpose; we have had zostavax for years.  Shingrix provides a much better and longer immunity for shingles than zostavax.  For this and other reasons Shingrix can be started at age 50.  If you have had zostavax in the past; you can still take Shingrix.      This vaccine is not paid for in a doctor's office by medicare, medicaid and probably most insurances.  Like zostavax; this is covered in drug stores.  This is a vaccine that if you chose to get you need to get at a drug store that gives vaccines (like [facility], [facility] pharmacy and [facility].      If you get this vaccine; please let us know so we can record this on your record here.     #################################

## 2020-10-13 NOTE — PROGRESS NOTES
Pt here to be seen and discussed the benefits and risks of flu shot, as pt is due. Copy of VIS given to pt and verbal and written consent given. Given in R deltoid site, tolerated well.

## 2020-10-15 ENCOUNTER — TELEPHONE (OUTPATIENT)
Dept: OTOLARYNGOLOGY | Facility: CLINIC | Age: 70
End: 2020-10-15

## 2020-10-15 ENCOUNTER — OFFICE VISIT (OUTPATIENT)
Dept: OTOLARYNGOLOGY | Facility: CLINIC | Age: 70
End: 2020-10-15

## 2020-10-15 VITALS
BODY MASS INDEX: 34.91 KG/M2 | WEIGHT: 204.5 LBS | TEMPERATURE: 96.9 F | DIASTOLIC BLOOD PRESSURE: 91 MMHG | HEART RATE: 87 BPM | SYSTOLIC BLOOD PRESSURE: 144 MMHG | HEIGHT: 64 IN

## 2020-10-15 DIAGNOSIS — C44.329 SQUAMOUS CELL CARCINOMA OF SKIN OF RIGHT CHEEK: Primary | ICD-10-CM

## 2020-10-15 DIAGNOSIS — Z79.01 ANTICOAGULATED: ICD-10-CM

## 2020-10-15 PROCEDURE — 99214 OFFICE O/P EST MOD 30 MIN: CPT | Performed by: PHYSICIAN ASSISTANT

## 2020-10-15 NOTE — PATIENT INSTRUCTIONS
Discussion of skin lesion. Discussed risks, benefits, alternatives, and possible complications of excision of the skin lesion with reconstruction utilizing local tissue rearrangement, full-thickness skin grafting, or local interpolated flaps. Risks include, but are not limited too: bleeding, infection, hematoma, recurrence, need for additional procedures, flap failure, cosmetic deformity. Patient understands risks and would like to proceed with surgery.     Patient sees Dr. Underwood and will need clearance to stop the xarelto.

## 2020-10-15 NOTE — TELEPHONE ENCOUNTER
Patient is having excision of skin lesion on 10/26 by Dr. Le.  Need clearance for patient to come off Xarelto 7 days prior

## 2020-10-16 RX ORDER — FUROSEMIDE 20 MG/1
20 TABLET ORAL DAILY
Qty: 90 TABLET | Refills: 0 | Status: SHIPPED | OUTPATIENT
Start: 2020-10-16 | End: 2021-01-27

## 2020-10-16 NOTE — TELEPHONE ENCOUNTER
Caller: Lucy Sousa    Relationship: Self    Best call back number: [phone]    Medication needed:   Requested Prescriptions     Pending Prescriptions Disp Refills   • furosemide (LASIX) 20 MG tablet 90 tablet 1     Sig: Take 1 tablet by mouth Daily.       When do you need the refill by: 10/16/2020    Does the patient have less than a 3 day supply:  [x] Yes  [] No    What is the patient's preferred pharmacy: [facility] - [place] [id number] [address] [phone] [place] [phone]

## 2020-10-19 NOTE — TELEPHONE ENCOUNTER
Office staff [facility]  -advise patient we are aware of request to stop xarelto for her upcoming Dr Le excision  1. The reason for using anticoagulation is: anticoagulated-for PE/cardiac arrest    2.  If has to be stopped; risk for repeat would likely not be high but is not zero and therefore accepting this minor risk seems reasonable.  (example consider even a 10% annual risk for thromboembolic event a chance of holding the anticoagulation even 10 days would only give a risk of an event of <0.3%)    3. If she accepts this risk and is required to be off xarelto 7 days;   A. Stay off  B. 3rd day off start lovonex 1 mg/kg bid (Rx needs to be written)  C. Stop lovonex after evening dose before surgery (ie; no dose morning of surgery)  D. Restart xarelto JUST AS SOON as Dr Le will allow    Cc. Dr Underwood, Greg Arechiga, Bridgette Fofana    This patient takes Xarelto given a history of pulmonary embolism.  I have not seen her since May 2019.  My understanding is that her pulmonary embolism was unprovoked and there was not a definitive reversible cause, therefore she has been maintained on chronic anticoagulation.  Unfortunately, I am not aware of whether or not it is truly safe for her to come off of Xarelto without a Lovenox bridge at this time.  I would have to defer to her primary care provider to answer this question.  I will CC this response to Dr. Berg so that he may be able to weigh in as well.

## 2020-10-20 ENCOUNTER — TRANSCRIBE ORDERS (OUTPATIENT)
Dept: ADMINISTRATIVE | Facility: HOSPITAL | Age: 70
End: 2020-10-20

## 2020-10-20 ENCOUNTER — TELEPHONE (OUTPATIENT)
Dept: OTOLARYNGOLOGY | Facility: CLINIC | Age: 70
End: 2020-10-20

## 2020-10-20 DIAGNOSIS — Z01.818 PRE-OP TESTING: Primary | ICD-10-CM

## 2020-10-20 NOTE — TELEPHONE ENCOUNTER
I spoke with Bridgette at ENT, she said did not need a letter and will follow Dr. Underwood's recommendations on stopping the medication for procedure.  Greg Arechiga, CMA

## 2020-10-20 NOTE — TELEPHONE ENCOUNTER
Requested Prescriptions     Pending Prescriptions Disp Refills   • enoxaparin (Lovenox) 100 MG/ML solution syringe 8 mL 0     Sig: Inject 1 mL under the skin into the appropriate area as directed Every 12 (Twelve) Hours for 4 days. After stopping warfarin/coumadin for 3 days.

## 2020-10-20 NOTE — TELEPHONE ENCOUNTER
Yes; I sent this to my staff to call patient and take care of instructions and Rx      Dr. Berg,  This is Dr. Underwood's recommendation on patient but he has not seen patient since May 2019. Are you in agreement? Will you take care of Lovenox?     Note     Office staff [facility] [place]  -advise patient we are aware of request to stop xarelto for her upcoming Dr Le excision  1. The reason for using anticoagulation is: anticoagulated-for PE/cardiac arrest     2.  If has to be stopped; risk for repeat would likely not be high but is not zero and therefore accepting this minor risk seems reasonable.  (example consider even a 10% annual risk for thromboembolic event a chance of holding the anticoagulation even 10 days would only give a risk of an event of <0.3%)     3. If she accepts this risk and is required to be off xarelto 7 days;   A. Stay off  B. 3rd day off start lovonex 1 mg/kg bid (Rx needs to be written)  C. Stop lovonex after evening dose before surgery (ie; no dose morning of surgery)  D. Restart xarelto JUST AS SOON as Dr Le will allow     Cc. Dr Underwood, Greg Arechiga, Bridgette Fofana     This patient takes Xarelto given a history of pulmonary embolism.  I have not seen her since May 2019.  My understanding is that her pulmonary embolism was unprovoked and there was not a definitive reversible cause, therefore she has been maintained on chronic anticoagulation.  Unfortunately, I am not aware of whether or not it is truly safe for her to come off of Xarelto without a Lovenox bridge at this time.  I would have to defer to her primary care provider to answer this question.  I will CC this response to Dr. Berg so that he may be able to weigh in as well.

## 2020-10-21 ENCOUNTER — HOSPITAL ENCOUNTER (OUTPATIENT)
Dept: GENERAL RADIOLOGY | Facility: HOSPITAL | Age: 70
Discharge: HOME | End: 2020-10-21

## 2020-10-21 ENCOUNTER — APPOINTMENT (OUTPATIENT)
Dept: PREADMISSION TESTING | Facility: HOSPITAL | Age: 70
End: 2020-10-21

## 2020-10-21 VITALS
RESPIRATION RATE: 16 BRPM | WEIGHT: 207.45 LBS | HEART RATE: 80 BPM | DIASTOLIC BLOOD PRESSURE: 78 MMHG | OXYGEN SATURATION: 97 % | SYSTOLIC BLOOD PRESSURE: 154 MMHG | HEIGHT: 64 IN | BODY MASS INDEX: 35.42 KG/M2

## 2020-10-21 DIAGNOSIS — Z79.01 ANTICOAGULATED: ICD-10-CM

## 2020-10-21 DIAGNOSIS — C44.329 SQUAMOUS CELL CARCINOMA OF SKIN OF RIGHT CHEEK: ICD-10-CM

## 2020-10-21 LAB
ALBUMIN SERPL-MCNC: 4.2 G/DL (ref 3.5–5.2)
ALBUMIN/GLOB SERPL: 1.6 G/DL
ALP SERPL-CCNC: 63 U/L (ref 39–117)
ALT SERPL W P-5'-P-CCNC: 16 U/L (ref 1–33)
ANION GAP SERPL CALCULATED.3IONS-SCNC: 7 MMOL/L (ref 5–15)
AST SERPL-CCNC: 19 U/L (ref 1–32)
BASOPHILS # BLD AUTO: 0.06 10*3/MM3 (ref 0–0.2)
BASOPHILS NFR BLD AUTO: 1 % (ref 0–1.5)
BILIRUB SERPL-MCNC: 0.4 MG/DL (ref 0–1.2)
BUN SERPL-MCNC: 14 MG/DL (ref 8–23)
BUN/CREAT SERPL: 19.4 (ref 7–25)
CALCIUM SPEC-SCNC: 9.9 MG/DL (ref 8.6–10.5)
CHLORIDE SERPL-SCNC: 102 MMOL/L (ref 98–107)
CO2 SERPL-SCNC: 31 MMOL/L (ref 22–29)
CREAT SERPL-MCNC: 0.72 MG/DL (ref 0.57–1)
DEPRECATED RDW RBC AUTO: 45.9 FL (ref 37–54)
EOSINOPHIL # BLD AUTO: 0.1 10*3/MM3 (ref 0–0.4)
EOSINOPHIL NFR BLD AUTO: 1.7 % (ref 0.3–6.2)
ERYTHROCYTE [DISTWIDTH] IN BLOOD BY AUTOMATED COUNT: 12.9 % (ref 12.3–15.4)
GFR SERPL CREATININE-BSD FRML MDRD: 80 ML/MIN/1.73
GLOBULIN UR ELPH-MCNC: 2.6 GM/DL
GLUCOSE SERPL-MCNC: 109 MG/DL (ref 65–99)
HCT VFR BLD AUTO: 40.9 % (ref 34–46.6)
HGB BLD-MCNC: 13.7 G/DL (ref 12–15.9)
IMM GRANULOCYTES # BLD AUTO: 0.01 10*3/MM3 (ref 0–0.05)
IMM GRANULOCYTES NFR BLD AUTO: 0.2 % (ref 0–0.5)
LYMPHOCYTES # BLD AUTO: 1.46 10*3/MM3 (ref 0.7–3.1)
LYMPHOCYTES NFR BLD AUTO: 25 % (ref 19.6–45.3)
MCH RBC QN AUTO: 32.7 PG (ref 26.6–33)
MCHC RBC AUTO-ENTMCNC: 33.5 G/DL (ref 31.5–35.7)
MCV RBC AUTO: 97.6 FL (ref 79–97)
MONOCYTES # BLD AUTO: 0.53 10*3/MM3 (ref 0.1–0.9)
MONOCYTES NFR BLD AUTO: 9.1 % (ref 5–12)
NEUTROPHILS NFR BLD AUTO: 3.68 10*3/MM3 (ref 1.7–7)
NEUTROPHILS NFR BLD AUTO: 63 % (ref 42.7–76)
NRBC BLD AUTO-RTO: 0 /100 WBC (ref 0–0.2)
PLATELET # BLD AUTO: 298 10*3/MM3 (ref 140–450)
PMV BLD AUTO: 9.1 FL (ref 6–12)
POTASSIUM SERPL-SCNC: 3.4 MMOL/L (ref 3.5–5.2)
PROT SERPL-MCNC: 6.8 G/DL (ref 6–8.5)
RBC # BLD AUTO: 4.19 10*6/MM3 (ref 3.77–5.28)
SODIUM SERPL-SCNC: 140 MMOL/L (ref 136–145)
WBC # BLD AUTO: 5.84 10*3/MM3 (ref 3.4–10.8)

## 2020-10-21 PROCEDURE — 85025 COMPLETE CBC W/AUTO DIFF WBC: CPT | Performed by: PHYSICIAN ASSISTANT

## 2020-10-21 PROCEDURE — 93005 ELECTROCARDIOGRAM TRACING: CPT

## 2020-10-21 PROCEDURE — 80053 COMPREHEN METABOLIC PANEL: CPT | Performed by: PHYSICIAN ASSISTANT

## 2020-10-21 PROCEDURE — 93010 ELECTROCARDIOGRAM REPORT: CPT | Performed by: INTERNAL MEDICINE

## 2020-10-21 PROCEDURE — 36415 COLL VENOUS BLD VENIPUNCTURE: CPT

## 2020-10-21 PROCEDURE — 71046 X-RAY EXAM CHEST 2 VIEWS: CPT

## 2020-10-21 NOTE — DISCHARGE INSTRUCTIONS
DAY OF SURGERY INSTRUCTIONS        YOUR SURGEON: ***more navarrete    PROCEDURE: ***excision of skin lesion    DATE OF SURGERY: ***10/26/20    ARRIVAL TIME: AS DIRECTED BY OFFICE    YOU MAY TAKE THE FOLLOWING MEDICATION(S) THE MORNING OF SURGERY WITH A SIP OF WATER: ***    ALL OTHER HOME MEDICATIONS CHECK WITH YOUR DOCTOR    DO NOT TAKE ANY ERECTILE DYSFUNCTION MEDICATIONS (EX:  CIALIS, VIAGRA) 24 HOURS PRIOR TO SURGERY              MANAGING PAIN AFTER SURGERY    We know you are probably wondering what your pain will be like after surgery.  Following surgery it is unrealistic to expect you will not have pain.   Pain is how our bodies let us know that something is wrong or cautions us to be careful.  That said, our goal is to make your pain tolerable.    Methods we may use to treat your pain include (oral or IV medications, PCAs, epidurals, nerve blocks, etc.)   While some procedures require IV pain medications for a short time after surgery, transitioning to pain medications by mouth allows for better management of pain.   Your nurse will encourage you to take oral pain medications whenever possible.  IV medications work almost immediately, but only last a short while.  Taking medications by mouth allows for a more constant level of medication in your blood stream for a longer period of time.      Once your pain is out of control it is harder to get back under control.  It is important you are aware when your next dose of pain medication is due.  If you are admitted, your nurse may write the time of your next dose on the white board in your room to help you remember.      We are interested in your pain and encourage you to inform us about aggravating factors during your visit.   Many times a simple repositioning every few hours can make a big difference.    If your physician says it is okay, do not let your pain prevent you from getting out of bed. Be sure to call your nurse for assistance prior to getting up so you do  not fall.      Before surgery, please decide your tolerable pain goal.  These faces help describe the pain ratings we use on a 0-10 scale.   Be prepared to tell us your goal and whether or not you take pain or anxiety medications at home.      BEFORE YOU COME TO THE HOSPITAL  (Pre-op instructions)  • Do not eat, drink, smoke or chew gum after midnight the night before surgery.  This also includes no mints.  • Morning of surgery take only the medicines you have been instructed with a sip of water unless otherwise instructed  by your physician.  • Do not shave, wear makeup or dark nail polish.  • Remove all jewelry including rings.  • Leave anything you consider valuable at home.  • Leave your suitcase in the car until after your surgery.  • Bring the following with you if applicable:  o Picture ID and insurance, Medicare or Medicaid cards  o Co-pay/deductible required by insurance (cash, check, credit card)  o Copy of advance directive, living will or power-of- documents if not brought to PAT  o CPAP or BIPAP mask and tubing  o Relaxation aids ( book, magazine), etc.  o Hearing aids                                 ON THE DAY OF SURGERY  · On the day of surgery check in at registration located at the main entrance of the hospital.   ? You will be registered and given a beeper with instructions where to wait in the main lobby.  ? When your beeper lights up and vibrates a member of the Outpatient Surgery staff will meet you at the double doors under the stair steps and escort you to your preoperative room.   · You may have cloth compression devices placed on your legs. These help to prevent blood clots and reduce swelling in your legs.  · An IV may be inserted into one of your veins.  · In the operating room, you may be given one or more of the following:  ? A medicine to help you relax (sedative).  ? A medicine to numb the area (local anesthetic).  ? A medicine to make you fall asleep (general anesthetic).  ? A  "medicine that is injected into an area of your body to numb everything below the injection site (regional anesthetic).  · Your surgical site will be marked or identified.  · You may be given an antibiotic through your IV to help prevent infection.  Contact a health care provider if you:  · Develop a fever of more than 100.4°F (38°C) or other feelings of illness during the 48 hours before your surgery.  · Have symptoms that get worse.  Have questions or concerns about your surgery    General Anesthesia/Surgery, Adult  General anesthesia is the use of medicines to make a person \"go to sleep\" (unconscious) for a medical procedure. General anesthesia must be used for certain procedures, and is often recommended for procedures that:  · Last a long time.  · Require you to be still or in an unusual position.  · Are major and can cause blood loss.  The medicines used for general anesthesia are called general anesthetics. As well as making you unconscious for a certain amount of time, these medicines:  · Prevent pain.  · Control your blood pressure.  · Relax your muscles.  Tell a health care provider about:  · Any allergies you have.  · All medicines you are taking, including vitamins, herbs, eye drops, creams, and over-the-counter medicines.  · Any problems you or family members have had with anesthetic medicines.  · Types of anesthetics you have had in the past.  · Any blood disorders you have.  · Any surgeries you have had.  · Any medical conditions you have.  · Any recent upper respiratory, chest, or ear infections.  · Any history of:  ? Heart or lung conditions, such as heart failure, sleep apnea, asthma, or chronic obstructive pulmonary disease (COPD).  ?  service.  ? Depression or anxiety.  · Any tobacco or drug use, including marijuana or alcohol use.  · Whether you are pregnant or may be pregnant.  What are the risks?  Generally, this is a safe procedure. However, problems may occur, including:  · Allergic " reaction.  · Lung and heart problems.  · Inhaling food or liquid from the stomach into the lungs (aspiration).  · Nerve injury.  · Air in the bloodstream, which can lead to stroke.  · Extreme agitation or confusion (delirium) when you wake up from the anesthetic.  · Waking up during your procedure and being unable to move. This is rare.  These problems are more likely to develop if you are having a major surgery or if you have an advanced or serious medical condition. You can prevent some of these complications by answering all of your health care provider's questions thoroughly and by following all instructions before your procedure.  General anesthesia can cause side effects, including:  · Nausea or vomiting.  · A sore throat from the breathing tube.  · Hoarseness.  · Wheezing or coughing.  · Shaking chills.  · Tiredness.  · Body aches.  · Anxiety.  · Sleepiness or drowsiness.  · Confusion or agitation.  RISKS AND COMPLICATIONS OF SURGERY  Your health care provider will discuss possible risks and complications with you before surgery. Common risks and complications include:    · Problems due to the use of anesthetics.  · Blood loss and replacement (does not apply to minor surgical procedures).  · Temporary increase in pain due to surgery.  · Uncorrected pain or problems that the surgery was meant to correct.  · Infection.  · New damage.    What happens before the procedure?    Medicines  Ask your health care provider about:  · Changing or stopping your regular medicines. This is especially important if you are taking diabetes medicines or blood thinners.  · Taking medicines such as aspirin and ibuprofen. These medicines can thin your blood. Do not take these medicines unless your health care provider tells you to take them.  · Taking over-the-counter medicines, vitamins, herbs, and supplements. Do not take these during the week before your procedure unless your health care provider approves them.  General  instructions  · Starting 3-6 weeks before the procedure, do not use any products that contain nicotine or tobacco, such as cigarettes and e-cigarettes. If you need help quitting, ask your health care provider.  · If you brush your teeth on the morning of the procedure, make sure to spit out all of the toothpaste.  · Tell your health care provider if you become ill or develop a cold, cough, or fever.  · If instructed by your health care provider, bring your sleep apnea device with you on the day of your surgery (if applicable).  · Ask your health care provider if you will be going home the same day, the following day, or after a longer hospital stay.  ? Plan to have someone take you home from the hospital or clinic.  ? Plan to have a responsible adult care for you for at least 24 hours after you leave the hospital or clinic. This is important.  What happens during the procedure?  · You will be given anesthetics through both of the following:  ? A mask placed over your nose and mouth.  ? An IV in one of your veins.  · You may receive a medicine to help you relax (sedative).  · After you are unconscious, a breathing tube may be inserted down your throat to help you breathe. This will be removed before you wake up.  · An anesthesia specialist will stay with you throughout your procedure. He or she will:  ? Keep you comfortable and safe by continuing to give you medicines and adjusting the amount of medicine that you get.  ? Monitor your blood pressure, pulse, and oxygen levels to make sure that the anesthetics do not cause any problems.  The procedure may vary among health care providers and hospitals.  What happens after the procedure?  · Your blood pressure, temperature, heart rate, breathing rate, and blood oxygen level will be monitored until the medicines you were given have worn off.  · You will wake up in a recovery area. You may wake up slowly.  · If you feel anxious or agitated, you may be given medicine to  help you calm down.  · If you will be going home the same day, your health care provider may check to make sure you can walk, drink, and urinate.  · Your health care provider will treat any pain or side effects you have before you go home.  · Do not drive for 24 hours if you were given a sedative.  Summary  · General anesthesia is used to keep you still and prevent pain during a procedure.  · It is important to tell your healthcare provider about your medical history and any surgeries you have had, and previous experience with anesthesia.  · Follow your healthcare provider’s instructions about when to stop eating, drinking, or taking certain medicines before your procedure.  · Plan to have someone take you home from the hospital or clinic.  This information is not intended to replace advice given to you by your health care provider. Make sure you discuss any questions you have with your health care provider.  Document Released: 03/26/2009 Document Revised: 08/03/2018 Document Reviewed: 08/03/2018  [employer] Interactive Patient Education © 2019 [employer]      Fall Prevention in Hospitals, Adult  As a hospital patient, your condition and the treatments you receive can increase your risk for falls. Some additional risk factors for falls in a hospital include:  · Being in an unfamiliar environment.  · Being on bed rest.  · Your surgery.  · Taking certain medicines.  · Your tubing requirements, such as intravenous (IV) therapy or catheters.  It is important that you learn how to decrease fall risks while at the hospital. Below are important tips that can help prevent falls.  SAFETY TIPS FOR PREVENTING FALLS  Talk about your risk of falling.  · Ask your health care provider why you are at risk for falling. Is it your medicine, illness, tubing placement, or something else?  · Make a plan with your health care provider to keep you safe from falls.  · Ask your health care provider or pharmacist about side effects of your  medicines. Some medicines can make you dizzy or affect your coordination.  Ask for help.  · Ask for help before getting out of bed. You may need to press your call button.  · Ask for assistance in getting safely to the toilet.  · Ask for a walker or cane to be put at your bedside. Ask that most of the side rails on your bed be placed up before your health care provider leaves the room.  · Ask family or friends to sit with you.  · Ask for things that are out of your reach, such as your glasses, hearing aids, telephone, bedside table, or call button.  Follow these tips to avoid falling:  · Stay lying or seated, rather than standing, while waiting for help.  · Wear rubber-soled slippers or shoes whenever you walk in the hospital.  · Avoid quick, sudden movements.  ¨ Change positions slowly.  ¨ Sit on the side of your bed before standing.  ¨ Stand up slowly and wait before you start to walk.  · Let your health care provider know if there is a spill on the floor.  · Pay careful attention to the medical equipment, electrical cords, and tubes around you.  · When you need help, use your call button by your bed or in the bathroom. Wait for one of your health care providers to help you.  · If you feel dizzy or unsure of your footing, return to bed and wait for assistance.  · Avoid being distracted by the TV, telephone, or another person in your room.  · Do not lean or support yourself on rolling objects, such as IV poles or bedside tables.     This information is not intended to replace advice given to you by your health care provider. Make sure you discuss any questions you have with your health care provider.     Document Released: 12/15/2001 Document Revised: 01/08/2016 Document Reviewed: 08/25/2013  [employer] Interactive Patient Education ©2016 [employer]            PATIENT/FAMILY/RESPONSIBLE PARTY VERBALIZES UNDERSTANDING OF ABOVE EDUCATION.  COPY OF PAIN SCALE GIVEN AND REVIEWED WITH VERBALIZED UNDERSTANDING.

## 2020-10-26 ENCOUNTER — HOSPITAL ENCOUNTER (OUTPATIENT)
Facility: HOSPITAL | Age: 70
Setting detail: HOSPITAL OUTPATIENT SURGERY
Discharge: HOME | End: 2020-10-26
Attending: OTOLARYNGOLOGY | Admitting: OTOLARYNGOLOGY

## 2020-10-26 ENCOUNTER — LAB (OUTPATIENT)
Dept: LAB | Facility: HOSPITAL | Age: 70
End: 2020-10-26

## 2020-10-26 ENCOUNTER — ANESTHESIA EVENT (OUTPATIENT)
Dept: PERIOP | Facility: HOSPITAL | Age: 70
End: 2020-10-26

## 2020-10-26 ENCOUNTER — ANESTHESIA (OUTPATIENT)
Dept: PERIOP | Facility: HOSPITAL | Age: 70
End: 2020-10-26

## 2020-10-26 VITALS
RESPIRATION RATE: 18 BRPM | HEART RATE: 74 BPM | DIASTOLIC BLOOD PRESSURE: 75 MMHG | OXYGEN SATURATION: 97 % | SYSTOLIC BLOOD PRESSURE: 140 MMHG | TEMPERATURE: 98.8 F

## 2020-10-26 DIAGNOSIS — C44.329 SQUAMOUS CELL CARCINOMA OF SKIN OF RIGHT CHEEK: Primary | ICD-10-CM

## 2020-10-26 DIAGNOSIS — Z79.01 ANTICOAGULATED: ICD-10-CM

## 2020-10-26 LAB — SARS-COV-2 RDRP RESP QL NAA+PROBE: NOT DETECTED

## 2020-10-26 PROCEDURE — 25010000002 PROPOFOL 10 MG/ML EMULSION: Performed by: NURSE ANESTHETIST, CERTIFIED REGISTERED

## 2020-10-26 PROCEDURE — 87635 SARS-COV-2 COVID-19 AMP PRB: CPT | Performed by: OTOLARYNGOLOGY

## 2020-10-26 PROCEDURE — 88332 PATH CONSLTJ SURG EA ADD BLK: CPT | Performed by: PATHOLOGY

## 2020-10-26 PROCEDURE — C9803 HOPD COVID-19 SPEC COLLECT: HCPCS | Performed by: OTOLARYNGOLOGY

## 2020-10-26 PROCEDURE — 88305 TISSUE EXAM BY PATHOLOGIST: CPT | Performed by: OTOLARYNGOLOGY

## 2020-10-26 PROCEDURE — 14040 TIS TRNFR F/C/C/M/N/A/G/H/F: CPT | Performed by: OTOLARYNGOLOGY

## 2020-10-26 RX ORDER — LIDOCAINE HYDROCHLORIDE 10 MG/ML
0.5 INJECTION, SOLUTION EPIDURAL; INFILTRATION; INTRACAUDAL; PERINEURAL ONCE AS NEEDED
Status: DISCONTINUED | OUTPATIENT
Start: 2020-10-26 | End: 2020-10-26 | Stop reason: HOSPADM

## 2020-10-26 RX ORDER — SUFENTANIL CITRATE 50 UG/ML
INJECTION EPIDURAL; INTRAVENOUS AS NEEDED
Status: DISCONTINUED | OUTPATIENT
Start: 2020-10-26 | End: 2020-10-26 | Stop reason: SURG

## 2020-10-26 RX ORDER — SODIUM CHLORIDE 0.9 % (FLUSH) 0.9 %
3 SYRINGE (ML) INJECTION AS NEEDED
Status: DISCONTINUED | OUTPATIENT
Start: 2020-10-26 | End: 2020-10-26 | Stop reason: HOSPADM

## 2020-10-26 RX ORDER — SODIUM CHLORIDE 0.9 % (FLUSH) 0.9 %
10 SYRINGE (ML) INJECTION EVERY 12 HOURS SCHEDULED
Status: DISCONTINUED | OUTPATIENT
Start: 2020-10-26 | End: 2020-10-26 | Stop reason: HOSPADM

## 2020-10-26 RX ORDER — FENTANYL CITRATE 50 UG/ML
25 INJECTION, SOLUTION INTRAMUSCULAR; INTRAVENOUS
Status: DISCONTINUED | OUTPATIENT
Start: 2020-10-26 | End: 2020-10-26 | Stop reason: HOSPADM

## 2020-10-26 RX ORDER — NALOXONE HCL 0.4 MG/ML
0.4 VIAL (ML) INJECTION AS NEEDED
Status: CANCELLED | OUTPATIENT
Start: 2020-10-26

## 2020-10-26 RX ORDER — SODIUM CHLORIDE 0.9 % (FLUSH) 0.9 %
10 SYRINGE (ML) INJECTION AS NEEDED
Status: DISCONTINUED | OUTPATIENT
Start: 2020-10-26 | End: 2020-10-26 | Stop reason: HOSPADM

## 2020-10-26 RX ORDER — OXYCODONE AND ACETAMINOPHEN 10; 325 MG/1; MG/1
1 TABLET ORAL ONCE AS NEEDED
Status: CANCELLED | OUTPATIENT
Start: 2020-10-26

## 2020-10-26 RX ORDER — ONDANSETRON 4 MG/1
4 TABLET, FILM COATED ORAL ONCE AS NEEDED
Status: DISCONTINUED | OUTPATIENT
Start: 2020-10-26 | End: 2020-10-26 | Stop reason: HOSPADM

## 2020-10-26 RX ORDER — OXYCODONE AND ACETAMINOPHEN 7.5; 325 MG/1; MG/1
2 TABLET ORAL EVERY 4 HOURS PRN
Status: CANCELLED | OUTPATIENT
Start: 2020-10-26 | End: 2020-11-05

## 2020-10-26 RX ORDER — FENTANYL CITRATE 50 UG/ML
25 INJECTION, SOLUTION INTRAMUSCULAR; INTRAVENOUS
Status: CANCELLED | OUTPATIENT
Start: 2020-10-26

## 2020-10-26 RX ORDER — FLUMAZENIL 0.1 MG/ML
0.2 INJECTION INTRAVENOUS AS NEEDED
Status: CANCELLED | OUTPATIENT
Start: 2020-10-26

## 2020-10-26 RX ORDER — LABETALOL HYDROCHLORIDE 5 MG/ML
5 INJECTION, SOLUTION INTRAVENOUS
Status: CANCELLED | OUTPATIENT
Start: 2020-10-26

## 2020-10-26 RX ORDER — HYDROCODONE BITARTRATE AND ACETAMINOPHEN 5; 325 MG/1; MG/1
1 TABLET ORAL ONCE AS NEEDED
Status: DISCONTINUED | OUTPATIENT
Start: 2020-10-26 | End: 2020-10-26 | Stop reason: HOSPADM

## 2020-10-26 RX ORDER — SODIUM CHLORIDE, SODIUM LACTATE, POTASSIUM CHLORIDE, CALCIUM CHLORIDE 600; 310; 30; 20 MG/100ML; MG/100ML; MG/100ML; MG/100ML
1000 INJECTION, SOLUTION INTRAVENOUS CONTINUOUS
Status: DISCONTINUED | OUTPATIENT
Start: 2020-10-26 | End: 2020-10-26 | Stop reason: HOSPADM

## 2020-10-26 RX ORDER — SODIUM CHLORIDE, SODIUM LACTATE, POTASSIUM CHLORIDE, CALCIUM CHLORIDE 600; 310; 30; 20 MG/100ML; MG/100ML; MG/100ML; MG/100ML
9 INJECTION, SOLUTION INTRAVENOUS CONTINUOUS
Status: DISCONTINUED | OUTPATIENT
Start: 2020-10-26 | End: 2020-10-26 | Stop reason: HOSPADM

## 2020-10-26 RX ORDER — ONDANSETRON 2 MG/ML
4 INJECTION INTRAMUSCULAR; INTRAVENOUS ONCE AS NEEDED
Status: CANCELLED | OUTPATIENT
Start: 2020-10-26

## 2020-10-26 RX ORDER — HYDROCODONE BITARTRATE AND ACETAMINOPHEN 5; 325 MG/1; MG/1
1 TABLET ORAL EVERY 4 HOURS PRN
Qty: 8 TABLET | Refills: 0 | Status: SHIPPED | OUTPATIENT
Start: 2020-10-26 | End: 2020-12-09

## 2020-10-26 RX ORDER — LIDOCAINE HYDROCHLORIDE AND EPINEPHRINE 10; 10 MG/ML; UG/ML
INJECTION, SOLUTION INFILTRATION; PERINEURAL AS NEEDED
Status: DISCONTINUED | OUTPATIENT
Start: 2020-10-26 | End: 2020-10-26 | Stop reason: HOSPADM

## 2020-10-26 RX ORDER — IBUPROFEN 600 MG/1
600 TABLET ORAL ONCE AS NEEDED
Status: CANCELLED | OUTPATIENT
Start: 2020-10-26

## 2020-10-26 RX ORDER — DEXTROSE MONOHYDRATE 25 G/50ML
12.5 INJECTION, SOLUTION INTRAVENOUS AS NEEDED
Status: DISCONTINUED | OUTPATIENT
Start: 2020-10-26 | End: 2020-10-26 | Stop reason: HOSPADM

## 2020-10-26 RX ORDER — PROPOFOL 10 MG/ML
VIAL (ML) INTRAVENOUS AS NEEDED
Status: DISCONTINUED | OUTPATIENT
Start: 2020-10-26 | End: 2020-10-26 | Stop reason: SURG

## 2020-10-26 RX ADMIN — SUFENTANIL CITRATE 10 MCG: 50 INJECTION EPIDURAL; INTRAVENOUS at 13:58

## 2020-10-26 RX ADMIN — SUFENTANIL CITRATE 5 MCG: 50 INJECTION EPIDURAL; INTRAVENOUS at 14:18

## 2020-10-26 RX ADMIN — SUFENTANIL CITRATE 5 MCG: 50 INJECTION EPIDURAL; INTRAVENOUS at 14:32

## 2020-10-26 RX ADMIN — PROPOFOL 41 MCG/KG/MIN: 10 INJECTION, EMULSION INTRAVENOUS at 13:55

## 2020-10-26 RX ADMIN — PROPOFOL 10 MG: 10 INJECTION, EMULSION INTRAVENOUS at 14:08

## 2020-10-26 RX ADMIN — SODIUM CHLORIDE, POTASSIUM CHLORIDE, SODIUM LACTATE AND CALCIUM CHLORIDE 1000 ML: 600; 310; 30; 20 INJECTION, SOLUTION INTRAVENOUS at 12:20

## 2020-10-26 RX ADMIN — PROPOFOL 30 MG: 10 INJECTION, EMULSION INTRAVENOUS at 13:58

## 2020-10-26 NOTE — ANESTHESIA POSTPROCEDURE EVALUATION
Patient: Lucy Sousa    Procedure Summary     Date: 10/26/20 Room / Location: [facility] OR  /  [facility] OR    Anesthesia Start: 1356 Anesthesia Stop: 1503    Procedures:       EXCISION ATYPICAL LESION/SQUAMOUS CELL CARCINOMA ON THE RIGHT CENTRAL MALAR CHEEK WITH FROZEN SECTION AND POSSIBLE FLAP (Right )      POSSIBLE FLAP (Right ) Diagnosis:       Squamous cell carcinoma of skin of right cheek      Anticoagulated      (Squamous cell carcinoma of skin of right cheek [C44.329])      (Anticoagulated [Z79.01])    Surgeon: Vadim Le MD Provider: Александр Hernandez CRNA    Anesthesia Type: MAC ASA Status: 2          Anesthesia Type: MAC    Vitals  Vitals Value Taken Time   /96 10/26/20 1506   Temp 98.8 °F (37.1 °C) 10/26/20 1505   Pulse 86 10/26/20 1509   Resp 16 10/26/20 1505   SpO2 98 % 10/26/20 1509   Vitals shown include unvalidated device data.        Anesthesia Post Evaluation

## 2020-10-26 NOTE — ANESTHESIA POSTPROCEDURE EVALUATION
Patient: Lucy Sousa    Procedure Summary     Date: 10/26/20 Room / Location: [facility] OR  /  [facility] OR    Anesthesia Start: 1356 Anesthesia Stop: 1508    Procedures:       EXCISION ATYPICAL LESION/SQUAMOUS CELL CARCINOMA ON THE RIGHT CENTRAL MALAR CHEEK WITH FROZEN SECTION AND POSSIBLE FLAP (Right )      POSSIBLE FLAP (Right ) Diagnosis:       Squamous cell carcinoma of skin of right cheek      Anticoagulated      (Squamous cell carcinoma of skin of right cheek [C44.329])      (Anticoagulated [Z79.01])    Surgeon: Vadim Le MD Provider: Александр Hernandez CRNA    Anesthesia Type: MAC ASA Status: 2          Anesthesia Type: MAC    Vitals  Vitals Value Taken Time   BP     Temp     Pulse 84 10/26/20 1508   Resp     SpO2 100 % 10/26/20 1508   Vitals shown include unvalidated device data.        Post Anesthesia Care and Evaluation    Patient location during evaluation: PHASE II  Patient participation: complete - patient participated  Level of consciousness: awake and alert  Pain management: adequate  Airway patency: patent  Anesthetic complications: No anesthetic complications    Cardiovascular status: acceptable  Respiratory status: acceptable  Hydration status: acceptable

## 2020-10-26 NOTE — ANESTHESIA PREPROCEDURE EVALUATION
Anesthesia Evaluation     Patient summary reviewed   no history of anesthetic complications:  NPO Solid Status: > 8 hours             Airway   Mallampati: II  TM distance: >3 FB  Neck ROM: full  Dental      Pulmonary    (+) pulmonary embolism (2016),   (-) COPD, asthma, sleep apnea, not a smoker  Cardiovascular   Exercise tolerance: good (4-7 METS)    ECG reviewed    (+) hypertension,   (-) pacemaker, past MI, angina, cardiac stents      Neuro/Psych  (-) seizures, TIA, CVA  GI/Hepatic/Renal/Endo    (+) obesity,  GERD,    (-) liver disease, no renal disease, diabetes    Musculoskeletal     Abdominal    Substance History      OB/GYN          Other                        Anesthesia Plan    ASA 2     MAC       Anesthetic plan, all risks, benefits, and alternatives have been provided, discussed and informed consent has been obtained with: patient.

## 2020-11-02 ENCOUNTER — CLINICAL SUPPORT (OUTPATIENT)
Dept: FAMILY MEDICINE CLINIC | Facility: CLINIC | Age: 70
End: 2020-11-02

## 2020-11-02 DIAGNOSIS — Z23 IMMUNIZATION DUE: Primary | ICD-10-CM

## 2020-11-02 LAB
LAB AP CASE REPORT: NORMAL
LAB AP CLINICAL INFORMATION: NORMAL
LAB AP INTRADEPARTMENTAL CONSULT: NORMAL
Lab: NORMAL
PATH REPORT.FINAL DX SPEC: NORMAL
PATH REPORT.GROSS SPEC: NORMAL

## 2020-11-02 PROCEDURE — 90471 IMMUNIZATION ADMIN: CPT | Performed by: FAMILY MEDICINE

## 2020-11-02 PROCEDURE — 90715 TDAP VACCINE 7 YRS/> IM: CPT | Performed by: FAMILY MEDICINE

## 2020-11-02 NOTE — PROGRESS NOTES
Pt here for Tdap injection as ordered. Given in R deltoid . Pt tolerated well and no adverse reactions noted and pt left office.

## 2020-11-06 ENCOUNTER — OFFICE VISIT (OUTPATIENT)
Dept: OTOLARYNGOLOGY | Facility: CLINIC | Age: 70
End: 2020-11-06

## 2020-11-06 DIAGNOSIS — C44.329 SQUAMOUS CELL CARCINOMA OF SKIN OF RIGHT CHEEK: Primary | ICD-10-CM

## 2020-11-06 PROCEDURE — 99024 POSTOP FOLLOW-UP VISIT: CPT | Performed by: PHYSICIAN ASSISTANT

## 2020-11-18 ENCOUNTER — OFFICE VISIT (OUTPATIENT)
Dept: FAMILY MEDICINE CLINIC | Facility: CLINIC | Age: 70
End: 2020-11-18

## 2020-11-18 VITALS
SYSTOLIC BLOOD PRESSURE: 142 MMHG | HEART RATE: 73 BPM | WEIGHT: 207 LBS | HEIGHT: 64 IN | TEMPERATURE: 98.1 F | BODY MASS INDEX: 35.34 KG/M2 | RESPIRATION RATE: 16 BRPM | DIASTOLIC BLOOD PRESSURE: 78 MMHG | OXYGEN SATURATION: 98 %

## 2020-11-18 DIAGNOSIS — M79.672 PAIN OF LEFT HEEL: Primary | ICD-10-CM

## 2020-11-18 DIAGNOSIS — M79.672 PAIN OF LEFT HEEL: ICD-10-CM

## 2020-11-18 PROCEDURE — 99213 OFFICE O/P EST LOW 20 MIN: CPT | Performed by: NURSE PRACTITIONER

## 2020-11-18 NOTE — PROGRESS NOTES
Subjective   Chief Complaint:  Heel pain x1 month    History of Present Illness:  This 70 y.o. female was seen in the office today.  He was evaluated for left heel pain x1 month.  Reports difficulty walking and pressure makes it worse.  She is already tried padded insoles which has not helped.  She reports Tylenol for pain only.    Allergies   Allergen Reactions   • Macrobid [Nitrofurantoin Macrocrystal] Rash      Current Outpatient Medications on File Prior to Visit   Medication Sig   • ALPRAZolam (XANAX) 0.5 MG tablet Take 1 tablet by mouth 2 (Two) Times a Day As Needed for Anxiety.   • budesonide-formoterol (SYMBICORT) 160-4.5 MCG/ACT inhaler Inhale 2 puffs 2 (Two) Times a Day. (Patient taking differently: Inhale 2 puffs 2 (Two) Times a Day As Needed.)   • Cholecalciferol (VITAMIN D) 2000 UNITS tablet Take 1,000 Units by mouth Daily.   • cyanocobalamin (VITAMIN B-12) 50 MCG tablet tablet Take 50 mcg by mouth Daily.   • enoxaparin (LOVENOX) 100 MG/ML solution syringe Inject 100 mg under the skin into the appropriate area as directed Daily.   • esomeprazole (nexIUM) 40 MG capsule Take 1 capsule by mouth 2 (Two) Times a Day.   • furosemide (LASIX) 20 MG tablet Take 1 tablet by mouth Daily.   • HYDROcodone-acetaminophen (NORCO) 5-325 MG per tablet Take 1 tablet by mouth Every 4 (Four) Hours As Needed for Moderate Pain  or Severe Pain  (Pain) for up to 8 doses.   • losartan (COZAAR) 50 MG tablet TAKE ONE TABLET DAILY GENERIC FOR COZAAR   • Multiple Vitamin (MULTI VITAMIN DAILY PO) Take 1 tablet by mouth daily.   • potassium chloride (K-DUR,KLOR-CON) 20 MEQ CR tablet Take 1 tablet by mouth 2 (Two) Times a Day.   • triamterene-hydrochlorothiazide (DYAZIDE) 37.5-25 MG per capsule Take 1 capsule by mouth Every Morning.   • vitamin E 600 UNIT capsule Take 400 Units by mouth Daily.   • XARELTO 20 MG tablet TAKE ONE TABLET DAILY WITH DINNER     No current facility-administered medications on file prior to visit.       Past  Medical, Surgical, Social, and Family History:  Past Medical History:   Diagnosis Date   • Anxiety    • Cancer (CMS/HCC)     skin squamous cell   • Depression    • DJD (degenerative joint disease)    • GERD (gastroesophageal reflux disease)    • HTN (hypertension)    • Hx of colonic polyp    • Pulmonary embolism (CMS/HCC)     post surgical with cardiac arrest     Past Surgical History:   Procedure Laterality Date   • BREAST SURGERY      breast reduction   • CHOLECYSTECTOMY     • COLONOSCOPY  04/01/2019    Diverticulosis otherwise normal exam repeat in 5 years   • COLONOSCOPY W/ POLYPECTOMY  02/13/2014    Hyperplastic polyp at 20 cm, Diverticulosis repeat exam in 5 years   • EKOS CATHETER PLACEMENT Bilateral 11/17/2016    Procedure: Ekos catheter placement;  Surgeon: Daniel Underwood MD;  Location:  [facility] CATH INVASIVE LOCATION;  Service:    • EKOS CATHETER REMOVAL Bilateral 11/18/2016    Procedure: Ekos catheter removal with stent placement;  Surgeon: Daniel Underwood MD;  Location:  [facility] CATH INVASIVE LOCATION;  Service:    • ENDOSCOPY  01/22/2010    Negative endoscopy noting a healed gastric ulcer   • ENDOSCOPY  10/21/2009    Superficial mucosal erosion, chronic active gastritis   • FLAP HEAD/NECK Right 10/26/2020    Procedure: POSSIBLE FLAP;  Surgeon: Vadim Le MD;  Location:  [facility] OR;  Service: ENT;  Laterality: Right;   • FOOT SURGERY     • HEAD/NECK LESION/CYST EXCISION Right 10/26/2020    Procedure: 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 with transposition flap;  Surgeon: Vadim Le MD;  Location:  [facility] OR;  Service: ENT;  Laterality: Right;   • HERNIA REPAIR     • HYSTERECTOMY     • OOPHORECTOMY     • REPLACEMENT TOTAL KNEE     • TOTAL ABDOMINAL HYSTERECTOMY     • TUBAL ABDOMINAL LIGATION       Social History     Socioeconomic History   • Marital status:      Spouse name: Nima   • Number of children: 1   • Years of education: 13   • Highest  education level: Not on file   Occupational History   • Occupation: [employer]  de[employer]   Tobacco Use   • Smoking status: Never Smoker   • Smokeless tobacco: Never Used   Substance and Sexual Activity   • Alcohol use: No   • Drug use: No   • Sexual activity: Not Currently     Family History   Problem Relation Age of Onset   • Coronary artery disease Mother    • Coronary artery disease Father    • Diabetes Brother    • Hypertension Brother    • Colon polyps Brother    • Heart disease Maternal Grandfather    • Colon cancer Neg Hx      Review of Systems   Constitutional: Negative for appetite change, chills and fever.   Respiratory: Negative for cough and shortness of breath.    Cardiovascular: Negative for chest pain and palpitations.   Musculoskeletal: Negative for arthralgias and myalgias.        Positive for left heel pain     Objective   Physical Exam  Vitals signs and nursing note reviewed.   Constitutional:       General: She is not in acute distress.     Appearance: She is not diaphoretic.   HENT:      Head: Normocephalic and atraumatic.      Nose: Nose normal.   Eyes:      Conjunctiva/sclera: Conjunctivae normal.      Pupils: Pupils are equal, round, and reactive to light.   Neck:      Musculoskeletal: Normal range of motion and neck supple.      Thyroid: No thyromegaly.      Vascular: No JVD.      Trachea: No tracheal deviation.   Cardiovascular:      Rate and Rhythm: Normal rate and regular rhythm.      Heart sounds: Normal heart sounds. No murmur. No friction rub. No gallop.    Pulmonary:      Effort: Pulmonary effort is normal. No respiratory distress.      Breath sounds: Normal breath sounds. No wheezing.   Abdominal:      General: Bowel sounds are normal.      Palpations: Abdomen is soft.      Tenderness: There is no abdominal tenderness. There is no guarding.   Musculoskeletal: Normal range of motion.         General: Tenderness (left heel) present. No deformity.   Lymphadenopathy:      Cervical:  "No cervical adenopathy.   Skin:     General: Skin is warm and dry.      Capillary Refill: Capillary refill takes less than 2 seconds.   Neurological:      Mental Status: She is alert and oriented to person, place, and time.   Psychiatric:         Behavior: Behavior normal.         Thought Content: Thought content normal.         Judgment: Judgment normal.     /78 (BP Location: Left arm)   Pulse 73   Temp 98.1 °F (36.7 °C) (Tympanic)   Resp 16   Ht 162.6 cm (64\")   Wt 93.9 kg (207 lb)   SpO2 98%   BMI 35.53 kg/m²     Assessment/Plan   Diagnoses and all orders for this visit:    1. Pain of left heel (Primary)  -     XR calcaneUS 2+ vw left; Future    Discussion:  Advised and educated plan of care.  For stronger pain medicine, she did decline.  I advised against NSAID use based on current problem list and medications.  We will get an x-ray and see what is going on possible referral.    Follow-up:  Return for As Needed - Depending on Test Results - Will Call.    Note e-Signed by LANRE Hernandez on 11/18/2020 at 13:42 CST  "

## 2020-11-18 NOTE — PROGRESS NOTES
Please call the patient - results as listed on the report.  I think it would be best to persue a consultation with podiatry.  Prefer Dr. Cunningham for this.

## 2020-11-20 ENCOUNTER — TELEPHONE (OUTPATIENT)
Dept: FAMILY MEDICINE CLINIC | Facility: CLINIC | Age: 70
End: 2020-11-20

## 2020-11-20 NOTE — TELEPHONE ENCOUNTER
Patient called and stated that she had seen you this week and got a xr done of her foot and you then referred her to dr branham. She stated that you had discussed an MRI but there is no order.

## 2020-12-08 NOTE — PROGRESS NOTES
[facility] - PODIATRY    Today's Date: 12/09/20    Patient Name: Lucy Sousa  MRN: [id number]  CSN: [id number]  PCP: Rosas Berg MD  Referring Provider: Jef Rodriguez APRN    SUBJECTIVE     Chief Complaint   Patient presents with   • Left Foot - Pain   • Foot Pain     Pt is here today for left foot pain.  Pt states she fell a couple of months ago, but isn't sure if she injured her foot at that time.  Pt's pain level is a 6/10.  Last saw her PCP on 10/12/20.     HPI: Lucy Sousa, a 70 y.o.female, comes to clinic as a(n) established patient complaining of foot pain. Patient has h/o anxiety, SCC, Depression, DJD, GERD, HTN, PE. States that she fell about 2 months ago while gardening. After the fall her heel was sore but didn't have any bruising or significant pain. Notes that for the past 3 weeks she has started to have pain to the bottom of her left heel. Has most pain after periods of rest or standing. She has had xrays performed. Admits pain at 6/10 level and described as aching, throbbing and sharp. Denies previous treatment. Denies any constitutional symptoms. No other pedal complaints at this time.    Past Medical History:   Diagnosis Date   • Anxiety    • Cancer (CMS/HCC)     skin squamous cell   • Depression    • DJD (degenerative joint disease)    • GERD (gastroesophageal reflux disease)    • HTN (hypertension)    • Hx of colonic polyp    • Pulmonary embolism (CMS/HCC)     post surgical with cardiac arrest     Past Surgical History:   Procedure Laterality Date   • BREAST SURGERY      breast reduction   • CHOLECYSTECTOMY     • COLONOSCOPY  04/01/2019    Diverticulosis otherwise normal exam repeat in 5 years   • COLONOSCOPY W/ POLYPECTOMY  02/13/2014    Hyperplastic polyp at 20 cm, Diverticulosis repeat exam in 5 years   • EKOS CATHETER PLACEMENT Bilateral 11/17/2016    Procedure: Ekos catheter placement;  Surgeon: Daniel Underwood MD;  Location: [facility] CATH INVASIVE  LOCATION;  Service:    • EKOS CATHETER REMOVAL Bilateral 11/18/2016    Procedure: Ekos catheter removal with stent placement;  Surgeon: Daniel Underwood MD;  Location:  [facility] CATH INVASIVE LOCATION;  Service:    • ENDOSCOPY  01/22/2010    Negative endoscopy noting a healed gastric ulcer   • ENDOSCOPY  10/21/2009    Superficial mucosal erosion, chronic active gastritis   • FLAP HEAD/NECK Right 10/26/2020    Procedure: POSSIBLE FLAP;  Surgeon: Vadim Le MD;  Location:  [facility] OR;  Service: ENT;  Laterality: Right;   • FOOT SURGERY     • HEAD/NECK LESION/CYST EXCISION Right 10/26/2020    Procedure: 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 with transposition flap;  Surgeon: Vadim Le MD;  Location:  [facility] OR;  Service: ENT;  Laterality: Right;   • HERNIA REPAIR     • HYSTERECTOMY     • OOPHORECTOMY     • REPLACEMENT TOTAL KNEE     • TOTAL ABDOMINAL HYSTERECTOMY     • TUBAL ABDOMINAL LIGATION       Family History   Problem Relation Age of Onset   • Coronary artery disease Mother    • Coronary artery disease Father    • Diabetes Brother    • Hypertension Brother    • Colon polyps Brother    • Heart disease Maternal Grandfather    • Colon cancer Neg Hx      Social History     Socioeconomic History   • Marital status:      Spouse name: Nima   • Number of children: 1   • Years of education: 13   • Highest education level: Not on file   Occupational History   • Occupation: [employer]   Tobacco Use   • Smoking status: Never Smoker   • Smokeless tobacco: Never Used   Substance and Sexual Activity   • Alcohol use: No   • Drug use: No   • Sexual activity: Not Currently     Allergies   Allergen Reactions   • Macrobid [Nitrofurantoin Macrocrystal] Rash     Current Outpatient Medications   Medication Sig Dispense Refill   • budesonide-formoterol (SYMBICORT) 160-4.5 MCG/ACT inhaler Inhale 2 puffs 2 (Two) Times a Day. (Patient taking differently: Inhale 2 puffs 2 (Two)  Times a Day As Needed.) 1 inhaler 5   • Cholecalciferol (VITAMIN D) 2000 UNITS tablet Take 1,000 Units by mouth Daily.     • cyanocobalamin (VITAMIN B-12) 50 MCG tablet tablet Take 50 mcg by mouth Daily.     • esomeprazole (nexIUM) 40 MG capsule Take 1 capsule by mouth 2 (Two) Times a Day. 180 capsule 1   • furosemide (LASIX) 20 MG tablet Take 1 tablet by mouth Daily. 90 tablet 0   • losartan (COZAAR) 50 MG tablet TAKE ONE TABLET DAILY GENERIC FOR COZAAR 90 tablet 3   • Multiple Vitamin (MULTI VITAMIN DAILY PO) Take 1 tablet by mouth daily.     • potassium chloride (K-DUR,KLOR-CON) 20 MEQ CR tablet Take 1 tablet by mouth 2 (Two) Times a Day. 180 tablet 1   • triamterene-hydrochlorothiazide (DYAZIDE) 37.5-25 MG per capsule Take 1 capsule by mouth Every Morning. 30 capsule 5   • vitamin E 600 UNIT capsule Take 400 Units by mouth Daily.     • XARELTO 20 MG tablet TAKE ONE TABLET DAILY WITH DINNER 90 tablet 1   • ALPRAZolam (XANAX) 0.5 MG tablet Take 1 tablet by mouth 2 (Two) Times a Day As Needed for Anxiety. 30 tablet 1     No current facility-administered medications for this visit.      Review of Systems   Constitutional: Negative for chills and fever.   HENT: Negative for congestion.    Respiratory: Negative for shortness of breath.    Cardiovascular: Negative for chest pain and leg swelling.   Gastrointestinal: Negative for constipation, diarrhea, nausea and vomiting.   Musculoskeletal: Positive for arthralgias.        Foot pain   Skin: Negative for wound.   Neurological: Negative for numbness.       OBJECTIVE     Vitals:    12/09/20 0841   BP: 124/84   Pulse: 72   SpO2: 97%       PHYSICAL EXAM  GEN:   Accompanied by none.     Foot/Ankle Exam:       General:   Appearance: appears stated age and healthy    Orientation: AAOx3    Affect: appropriate    Gait: unimpaired    Assistance: independent    Shoe Gear:  Casual shoes    VASCULAR      Right Foot Vascularity   Dorsalis pedis:  2+  Posterior tibial:  2+  Skin  Temperature: warm    CFT:  3  Pedal Hair Growth:  Present  Varicosities: spider veins       Left Foot Vascularity   Dorsalis pedis:  2+  Posterior tibial:  2+  Skin Temperature: warm    CFT:  3  Pedal Hair Growth:  Present  Varicosities: spider veins        NEUROLOGIC     Right Foot Neurologic   Normal sensation    Light touch sensation:  Normal  Vibratory sensation:  Normal  Hot/Cold sensation: normal    Protective Sensation using Drifting-Joyce Monofilament:  10     Left Foot Neurologic   Normal sensation    Light touch sensation:  Normal  Vibratory sensation:  Normal  Hot/cold sensation: normal    Protective Sensation using Drifting-Joyce Monofilament:  10     MUSCULOSKELETAL      Right Foot Musculoskeletal   Ecchymosis:  None  Tenderness: none    Arch:  Normal  Hallux valgus: No    Hallux limitus: Yes (Mild crepitus with ROM)       Left Foot Musculoskeletal   Ecchymosis:  None  Tenderness: plantar fascia, posterior heel, plantar heel and arch    Arch:  Normal  Hallux valgus: No    Hallux limitus: No       MUSCLE STRENGTH     Right Foot Muscle Strength   Foot dorsiflexion:  5  Foot plantar flexion:  5  Foot inversion:  5  Foot eversion:  5     Left Foot Muscle Strength   Foot dorsiflexion:  5  Foot plantar flexion:  5  Foot inversion:  5  Foot eversion:  5     RANGE OF MOTION      Right Foot Range of Motion   Foot and ankle ROM within normal limits       Left Foot Range of Motion   Foot and ankle ROM within normal limits       DERMATOLOGIC     Right Foot Dermatologic   Skin: skin intact    Nails: normal       Left Foot Dermatologic   Skin: skin intact    Nails: normal       Image:       RADIOLOGY/NUCLEAR:  No results found.    LABORATORY/CULTURE RESULTS:      PATHOLOGY RESULTS:       ASSESSMENT/PLAN     Diagnoses and all orders for this visit:    1. Foot pain, left (Primary)  -     bupivacaine (MARCAINE) 0.5 % injection 1.5 mL    2. Plantar fasciitis, left  -     Injection Tendon or Ligament  -      triamcinolone acetonide (KENALOG-40) injection 40 mg  -     dexamethasone (DECADRON) injection 10 mg      Comprehensive lower extremity examination and evaluation was performed.  Discussed findings and treatment plan including risks, benefits, and treatment options with patient in detail. Patient agreed with treatment plan.  Reviewed xrays supplied by patient.  After written consent obtained, plantar fascial injection performed as documented in procedure note. Post-procedure instructions given.  Conservative therapy including daily stretching (demonstrated proper way of performing), icing 3x daily, decreased activity, and nsaids PRN.   An After Visit Summary was printed and given to the patient at discharge, including (if requested) any available informative/educational handouts regarding diagnosis, treatment, or medications. All questions were answered to patient/family satisfaction. Should symptoms fail to improve or worsen they agree to call or return to clinic or to go to the Emergency Department. Discussed the importance of following up with any needed screening tests/labs/specialist appointments and any requested follow-up recommended by me today. Importance of maintaining follow-up discussed and patient accepts that missed appointments can delay diagnosis and potentially lead to worsening of conditions.  Return in about 1 month (around 1/9/2021)., or sooner if acute issues arise.    Injection Tendon or Ligament    Date/Time: 12/9/2020 9:11 AM  Performed by: Sanjeev Cunningham DPM  Authorized by: Sanjeev Cunningham DPM   Preparation: Patient was prepped and draped in the usual sterile fashion.  Local anesthesia used: yes  Anesthesia: local infiltration    Anesthesia:  Local anesthesia used: yes  Local Anesthetic: bupivacaine 0.5% without epinephrine  Anesthetic total: 1.5 mL    Sedation:  Patient sedated: no    Patient tolerance: patient tolerated the procedure well with no immediate complications  Comments:  1cc Dexamethasone, 0.5cc Kenalog 40          Lab Frequency Next Occurrence   Follow Anesthesia Guidelines / Standing Orders Once 02/14/2019   Mammo Screening Digital Tomosynthesis Bilateral With CAD Once 12/30/2020   Follow Anesthesia Guidelines / Standing Orders Once 10/16/2020   Provide Patient With Instructions on NPO Status Once 10/20/2020       This document has been electronically signed by Sanjeev Cunningham DPM on December 9, 2020 09:56 CST

## 2020-12-09 ENCOUNTER — OFFICE VISIT (OUTPATIENT)
Dept: PODIATRY | Facility: CLINIC | Age: 70
End: 2020-12-09

## 2020-12-09 VITALS
OXYGEN SATURATION: 97 % | HEIGHT: 63 IN | HEART RATE: 72 BPM | SYSTOLIC BLOOD PRESSURE: 124 MMHG | DIASTOLIC BLOOD PRESSURE: 84 MMHG | BODY MASS INDEX: 35.08 KG/M2 | WEIGHT: 198 LBS

## 2020-12-09 DIAGNOSIS — M72.2 PLANTAR FASCIITIS, LEFT: ICD-10-CM

## 2020-12-09 DIAGNOSIS — M79.672 FOOT PAIN, LEFT: Primary | ICD-10-CM

## 2020-12-09 PROCEDURE — 20550 NJX 1 TENDON SHEATH/LIGAMENT: CPT | Performed by: PODIATRIST

## 2020-12-09 PROCEDURE — 99213 OFFICE O/P EST LOW 20 MIN: CPT | Performed by: PODIATRIST

## 2020-12-09 RX ORDER — DEXAMETHASONE SODIUM PHOSPHATE 10 MG/ML
10 INJECTION INTRAMUSCULAR; INTRAVENOUS ONCE
Status: COMPLETED | OUTPATIENT
Start: 2020-12-09 | End: 2020-12-09

## 2020-12-09 RX ORDER — BUPIVACAINE HYDROCHLORIDE 5 MG/ML
1.5 INJECTION, SOLUTION PERINEURAL ONCE
Status: COMPLETED | OUTPATIENT
Start: 2020-12-09 | End: 2020-12-09

## 2020-12-09 RX ORDER — TRIAMCINOLONE ACETONIDE 40 MG/ML
40 INJECTION, SUSPENSION INTRA-ARTICULAR; INTRAMUSCULAR ONCE
Status: COMPLETED | OUTPATIENT
Start: 2020-12-09 | End: 2020-12-09

## 2020-12-09 RX ADMIN — DEXAMETHASONE SODIUM PHOSPHATE 10 MG: 10 INJECTION INTRAMUSCULAR; INTRAVENOUS at 09:46

## 2020-12-09 RX ADMIN — TRIAMCINOLONE ACETONIDE 40 MG: 40 INJECTION, SUSPENSION INTRA-ARTICULAR; INTRAMUSCULAR at 09:46

## 2020-12-09 RX ADMIN — BUPIVACAINE HYDROCHLORIDE 1.5 ML: 5 INJECTION, SOLUTION PERINEURAL at 09:45

## 2020-12-09 NOTE — PATIENT INSTRUCTIONS
Plantar Fasciitis    Plantar fasciitis is a painful foot condition that affects the heel. It occurs when the band of tissue that connects the toes to the heel bone (plantar fascia) becomes irritated. This can happen as the result of exercising too much or doing other repetitive activities (overuse injury).  The pain from plantar fasciitis can range from mild irritation to severe pain that makes it difficult to walk or move. The pain is usually worse in the morning after sleeping, or after sitting or lying down for a while. Pain may also be worse after long periods of walking or standing.  What are the causes?  This condition may be caused by:  · Standing for long periods of time.  · Wearing shoes that do not have good arch support.  · Doing activities that put stress on joints (high-impact activities), including running, aerobics, and ballet.  · Being overweight.  · An abnormal way of walking (gait).  · Tight muscles in the back of your lower leg (calf).  · High arches in your feet.  · Starting a new athletic activity.  What are the signs or symptoms?  The main symptom of this condition is heel pain. Pain may:  · Be worse with first steps after a time of rest, especially in the morning after sleeping or after you have been sitting or lying down for a while.  · Be worse after long periods of standing still.  · Decrease after 30-45 minutes of activity, such as gentle walking.  How is this diagnosed?  This condition may be diagnosed based on your medical history and your symptoms. Your health care provider may ask questions about your activity level. Your health care provider will do a physical exam to check for:  · A tender area on the bottom of your foot.  · A high arch in your foot.  · Pain when you move your foot.  · Difficulty moving your foot.  You may have imaging tests to confirm the diagnosis, such as:  · X-rays.  · Ultrasound.  · MRI.  How is this treated?  Treatment for plantar fasciitis depends on how  severe your condition is. Treatment may include:  · Rest, ice, applying pressure (compression), and raising the affected foot (elevation). This may be called RICE therapy. Your health care provider may recommend RICE therapy along with over-the-counter pain medicines to manage your pain.  · Exercises to stretch your calves and your plantar fascia.  · A splint that holds your foot in a stretched, upward position while you sleep (night splint).  · Physical therapy to relieve symptoms and prevent problems in the future.  · Injections of steroid medicine (cortisone) to relieve pain and inflammation.  · Stimulating your plantar fascia with electrical impulses (extracorporeal shock wave therapy). This is usually the last treatment option before surgery.  · Surgery, if other treatments have not worked after 12 months.  Follow these instructions at home:    Managing pain, stiffness, and swelling  · If directed, put ice on the painful area:  ? Put ice in a plastic bag, or use a frozen bottle of water.  ? Place a towel between your skin and the bag or bottle.  ? Roll the bottom of your foot over the bag or bottle.  ? Do this for 20 minutes, 2-3 times a day.  · Wear athletic shoes that have air-sole or gel-sole cushions, or try wearing soft shoe inserts that are designed for plantar fasciitis.  · Raise (elevate) your foot above the level of your heart while you are sitting or lying down.  Activity  · Avoid activities that cause pain. Ask your health care provider what activities are safe for you.  · Do physical therapy exercises and stretches as told by your health care provider.  · Try activities and forms of exercise that are easier on your joints (low-impact). Examples include swimming, water aerobics, and biking.  General instructions  · Take over-the-counter and prescription medicines only as told by your health care provider.  · Wear a night splint while sleeping, if told by your health care provider. Loosen the splint  if your toes tingle, become numb, or turn cold and blue.  · Maintain a healthy weight, or work with your health care provider to lose weight as needed.  · Keep all follow-up visits as told by your health care provider. This is important.  Contact a health care provider if you:  · Have symptoms that do not go away after caring for yourself at home.  · Have pain that gets worse.  · Have pain that affects your ability to move or do your daily activities.  Summary  · Plantar fasciitis is a painful foot condition that affects the heel. It occurs when the band of tissue that connects the toes to the heel bone (plantar fascia) becomes irritated.  · The main symptom of this condition is heel pain that may be worse after exercising too much or standing still for a long time.  · Treatment varies, but it usually starts with rest, ice, compression, and elevation (RICE therapy) and over-the-counter medicines to manage pain.  This information is not intended to replace advice given to you by your health care provider. Make sure you discuss any questions you have with your health care provider.  Document Revised: 11/30/2018 Document Reviewed: 10/15/2018  Elsevier Patient Education © 2020 [employer]

## 2020-12-11 DIAGNOSIS — I10 ESSENTIAL HYPERTENSION: Chronic | ICD-10-CM

## 2020-12-11 RX ORDER — TRIAMTERENE AND HYDROCHLOROTHIAZIDE 37.5; 25 MG/1; MG/1
1 CAPSULE ORAL EVERY MORNING
Qty: 30 CAPSULE | Refills: 5 | Status: SHIPPED | OUTPATIENT
Start: 2020-12-11 | End: 2021-02-09

## 2020-12-11 NOTE — TELEPHONE ENCOUNTER
Requested Prescriptions     Pending Prescriptions Disp Refills   • triamterene-hydrochlorothiazide (DYAZIDE) 37.5-25 MG per capsule 30 capsule 5     Sig: Take 1 capsule by mouth Every Morning.

## 2020-12-11 NOTE — TELEPHONE ENCOUNTER
Caller: Lucy Sousa    Relationship: Self    Best call back number:[phone]    Medication needed:   Requested Prescriptions     Pending Prescriptions Disp Refills   • triamterene-hydrochlorothiazide (DYAZIDE) 37.5-25 MG per capsule 30 capsule 5     Sig: Take 1 capsule by mouth Every Morning.       What is the patient's preferred pharmacy: [facility] - [place], [place] - [address] [facility] [phone] [place] [phone]

## 2021-01-04 DIAGNOSIS — I10 ESSENTIAL HYPERTENSION: Chronic | ICD-10-CM

## 2021-01-04 RX ORDER — LOSARTAN POTASSIUM 50 MG/1
50 TABLET ORAL DAILY
Qty: 90 TABLET | Refills: 3 | Status: SHIPPED | OUTPATIENT
Start: 2021-01-04 | End: 2021-02-09

## 2021-01-04 NOTE — TELEPHONE ENCOUNTER
Caller: Lucy Sousa    Relationship: Self    Best call back number: [phone]     Medication needed:   Requested Prescriptions     Pending Prescriptions Disp Refills   • losartan (COZAAR) 50 MG tablet 90 tablet 3     Sig: Take 1 tablet by mouth Daily.       When do you need the refill by: ASAP    What details did the patient provide when requesting the medication: COMPLETELY OUT     Does the patient have less than a 3 day supply:  [x] Yes  [] No    What is the patient's preferred pharmacy:    [facility] - [place], [place] - [address] [phone] [place] [phone]

## 2021-01-06 ENCOUNTER — TELEPHONE (OUTPATIENT)
Dept: PODIATRY | Facility: CLINIC | Age: 71
End: 2021-01-06

## 2021-01-07 ENCOUNTER — OFFICE VISIT (OUTPATIENT)
Dept: PODIATRY | Facility: CLINIC | Age: 71
End: 2021-01-07

## 2021-01-07 VITALS
HEIGHT: 63 IN | BODY MASS INDEX: 35.07 KG/M2 | HEART RATE: 84 BPM | DIASTOLIC BLOOD PRESSURE: 81 MMHG | SYSTOLIC BLOOD PRESSURE: 131 MMHG | OXYGEN SATURATION: 97 %

## 2021-01-07 DIAGNOSIS — M79.671 FOOT PAIN, RIGHT: ICD-10-CM

## 2021-01-07 DIAGNOSIS — M79.672 FOOT PAIN, LEFT: ICD-10-CM

## 2021-01-07 DIAGNOSIS — G89.29 CHRONIC PAIN OF RIGHT ANKLE: ICD-10-CM

## 2021-01-07 DIAGNOSIS — M25.571 CHRONIC PAIN OF RIGHT ANKLE: ICD-10-CM

## 2021-01-07 DIAGNOSIS — M72.2 PLANTAR FASCIITIS, LEFT: Primary | ICD-10-CM

## 2021-01-07 DIAGNOSIS — M20.5X1 HALLUX LIMITUS, ACQUIRED, RIGHT: ICD-10-CM

## 2021-01-07 PROCEDURE — 20550 NJX 1 TENDON SHEATH/LIGAMENT: CPT | Performed by: PODIATRIST

## 2021-01-07 RX ORDER — BUPIVACAINE HYDROCHLORIDE 5 MG/ML
1.5 INJECTION, SOLUTION PERINEURAL ONCE
Status: DISCONTINUED | OUTPATIENT
Start: 2021-01-07 | End: 2021-01-14

## 2021-01-07 RX ORDER — TRIAMCINOLONE ACETONIDE 40 MG/ML
40 INJECTION, SUSPENSION INTRA-ARTICULAR; INTRAMUSCULAR ONCE
Status: DISCONTINUED | OUTPATIENT
Start: 2021-01-07 | End: 2021-01-14

## 2021-01-07 RX ORDER — DEXAMETHASONE SODIUM PHOSPHATE 10 MG/ML
10 INJECTION INTRAMUSCULAR; INTRAVENOUS ONCE
Status: DISCONTINUED | OUTPATIENT
Start: 2021-01-07 | End: 2021-01-14

## 2021-01-14 RX ORDER — BUPIVACAINE HYDROCHLORIDE 5 MG/ML
1.5 INJECTION, SOLUTION PERINEURAL ONCE
Status: DISCONTINUED | OUTPATIENT
Start: 2021-01-14 | End: 2021-03-27

## 2021-01-14 RX ORDER — DEXAMETHASONE SODIUM PHOSPHATE 10 MG/ML
10 INJECTION INTRAMUSCULAR; INTRAVENOUS ONCE
Status: DISCONTINUED | OUTPATIENT
Start: 2021-01-14 | End: 2021-03-27

## 2021-01-14 RX ORDER — TRIAMCINOLONE ACETONIDE 40 MG/ML
40 INJECTION, SUSPENSION INTRA-ARTICULAR; INTRAMUSCULAR ONCE
Status: DISCONTINUED | OUTPATIENT
Start: 2021-01-14 | End: 2021-03-27

## 2021-01-27 RX ORDER — FUROSEMIDE 20 MG/1
20 TABLET ORAL DAILY
Qty: 90 TABLET | Refills: 0 | Status: SHIPPED | OUTPATIENT
Start: 2021-01-27 | End: 2021-03-29 | Stop reason: HOSPADM

## 2021-01-27 RX ORDER — FUROSEMIDE 20 MG/1
TABLET ORAL
Qty: 90 TABLET | Refills: 1 | Status: SHIPPED | OUTPATIENT
Start: 2021-01-27 | End: 2021-03-29 | Stop reason: HOSPADM

## 2021-01-27 NOTE — TELEPHONE ENCOUNTER
Requested Prescriptions     Pending Prescriptions Disp Refills   • furosemide (LASIX) 20 MG tablet [Pharmacy Med Name: FUROSEMIDE TAB 20MG    WILLY  1000@ TABLET] 90 tablet 1     Sig: TAKE ONE TABLET DAILY GENERIC FOR LASIX

## 2021-01-27 NOTE — TELEPHONE ENCOUNTER
Caller: Lucy Sousa    Relationship: Self    Best call back number: [phone] (M)    Medication needed:   furosemide (LASIX) 20 MG tablet  20 mg, Daily         When do you need the refill by: BY TOMORROW     What details did the patient provide when requesting the medication: HAS ONE LEFT     Does the patient have less than a 3 day supply:  [x] Yes  [] No    What is the patient's preferred pharmacy:      [facility] - [place], [place] - [address] [phone] [place] [phone] [phone]

## 2021-02-08 ENCOUNTER — TELEPHONE (OUTPATIENT)
Dept: FAMILY MEDICINE CLINIC | Facility: CLINIC | Age: 71
End: 2021-02-08

## 2021-02-08 NOTE — TELEPHONE ENCOUNTER
Patient called requesting to speak with clinical staff. Provided no additional information as to reason for callback.

## 2021-02-09 DIAGNOSIS — Z79.01 ANTICOAGULATED: ICD-10-CM

## 2021-02-09 DIAGNOSIS — I10 ESSENTIAL HYPERTENSION: Chronic | ICD-10-CM

## 2021-02-09 DIAGNOSIS — E87.6 HYPOPOTASSEMIA: ICD-10-CM

## 2021-02-09 DIAGNOSIS — K21.9 GASTROESOPHAGEAL REFLUX DISEASE: ICD-10-CM

## 2021-02-09 RX ORDER — ESOMEPRAZOLE MAGNESIUM 40 MG/1
CAPSULE, DELAYED RELEASE ORAL
Qty: 180 CAPSULE | Refills: 1 | Status: SHIPPED | OUTPATIENT
Start: 2021-02-09 | End: 2021-08-04

## 2021-02-09 RX ORDER — POTASSIUM CHLORIDE 20 MEQ/1
20 TABLET, EXTENDED RELEASE ORAL 2 TIMES DAILY
Qty: 180 TABLET | Refills: 1 | Status: SHIPPED | OUTPATIENT
Start: 2021-02-09 | End: 2021-08-04

## 2021-02-09 RX ORDER — LOSARTAN POTASSIUM 50 MG/1
TABLET ORAL
Qty: 90 TABLET | Refills: 1 | Status: SHIPPED | OUTPATIENT
Start: 2021-02-09 | End: 2021-03-29 | Stop reason: HOSPADM

## 2021-02-09 RX ORDER — RIVAROXABAN 20 MG/1
TABLET, FILM COATED ORAL
Qty: 90 TABLET | Refills: 1 | Status: SHIPPED | OUTPATIENT
Start: 2021-02-09 | End: 2021-08-04

## 2021-02-09 RX ORDER — TRIAMTERENE AND HYDROCHLOROTHIAZIDE 37.5; 25 MG/1; MG/1
CAPSULE ORAL
Qty: 90 CAPSULE | Refills: 1 | Status: SHIPPED | OUTPATIENT
Start: 2021-02-09 | End: 2021-03-29 | Stop reason: HOSPADM

## 2021-02-09 NOTE — TELEPHONE ENCOUNTER
Requested Prescriptions     Pending Prescriptions Disp Refills   • Xarelto 20 MG tablet [Pharmacy Med Name: XARELTO 20MG TABLET] 90 tablet 1     Sig: TAKE ONE TABLET DAILY WITH DINNER   • esomeprazole (nexIUM) 40 MG capsule [Pharmacy Med Name: ESOMEPRAZOLE MAG DR 40MG CAPSULE] 180 capsule 1     Sig: TAKE 1 CAPSULE TWICE DAILY GENERIC FOR NEXIUM   • losartan (COZAAR) 50 MG tablet [Pharmacy Med Name: LOSARTAN  50MG TAB TABLET] 90 tablet 1     Sig: TAKE ONE TABLET DAILY GENERIC FOR COZAAR   • potassium chloride (K-DUR,KLOR-CON) 20 MEQ CR tablet [Pharmacy Med Name: POTASSIUM CH 20MEQ TAB TABLET] 180 tablet 1     Sig: TAKE 1 TABLET BY MOUTH 2 (TWO) TIMES A DAY.

## 2021-02-09 NOTE — TELEPHONE ENCOUNTER
Requested Prescriptions     Pending Prescriptions Disp Refills   • triamterene-hydrochlorothiazide (DYAZIDE) 37.5-25 MG per capsule [Pharmacy Med Name: TRIAM/HCTZ CAPS 37.5/25MG CAPSULE] 90 capsule 1     Sig: TAKE 1 CAPSULE EVERY MORNING GENERIC FOR DYAZIDE

## 2021-02-10 DIAGNOSIS — E87.6 HYPOPOTASSEMIA: Primary | ICD-10-CM

## 2021-02-10 DIAGNOSIS — I10 HTN (HYPERTENSION), BENIGN: Chronic | ICD-10-CM

## 2021-02-11 ENCOUNTER — HOSPITAL ENCOUNTER (EMERGENCY)
Facility: HOSPITAL | Age: 71
Discharge: HOME | End: 2021-02-12
Attending: EMERGENCY MEDICINE | Admitting: EMERGENCY MEDICINE

## 2021-02-11 DIAGNOSIS — R11.2 NON-INTRACTABLE VOMITING WITH NAUSEA, UNSPECIFIED VOMITING TYPE: Primary | ICD-10-CM

## 2021-02-11 LAB
ALBUMIN SERPL-MCNC: 3.9 G/DL (ref 3.5–5.2)
ALBUMIN/GLOB SERPL: 1.4 G/DL
ALP SERPL-CCNC: 61 U/L (ref 39–117)
ALT SERPL W P-5'-P-CCNC: 17 U/L (ref 1–33)
ANION GAP SERPL CALCULATED.3IONS-SCNC: 15 MMOL/L (ref 5–15)
AST SERPL-CCNC: 18 U/L (ref 1–32)
BACTERIA UR QL AUTO: ABNORMAL /HPF
BASOPHILS # BLD AUTO: 0.03 10*3/MM3 (ref 0–0.2)
BASOPHILS NFR BLD AUTO: 0.3 % (ref 0–1.5)
BILIRUB SERPL-MCNC: 0.8 MG/DL (ref 0–1.2)
BILIRUB UR QL STRIP: ABNORMAL
BUN SERPL-MCNC: 17 MG/DL (ref 8–23)
BUN/CREAT SERPL: 19.3 (ref 7–25)
CALCIUM SPEC-SCNC: 9.8 MG/DL (ref 8.6–10.5)
CHLORIDE SERPL-SCNC: 101 MMOL/L (ref 98–107)
CLARITY UR: ABNORMAL
CO2 SERPL-SCNC: 25 MMOL/L (ref 22–29)
COLOR UR: YELLOW
CREAT SERPL-MCNC: 0.88 MG/DL (ref 0.57–1)
DEPRECATED RDW RBC AUTO: 43.6 FL (ref 37–54)
EOSINOPHIL # BLD AUTO: 0 10*3/MM3 (ref 0–0.4)
EOSINOPHIL NFR BLD AUTO: 0 % (ref 0.3–6.2)
ERYTHROCYTE [DISTWIDTH] IN BLOOD BY AUTOMATED COUNT: 12.7 % (ref 12.3–15.4)
GFR SERPL CREATININE-BSD FRML MDRD: 63 ML/MIN/1.73
GLOBULIN UR ELPH-MCNC: 2.7 GM/DL
GLUCOSE SERPL-MCNC: 130 MG/DL (ref 65–99)
GLUCOSE UR STRIP-MCNC: NEGATIVE MG/DL
HCT VFR BLD AUTO: 43.5 % (ref 34–46.6)
HGB BLD-MCNC: 15.8 G/DL (ref 12–15.9)
HGB UR QL STRIP.AUTO: ABNORMAL
HYALINE CASTS UR QL AUTO: ABNORMAL /LPF
IMM GRANULOCYTES # BLD AUTO: 0.04 10*3/MM3 (ref 0–0.05)
IMM GRANULOCYTES NFR BLD AUTO: 0.4 % (ref 0–0.5)
KETONES UR QL STRIP: ABNORMAL
LEUKOCYTE ESTERASE UR QL STRIP.AUTO: ABNORMAL
LIPASE SERPL-CCNC: 27 U/L (ref 13–60)
LYMPHOCYTES # BLD AUTO: 1.07 10*3/MM3 (ref 0.7–3.1)
LYMPHOCYTES NFR BLD AUTO: 11.6 % (ref 19.6–45.3)
MCH RBC QN AUTO: 34.1 PG (ref 26.6–33)
MCHC RBC AUTO-ENTMCNC: 36.3 G/DL (ref 31.5–35.7)
MCV RBC AUTO: 94 FL (ref 79–97)
MONOCYTES # BLD AUTO: 0.46 10*3/MM3 (ref 0.1–0.9)
MONOCYTES NFR BLD AUTO: 5 % (ref 5–12)
NEUTROPHILS NFR BLD AUTO: 7.61 10*3/MM3 (ref 1.7–7)
NEUTROPHILS NFR BLD AUTO: 82.7 % (ref 42.7–76)
NITRITE UR QL STRIP: NEGATIVE
NRBC BLD AUTO-RTO: 0 /100 WBC (ref 0–0.2)
PH UR STRIP.AUTO: 7 [PH] (ref 5–8)
PLATELET # BLD AUTO: 316 10*3/MM3 (ref 140–450)
PMV BLD AUTO: 9.4 FL (ref 6–12)
POTASSIUM SERPL-SCNC: 3.7 MMOL/L (ref 3.5–5.2)
PROT SERPL-MCNC: 6.6 G/DL (ref 6–8.5)
PROT UR QL STRIP: NEGATIVE
RBC # BLD AUTO: 4.63 10*6/MM3 (ref 3.77–5.28)
RBC # UR: ABNORMAL /HPF
REF LAB TEST METHOD: ABNORMAL
SARS-COV-2 RNA RESP QL NAA+PROBE: NOT DETECTED
SODIUM SERPL-SCNC: 141 MMOL/L (ref 136–145)
SP GR UR STRIP: 1.01 (ref 1–1.03)
SQUAMOUS #/AREA URNS HPF: ABNORMAL /HPF
TROPONIN T SERPL-MCNC: <0.01 NG/ML (ref 0–0.03)
UROBILINOGEN UR QL STRIP: ABNORMAL
WBC # BLD AUTO: 9.21 10*3/MM3 (ref 3.4–10.8)
WBC UR QL AUTO: ABNORMAL /HPF

## 2021-02-11 PROCEDURE — 25010000002 DROPERIDOL PER 5 MG: Performed by: EMERGENCY MEDICINE

## 2021-02-11 PROCEDURE — 96367 TX/PROPH/DG ADDL SEQ IV INF: CPT

## 2021-02-11 PROCEDURE — 96372 THER/PROPH/DIAG INJ SC/IM: CPT

## 2021-02-11 PROCEDURE — 84484 ASSAY OF TROPONIN QUANT: CPT | Performed by: EMERGENCY MEDICINE

## 2021-02-11 PROCEDURE — 96375 TX/PRO/DX INJ NEW DRUG ADDON: CPT

## 2021-02-11 PROCEDURE — 83690 ASSAY OF LIPASE: CPT | Performed by: EMERGENCY MEDICINE

## 2021-02-11 PROCEDURE — 25010000002 ONDANSETRON PER 1 MG: Performed by: EMERGENCY MEDICINE

## 2021-02-11 PROCEDURE — 99284 EMERGENCY DEPT VISIT MOD MDM: CPT

## 2021-02-11 PROCEDURE — 85025 COMPLETE CBC W/AUTO DIFF WBC: CPT | Performed by: EMERGENCY MEDICINE

## 2021-02-11 PROCEDURE — 93010 ELECTROCARDIOGRAM REPORT: CPT | Performed by: INTERNAL MEDICINE

## 2021-02-11 PROCEDURE — 25010000002 CEFTRIAXONE PER 250 MG: Performed by: EMERGENCY MEDICINE

## 2021-02-11 PROCEDURE — 81001 URINALYSIS AUTO W/SCOPE: CPT | Performed by: EMERGENCY MEDICINE

## 2021-02-11 PROCEDURE — U0005 INFEC AGEN DETEC AMPLI PROBE: HCPCS | Performed by: EMERGENCY MEDICINE

## 2021-02-11 PROCEDURE — 93005 ELECTROCARDIOGRAM TRACING: CPT | Performed by: EMERGENCY MEDICINE

## 2021-02-11 PROCEDURE — 25010000002 DICYCLOMINE PER 20 MG: Performed by: EMERGENCY MEDICINE

## 2021-02-11 PROCEDURE — 96365 THER/PROPH/DIAG IV INF INIT: CPT

## 2021-02-11 PROCEDURE — 80053 COMPREHEN METABOLIC PANEL: CPT | Performed by: EMERGENCY MEDICINE

## 2021-02-11 PROCEDURE — U0003 INFECTIOUS AGENT DETECTION BY NUCLEIC ACID (DNA OR RNA); SEVERE ACUTE RESPIRATORY SYNDROME CORONAVIRUS 2 (SARS-COV-2) (CORONAVIRUS DISEASE [COVID-19]), AMPLIFIED PROBE TECHNIQUE, MAKING USE OF HIGH THROUGHPUT TECHNOLOGIES AS DESCRIBED BY CMS-2020-01-R: HCPCS | Performed by: EMERGENCY MEDICINE

## 2021-02-11 RX ORDER — FAMOTIDINE 10 MG/ML
20 INJECTION, SOLUTION INTRAVENOUS ONCE
Status: COMPLETED | OUTPATIENT
Start: 2021-02-11 | End: 2021-02-11

## 2021-02-11 RX ORDER — DROPERIDOL 2.5 MG/ML
2.5 INJECTION, SOLUTION INTRAMUSCULAR; INTRAVENOUS ONCE
Status: COMPLETED | OUTPATIENT
Start: 2021-02-11 | End: 2021-02-11

## 2021-02-11 RX ORDER — ONDANSETRON HCL IN 0.9 % NACL 8 MG/50 ML
8 INTRAVENOUS SOLUTION, PIGGYBACK (ML) INTRAVENOUS ONCE
Status: COMPLETED | OUTPATIENT
Start: 2021-02-11 | End: 2021-02-11

## 2021-02-11 RX ORDER — DICYCLOMINE HYDROCHLORIDE 10 MG/ML
20 INJECTION INTRAMUSCULAR ONCE
Status: COMPLETED | OUTPATIENT
Start: 2021-02-11 | End: 2021-02-11

## 2021-02-11 RX ADMIN — SODIUM CHLORIDE 1000 ML: 9 INJECTION, SOLUTION INTRAVENOUS at 20:09

## 2021-02-11 RX ADMIN — ONDANSETRON 8 MG: 2 INJECTION INTRAMUSCULAR; INTRAVENOUS at 20:09

## 2021-02-11 RX ADMIN — DROPERIDOL 2.5 MG: 2.5 INJECTION, SOLUTION INTRAMUSCULAR; INTRAVENOUS at 23:04

## 2021-02-11 RX ADMIN — DICYCLOMINE HYDROCHLORIDE 20 MG: 20 INJECTION, SOLUTION INTRAMUSCULAR at 19:59

## 2021-02-11 RX ADMIN — CEFTRIAXONE SODIUM 2 G: 2 INJECTION, POWDER, FOR SOLUTION INTRAMUSCULAR; INTRAVENOUS at 22:23

## 2021-02-11 RX ADMIN — FAMOTIDINE 20 MG: 10 INJECTION INTRAVENOUS at 19:57

## 2021-02-12 ENCOUNTER — LAB (OUTPATIENT)
Dept: FAMILY MEDICINE CLINIC | Facility: CLINIC | Age: 71
End: 2021-02-12

## 2021-02-12 VITALS
WEIGHT: 198 LBS | HEIGHT: 63 IN | RESPIRATION RATE: 18 BRPM | OXYGEN SATURATION: 99 % | DIASTOLIC BLOOD PRESSURE: 70 MMHG | HEART RATE: 70 BPM | TEMPERATURE: 98.7 F | SYSTOLIC BLOOD PRESSURE: 160 MMHG | BODY MASS INDEX: 35.08 KG/M2

## 2021-02-12 LAB
QT INTERVAL: 390 MS
QTC INTERVAL: 435 MS

## 2021-02-12 RX ORDER — PROMETHAZINE HYDROCHLORIDE 25 MG/1
25 TABLET ORAL EVERY 6 HOURS PRN
Qty: 15 TABLET | Refills: 0 | Status: SHIPPED | OUTPATIENT
Start: 2021-02-12 | End: 2021-03-27

## 2021-02-12 NOTE — ED PROVIDER NOTES
Subjective   70 y/o female arrives for evaluation of abdominal pain, nausea and vomiting. She states she started with diarrhea on 2/8 that abated on 2/9 but has been replaced with vomiting. She went to [facility] on 2/9 and states her CT was okay. She tells me she was called by her PMD and told her potassium level needed to be rechecked and had appointment to do this on 2/12 but states her pain continued and thus presented here. She notes she has been unable to keep anything down even water noting immediate vomiting with ingestion. She denies sore throat, cough, loss of taste or smell or exposure to COVID.           Review of Systems   All other systems reviewed and are negative.      Past Medical History:   Diagnosis Date   • Anxiety    • Cancer (CMS/HCC)     skin squamous cell   • Depression    • DJD (degenerative joint disease)    • GERD (gastroesophageal reflux disease)    • HTN (hypertension)    • Hx of colonic polyp    • Pulmonary embolism (CMS/HCC)     post surgical with cardiac arrest       Allergies   Allergen Reactions   • Macrobid [Nitrofurantoin Macrocrystal] Rash       Past Surgical History:   Procedure Laterality Date   • BREAST SURGERY      breast reduction   • CHOLECYSTECTOMY     • COLONOSCOPY  04/01/2019    Diverticulosis otherwise normal exam repeat in 5 years   • COLONOSCOPY W/ POLYPECTOMY  02/13/2014    Hyperplastic polyp at 20 cm, Diverticulosis repeat exam in 5 years   • EKOS CATHETER PLACEMENT Bilateral 11/17/2016    Procedure: Ekos catheter placement;  Surgeon: Daniel Underwood MD;  Location:  [facility];  Service:    • EKOS CATHETER REMOVAL Bilateral 11/18/2016    Procedure: Ekos catheter removal with stent placement;  Surgeon: Daniel Underwood MD;  Location:  [facility];  Service:    • ENDOSCOPY  01/22/2010    Negative endoscopy noting a healed gastric ulcer   • ENDOSCOPY  10/21/2009    Superficial mucosal erosion, chronic active gastritis   •  FLAP HEAD/NECK Right 10/26/2020    Procedure: POSSIBLE FLAP;  Surgeon: Vadim Le MD;  Location:  [facility] OR;  Service: ENT;  Laterality: Right;   • FOOT SURGERY     • HEAD/NECK LESION/CYST EXCISION Right 10/26/2020    Procedure: 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 with transposition flap;  Surgeon: Vadim Le MD;  Location:  [facility] OR;  Service: ENT;  Laterality: Right;   • HERNIA REPAIR     • HYSTERECTOMY     • OOPHORECTOMY     • REPLACEMENT TOTAL KNEE     • TOTAL ABDOMINAL HYSTERECTOMY     • TUBAL ABDOMINAL LIGATION         Family History   Problem Relation Age of Onset   • Coronary artery disease Mother    • Coronary artery disease Father    • Diabetes Brother    • Hypertension Brother    • Colon polyps Brother    • Heart disease Maternal Grandfather    • Colon cancer Neg Hx        Social History     Socioeconomic History   • Marital status:      Spouse name: Nima   • Number of children: 1   • Years of education: 13   • Highest education level: Not on file   Occupational History   • Occupation: [employer]   Tobacco Use   • Smoking status: Never Smoker   • Smokeless tobacco: Never Used   Substance and Sexual Activity   • Alcohol use: No   • Drug use: No   • Sexual activity: Not Currently           Objective   Physical Exam  Vitals signs and nursing note reviewed.   Constitutional:       Appearance: She is well-developed.   HENT:      Head: Normocephalic.      Mouth/Throat:      Mouth: Mucous membranes are moist.   Eyes:      Extraocular Movements: Extraocular movements intact.      Pupils: Pupils are equal, round, and reactive to light.   Cardiovascular:      Rate and Rhythm: Normal rate and regular rhythm.   Pulmonary:      Effort: Pulmonary effort is normal.      Breath sounds: Normal breath sounds.   Abdominal:      General: Abdomen is flat. Bowel sounds are normal.      Palpations: Abdomen is soft.      Tenderness: There is abdominal tenderness in  the epigastric area. There is no guarding or rebound. Negative signs include Stern's sign, Rovsing's sign, McBurney's sign and psoas sign.   Skin:     General: Skin is warm.      Capillary Refill: Capillary refill takes less than 2 seconds.   Neurological:      General: No focal deficit present.      Mental Status: She is alert and oriented to person, place, and time.   Psychiatric:         Mood and Affect: Mood normal.         Behavior: Behavior normal.         Procedures           ED Course  ED Course as of Feb 12 0215   Thu Feb 11, 2021 2212 Did not repeat the CT given just done at [facility], still no records from them. She cannot keep down oral hydration.    I called Dr. Oliveros and he informed me that  is doing Dr. Tyson patients this weekened    []   2237 We will try another medication and re-check rather than admit.     []   Fri Feb 12, 2021   0012 Thus far has kept down fluid, we will re-check    []   0109 Kept down water, is asking for dc at this time. Aware of reasons for return. No vomiting since the last medication. Again did not repeat the CT given she just had one.     []      ED Course User Index  [] Shyam Tong MD            No orders to display     Labs Reviewed   COMPREHENSIVE METABOLIC PANEL - Abnormal; Notable for the following components:       Result Value    Glucose 130 (*)     All other components within normal limits    Narrative:     GFR Normal >60  Chronic Kidney Disease <60  Kidney Failure <15     URINALYSIS W/ MICROSCOPIC IF INDICATED (NO CULTURE) - Abnormal; Notable for the following components:    Appearance, UA Cloudy (*)     Ketones, UA 40 mg/dL (2+) (*)     Bilirubin, UA Moderate (2+) (*)     Blood, UA Trace (*)     Leuk Esterase, UA Trace (*)     All other components within normal limits   CBC WITH AUTO DIFFERENTIAL - Abnormal; Notable for the following components:    MCH 34.1 (*)     MCHC 36.3 (*)     Neutrophil % 82.7 (*)     Lymphocyte % 11.6 (*)      Eosinophil % 0.0 (*)     Neutrophils, Absolute 7.61 (*)     All other components within normal limits   URINALYSIS, MICROSCOPIC ONLY - Abnormal; Notable for the following components:    RBC, UA 0-2 (*)     WBC, UA 3-5 (*)     Bacteria, UA 4+ (*)     Squamous Epithelial Cells, UA 3-6 (*)     All other components within normal limits   COVID-19,CEPHEID,COR/ALICIA/[facility] IN-HOUSE(OR EMERGENT/ADD-ON),NP SWAB IN TRANSPORT MEDIA 3-4 HR TAT, RT-PCR - Normal    Narrative:     Fact sheet for providers: https://www.fda.gov/media/008994/download     Fact sheet for patients: https://www.fda.gov/media/584866/download   LIPASE - Normal   TROPONIN (IN-HOUSE) - Normal    Narrative:     Troponin T Reference Range:  <= 0.03 ng/mL-   Negative for AMI  >0.03 ng/mL-     Abnormal for myocardial necrosis.  Clinicians would have to utilize clinical acumen, EKG, Troponin and serial changes to determine if it is an Acute Myocardial Infarction or myocardial injury due to an underlying chronic condition.       Results may be falsely decreased if patient taking Biotin.     CBC AND DIFFERENTIAL    Narrative:     The following orders were created for panel order CBC & Differential.  Procedure                               Abnormality         Status                     ---------                               -----------         ------                     CBC Auto Differential[[id number]]        Abnormal            Final result                 Please view results for these tests on the individual orders.       Urine Culture - Urine, Urine, Clean Catch  Order: [id number] - Reflex for Order [id number]  Status:  Final result   Visible to patient:  Yes (MyChart) Next appt:  02/12/2021 at 01:00 PM in Family Medicine (LAB [facility])  Specimen Information: Urine, Catheter        Urine Culture       >100,000 CFU/mL Klebsiella pneumoniae ssp pneumoniaeAbnormal           Resulting Agency:  [facility]   Susceptibility     Klebsiella pneumoniae ssp pneumoniae      MANOJ     Ampicillin >=32 ug/ml Resistant     Ampicillin + Sulbactam 4 ug/ml Susceptible     Cefazolin <=4 ug/ml Susceptible     Cefepime <=1 ug/ml Susceptible     Ceftriaxone <=1 ug/ml Susceptible     Ertapenem <=0.5 ug/ml Susceptible     ESBL Confirmation Test NEG  Negative     Gentamicin <=1 ug/ml Susceptible     Levofloxacin <=0.12 ug/ml Susceptible     Meropenem <=0.25 ug/ml Susceptible     Nitrofurantoin 64 ug/ml Intermediate     Piperacillin + Tazobactam <=4 ug/ml Susceptible     Trimethoprim + Sulfamethoxazole <=20 ug/ml Susceptible                                               MDM    Final diagnoses:   Non-intractable vomiting with nausea, unspecified vomiting type            Shyam Tong MD  02/11/21 6847       Shyam Tong MD  02/12/21 0110       Shyam Tong MD  02/12/21 0110       Shyam Tong MD  02/12/21 9166

## 2021-02-12 NOTE — ED NOTES
Pt reports that she drank some more water and so far she is not feeling nauseated and has been able to keep it down. She reports that her stomach just aches. Notified Dr. Tong.     Vera Bledsoe RN  02/11/21 2332       Rakan, Vera, RN  02/11/21 9800

## 2021-02-12 NOTE — ED NOTES
Pt was unable to keep the water that was provided to her down. Had a small episode of emesis. Plan of care continued.     Vera Bledsoe RN  02/11/21 7749

## 2021-02-13 LAB
BUN SERPL-MCNC: 14 MG/DL (ref 8–23)
BUN/CREAT SERPL: 16.7 (ref 7–25)
CALCIUM SERPL-MCNC: 9.8 MG/DL (ref 8.6–10.5)
CHLORIDE SERPL-SCNC: 98 MMOL/L (ref 98–107)
CO2 SERPL-SCNC: 26.7 MMOL/L (ref 22–29)
CREAT SERPL-MCNC: 0.84 MG/DL (ref 0.57–1)
GLUCOSE SERPL-MCNC: 104 MG/DL (ref 65–99)
POTASSIUM SERPL-SCNC: 3.8 MMOL/L (ref 3.5–5.2)
SODIUM SERPL-SCNC: 139 MMOL/L (ref 136–145)

## 2021-02-14 ENCOUNTER — APPOINTMENT (OUTPATIENT)
Dept: CT IMAGING | Facility: HOSPITAL | Age: 71
End: 2021-02-14

## 2021-02-14 ENCOUNTER — HOSPITAL ENCOUNTER (EMERGENCY)
Facility: HOSPITAL | Age: 71
Discharge: HOME | End: 2021-02-14
Attending: EMERGENCY MEDICINE | Admitting: EMERGENCY MEDICINE

## 2021-02-14 VITALS
RESPIRATION RATE: 16 BRPM | DIASTOLIC BLOOD PRESSURE: 64 MMHG | HEART RATE: 78 BPM | BODY MASS INDEX: 35.08 KG/M2 | SYSTOLIC BLOOD PRESSURE: 155 MMHG | TEMPERATURE: 98.4 F | HEIGHT: 63 IN | WEIGHT: 198 LBS | OXYGEN SATURATION: 99 %

## 2021-02-14 DIAGNOSIS — R11.2 NON-INTRACTABLE VOMITING WITH NAUSEA, UNSPECIFIED VOMITING TYPE: Primary | ICD-10-CM

## 2021-02-14 DIAGNOSIS — E86.0 DEHYDRATION: ICD-10-CM

## 2021-02-14 LAB
ALBUMIN SERPL-MCNC: 4 G/DL (ref 3.5–5.2)
ALBUMIN/GLOB SERPL: 1.3 G/DL
ALP SERPL-CCNC: 71 U/L (ref 39–117)
ALT SERPL W P-5'-P-CCNC: 23 U/L (ref 1–33)
ANION GAP SERPL CALCULATED.3IONS-SCNC: 14 MMOL/L (ref 5–15)
AST SERPL-CCNC: 22 U/L (ref 1–32)
BACTERIA UR QL AUTO: ABNORMAL /HPF
BASOPHILS # BLD AUTO: 0.05 10*3/MM3 (ref 0–0.2)
BASOPHILS NFR BLD AUTO: 0.6 % (ref 0–1.5)
BILIRUB SERPL-MCNC: 0.6 MG/DL (ref 0–1.2)
BILIRUB UR QL STRIP: ABNORMAL
BUN SERPL-MCNC: 16 MG/DL (ref 8–23)
BUN/CREAT SERPL: 18.8 (ref 7–25)
CALCIUM SPEC-SCNC: 10 MG/DL (ref 8.6–10.5)
CHLORIDE SERPL-SCNC: 97 MMOL/L (ref 98–107)
CLARITY UR: CLEAR
CO2 SERPL-SCNC: 28 MMOL/L (ref 22–29)
COLOR UR: YELLOW
CREAT SERPL-MCNC: 0.85 MG/DL (ref 0.57–1)
DEPRECATED RDW RBC AUTO: 41.5 FL (ref 37–54)
EOSINOPHIL # BLD AUTO: 0.02 10*3/MM3 (ref 0–0.4)
EOSINOPHIL NFR BLD AUTO: 0.2 % (ref 0.3–6.2)
ERYTHROCYTE [DISTWIDTH] IN BLOOD BY AUTOMATED COUNT: 12.2 % (ref 12.3–15.4)
GFR SERPL CREATININE-BSD FRML MDRD: 66 ML/MIN/1.73
GLOBULIN UR ELPH-MCNC: 3.2 GM/DL
GLUCOSE SERPL-MCNC: 110 MG/DL (ref 65–99)
GLUCOSE UR STRIP-MCNC: NEGATIVE MG/DL
HCT VFR BLD AUTO: 43.3 % (ref 34–46.6)
HGB BLD-MCNC: 15.7 G/DL (ref 12–15.9)
HGB UR QL STRIP.AUTO: ABNORMAL
IMM GRANULOCYTES # BLD AUTO: 0.03 10*3/MM3 (ref 0–0.05)
IMM GRANULOCYTES NFR BLD AUTO: 0.4 % (ref 0–0.5)
KETONES UR QL STRIP: ABNORMAL
LEUKOCYTE ESTERASE UR QL STRIP.AUTO: NEGATIVE
LIPASE SERPL-CCNC: 40 U/L (ref 13–60)
LYMPHOCYTES # BLD AUTO: 0.96 10*3/MM3 (ref 0.7–3.1)
LYMPHOCYTES NFR BLD AUTO: 11.6 % (ref 19.6–45.3)
MAGNESIUM SERPL-MCNC: 2 MG/DL (ref 1.6–2.4)
MCH RBC QN AUTO: 33.6 PG (ref 26.6–33)
MCHC RBC AUTO-ENTMCNC: 36.3 G/DL (ref 31.5–35.7)
MCV RBC AUTO: 92.7 FL (ref 79–97)
MONOCYTES # BLD AUTO: 0.56 10*3/MM3 (ref 0.1–0.9)
MONOCYTES NFR BLD AUTO: 6.8 % (ref 5–12)
NEUTROPHILS NFR BLD AUTO: 6.63 10*3/MM3 (ref 1.7–7)
NEUTROPHILS NFR BLD AUTO: 80.4 % (ref 42.7–76)
NITRITE UR QL STRIP: NEGATIVE
NRBC BLD AUTO-RTO: 0 /100 WBC (ref 0–0.2)
PH UR STRIP.AUTO: 6 [PH] (ref 5–8)
PLATELET # BLD AUTO: 290 10*3/MM3 (ref 140–450)
PMV BLD AUTO: 8.8 FL (ref 6–12)
POTASSIUM SERPL-SCNC: 3.2 MMOL/L (ref 3.5–5.2)
PROT SERPL-MCNC: 7.2 G/DL (ref 6–8.5)
PROT UR QL STRIP: NEGATIVE
RBC # BLD AUTO: 4.67 10*6/MM3 (ref 3.77–5.28)
RBC # UR: ABNORMAL /HPF
REF LAB TEST METHOD: ABNORMAL
SODIUM SERPL-SCNC: 139 MMOL/L (ref 136–145)
SP GR UR STRIP: >=1.03 (ref 1–1.03)
SQUAMOUS #/AREA URNS HPF: ABNORMAL /HPF
UROBILINOGEN UR QL STRIP: ABNORMAL
WBC # BLD AUTO: 8.25 10*3/MM3 (ref 3.4–10.8)
WBC UR QL AUTO: ABNORMAL /HPF

## 2021-02-14 PROCEDURE — 96375 TX/PRO/DX INJ NEW DRUG ADDON: CPT

## 2021-02-14 PROCEDURE — 81001 URINALYSIS AUTO W/SCOPE: CPT | Performed by: EMERGENCY MEDICINE

## 2021-02-14 PROCEDURE — 96374 THER/PROPH/DIAG INJ IV PUSH: CPT

## 2021-02-14 PROCEDURE — 74177 CT ABD & PELVIS W/CONTRAST: CPT

## 2021-02-14 PROCEDURE — 85025 COMPLETE CBC W/AUTO DIFF WBC: CPT | Performed by: EMERGENCY MEDICINE

## 2021-02-14 PROCEDURE — 99284 EMERGENCY DEPT VISIT MOD MDM: CPT

## 2021-02-14 PROCEDURE — 83735 ASSAY OF MAGNESIUM: CPT | Performed by: EMERGENCY MEDICINE

## 2021-02-14 PROCEDURE — 25010000002 IOPAMIDOL 61 % SOLUTION: Performed by: EMERGENCY MEDICINE

## 2021-02-14 PROCEDURE — 80053 COMPREHEN METABOLIC PANEL: CPT | Performed by: EMERGENCY MEDICINE

## 2021-02-14 PROCEDURE — 83690 ASSAY OF LIPASE: CPT | Performed by: EMERGENCY MEDICINE

## 2021-02-14 PROCEDURE — 25010000002 ONDANSETRON PER 1 MG: Performed by: EMERGENCY MEDICINE

## 2021-02-14 RX ORDER — ONDANSETRON 4 MG/1
4 TABLET, ORALLY DISINTEGRATING ORAL EVERY 8 HOURS PRN
Qty: 20 TABLET | Refills: 0 | Status: SHIPPED | OUTPATIENT
Start: 2021-02-14 | End: 2021-02-22 | Stop reason: SDUPTHER

## 2021-02-14 RX ORDER — SODIUM CHLORIDE 0.9 % (FLUSH) 0.9 %
10 SYRINGE (ML) INJECTION AS NEEDED
Status: DISCONTINUED | OUTPATIENT
Start: 2021-02-14 | End: 2021-02-14 | Stop reason: HOSPADM

## 2021-02-14 RX ORDER — FAMOTIDINE 10 MG/ML
20 INJECTION, SOLUTION INTRAVENOUS ONCE
Status: COMPLETED | OUTPATIENT
Start: 2021-02-14 | End: 2021-02-14

## 2021-02-14 RX ORDER — ONDANSETRON 2 MG/ML
8 INJECTION INTRAMUSCULAR; INTRAVENOUS ONCE
Status: COMPLETED | OUTPATIENT
Start: 2021-02-14 | End: 2021-02-14

## 2021-02-14 RX ADMIN — ONDANSETRON HYDROCHLORIDE 8 MG: 2 SOLUTION INTRAMUSCULAR; INTRAVENOUS at 10:35

## 2021-02-14 RX ADMIN — SODIUM CHLORIDE 1000 ML: 9 INJECTION, SOLUTION INTRAVENOUS at 14:56

## 2021-02-14 RX ADMIN — IOPAMIDOL 100 ML: 612 INJECTION, SOLUTION INTRAVENOUS at 12:09

## 2021-02-14 RX ADMIN — FAMOTIDINE 20 MG: 10 INJECTION INTRAVENOUS at 10:31

## 2021-02-14 RX ADMIN — SODIUM CHLORIDE 1000 ML: 9 INJECTION, SOLUTION INTRAVENOUS at 10:29

## 2021-02-14 NOTE — ED PROVIDER NOTES
Subjective   71-year-old female presents to the ER with complaint of intractable nausea and vomiting.  She has a history of depression, anxiety, GERD, hypertension, PE.  Patient reports onset of abdominal pain, nausea, vomiting, diarrhea 5 days ago.  She has had numerous episodes of nonbloody nonbilious emesis as well as watery diarrhea.  She was seen at [facility] at onset of her symptoms.  States she had labs and a CT of her abdomen pelvis that were unremarkable, was sent home with prescription for some nausea medications.  Symptoms did not improve so she came to the emergency department at [facility] 3 days ago.  By that point time her diarrhea had resolved but she continues to have nausea and vomiting.  Patient was seen in the ED, had labs and was given IV antiemetics in the emergency department and was able tolerate small amount of fluid.  She was discharged home with trial of Phenergan.  She states since discharge from [facility] ED she continues to have nausea, vomiting, and inability to tolerate fluids or food which prompted visit to the emergency department this morning.  She states she will attempt to drink water some other liquid, develops vomiting within 10 to 15 minutes.  She reports associated diffuse abdominal discomfort.  She had no fever, cough, sore throat, rhinorrhea.  She has had no dysuria or hematuria, no urinary urgency or frequency.  Does complain of mild low back pain.        History provided by:  Patient      Review of Systems   All other systems reviewed and are negative.      Past Medical History:   Diagnosis Date   • Anxiety    • Cancer (CMS/HCC)     skin squamous cell   • Depression    • DJD (degenerative joint disease)    • GERD (gastroesophageal reflux disease)    • HTN (hypertension)    • Hx of colonic polyp    • Pulmonary embolism (CMS/HCC)     post surgical with cardiac arrest       Allergies   Allergen Reactions   • Macrobid [Nitrofurantoin Macrocrystal] Rash        Past Surgical History:   Procedure Laterality Date   • BREAST SURGERY      breast reduction   • CHOLECYSTECTOMY     • COLONOSCOPY  04/01/2019    Diverticulosis otherwise normal exam repeat in 5 years   • COLONOSCOPY W/ POLYPECTOMY  02/13/2014    Hyperplastic polyp at 20 cm, Diverticulosis repeat exam in 5 years   • EKOS CATHETER PLACEMENT Bilateral 11/17/2016    Procedure: Ekos catheter placement;  Surgeon: Daniel Underwood MD;  Location:  [facility];  Service:    • EKOS CATHETER REMOVAL Bilateral 11/18/2016    Procedure: Ekos catheter removal with stent placement;  Surgeon: Daniel Underwood MD;  Location:  [facility];  Service:    • ENDOSCOPY  01/22/2010    Negative endoscopy noting a healed gastric ulcer   • ENDOSCOPY  10/21/2009    Superficial mucosal erosion, chronic active gastritis   • FLAP HEAD/NECK Right 10/26/2020    Procedure: POSSIBLE FLAP;  Surgeon: Vadim Le MD;  Location:  [facility] OR;  Service: ENT;  Laterality: Right;   • FOOT SURGERY     • HEAD/NECK LESION/CYST EXCISION Right 10/26/2020    Procedure: 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 with transposition flap;  Surgeon: Vadim Le MD;  Location:  [facility] OR;  Service: ENT;  Laterality: Right;   • HERNIA REPAIR     • HYSTERECTOMY     • OOPHORECTOMY     • REPLACEMENT TOTAL KNEE     • TOTAL ABDOMINAL HYSTERECTOMY     • TUBAL ABDOMINAL LIGATION         Family History   Problem Relation Age of Onset   • Coronary artery disease Mother    • Coronary artery disease Father    • Diabetes Brother    • Hypertension Brother    • Colon polyps Brother    • Heart disease Maternal Grandfather    • Colon cancer Neg Hx        Social History     Socioeconomic History   • Marital status:      Spouse name: Nima   • Number of children: 1   • Years of education: 13   • Highest education level: Not on file   Occupational History   • Occupation: sec [employer]    Tobacco Use   • Smoking status: Never Smoker   • Smokeless tobacco: Never Used   Substance and Sexual Activity   • Alcohol use: No   • Drug use: No   • Sexual activity: Not Currently           Objective   Physical Exam  Vitals signs and nursing note reviewed.   Constitutional:       General: She is not in acute distress.     Appearance: Normal appearance. She is normal weight.   HENT:      Head: Normocephalic and atraumatic.      Nose: Nose normal.      Mouth/Throat:      Mouth: Mucous membranes are moist.      Pharynx: Oropharynx is clear. No oropharyngeal exudate or posterior oropharyngeal erythema.   Eyes:      Extraocular Movements: Extraocular movements intact.      Conjunctiva/sclera: Conjunctivae normal.      Pupils: Pupils are equal, round, and reactive to light.   Neck:      Musculoskeletal: Normal range of motion and neck supple. No neck rigidity or muscular tenderness.   Cardiovascular:      Rate and Rhythm: Normal rate and regular rhythm.      Pulses: Normal pulses.      Heart sounds: Normal heart sounds.   Pulmonary:      Effort: Pulmonary effort is normal.      Breath sounds: Normal breath sounds. No wheezing, rhonchi or rales.   Abdominal:      General: Abdomen is flat. Bowel sounds are normal. There is no distension.      Palpations: Abdomen is soft.      Tenderness: There is generalized abdominal tenderness.   Musculoskeletal: Normal range of motion.         General: No tenderness.      Right lower leg: No edema.      Left lower leg: No edema.   Skin:     General: Skin is warm and dry.      Capillary Refill: Capillary refill takes less than 2 seconds.      Findings: No rash.   Neurological:      General: No focal deficit present.      Mental Status: She is alert and oriented to person, place, and time. Mental status is at baseline.      Cranial Nerves: No cranial nerve deficit.      Sensory: No sensory deficit.      Motor: No weakness.   Psychiatric:         Mood and Affect: Mood normal.          Behavior: Behavior normal.         Thought Content: Thought content normal.         Procedures           ED Course  ED Course as of Feb 14 1532   Sun Feb 14, 2021   1529 Patient is feeling better, she is tolerating fluids in the ED.  Urine is very concentrated she does have some ketones, but her BUN and creatinine are within normal limits.  Suspect mild dehydration.  No evidence of urinary tract infection.  Patient received 2 L IV fluid in ED, famotidine and zofran.  Feeling much better and tolerating fluids.  We will discharge with prescription for sublingual Zofran, have patient continue Phenergan as well for nausea not alleviated by Zofran.    [AW]      ED Course User Index  [AW] Saturnino Lang MD      Lab Results (last 24 hours)     Procedure Component Value Units Date/Time    CBC & Differential [[id number]]  (Abnormal) Collected: 02/14/21 1028    Specimen: Blood Updated: 02/14/21 1041    Narrative:      The following orders were created for panel order CBC & Differential.  Procedure                               Abnormality         Status                     ---------                               -----------         ------                     CBC Auto Differential[[id number]]        Abnormal            Final result                 Please view results for these tests on the individual orders.    Comprehensive Metabolic Panel [[id number]]  (Abnormal) Collected: 02/14/21 1028    Specimen: Blood Updated: 02/14/21 1059     Glucose 110 mg/dL      BUN 16 mg/dL      Creatinine 0.85 mg/dL      Sodium 139 mmol/L      Potassium 3.2 mmol/L      Chloride 97 mmol/L      CO2 28.0 mmol/L      Calcium 10.0 mg/dL      Total Protein 7.2 g/dL      Albumin 4.00 g/dL      ALT (SGPT) 23 U/L      AST (SGOT) 22 U/L      Alkaline Phosphatase 71 U/L      Total Bilirubin 0.6 mg/dL      eGFR Non African Amer 66 mL/min/1.73      Globulin 3.2 gm/dL      A/G Ratio 1.3 g/dL      BUN/Creatinine Ratio 18.8     Anion Gap 14.0 mmol/L      Narrative:      GFR Normal >60  Chronic Kidney Disease <60  Kidney Failure <15      Lipase [[id number]]  (Normal) Collected: 02/14/21 1028    Specimen: Blood Updated: 02/14/21 1054     Lipase 40 U/L     Magnesium [[id number]]  (Normal) Collected: 02/14/21 1028    Specimen: Blood Updated: 02/14/21 1053     Magnesium 2.0 mg/dL     CBC Auto Differential [[id number]]  (Abnormal) Collected: 02/14/21 1028    Specimen: Blood Updated: 02/14/21 1041     WBC 8.25 10*3/mm3      RBC 4.67 10*6/mm3      Hemoglobin 15.7 g/dL      Hematocrit 43.3 %      MCV 92.7 fL      MCH 33.6 pg      MCHC 36.3 g/dL      RDW 12.2 %      RDW-SD 41.5 fl      MPV 8.8 fL      Platelets 290 10*3/mm3      Neutrophil % 80.4 %      Lymphocyte % 11.6 %      Monocyte % 6.8 %      Eosinophil % 0.2 %      Basophil % 0.6 %      Immature Grans % 0.4 %      Neutrophils, Absolute 6.63 10*3/mm3      Lymphocytes, Absolute 0.96 10*3/mm3      Monocytes, Absolute 0.56 10*3/mm3      Eosinophils, Absolute 0.02 10*3/mm3      Basophils, Absolute 0.05 10*3/mm3      Immature Grans, Absolute 0.03 10*3/mm3      nRBC 0.0 /100 WBC     Urinalysis With Culture If Indicated - Urine, Clean Catch [[id number]]  (Abnormal) Collected: 02/14/21 1107    Specimen: Urine, Clean Catch Updated: 02/14/21 1154     Color, UA Yellow     Appearance, UA Clear     pH, UA 6.0     Specific Gravity, UA >=1.030     Glucose, UA Negative     Ketones, UA >=80 mg/dL (3+)     Bilirubin, UA Small (1+)     Blood, UA Trace     Protein, UA Negative     Leuk Esterase, UA Negative     Nitrite, UA Negative     Urobilinogen, UA 0.2 E.U./dL    Urinalysis, Microscopic Only - Urine, Clean Catch [[id number]]  (Abnormal) Collected: 02/14/21 1107    Specimen: Urine, Clean Catch Updated: 02/14/21 1200     RBC, UA None Seen /HPF      WBC, UA 0-2 /HPF      Bacteria, UA None Seen /HPF      Squamous Epithelial Cells, UA 3-6 /HPF      Methodology Automated Microscopy    Narrative:      Less than 1 ml specimen.  microscopics  performed on uncentrifuged specimen.      Ct Abdomen Pelvis With Contrast    Result Date: 2/14/2021  CT ABDOMEN PELVIS W CONTRAST- 2/14/2021 12:03 PM CST  HISTORY: abd pain, n/v, decreased flatus and stool   COMPARISON: None.  DLP: 662 mGy cm. Automated exposure control was utilized to diminish patient radiation dose.  TECHNIQUE: Following the intravenous administration of contrast, helical CT tomographic images of the abdomen and pelvis were acquired. Coronal reformatted images were also provided for review.  FINDINGS: Bibasilar subsegmental atelectasis is present. Lung bases are otherwise clear.. A small hiatal hernia is present.  LIVER: No focal liver lesion. The hepatic vasculature is patent.  BILIARY SYSTEM: The patient is status post cholecystectomy. No evidence of intra or extrahepatic biliary dilatation..  PANCREAS: No focal pancreatic lesion.  SPLEEN: Splenic granulomas are present. No evidence of splenomegaly or mass..  KIDNEYS AND ADRENALS: Bilateral kidneys and adrenal glands are unremarkable. The ureters are decompressed and normal in appearance.  RETROPERITONEUM: No mass, lymphadenopathy or hemorrhage.  GI TRACT: There is no evidence of mechanical bowel obstruction or focal bowel wall thickening. There are scattered diverticula throughout the colon most concentrated in the descending and sigmoid colon. No radiographic evidence of acute diverticulitis.. The appendix is not visualized and I suspect may be surgically absent..  OTHER: There is no mesenteric mass, lymphadenopathy or fluid collection. There is no abdominal wall defect within the midline above the umbilicus with a transverse diameter 4 cm. This contains peritoneal fat. There is also a small fat-containing periumbilical hernia. No evidence of herniated bowel loop or incarceration.. The osseous structures and soft tissues demonstrate no worrisome lesions. No free fluid or localized inflammatory process is demonstrated.  PELVIS: There is  suspected pelvic relaxation with the lower aspect of the urinary bladder as well as the vaginal cuff low lying.. The urinary bladder is otherwise normal in appearance. The patient is status post hysterectomy..      1. No evidence of mechanical obstruction. There are scattered diverticula throughout the colon most concentrated in the descending and sigmoid colon. No evidence of acute diverticulitis. The appendix is not visualized and I suspect may be surgically absent. 2. Small hiatal hernia. 3. The patient is status post cholecystectomy. The lesion has also undergone prior hysterectomy. The appendix is not visualized and may also be surgically absent. 4. There is a ventral wall hernia above the umbilicus within the midline containing peritoneal fat. There is also a small fat-containing periumbilical hernia. No evidence of herniated bowel loop or incarceration. 5. Suspected pelvic relaxation with the lower aspect of the urinary bladder as well as the vaginal cuff rather low lying. No free fluid or localized inflammatory process is identified within the lower abdomen or pelvis.   This report was finalized on 02/14/2021 13:19 by Dr. Noah Ferro MD.                                       [facility]    Final diagnoses:   Non-intractable vomiting with nausea, unspecified vomiting type   Dehydration            Saturnino Lang MD  02/14/21 1532

## 2021-02-17 ENCOUNTER — TELEPHONE (OUTPATIENT)
Dept: PODIATRY | Facility: CLINIC | Age: 71
End: 2021-02-17

## 2021-02-19 ENCOUNTER — TELEMEDICINE (OUTPATIENT)
Dept: FAMILY MEDICINE CLINIC | Facility: CLINIC | Age: 71
End: 2021-02-19

## 2021-02-19 DIAGNOSIS — K52.9 GASTROENTERITIS: Primary | ICD-10-CM

## 2021-02-19 DIAGNOSIS — Z20.822 SUSPECTED COVID-19 VIRUS INFECTION: ICD-10-CM

## 2021-02-19 PROCEDURE — 99442 PR PHYS/QHP TELEPHONE EVALUATION 11-20 MIN: CPT | Performed by: NURSE PRACTITIONER

## 2021-02-19 RX ORDER — METRONIDAZOLE 500 MG/1
500 TABLET ORAL 3 TIMES DAILY
Qty: 21 TABLET | Refills: 0 | Status: SHIPPED | OUTPATIENT
Start: 2021-02-19 | End: 2021-02-19

## 2021-02-19 RX ORDER — METRONIDAZOLE 500 MG/1
500 TABLET ORAL 3 TIMES DAILY
Qty: 21 TABLET | Refills: 0 | Status: SHIPPED | OUTPATIENT
Start: 2021-02-19 | End: 2021-03-27

## 2021-02-19 NOTE — PROGRESS NOTES
Subjective   Chief Complaint:  Persistent nausea and diarrhea    History of Present Illness:  After obtaining verbal consent to receive care via telephone, this 71 y.o. female was evaluated via telemedicine telephone conference today for evaluation of persistent nausea and diarrhea.  Reports she has been to the ER 3 times and has already had a CAT scan.  Reports is on Zofran and Phenergan already.  Reports onset of symptoms approximately 2 weeks.    Allergies   Allergen Reactions   • Macrobid [Nitrofurantoin Macrocrystal] Rash      Current Outpatient Medications on File Prior to Visit   Medication Sig   • Cholecalciferol (VITAMIN D) 2000 UNITS tablet Take 1,000 Units by mouth Daily.   • cyanocobalamin (VITAMIN B-12) 50 MCG tablet tablet Take 50 mcg by mouth Daily.   • esomeprazole (nexIUM) 40 MG capsule TAKE 1 CAPSULE TWICE DAILY GENERIC FOR NEXIUM   • furosemide (LASIX) 20 MG tablet Take 1 tablet by mouth Daily.   • furosemide (LASIX) 20 MG tablet TAKE ONE TABLET DAILY GENERIC FOR LASIX   • losartan (COZAAR) 50 MG tablet TAKE ONE TABLET DAILY GENERIC FOR COZAAR   • Multiple Vitamin (MULTI VITAMIN DAILY PO) Take 1 tablet by mouth daily.   • ondansetron ODT (ZOFRAN-ODT) 4 MG disintegrating tablet Place 1 tablet on the tongue Every 8 (Eight) Hours As Needed for Nausea or Vomiting.   • potassium chloride (K-DUR,KLOR-CON) 20 MEQ CR tablet TAKE 1 TABLET BY MOUTH 2 (TWO) TIMES A DAY.   • promethazine (PHENERGAN) 25 MG tablet Take 1 tablet by mouth Every 6 (Six) Hours As Needed for Nausea or Vomiting.   • triamterene-hydrochlorothiazide (DYAZIDE) 37.5-25 MG per capsule TAKE 1 CAPSULE EVERY MORNING GENERIC FOR DYAZIDE   • vitamin E 600 UNIT capsule Take 400 Units by mouth Daily.   • Xarelto 20 MG tablet TAKE ONE TABLET DAILY WITH DINNER     Current Facility-Administered Medications on File Prior to Visit   Medication   • bupivacaine (MARCAINE) 0.5 % injection 1.5 mL   • dexamethasone (DECADRON) injection 10 mg   •  triamcinolone acetonide (KENALOG-40) injection 40 mg      Past Medical, Surgical, Social, and Family History:  Past Medical History:   Diagnosis Date   • Anxiety    • Cancer (CMS/HCC)     skin squamous cell   • Depression    • DJD (degenerative joint disease)    • GERD (gastroesophageal reflux disease)    • HTN (hypertension)    • Hx of colonic polyp    • Pulmonary embolism (CMS/HCC)     post surgical with cardiac arrest     Past Surgical History:   Procedure Laterality Date   • BREAST SURGERY      breast reduction   • CHOLECYSTECTOMY     • COLONOSCOPY  04/01/2019    Diverticulosis otherwise normal exam repeat in 5 years   • COLONOSCOPY W/ POLYPECTOMY  02/13/2014    Hyperplastic polyp at 20 cm, Diverticulosis repeat exam in 5 years   • EKOS CATHETER PLACEMENT Bilateral 11/17/2016    Procedure: Ekos catheter placement;  Surgeon: Daniel Underwood MD;  Location:  [facility];  Service:    • EKOS CATHETER REMOVAL Bilateral 11/18/2016    Procedure: Ekos catheter removal with stent placement;  Surgeon: Daniel Underwood MD;  Location:  [facility];  Service:    • ENDOSCOPY  01/22/2010    Negative endoscopy noting a healed gastric ulcer   • ENDOSCOPY  10/21/2009    Superficial mucosal erosion, chronic active gastritis   • FLAP HEAD/NECK Right 10/26/2020    Procedure: POSSIBLE FLAP;  Surgeon: Vadim Le MD;  Location:  [facility] OR;  Service: ENT;  Laterality: Right;   • FOOT SURGERY     • HEAD/NECK LESION/CYST EXCISION Right 10/26/2020    Procedure: 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 with transposition flap;  Surgeon: Vadim Le MD;  Location:  [facility] OR;  Service: ENT;  Laterality: Right;   • HERNIA REPAIR     • HYSTERECTOMY     • OOPHORECTOMY     • REPLACEMENT TOTAL KNEE     • TOTAL ABDOMINAL HYSTERECTOMY     • TUBAL ABDOMINAL LIGATION       Social History     Socioeconomic History   • Marital status:      Spouse name: Nima   • Number  of children: 1   • Years of education: 13   • Highest education level: Not on file   Occupational History   • Occupation: [employer]  dept   Tobacco Use   • Smoking status: Never Smoker   • Smokeless tobacco: Never Used   Substance and Sexual Activity   • Alcohol use: No   • Drug use: No   • Sexual activity: Not Currently     Family History   Problem Relation Age of Onset   • Coronary artery disease Mother    • Coronary artery disease Father    • Diabetes Brother    • Hypertension Brother    • Colon polyps Brother    • Heart disease Maternal Grandfather    • Colon cancer Neg Hx      Review of Systems   Constitutional: Positive for fatigue. Negative for appetite change, chills and fever.   Respiratory: Negative for cough and shortness of breath.    Cardiovascular: Negative for chest pain and palpitations.   Gastrointestinal: Positive for diarrhea and nausea.   Musculoskeletal: Negative for arthralgias and myalgias.     Objective   Physical Exam:  Visit done through telephone call therefore no visualization, palpation, or percussion is possible.    Assessment/Plan   Diagnoses and all orders for this visit:    1. Gastroenteritis (Primary)  -     Discontinue: metroNIDAZOLE (Flagyl) 500 MG tablet; Take 1 tablet by mouth 3 (Three) Times a Day.  Dispense: 21 tablet; Refill: 0  -     metroNIDAZOLE (Flagyl) 500 MG tablet; Take 1 tablet by mouth 3 (Three) Times a Day.  Dispense: 21 tablet; Refill: 0    2. Suspected COVID-19 virus infection  -     COVID-19,[facility],NP/OP Swab in Transport Media or ESwab 72 HR TAT - Swab, Nasopharynx; Future    Discussion:  Advised and educated plan of care.  With the onset of symptoms, I believe that this was viral in nature she would have already been getting better.  That being said, advise GI panel versus empiric treatment.  She is using empiric treatment because he is so weak she has difficulty getting out.  I advised the ER if worse or dehydrated.  We will also have her get a  Covid test to be thorough.  Advised how to take the Zofran 30 minutes before a meal for a few days to try to get some nutrition.    Time = 15 Minutes  Telephone visit done due to request of patient after offering a video visit first.    Follow-up:  Return for Next scheduled follow up as already scheduled..    Note e-Signed by LANRE Hernandez on 02/19/2021 at 15:23 CST

## 2021-02-21 ENCOUNTER — HOSPITAL ENCOUNTER (EMERGENCY)
Facility: HOSPITAL | Age: 71
Discharge: HOME | End: 2021-02-22
Attending: INTERNAL MEDICINE | Admitting: INTERNAL MEDICINE

## 2021-02-21 ENCOUNTER — TELEPHONE (OUTPATIENT)
Dept: FAMILY MEDICINE CLINIC | Facility: CLINIC | Age: 71
End: 2021-02-21

## 2021-02-21 DIAGNOSIS — U07.1 GASTROENTERITIS DUE TO COVID-19 VIRUS: Primary | ICD-10-CM

## 2021-02-21 DIAGNOSIS — A08.39 GASTROENTERITIS DUE TO COVID-19 VIRUS: Primary | ICD-10-CM

## 2021-02-21 LAB
ALBUMIN SERPL-MCNC: 3.6 G/DL (ref 3.5–5.2)
ALBUMIN/GLOB SERPL: 1.4 G/DL
ALP SERPL-CCNC: 58 U/L (ref 39–117)
ALT SERPL W P-5'-P-CCNC: 17 U/L (ref 1–33)
ANION GAP SERPL CALCULATED.3IONS-SCNC: 12 MMOL/L (ref 5–15)
AST SERPL-CCNC: 18 U/L (ref 1–32)
BASOPHILS # BLD AUTO: 0.03 10*3/MM3 (ref 0–0.2)
BASOPHILS NFR BLD AUTO: 0.4 % (ref 0–1.5)
BILIRUB SERPL-MCNC: 0.5 MG/DL (ref 0–1.2)
BUN SERPL-MCNC: 11 MG/DL (ref 8–23)
BUN/CREAT SERPL: 13.4 (ref 7–25)
CALCIUM SPEC-SCNC: 9.2 MG/DL (ref 8.6–10.5)
CHLORIDE SERPL-SCNC: 100 MMOL/L (ref 98–107)
CO2 SERPL-SCNC: 29 MMOL/L (ref 22–29)
CREAT SERPL-MCNC: 0.82 MG/DL (ref 0.57–1)
DEPRECATED RDW RBC AUTO: 42.7 FL (ref 37–54)
EOSINOPHIL # BLD AUTO: 0.08 10*3/MM3 (ref 0–0.4)
EOSINOPHIL NFR BLD AUTO: 1.2 % (ref 0.3–6.2)
ERYTHROCYTE [DISTWIDTH] IN BLOOD BY AUTOMATED COUNT: 12.6 % (ref 12.3–15.4)
GFR SERPL CREATININE-BSD FRML MDRD: 68 ML/MIN/1.73
GLOBULIN UR ELPH-MCNC: 2.6 GM/DL
GLUCOSE SERPL-MCNC: 96 MG/DL (ref 65–99)
HCT VFR BLD AUTO: 41 % (ref 34–46.6)
HGB BLD-MCNC: 14.9 G/DL (ref 12–15.9)
HOLD SPECIMEN: NORMAL
IMM GRANULOCYTES # BLD AUTO: 0.02 10*3/MM3 (ref 0–0.05)
IMM GRANULOCYTES NFR BLD AUTO: 0.3 % (ref 0–0.5)
LIPASE SERPL-CCNC: 34 U/L (ref 13–60)
LYMPHOCYTES # BLD AUTO: 0.82 10*3/MM3 (ref 0.7–3.1)
LYMPHOCYTES NFR BLD AUTO: 12 % (ref 19.6–45.3)
MCH RBC QN AUTO: 33.7 PG (ref 26.6–33)
MCHC RBC AUTO-ENTMCNC: 36.3 G/DL (ref 31.5–35.7)
MCV RBC AUTO: 92.8 FL (ref 79–97)
MONOCYTES # BLD AUTO: 0.71 10*3/MM3 (ref 0.1–0.9)
MONOCYTES NFR BLD AUTO: 10.4 % (ref 5–12)
NEUTROPHILS NFR BLD AUTO: 5.16 10*3/MM3 (ref 1.7–7)
NEUTROPHILS NFR BLD AUTO: 75.7 % (ref 42.7–76)
NRBC BLD AUTO-RTO: 0 /100 WBC (ref 0–0.2)
PLATELET # BLD AUTO: 282 10*3/MM3 (ref 140–450)
PMV BLD AUTO: 9.3 FL (ref 6–12)
POTASSIUM SERPL-SCNC: 3.2 MMOL/L (ref 3.5–5.2)
PROT SERPL-MCNC: 6.2 G/DL (ref 6–8.5)
RBC # BLD AUTO: 4.42 10*6/MM3 (ref 3.77–5.28)
SARS-COV-2 RNA PNL SPEC NAA+PROBE: DETECTED
SODIUM SERPL-SCNC: 141 MMOL/L (ref 136–145)
WBC # BLD AUTO: 6.82 10*3/MM3 (ref 3.4–10.8)
WHOLE BLOOD HOLD SPECIMEN: NORMAL
WHOLE BLOOD HOLD SPECIMEN: NORMAL

## 2021-02-21 PROCEDURE — 80053 COMPREHEN METABOLIC PANEL: CPT | Performed by: INTERNAL MEDICINE

## 2021-02-21 PROCEDURE — 96374 THER/PROPH/DIAG INJ IV PUSH: CPT

## 2021-02-21 PROCEDURE — 36415 COLL VENOUS BLD VENIPUNCTURE: CPT

## 2021-02-21 PROCEDURE — 83690 ASSAY OF LIPASE: CPT | Performed by: INTERNAL MEDICINE

## 2021-02-21 PROCEDURE — 85025 COMPLETE CBC W/AUTO DIFF WBC: CPT | Performed by: INTERNAL MEDICINE

## 2021-02-21 PROCEDURE — 25010000002 ONDANSETRON PER 1 MG: Performed by: INTERNAL MEDICINE

## 2021-02-21 PROCEDURE — 87635 SARS-COV-2 COVID-19 AMP PRB: CPT | Performed by: INTERNAL MEDICINE

## 2021-02-21 PROCEDURE — 99284 EMERGENCY DEPT VISIT MOD MDM: CPT

## 2021-02-21 RX ORDER — ONDANSETRON 2 MG/ML
4 INJECTION INTRAMUSCULAR; INTRAVENOUS ONCE
Status: COMPLETED | OUTPATIENT
Start: 2021-02-21 | End: 2021-02-21

## 2021-02-21 RX ADMIN — ONDANSETRON HYDROCHLORIDE 4 MG: 2 SOLUTION INTRAMUSCULAR; INTRAVENOUS at 21:24

## 2021-02-21 RX ADMIN — SODIUM CHLORIDE 1000 ML: 9 INJECTION, SOLUTION INTRAVENOUS at 21:24

## 2021-02-21 NOTE — TELEPHONE ENCOUNTER
2.17.21 onset symptoms  2.19.21 video visit Jef  2.21.21 [facility]/ro on call +  Cocktail being sent lisa    Consider BAM; let us know tomorrow decision  A. Benefit; used early may make disease milder/potentially life safing  B. Negative; will push back vaccination 3m    Vaccine push back anyway with natural disease; 30 days    Go to ER if oxygen < 90%/any SOB  Expect health department to call    Quarantine 2.27.21 if things go well    Lucy:   Wife: she needs to stay away from  and needs testing tomorrow; [facility] vs office

## 2021-02-22 VITALS
SYSTOLIC BLOOD PRESSURE: 128 MMHG | HEIGHT: 63 IN | WEIGHT: 191 LBS | HEART RATE: 80 BPM | RESPIRATION RATE: 18 BRPM | TEMPERATURE: 98 F | OXYGEN SATURATION: 97 % | DIASTOLIC BLOOD PRESSURE: 65 MMHG | BODY MASS INDEX: 33.84 KG/M2

## 2021-02-22 PROCEDURE — 96375 TX/PRO/DX INJ NEW DRUG ADDON: CPT

## 2021-02-22 PROCEDURE — 25010000002 PROCHLORPERAZINE 10 MG/2ML SOLUTION: Performed by: INTERNAL MEDICINE

## 2021-02-22 RX ORDER — ONDANSETRON 4 MG/1
4 TABLET, ORALLY DISINTEGRATING ORAL EVERY 8 HOURS PRN
Qty: 20 TABLET | Refills: 0 | Status: SHIPPED | OUTPATIENT
Start: 2021-02-22 | End: 2021-03-27

## 2021-02-22 RX ORDER — PROCHLORPERAZINE EDISYLATE 5 MG/ML
10 INJECTION INTRAMUSCULAR; INTRAVENOUS ONCE
Status: COMPLETED | OUTPATIENT
Start: 2021-02-22 | End: 2021-02-22

## 2021-02-22 RX ADMIN — PROCHLORPERAZINE EDISYLATE 10 MG: 5 INJECTION INTRAMUSCULAR; INTRAVENOUS at 00:41

## 2021-02-22 NOTE — ED PROVIDER NOTES
Subjective   Ms. Sousa is a 71-year-old female who for 2 weeks has had nausea and vomiting has been at home with her  who just was brought into the emergency room earlier today with shortness of breath and was found to have Covid.  She states that she has not had shortness of breath with more than nausea vomiting and fatigue.  She states that she does have an oxygen monitor at home is monitor her oxygen levels and they have been okay.          Review of Systems   Constitutional: Positive for fatigue. Negative for chills and fever.   HENT: Negative for congestion and facial swelling.    Eyes: Negative for photophobia, discharge and visual disturbance.   Respiratory: Negative for cough, shortness of breath and wheezing.    Cardiovascular: Negative for chest pain, palpitations and leg swelling.   Gastrointestinal: Positive for nausea and vomiting. Negative for abdominal pain and diarrhea.   Endocrine: Negative for cold intolerance and heat intolerance.   Genitourinary: Negative for difficulty urinating and urgency.   Musculoskeletal: Negative for arthralgias, joint swelling and myalgias.   Skin: Negative for color change and pallor.   Neurological: Negative for dizziness and light-headedness.   Hematological: Negative for adenopathy. Does not bruise/bleed easily.   Psychiatric/Behavioral: Negative for agitation, behavioral problems and confusion.       Past Medical History:   Diagnosis Date   • Anxiety    • Cancer (CMS/HCC)     skin squamous cell   • Depression    • DJD (degenerative joint disease)    • GERD (gastroesophageal reflux disease)    • HTN (hypertension)    • Hx of colonic polyp    • Pulmonary embolism (CMS/HCC)     post surgical with cardiac arrest       Allergies   Allergen Reactions   • Macrobid [Nitrofurantoin Macrocrystal] Rash       Past Surgical History:   Procedure Laterality Date   • BREAST SURGERY      breast reduction   • CHOLECYSTECTOMY     • COLONOSCOPY  04/01/2019    Diverticulosis  otherwise normal exam repeat in 5 years   • COLONOSCOPY W/ POLYPECTOMY  02/13/2014    Hyperplastic polyp at 20 cm, Diverticulosis repeat exam in 5 years   • EKOS CATHETER PLACEMENT Bilateral 11/17/2016    Procedure: Ekos catheter placement;  Surgeon: Daniel Underwood MD;  Location:  [facility];  Service:    • EKOS CATHETER REMOVAL Bilateral 11/18/2016    Procedure: Ekos catheter removal with stent placement;  Surgeon: Daniel Underwood MD;  Location:  [facility];  Service:    • ENDOSCOPY  01/22/2010    Negative endoscopy noting a healed gastric ulcer   • ENDOSCOPY  10/21/2009    Superficial mucosal erosion, chronic active gastritis   • FLAP HEAD/NECK Right 10/26/2020    Procedure: POSSIBLE FLAP;  Surgeon: Vadim Le MD;  Location:  [facility] OR;  Service: ENT;  Laterality: Right;   • FOOT SURGERY     • HEAD/NECK LESION/CYST EXCISION Right 10/26/2020    Procedure: 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 with transposition flap;  Surgeon: Vadim Le MD;  Location:  [facility] OR;  Service: ENT;  Laterality: Right;   • HERNIA REPAIR     • HYSTERECTOMY     • OOPHORECTOMY     • REPLACEMENT TOTAL KNEE     • TOTAL ABDOMINAL HYSTERECTOMY     • TUBAL ABDOMINAL LIGATION         Family History   Problem Relation Age of Onset   • Coronary artery disease Mother    • Coronary artery disease Father    • Diabetes Brother    • Hypertension Brother    • Colon polyps Brother    • Heart disease Maternal Grandfather    • Colon cancer Neg Hx        Social History     Socioeconomic History   • Marital status:      Spouse name: Nima   • Number of children: 1   • Years of education: 13   • Highest education level: Not on file   Occupational History   • Occupation: [employer]  dept   Tobacco Use   • Smoking status: Never Smoker   • Smokeless tobacco: Never Used   Substance and Sexual Activity   • Alcohol use: No   • Drug use: No   • Sexual activity:  Not Currently           Objective   Physical Exam  Vitals signs and nursing note reviewed.   Constitutional:       Appearance: Normal appearance. She is well-developed.   HENT:      Head: Normocephalic and atraumatic.      Nose: Nose normal.   Eyes:      Conjunctiva/sclera: Conjunctivae normal.      Pupils: Pupils are equal, round, and reactive to light.   Neck:      Musculoskeletal: Normal range of motion and neck supple.   Pulmonary:      Effort: Pulmonary effort is normal.   Musculoskeletal: Normal range of motion.   Skin:     General: Skin is warm and dry.   Neurological:      General: No focal deficit present.      Mental Status: She is alert and oriented to person, place, and time.      Cranial Nerves: No cranial nerve deficit.   Psychiatric:         Mood and Affect: Mood normal.         Behavior: Behavior normal.         Thought Content: Thought content normal.         Procedures           ED Course  ED Course as of Feb 22 0308   Mon Feb 22, 2021   0308 I counseled the patient on the need to continue to monitor her oxygen levels at home continue to push fluids and use antiemetics.    [RW]      ED Course User Index  [RW] Alex Coello MD                                           [facility]    Final diagnoses:   Gastroenteritis due to COVID-19 virus            Alex Coello MD  02/22/21 0308

## 2021-02-23 ENCOUNTER — EPISODE CHANGES (OUTPATIENT)
Dept: CASE MANAGEMENT | Facility: OTHER | Age: 71
End: 2021-02-23

## 2021-02-23 ENCOUNTER — PATIENT OUTREACH (OUTPATIENT)
Dept: CASE MANAGEMENT | Facility: OTHER | Age: 71
End: 2021-02-23

## 2021-02-23 ENCOUNTER — TELEMEDICINE (OUTPATIENT)
Dept: FAMILY MEDICINE CLINIC | Facility: CLINIC | Age: 71
End: 2021-02-23

## 2021-02-23 VITALS — WEIGHT: 191 LBS | RESPIRATION RATE: 18 BRPM | HEIGHT: 63 IN | BODY MASS INDEX: 33.84 KG/M2

## 2021-02-23 DIAGNOSIS — R53.83 OTHER FATIGUE: ICD-10-CM

## 2021-02-23 DIAGNOSIS — U07.1 COVID-19 VIRUS INFECTION: ICD-10-CM

## 2021-02-23 DIAGNOSIS — E87.6 HYPOKALEMIA: ICD-10-CM

## 2021-02-23 DIAGNOSIS — Z71.85 VACCINE COUNSELING: ICD-10-CM

## 2021-02-23 DIAGNOSIS — R11.2 NAUSEA AND VOMITING, INTRACTABILITY OF VOMITING NOT SPECIFIED, UNSPECIFIED VOMITING TYPE: ICD-10-CM

## 2021-02-23 PROCEDURE — 99214 OFFICE O/P EST MOD 30 MIN: CPT | Performed by: FAMILY MEDICINE

## 2021-02-23 NOTE — PROGRESS NOTES
Subjective   Lucy Sousa is a 71 y.o. female presenting with chief complaint of:   Vomiting, COVID  The use of a video visit has been reviewed with the patient and verbal informed consent has been obtained.    Unable to complete visit using a video connection to the patient. A phone visit was used to complete this visits. Total time of discussion was 27 minutes.    History of Present Illness :  Alone.  Here for primarily an acute issue today; many issues.       Couple days before at [place] with /in-laws.   2.8.21 diarrhea  2.9.21 [facility] ER; CT abdomen normal  2.11.21  nausea/vomiting  2.14.21  ER  2.19.21 brianne visit-flagyl given  2.19.21  onset respiratory symptoms  2.21.21  covid +  2.21.21  admitted [facility]/COVID  2.21.21 patient seen /ER COVID +  2.23.21 ro visit    Has multiple chronic problems to consider that might have a bearing on today's issues;  an interval appointment.       Chronic/acute problems reviewed today:   1. Hypokalemia: Chronic variable; uses diuretics.  Recent ER documentation of just below normal potassium.  Denies leg cramps palpitations   2. Nausea and vomiting, intractability of vomiting not specified, unspecified vomiting type acute; see above.  Improved today and actually able to eat and drink some   3. COVID-19 virus infection acute recent diagnosis.  Primary symptoms of fatigue   4. Other fatigue acute on chronic; i.e. worse than baseline.   5. Vaccine counseling Chronic/ongoing need to review pro/cons for various vaccinations based on age, health issues.  Needs vaccination today for COVID 2nd shot       Has an/another acute issue today: none.    The following portions of the patient's history were reviewed and updated as appropriate: allergies, current medications, past family history, past medical history, past social history, past surgical history and problem list.      Current Outpatient Medications:   •  Cholecalciferol (VITAMIN D) 2000 UNITS  tablet, Take 1,000 Units by mouth Daily., Disp: , Rfl:   •  cyanocobalamin (VITAMIN B-12) 50 MCG tablet tablet, Take 50 mcg by mouth Daily., Disp: , Rfl:   •  esomeprazole (nexIUM) 40 MG capsule, TAKE 1 CAPSULE TWICE DAILY GENERIC FOR NEXIUM, Disp: 180 capsule, Rfl: 1  •  furosemide (LASIX) 20 MG tablet, Take 1 tablet by mouth Daily., Disp: 90 tablet, Rfl: 0  •  furosemide (LASIX) 20 MG tablet, TAKE ONE TABLET DAILY GENERIC FOR LASIX, Disp: 90 tablet, Rfl: 1  •  losartan (COZAAR) 50 MG tablet, TAKE ONE TABLET DAILY GENERIC FOR COZAAR, Disp: 90 tablet, Rfl: 1  •  metroNIDAZOLE (Flagyl) 500 MG tablet, Take 1 tablet by mouth 3 (Three) Times a Day., Disp: 21 tablet, Rfl: 0  •  Multiple Vitamin (MULTI VITAMIN DAILY PO), Take 1 tablet by mouth daily., Disp: , Rfl:   •  ondansetron ODT (ZOFRAN-ODT) 4 MG disintegrating tablet, Place 1 tablet on the tongue Every 8 (Eight) Hours As Needed for Nausea or Vomiting., Disp: 20 tablet, Rfl: 0  •  potassium chloride (K-DUR,KLOR-CON) 20 MEQ CR tablet, TAKE 1 TABLET BY MOUTH 2 (TWO) TIMES A DAY., Disp: 180 tablet, Rfl: 1  •  promethazine (PHENERGAN) 25 MG tablet, Take 1 tablet by mouth Every 6 (Six) Hours As Needed for Nausea or Vomiting., Disp: 15 tablet, Rfl: 0  •  triamterene-hydrochlorothiazide (DYAZIDE) 37.5-25 MG per capsule, TAKE 1 CAPSULE EVERY MORNING GENERIC FOR DYAZIDE, Disp: 90 capsule, Rfl: 1  •  vitamin E 600 UNIT capsule, Take 400 Units by mouth Daily., Disp: , Rfl:   •  Xarelto 20 MG tablet, TAKE ONE TABLET DAILY WITH DINNER, Disp: 90 tablet, Rfl: 1    Current Facility-Administered Medications:   •  bupivacaine (MARCAINE) 0.5 % injection 1.5 mL, 1.5 mL, Injection, Once, Sanjeev Cunningham DPLADY  •  dexamethasone (DECADRON) injection 10 mg, 10 mg, Intravenous, Once, Sanjeev Cunningham DPM  •  triamcinolone acetonide (KENALOG-40) injection 40 mg, 40 mg, Intramuscular, Once, Sanjeev Cunningham DPM    No problems with medications.  Refills if needed done    Allergies    Allergen Reactions   • Macrobid [Nitrofurantoin Macrocrystal] Rash       Review of Systems   Constitutional: Positive for activity change, appetite change and fatigue. Negative for fever.   HENT: Negative.    Eyes: Negative for visual disturbance.   Respiratory: Negative for cough, shortness of breath and wheezing.    Cardiovascular: Negative for chest pain, palpitations and leg swelling.   Gastrointestinal: Positive for diarrhea, nausea and vomiting. Negative for abdominal pain and GERD.   Genitourinary: Negative for difficulty urinating and dysuria.   Skin: Negative for rash.   Neurological: Positive for weakness and headache.     Lab Results:  Results for orders placed or performed during the hospital encounter of 02/21/21   COVID-19,[facility] IN-HOUSE,Nasal Swab No Transport Media 3-4 HR TAT - Swab, Nasal Cavity    Specimen: Nasal Cavity; Swab   Result Value Ref Range    COVID19 Detected (C) Not Detected - Ref. Range   Comprehensive Metabolic Panel    Specimen: Blood   Result Value Ref Range    Glucose 96 65 - 99 mg/dL    BUN 11 8 - 23 mg/dL    Creatinine 0.82 0.57 - 1.00 mg/dL    Sodium 141 136 - 145 mmol/L    Potassium 3.2 (L) 3.5 - 5.2 mmol/L    Chloride 100 98 - 107 mmol/L    CO2 29.0 22.0 - 29.0 mmol/L    Calcium 9.2 8.6 - 10.5 mg/dL    Total Protein 6.2 6.0 - 8.5 g/dL    Albumin 3.60 3.50 - 5.20 g/dL    ALT (SGPT) 17 1 - 33 U/L    AST (SGOT) 18 1 - 32 U/L    Alkaline Phosphatase 58 39 - 117 U/L    Total Bilirubin 0.5 0.0 - 1.2 mg/dL    eGFR Non African Amer 68 >60 mL/min/1.73    Globulin 2.6 gm/dL    A/G Ratio 1.4 g/dL    BUN/Creatinine Ratio 13.4 7.0 - 25.0    Anion Gap 12.0 5.0 - 15.0 mmol/L   Lipase    Specimen: Blood   Result Value Ref Range    Lipase 34 13 - 60 U/L   CBC Auto Differential    Specimen: Blood   Result Value Ref Range    WBC 6.82 3.40 - 10.80 10*3/mm3    RBC 4.42 3.77 - 5.28 10*6/mm3    Hemoglobin 14.9 12.0 - 15.9 g/dL    Hematocrit 41.0 34.0 - 46.6 %    MCV 92.8 79.0 - 97.0 fL    MCH  33.7 (H) 26.6 - 33.0 pg    MCHC 36.3 (H) 31.5 - 35.7 g/dL    RDW 12.6 12.3 - 15.4 %    RDW-SD 42.7 37.0 - 54.0 fl    MPV 9.3 6.0 - 12.0 fL    Platelets 282 140 - 450 10*3/mm3    Neutrophil % 75.7 42.7 - 76.0 %    Lymphocyte % 12.0 (L) 19.6 - 45.3 %    Monocyte % 10.4 5.0 - 12.0 %    Eosinophil % 1.2 0.3 - 6.2 %    Basophil % 0.4 0.0 - 1.5 %    Immature Grans % 0.3 0.0 - 0.5 %    Neutrophils, Absolute 5.16 1.70 - 7.00 10*3/mm3    Lymphocytes, Absolute 0.82 0.70 - 3.10 10*3/mm3    Monocytes, Absolute 0.71 0.10 - 0.90 10*3/mm3    Eosinophils, Absolute 0.08 0.00 - 0.40 10*3/mm3    Basophils, Absolute 0.03 0.00 - 0.20 10*3/mm3    Immature Grans, Absolute 0.02 0.00 - 0.05 10*3/mm3    nRBC 0.0 0.0 - 0.2 /100 WBC   Gray Top - Ice   Result Value Ref Range    Extra Tube Hold for add-ons.    Light Blue Top   Result Value Ref Range    Extra Tube hold for add-on    Green Top (Gel)   Result Value Ref Range    Extra Tube Hold for add-ons.    Lavender Top   Result Value Ref Range    Extra Tube hold for add-on    Red Top   Result Value Ref Range    Extra Tube Hold for add-ons.    Holt Blood Culture Bottle Set    Specimen: Arm, Right; Blood   Result Value Ref Range    Extra Tube Hold for add-ons.        A1C:No results for input(s): HGBA1C in the last 61073 hours.  PSA:No results for input(s): PSA in the last 75709 hours.  CBC:  Lab Results - Last 18 Months   Lab Units 02/21/21 2123 02/14/21  1028 02/11/21 1952 10/21/20  1050 10/06/20  0712 03/30/20  0741 09/10/19  0724   WBC 10*3/mm3 6.82 8.25 9.21 5.84 4.75 5.04 4.95   HEMOGLOBIN g/dL 14.9 15.7 15.8 13.7 13.9 15.1 14.0   HEMATOCRIT % 41.0 43.3 43.5 40.9 40.2 44.5 43.8   PLATELETS 10*3/mm3 282 290 316 298 318 320 308   IRON mcg/dL  --   --   --   --   --  121  --       BMP/CMP:  Lab Results - Last 18 Months   Lab Units 02/21/21  2209 02/14/21  1028 02/12/21  1212 02/11/21  2020 10/21/20  1050 10/06/20  0712 03/30/20  0741 09/10/19  0724   SODIUM mmol/L 141 139 139 141 140 142  "142 142   POTASSIUM mmol/L 3.2* 3.2* 3.8 3.7 3.4* 3.8 3.7 3.9   CHLORIDE mmol/L 100 97* 98 101 102 102 98 99   TOTAL CO2 mmol/L  --   --  26.7  --   --  28.5 30.4* 28.4   CO2 mmol/L 29.0 28.0  --  25.0 31.0*  --   --   --    GLUCOSE mg/dL  --   --  104*  --   --  88 92 94   BUN mg/dL 11 16 14 17 14 12 15 12   CREATININE mg/dL 0.82 0.85 0.84 0.88 0.72 0.85 0.97 0.89   EGFR IF NONAFRICN AM mL/min/1.73 68 66 66 63 80 66 56* 63   EGFR IF AFRICN AM mL/min/1.73  --   --  81  --   --  80 68 76   CALCIUM mg/dL 9.2 10.0 9.8 9.8 9.9 9.5 10.5 9.5     HEPATIC:  Lab Results - Last 18 Months   Lab Units 02/21/21  2209 02/14/21  1028 02/11/21  2020 10/21/20  1050 10/06/20  0712 03/30/20  0741 11/11/19  0730   ALT (SGPT) U/L 17 23 17 16 17 17 19   AST (SGOT) U/L 18 22 18 19 18 15 21   ALK PHOS U/L 58 71 61 63 69 69 65     THYROID:  Lab Results - Last 18 Months   Lab Units 10/06/20  0712 03/30/20  0741 09/10/19  0724   TSH uIU/mL 3.740 3.820 3.970       Objective   Resp 18   Ht 160 cm (63\")   Wt 86.6 kg (191 lb)   BMI 33.83 kg/m²   Body mass index is 33.83 kg/m².    Physical Exam   None; due to telephone/COVID19  Was alert, oriented x 3, pleasant.     Assessment/Plan     1. Hypokalemia    2. Nausea and vomiting, intractability of vomiting not specified, unspecified vomiting type    3. COVID-19 virus infection    4. Other fatigue    5. Vaccine counseling      Rx: reviewed/changes:  No orders of the defined types were placed in this encounter.    LAB/Testing/Referrals: reviewed/orders:   Today:   Orders Placed This Encounter   Procedures   • Basic Metabolic Panel     Chronic/recurrent labs above or change to:   same     Discussions:   Extreme to call 10 days if does well from 2.21.21 (+ test); 3.1.21  [facility] office few days after 3.1.21  Pro/con BAM; biggest problem she has had first vaccine and BAM would cause 90 day delay 2nd vaccine: she agreed to call if 24 hr any worse.  Having COVID; suggest 4w delay 2nd shot  Continue " social distancing  Gradually advance diet    Body mass index is 33.83 kg/m².  Patient's Body mass index is 33.83 kg/m². BMI is within normal parameters. No follow-up required..      Tobacco use reviewed:    Non-smoker  Lucy Sousa  reports that she has never smoked. She has never used smokeless tobacco..    There are no Patient Instructions on file for this visit.    Follow up: No follow-ups on file.  Future Appointments   Date Time Provider Department Center   4/12/2021  8:20 AM LAB [facility]   4/14/2021  9:00 AM Rosas Berg MD [facility]

## 2021-02-23 NOTE — OUTREACH NOTE
ED Potential Covid Discharge Follow-up    Patient seen at [facility] ED 2-21-21 for N/V;tested positive for COVID-19. Today she is able to take solids and liquids. She did a telephonic visit with her PCP earlier today. Discussed 24/7 nurse triage and COVID-19 hotline. Denied food or medication insecurities. Family available to get supplies as needed. She is monitoring her pulse oximeter and it is averaging 95%;educated to return to ED if less than 90% for 2 minutes or longer. Appreciative of the outreach.     Jeannine Sánchez RN  Ambulatory     2/23/2021, 14:55 CST

## 2021-03-27 ENCOUNTER — APPOINTMENT (OUTPATIENT)
Dept: CT IMAGING | Facility: HOSPITAL | Age: 71
End: 2021-03-27

## 2021-03-27 ENCOUNTER — HOSPITAL ENCOUNTER (OUTPATIENT)
Facility: HOSPITAL | Age: 71
Setting detail: OBSERVATION
Discharge: HOME | End: 2021-03-29
Attending: EMERGENCY MEDICINE | Admitting: INTERNAL MEDICINE

## 2021-03-27 DIAGNOSIS — R19.7 NAUSEA VOMITING AND DIARRHEA: ICD-10-CM

## 2021-03-27 DIAGNOSIS — R79.89 ELEVATED LACTIC ACID LEVEL: ICD-10-CM

## 2021-03-27 DIAGNOSIS — R11.2 NAUSEA VOMITING AND DIARRHEA: ICD-10-CM

## 2021-03-27 DIAGNOSIS — E87.6 HYPOKALEMIA: ICD-10-CM

## 2021-03-27 DIAGNOSIS — R10.84 GENERALIZED ABDOMINAL PAIN: Primary | ICD-10-CM

## 2021-03-27 PROBLEM — A49.9 UTI (URINARY TRACT INFECTION), BACTERIAL: Status: ACTIVE | Noted: 2021-03-27

## 2021-03-27 PROBLEM — E86.9 VOLUME DEPLETION: Status: ACTIVE | Noted: 2021-03-27

## 2021-03-27 PROBLEM — Z79.01 CHRONIC ANTICOAGULATION: Status: ACTIVE | Noted: 2021-03-27

## 2021-03-27 PROBLEM — N39.0 UTI (URINARY TRACT INFECTION), BACTERIAL: Status: ACTIVE | Noted: 2021-03-27

## 2021-03-27 PROBLEM — E83.52 HYPERCALCEMIA: Status: ACTIVE | Noted: 2021-03-27

## 2021-03-27 PROBLEM — E87.20 LACTIC ACIDOSIS: Status: ACTIVE | Noted: 2021-03-27

## 2021-03-27 PROBLEM — Z86.16 HISTORY OF COVID-19: Status: ACTIVE | Noted: 2021-03-27

## 2021-03-27 LAB
ALBUMIN SERPL-MCNC: 4.3 G/DL (ref 3.5–5.2)
ALBUMIN/GLOB SERPL: 1.3 G/DL
ALP SERPL-CCNC: 60 U/L (ref 39–117)
ALT SERPL W P-5'-P-CCNC: 20 U/L (ref 1–33)
ANION GAP SERPL CALCULATED.3IONS-SCNC: 17 MMOL/L (ref 5–15)
AST SERPL-CCNC: 22 U/L (ref 1–32)
BACTERIA UR QL AUTO: ABNORMAL /HPF
BASOPHILS # BLD AUTO: 0.04 10*3/MM3 (ref 0–0.2)
BASOPHILS NFR BLD AUTO: 0.4 % (ref 0–1.5)
BILIRUB SERPL-MCNC: 0.8 MG/DL (ref 0–1.2)
BILIRUB UR QL STRIP: ABNORMAL
BUN SERPL-MCNC: 14 MG/DL (ref 8–23)
BUN/CREAT SERPL: 15.6 (ref 7–25)
CALCIUM SPEC-SCNC: 10.6 MG/DL (ref 8.6–10.5)
CHLORIDE SERPL-SCNC: 91 MMOL/L (ref 98–107)
CLARITY UR: ABNORMAL
CO2 SERPL-SCNC: 27 MMOL/L (ref 22–29)
COLOR UR: ABNORMAL
CREAT SERPL-MCNC: 0.9 MG/DL (ref 0.57–1)
D-LACTATE SERPL-SCNC: 1.8 MMOL/L (ref 0.5–2)
D-LACTATE SERPL-SCNC: 3.2 MMOL/L (ref 0.5–2)
DEPRECATED RDW RBC AUTO: 43.9 FL (ref 37–54)
EOSINOPHIL # BLD AUTO: 0.01 10*3/MM3 (ref 0–0.4)
EOSINOPHIL NFR BLD AUTO: 0.1 % (ref 0.3–6.2)
ERYTHROCYTE [DISTWIDTH] IN BLOOD BY AUTOMATED COUNT: 12.7 % (ref 12.3–15.4)
GFR SERPL CREATININE-BSD FRML MDRD: 61 ML/MIN/1.73
GLOBULIN UR ELPH-MCNC: 3.2 GM/DL
GLUCOSE SERPL-MCNC: 176 MG/DL (ref 65–99)
GLUCOSE UR STRIP-MCNC: NEGATIVE MG/DL
HCT VFR BLD AUTO: 42 % (ref 34–46.6)
HGB BLD-MCNC: 14.8 G/DL (ref 12–15.9)
HGB UR QL STRIP.AUTO: NEGATIVE
HOLD SPECIMEN: NORMAL
HOLD SPECIMEN: NORMAL
HYALINE CASTS UR QL AUTO: ABNORMAL /LPF
IMM GRANULOCYTES # BLD AUTO: 0.04 10*3/MM3 (ref 0–0.05)
IMM GRANULOCYTES NFR BLD AUTO: 0.4 % (ref 0–0.5)
KETONES UR QL STRIP: ABNORMAL
LEUKOCYTE ESTERASE UR QL STRIP.AUTO: ABNORMAL
LIPASE SERPL-CCNC: 18 U/L (ref 13–60)
LYMPHOCYTES # BLD AUTO: 0.92 10*3/MM3 (ref 0.7–3.1)
LYMPHOCYTES NFR BLD AUTO: 10 % (ref 19.6–45.3)
MAGNESIUM SERPL-MCNC: 1.8 MG/DL (ref 1.6–2.4)
MCH RBC QN AUTO: 33 PG (ref 26.6–33)
MCHC RBC AUTO-ENTMCNC: 35.2 G/DL (ref 31.5–35.7)
MCV RBC AUTO: 93.5 FL (ref 79–97)
MONOCYTES # BLD AUTO: 0.43 10*3/MM3 (ref 0.1–0.9)
MONOCYTES NFR BLD AUTO: 4.7 % (ref 5–12)
NEUTROPHILS NFR BLD AUTO: 7.77 10*3/MM3 (ref 1.7–7)
NEUTROPHILS NFR BLD AUTO: 84.4 % (ref 42.7–76)
NITRITE UR QL STRIP: NEGATIVE
NRBC BLD AUTO-RTO: 0 /100 WBC (ref 0–0.2)
PH UR STRIP.AUTO: 6 [PH] (ref 5–8)
PLATELET # BLD AUTO: 369 10*3/MM3 (ref 140–450)
PMV BLD AUTO: 9.2 FL (ref 6–12)
POTASSIUM SERPL-SCNC: 2.8 MMOL/L (ref 3.5–5.2)
PROT SERPL-MCNC: 7.5 G/DL (ref 6–8.5)
PROT UR QL STRIP: ABNORMAL
RBC # BLD AUTO: 4.49 10*6/MM3 (ref 3.77–5.28)
RBC # UR: ABNORMAL /HPF
REF LAB TEST METHOD: ABNORMAL
SARS-COV-2 RNA PNL SPEC NAA+PROBE: NOT DETECTED
SODIUM SERPL-SCNC: 135 MMOL/L (ref 136–145)
SP GR UR STRIP: >1.03 (ref 1–1.03)
SQUAMOUS #/AREA URNS HPF: ABNORMAL /HPF
TROPONIN T SERPL-MCNC: <0.01 NG/ML (ref 0–0.03)
UROBILINOGEN UR QL STRIP: ABNORMAL
WBC # BLD AUTO: 9.21 10*3/MM3 (ref 3.4–10.8)
WBC UR QL AUTO: ABNORMAL /HPF
WHOLE BLOOD HOLD SPECIMEN: NORMAL
WHOLE BLOOD HOLD SPECIMEN: NORMAL

## 2021-03-27 PROCEDURE — 25010000002 MORPHINE PER 10 MG: Performed by: EMERGENCY MEDICINE

## 2021-03-27 PROCEDURE — 99284 EMERGENCY DEPT VISIT MOD MDM: CPT

## 2021-03-27 PROCEDURE — 96375 TX/PRO/DX INJ NEW DRUG ADDON: CPT

## 2021-03-27 PROCEDURE — 85025 COMPLETE CBC W/AUTO DIFF WBC: CPT | Performed by: EMERGENCY MEDICINE

## 2021-03-27 PROCEDURE — 25010000002 CEFTRIAXONE PER 250 MG: Performed by: EMERGENCY MEDICINE

## 2021-03-27 PROCEDURE — 87635 SARS-COV-2 COVID-19 AMP PRB: CPT | Performed by: EMERGENCY MEDICINE

## 2021-03-27 PROCEDURE — 93010 ELECTROCARDIOGRAM REPORT: CPT | Performed by: INTERNAL MEDICINE

## 2021-03-27 PROCEDURE — 96366 THER/PROPH/DIAG IV INF ADDON: CPT

## 2021-03-27 PROCEDURE — 25810000003 SODIUM CHLORIDE 0.9 % WITH KCL 20 MEQ 20-0.9 MEQ/L-% SOLUTION: Performed by: INTERNAL MEDICINE

## 2021-03-27 PROCEDURE — G0378 HOSPITAL OBSERVATION PER HR: HCPCS

## 2021-03-27 PROCEDURE — 96367 TX/PROPH/DG ADDL SEQ IV INF: CPT

## 2021-03-27 PROCEDURE — 74177 CT ABD & PELVIS W/CONTRAST: CPT

## 2021-03-27 PROCEDURE — 96365 THER/PROPH/DIAG IV INF INIT: CPT

## 2021-03-27 PROCEDURE — 87186 SC STD MICRODIL/AGAR DIL: CPT | Performed by: EMERGENCY MEDICINE

## 2021-03-27 PROCEDURE — 83735 ASSAY OF MAGNESIUM: CPT | Performed by: INTERNAL MEDICINE

## 2021-03-27 PROCEDURE — 83690 ASSAY OF LIPASE: CPT | Performed by: EMERGENCY MEDICINE

## 2021-03-27 PROCEDURE — 25010000002 IOPAMIDOL 61 % SOLUTION: Performed by: EMERGENCY MEDICINE

## 2021-03-27 PROCEDURE — 84484 ASSAY OF TROPONIN QUANT: CPT | Performed by: EMERGENCY MEDICINE

## 2021-03-27 PROCEDURE — 96376 TX/PRO/DX INJ SAME DRUG ADON: CPT

## 2021-03-27 PROCEDURE — 93005 ELECTROCARDIOGRAM TRACING: CPT | Performed by: EMERGENCY MEDICINE

## 2021-03-27 PROCEDURE — 83605 ASSAY OF LACTIC ACID: CPT | Performed by: EMERGENCY MEDICINE

## 2021-03-27 PROCEDURE — 80053 COMPREHEN METABOLIC PANEL: CPT | Performed by: EMERGENCY MEDICINE

## 2021-03-27 PROCEDURE — 87086 URINE CULTURE/COLONY COUNT: CPT | Performed by: EMERGENCY MEDICINE

## 2021-03-27 PROCEDURE — 81001 URINALYSIS AUTO W/SCOPE: CPT | Performed by: EMERGENCY MEDICINE

## 2021-03-27 PROCEDURE — C9803 HOPD COVID-19 SPEC COLLECT: HCPCS

## 2021-03-27 PROCEDURE — 25010000002 MORPHINE SULFATE (PF) 2 MG/ML SOLUTION: Performed by: EMERGENCY MEDICINE

## 2021-03-27 PROCEDURE — 25010000002 POTASSIUM CHLORIDE PER 2 MEQ: Performed by: EMERGENCY MEDICINE

## 2021-03-27 PROCEDURE — 25010000002 ONDANSETRON PER 1 MG: Performed by: EMERGENCY MEDICINE

## 2021-03-27 RX ORDER — ACETAMINOPHEN 325 MG/1
650 TABLET ORAL EVERY 4 HOURS PRN
Status: DISCONTINUED | OUTPATIENT
Start: 2021-03-27 | End: 2021-03-29 | Stop reason: HOSPADM

## 2021-03-27 RX ORDER — SODIUM CHLORIDE 0.9 % (FLUSH) 0.9 %
10 SYRINGE (ML) INJECTION AS NEEDED
Status: DISCONTINUED | OUTPATIENT
Start: 2021-03-27 | End: 2021-03-29 | Stop reason: HOSPADM

## 2021-03-27 RX ORDER — POTASSIUM CHLORIDE 14.9 MG/ML
20 INJECTION INTRAVENOUS ONCE
Status: COMPLETED | OUTPATIENT
Start: 2021-03-27 | End: 2021-03-27

## 2021-03-27 RX ORDER — ONDANSETRON 2 MG/ML
4 INJECTION INTRAMUSCULAR; INTRAVENOUS ONCE
Status: COMPLETED | OUTPATIENT
Start: 2021-03-27 | End: 2021-03-27

## 2021-03-27 RX ORDER — SODIUM CHLORIDE 0.9 % (FLUSH) 0.9 %
10 SYRINGE (ML) INJECTION EVERY 12 HOURS SCHEDULED
Status: DISCONTINUED | OUTPATIENT
Start: 2021-03-27 | End: 2021-03-29 | Stop reason: HOSPADM

## 2021-03-27 RX ORDER — SODIUM CHLORIDE AND POTASSIUM CHLORIDE 150; 900 MG/100ML; MG/100ML
125 INJECTION, SOLUTION INTRAVENOUS CONTINUOUS
Status: DISCONTINUED | OUTPATIENT
Start: 2021-03-27 | End: 2021-03-29 | Stop reason: HOSPADM

## 2021-03-27 RX ORDER — MORPHINE SULFATE 2 MG/ML
2 INJECTION, SOLUTION INTRAMUSCULAR; INTRAVENOUS ONCE
Status: COMPLETED | OUTPATIENT
Start: 2021-03-27 | End: 2021-03-27

## 2021-03-27 RX ORDER — ONDANSETRON 2 MG/ML
4 INJECTION INTRAMUSCULAR; INTRAVENOUS EVERY 6 HOURS PRN
Status: DISCONTINUED | OUTPATIENT
Start: 2021-03-27 | End: 2021-03-29 | Stop reason: HOSPADM

## 2021-03-27 RX ORDER — ACETAMINOPHEN 160 MG/5ML
650 SOLUTION ORAL EVERY 4 HOURS PRN
Status: DISCONTINUED | OUTPATIENT
Start: 2021-03-27 | End: 2021-03-29 | Stop reason: HOSPADM

## 2021-03-27 RX ORDER — MORPHINE SULFATE 2 MG/ML
1 INJECTION, SOLUTION INTRAMUSCULAR; INTRAVENOUS EVERY 4 HOURS PRN
Status: DISCONTINUED | OUTPATIENT
Start: 2021-03-27 | End: 2021-03-29 | Stop reason: HOSPADM

## 2021-03-27 RX ADMIN — IOPAMIDOL 100 ML: 612 INJECTION, SOLUTION INTRAVENOUS at 15:13

## 2021-03-27 RX ADMIN — POTASSIUM CHLORIDE AND SODIUM CHLORIDE 125 ML/HR: 900; 150 INJECTION, SOLUTION INTRAVENOUS at 18:21

## 2021-03-27 RX ADMIN — SODIUM CHLORIDE, POTASSIUM CHLORIDE, SODIUM LACTATE AND CALCIUM CHLORIDE 1000 ML: 600; 310; 30; 20 INJECTION, SOLUTION INTRAVENOUS at 13:53

## 2021-03-27 RX ADMIN — RIVAROXABAN 20 MG: 20 TABLET, FILM COATED ORAL at 20:53

## 2021-03-27 RX ADMIN — MORPHINE SULFATE 2 MG: 2 INJECTION, SOLUTION INTRAMUSCULAR; INTRAVENOUS at 13:55

## 2021-03-27 RX ADMIN — ONDANSETRON HYDROCHLORIDE 4 MG: 2 SOLUTION INTRAMUSCULAR; INTRAVENOUS at 13:55

## 2021-03-27 RX ADMIN — CEFTRIAXONE SODIUM 2 G: 2 INJECTION, POWDER, FOR SOLUTION INTRAMUSCULAR; INTRAVENOUS at 14:37

## 2021-03-27 RX ADMIN — MORPHINE SULFATE 4 MG: 4 INJECTION, SOLUTION INTRAMUSCULAR; INTRAVENOUS at 14:52

## 2021-03-27 RX ADMIN — POTASSIUM CHLORIDE 20 MEQ: 14.9 INJECTION, SOLUTION INTRAVENOUS at 15:35

## 2021-03-27 RX ADMIN — SODIUM CHLORIDE, POTASSIUM CHLORIDE, SODIUM LACTATE AND CALCIUM CHLORIDE 1000 ML: 600; 310; 30; 20 INJECTION, SOLUTION INTRAVENOUS at 14:52

## 2021-03-28 LAB
ADV 40+41 DNA STL QL NAA+NON-PROBE: NOT DETECTED
ALBUMIN SERPL-MCNC: 3 G/DL (ref 3.5–5.2)
ALBUMIN/GLOB SERPL: 1.3 G/DL
ALP SERPL-CCNC: 41 U/L (ref 39–117)
ALT SERPL W P-5'-P-CCNC: 13 U/L (ref 1–33)
ANION GAP SERPL CALCULATED.3IONS-SCNC: 7 MMOL/L (ref 5–15)
AST SERPL-CCNC: 19 U/L (ref 1–32)
ASTRO TYP 1-8 RNA STL QL NAA+NON-PROBE: NOT DETECTED
BILIRUB SERPL-MCNC: 0.4 MG/DL (ref 0–1.2)
BUN SERPL-MCNC: 9 MG/DL (ref 8–23)
BUN/CREAT SERPL: 13.2 (ref 7–25)
C CAYETANENSIS DNA STL QL NAA+NON-PROBE: NOT DETECTED
C COLI+JEJ+UPSA DNA STL QL NAA+NON-PROBE: NOT DETECTED
C DIFF TOX GENS STL QL NAA+PROBE: NEGATIVE
CALCIUM SPEC-SCNC: 8.7 MG/DL (ref 8.6–10.5)
CHLORIDE SERPL-SCNC: 102 MMOL/L (ref 98–107)
CO2 SERPL-SCNC: 30 MMOL/L (ref 22–29)
CREAT SERPL-MCNC: 0.68 MG/DL (ref 0.57–1)
CRYPTOSP DNA STL QL NAA+NON-PROBE: NOT DETECTED
DEPRECATED RDW RBC AUTO: 46.1 FL (ref 37–54)
E HISTOLYT DNA STL QL NAA+NON-PROBE: NOT DETECTED
EAEC PAA PLAS AGGR+AATA ST NAA+NON-PRB: NOT DETECTED
EC STX1+STX2 GENES STL QL NAA+NON-PROBE: NOT DETECTED
EPEC EAE GENE STL QL NAA+NON-PROBE: NOT DETECTED
ERYTHROCYTE [DISTWIDTH] IN BLOOD BY AUTOMATED COUNT: 13.1 % (ref 12.3–15.4)
ETEC LTA+ST1A+ST1B TOX ST NAA+NON-PROBE: NOT DETECTED
G LAMBLIA DNA STL QL NAA+NON-PROBE: NOT DETECTED
GFR SERPL CREATININE-BSD FRML MDRD: 85 ML/MIN/1.73
GLOBULIN UR ELPH-MCNC: 2.3 GM/DL
GLUCOSE SERPL-MCNC: 93 MG/DL (ref 65–99)
HCT VFR BLD AUTO: 33.8 % (ref 34–46.6)
HGB BLD-MCNC: 11.6 G/DL (ref 12–15.9)
MAGNESIUM SERPL-MCNC: 1.8 MG/DL (ref 1.6–2.4)
MCH RBC QN AUTO: 33 PG (ref 26.6–33)
MCHC RBC AUTO-ENTMCNC: 34.3 G/DL (ref 31.5–35.7)
MCV RBC AUTO: 96 FL (ref 79–97)
NOROVIRUS GI+II RNA STL QL NAA+NON-PROBE: NOT DETECTED
P SHIGELLOIDES DNA STL QL NAA+NON-PROBE: NOT DETECTED
PHOSPHATE SERPL-MCNC: 3.1 MG/DL (ref 2.5–4.5)
PLATELET # BLD AUTO: 259 10*3/MM3 (ref 140–450)
PMV BLD AUTO: 9 FL (ref 6–12)
POTASSIUM SERPL-SCNC: 3.2 MMOL/L (ref 3.5–5.2)
PROT SERPL-MCNC: 5.3 G/DL (ref 6–8.5)
RBC # BLD AUTO: 3.52 10*6/MM3 (ref 3.77–5.28)
RVA RNA STL QL NAA+NON-PROBE: NOT DETECTED
S ENT+BONG DNA STL QL NAA+NON-PROBE: NOT DETECTED
SAPO I+II+IV+V RNA STL QL NAA+NON-PROBE: NOT DETECTED
SHIGELLA SP+EIEC IPAH ST NAA+NON-PROBE: NOT DETECTED
SODIUM SERPL-SCNC: 139 MMOL/L (ref 136–145)
V CHOL+PARA+VUL DNA STL QL NAA+NON-PROBE: NOT DETECTED
V CHOLERAE DNA STL QL NAA+NON-PROBE: NOT DETECTED
WBC # BLD AUTO: 6.18 10*3/MM3 (ref 3.4–10.8)
Y ENTEROCOL DNA STL QL NAA+NON-PROBE: NOT DETECTED

## 2021-03-28 PROCEDURE — G0378 HOSPITAL OBSERVATION PER HR: HCPCS

## 2021-03-28 PROCEDURE — 0097U HC BIOFIRE FILMARRAY GI PANEL: CPT | Performed by: EMERGENCY MEDICINE

## 2021-03-28 PROCEDURE — 25810000003 SODIUM CHLORIDE 0.9 % WITH KCL 20 MEQ 20-0.9 MEQ/L-% SOLUTION: Performed by: INTERNAL MEDICINE

## 2021-03-28 PROCEDURE — 83735 ASSAY OF MAGNESIUM: CPT | Performed by: INTERNAL MEDICINE

## 2021-03-28 PROCEDURE — 85027 COMPLETE CBC AUTOMATED: CPT | Performed by: INTERNAL MEDICINE

## 2021-03-28 PROCEDURE — 80053 COMPREHEN METABOLIC PANEL: CPT | Performed by: INTERNAL MEDICINE

## 2021-03-28 PROCEDURE — 84100 ASSAY OF PHOSPHORUS: CPT | Performed by: INTERNAL MEDICINE

## 2021-03-28 PROCEDURE — 96366 THER/PROPH/DIAG IV INF ADDON: CPT

## 2021-03-28 PROCEDURE — 25010000002 CEFTRIAXONE PER 250 MG: Performed by: INTERNAL MEDICINE

## 2021-03-28 PROCEDURE — 87493 C DIFF AMPLIFIED PROBE: CPT | Performed by: INTERNAL MEDICINE

## 2021-03-28 RX ORDER — POTASSIUM CHLORIDE 750 MG/1
40 CAPSULE, EXTENDED RELEASE ORAL ONCE
Status: COMPLETED | OUTPATIENT
Start: 2021-03-28 | End: 2021-03-28

## 2021-03-28 RX ADMIN — RIVAROXABAN 20 MG: 20 TABLET, FILM COATED ORAL at 17:05

## 2021-03-28 RX ADMIN — POTASSIUM CHLORIDE 40 MEQ: 750 CAPSULE, EXTENDED RELEASE ORAL at 10:41

## 2021-03-28 RX ADMIN — POTASSIUM CHLORIDE AND SODIUM CHLORIDE 125 ML/HR: 900; 150 INJECTION, SOLUTION INTRAVENOUS at 10:38

## 2021-03-28 RX ADMIN — POTASSIUM CHLORIDE AND SODIUM CHLORIDE 125 ML/HR: 900; 150 INJECTION, SOLUTION INTRAVENOUS at 02:55

## 2021-03-28 RX ADMIN — POTASSIUM CHLORIDE AND SODIUM CHLORIDE 125 ML/HR: 900; 150 INJECTION, SOLUTION INTRAVENOUS at 19:10

## 2021-03-28 RX ADMIN — CEFTRIAXONE SODIUM 1 G: 1 INJECTION, POWDER, FOR SOLUTION INTRAMUSCULAR; INTRAVENOUS at 13:56

## 2021-03-29 ENCOUNTER — READMISSION MANAGEMENT (OUTPATIENT)
Dept: CALL CENTER | Facility: HOSPITAL | Age: 71
End: 2021-03-29

## 2021-03-29 VITALS
HEART RATE: 62 BPM | DIASTOLIC BLOOD PRESSURE: 52 MMHG | RESPIRATION RATE: 16 BRPM | TEMPERATURE: 98.1 F | HEIGHT: 63 IN | SYSTOLIC BLOOD PRESSURE: 101 MMHG | BODY MASS INDEX: 33.09 KG/M2 | WEIGHT: 186.73 LBS | OXYGEN SATURATION: 99 %

## 2021-03-29 LAB
ANION GAP SERPL CALCULATED.3IONS-SCNC: 5 MMOL/L (ref 5–15)
BACTERIA SPEC AEROBE CULT: ABNORMAL
BUN SERPL-MCNC: 4 MG/DL (ref 8–23)
BUN/CREAT SERPL: 7.3 (ref 7–25)
CALCIUM SPEC-SCNC: 8.5 MG/DL (ref 8.6–10.5)
CHLORIDE SERPL-SCNC: 109 MMOL/L (ref 98–107)
CO2 SERPL-SCNC: 28 MMOL/L (ref 22–29)
CREAT SERPL-MCNC: 0.55 MG/DL (ref 0.57–1)
GFR SERPL CREATININE-BSD FRML MDRD: 108 ML/MIN/1.73
GLUCOSE SERPL-MCNC: 85 MG/DL (ref 65–99)
MAGNESIUM SERPL-MCNC: 1.7 MG/DL (ref 1.6–2.4)
POTASSIUM SERPL-SCNC: 3.9 MMOL/L (ref 3.5–5.2)
QT INTERVAL: 418 MS
QT INTERVAL: 426 MS
QTC INTERVAL: 466 MS
QTC INTERVAL: 476 MS
SODIUM SERPL-SCNC: 142 MMOL/L (ref 136–145)
WHOLE BLOOD HOLD SPECIMEN: NORMAL

## 2021-03-29 PROCEDURE — G0378 HOSPITAL OBSERVATION PER HR: HCPCS

## 2021-03-29 PROCEDURE — 80048 BASIC METABOLIC PNL TOTAL CA: CPT | Performed by: INTERNAL MEDICINE

## 2021-03-29 PROCEDURE — 96366 THER/PROPH/DIAG IV INF ADDON: CPT

## 2021-03-29 PROCEDURE — 83735 ASSAY OF MAGNESIUM: CPT | Performed by: INTERNAL MEDICINE

## 2021-03-29 PROCEDURE — 25810000003 SODIUM CHLORIDE 0.9 % WITH KCL 20 MEQ 20-0.9 MEQ/L-% SOLUTION: Performed by: INTERNAL MEDICINE

## 2021-03-29 RX ORDER — CEFDINIR 300 MG/1
300 CAPSULE ORAL 2 TIMES DAILY
Qty: 8 CAPSULE | Refills: 0 | Status: SHIPPED | OUTPATIENT
Start: 2021-03-29 | End: 2021-04-08

## 2021-03-29 RX ADMIN — POTASSIUM CHLORIDE AND SODIUM CHLORIDE 125 ML/HR: 900; 150 INJECTION, SOLUTION INTRAVENOUS at 02:50

## 2021-03-29 NOTE — OUTREACH NOTE
Prep Survey      Responses   [facility] facility patient discharged from?  [facility]   Is LACE score < 7 ?  No   Emergency Room discharge w/ pulse ox?  No   Eligibility  [facility]   Date of Admission  03/27/21   Date of Discharge  03/29/21   Discharge Disposition  Home or Self Care   Discharge diagnosis  N/V/D, UTI, lactic acidosis, volume depletion   Does the patient have one of the following disease processes/diagnoses(primary or secondary)?  Other   Does the patient have Home health ordered?  No   Is there a DME ordered?  No   Prep survey completed?  Yes          Bianca Oliva RN

## 2021-03-30 ENCOUNTER — TRANSITIONAL CARE MANAGEMENT TELEPHONE ENCOUNTER (OUTPATIENT)
Dept: CALL CENTER | Facility: HOSPITAL | Age: 71
End: 2021-03-30

## 2021-03-30 NOTE — OUTREACH NOTE
Call Center TCM Note      Responses   [facility] patient discharged from?  [place]   Does the patient have one of the following disease processes/diagnoses(primary or secondary)?  Other   TCM attempt successful?  No   Unsuccessful attempts  Attempt 1          Sandy Manzanares RN    3/30/2021, 15:41 EDT

## 2021-03-31 ENCOUNTER — TRANSITIONAL CARE MANAGEMENT TELEPHONE ENCOUNTER (OUTPATIENT)
Dept: CALL CENTER | Facility: HOSPITAL | Age: 71
End: 2021-03-31

## 2021-03-31 NOTE — OUTREACH NOTE
Call Center TCM Note      Responses   [facility] patient discharged from?  [place]   Does the patient have one of the following disease processes/diagnoses(primary or secondary)?  Other   TCM attempt successful?  Yes [Nima]   Call start time  1521   Call end time  1526   Discharge diagnosis  N/V/D, UTI, lactic acidosis, volume depletion   Person spoke with today (if not patient) and relationship  Nima-spouse    Meds reviewed with patient/caregiver?  Yes   Is the patient having any side effects they believe may be caused by any medication additions or changes?  No   Does the patient have all medications ordered at discharge?  Yes   Is the patient taking all medications as directed (includes completed medication regime)?  Yes   Does the patient have a primary care provider?   Yes   Does the patient have an appointment with their PCP within 7 days of discharge?  Greater than 7 days   Comments regarding PCP  Hospital d/c f/u appt is on 4/8/21 at 10:45 am    What is preventing the patient from scheduling follow up appointments within 7 days of discharge?  -- [unsure]   Nursing Interventions  Verified appointment date/time/provider   Has the patient kept scheduled appointments due by today?  N/A   Psychosocial issues?  No   Did the patient receive a copy of their discharge instructions?  Yes   Nursing interventions  Reviewed instructions with patient   What is the patient's perception of their health status since discharge?  Improving   Is the patient/caregiver able to teach back signs and symptoms related to disease process for when to call PCP?  Yes   Is the patient/caregiver able to teach back signs and symptoms related to disease process for when to call 911?  Yes   Is the patient/caregiver able to teach back the hierarchy of who to call/visit for symptoms/problems? PCP, Specialist, Home health nurse, Urgent Care, ED, 911  Yes   If the patient is a current smoker, are they able to teach back resources for  cessation?  Not a smoker   TCM call completed?  Yes          Catarina Pan, LUPIS    3/31/2021, 15:28 EDT

## 2021-04-06 ENCOUNTER — READMISSION MANAGEMENT (OUTPATIENT)
Dept: CALL CENTER | Facility: HOSPITAL | Age: 71
End: 2021-04-06

## 2021-04-06 NOTE — OUTREACH NOTE
Medical Week 2 Survey      Responses   [facility] patient discharged from?  [place]   Does the patient have one of the following disease processes/diagnoses(primary or secondary)?  Other   Week 2 attempt successful?  No   Unsuccessful attempts  Attempt 1          Maru Olivares RN

## 2021-04-08 ENCOUNTER — OFFICE VISIT (OUTPATIENT)
Dept: FAMILY MEDICINE CLINIC | Facility: CLINIC | Age: 71
End: 2021-04-08

## 2021-04-08 VITALS
WEIGHT: 192.6 LBS | BODY MASS INDEX: 34.12 KG/M2 | DIASTOLIC BLOOD PRESSURE: 74 MMHG | RESPIRATION RATE: 16 BRPM | HEART RATE: 94 BPM | TEMPERATURE: 97.8 F | HEIGHT: 63 IN | SYSTOLIC BLOOD PRESSURE: 120 MMHG | OXYGEN SATURATION: 97 %

## 2021-04-08 DIAGNOSIS — N39.0 URINARY TRACT INFECTION WITHOUT HEMATURIA, SITE UNSPECIFIED: ICD-10-CM

## 2021-04-08 DIAGNOSIS — I50.9 CONGESTIVE HEART FAILURE, UNSPECIFIED HF CHRONICITY, UNSPECIFIED HEART FAILURE TYPE (HCC): Chronic | ICD-10-CM

## 2021-04-08 DIAGNOSIS — E86.9 VOLUME DEPLETION: ICD-10-CM

## 2021-04-08 DIAGNOSIS — E87.6 HYPOKALEMIA: ICD-10-CM

## 2021-04-08 DIAGNOSIS — R10.84 GENERALIZED ABDOMINAL PAIN: ICD-10-CM

## 2021-04-08 DIAGNOSIS — Z86.16 HISTORY OF COVID-19: ICD-10-CM

## 2021-04-08 DIAGNOSIS — Z79.01 ANTICOAGULATED: ICD-10-CM

## 2021-04-08 DIAGNOSIS — I10 HTN (HYPERTENSION), BENIGN: Chronic | ICD-10-CM

## 2021-04-08 DIAGNOSIS — E87.20 LACTIC ACIDOSIS: ICD-10-CM

## 2021-04-08 DIAGNOSIS — R82.71 BACTERIA IN URINE: ICD-10-CM

## 2021-04-08 DIAGNOSIS — E83.52 HYPERCALCEMIA: ICD-10-CM

## 2021-04-08 DIAGNOSIS — Z79.01 CHRONIC ANTICOAGULATION: ICD-10-CM

## 2021-04-08 DIAGNOSIS — R11.2 NAUSEA AND VOMITING, INTRACTABILITY OF VOMITING NOT SPECIFIED, UNSPECIFIED VOMITING TYPE: ICD-10-CM

## 2021-04-08 PROBLEM — U07.1 COVID-19 VIRUS INFECTION: Status: RESOLVED | Noted: 2021-02-23 | Resolved: 2021-04-08

## 2021-04-08 PROBLEM — R19.7 NAUSEA, VOMITING, AND DIARRHEA: Status: RESOLVED | Noted: 2021-03-27 | Resolved: 2021-04-08

## 2021-04-08 PROCEDURE — 99495 TRANSJ CARE MGMT MOD F2F 14D: CPT | Performed by: FAMILY MEDICINE

## 2021-04-08 PROCEDURE — 1111F DSCHRG MED/CURRENT MED MERGE: CPT | Performed by: FAMILY MEDICINE

## 2021-04-08 RX ORDER — FUROSEMIDE 20 MG/1
20 TABLET ORAL 2 TIMES DAILY
COMMUNITY
End: 2021-04-22

## 2021-04-08 RX ORDER — LOSARTAN POTASSIUM 50 MG/1
50 TABLET ORAL DAILY
COMMUNITY
End: 2021-08-04

## 2021-04-08 RX ORDER — TRIAMTERENE AND HYDROCHLOROTHIAZIDE 37.5; 25 MG/1; MG/1
1 CAPSULE ORAL EVERY MORNING
COMMUNITY
End: 2021-08-05

## 2021-04-08 NOTE — PROGRESS NOTES
Transitional Care Follow Up Visit  Subjective     Lucy Sousa is a 71 y.o. female who presents for a transitional care management visit.  Alone.    Within 48 business hours after discharge our office contacted her via telephone to coordinate her care and needs.      I reviewed and discussed the details of that call along with the discharge summary, hospital problems, inpatient lab results, inpatient diagnostic studies, and consultation reports with Lucy.     Current outpatient and discharge medications have been reconciled for the patient.    Date of TCM Phone Call 3/29/2021   [facility]   Date of Admission 3/27/2021   Date of Discharge 3/29/2021   Discharge Disposition Home or Self Care     Nausea vomiting and diarrhea-resolved both  Generalized abdominal pain-rare/pelvic; ct showed ventral/umbilical hernia and dropped bladder-resolved  Lactic acidosis  Hypokalemia  Hypercalcemia  Volume depletion-eating/drinking well  History of COVID-19 February 21 -LUE weakness  UTI-Enterobacter-no dysuria  Chronic anticoagulation for history of PE-no bleeding/still on  Hypertension    Risk for Readmission (LACE) Score: 7 (3/29/2021  5:00 AM)      History of Present Illness   This is a 71-year-old  female patient who resides in [place], [place].  She sees Dr. Rosas Berg for primary care.  He typically admits his own patients to the hospital, but we have been covering for him on the weekends recently.  She presents to the emergency department today with complaints of nausea, vomiting, and diarrhea.  This has apparently been a recurrent issue for over a month.  She started having problems with this in early February.  She was seen at [facility] emergency department on 2/16 and has also been in our emergency department on 2/11, 2/14, and 2/21.  She was ultimately found to be Covid positive on 2/21.  She was able to clear quarantine on 3/8.  It seems that her main problems from COVID-19 were gastrointestinal  in nature.  She states that she did early development of the respiratory symptoms to the back of.  She apparently had some remission of her symptoms in early to mid March, but started having problems with stomach cramps and diarrhea again on 3/12.  She had worse problems with this yesterday and started having problems with nausea and vomiting again.  She has not been able to keep anything down yesterday or today.  She has been near syncopal.  She has had some palpitations.  She initially sought evaluation at Urgent Care on campus today, but was ultimately referred to the emergency department.  She was found to have signs of clinical dehydration.  She is mildly hypercalcemic, hypokalemic.  She was also noted to have a urinary tract infection.  She also had a lactic acid level of 3.2.  CT of the abdomen pelvis shows no acute findings.  He was referred by Dr. Kahn for admission and volume repletion/electrolyte replacement/IV antibiotics for her UTI.  Her last colonoscopy was in April 2019 with Dr. Mckeon.  This showed diverticulosis and no other problems.  She states that she tends towards having diarrhea on a chronic basis, but this is much different than what she has been dealing with lately.  She has had a prior cholecystectomy.  She feels like she is lost 20 pounds since all this started.  She denies fevers, sweats, or chills.  She thinks at some point during all this she took a round of antibiotics but cannot remember exactly.    Course During Hospital Stay:  The patient was admitted to my service here [facility] on 3/27.  She presented to the emergency department with complaints of recurrent nausea, vomiting, and diarrhea.  Her initial problems with this were in February.  She was ultimately diagnosed with COVID-19 on 2/21 in our emergency department.  She broke quarantine on 3/8.  She had some remission of her symptoms, but has now had persistent nausea/vomiting/diarrhea over the last 48 hours  with no ability to tolerate oral intake.      Admitted for IV fluids, antiemetics, and electrolyte repletion.  She was given a run of 20 mEq of potassium in the emergency department.  Added potassium to fluids. Gave oral potassium on 3/28.  Potassium resolved to 3.9 from 2.8 on admit.  Magnesium 1.7-1.8.     Lactic acid improved from 3.2 down to 1.8 after volume resuscitation.  She was given a total of 2 L of LR in the emergency department.     Stool for GI PCR panel and C. difficile toxin were negative.     Started on clear liquids at admission and then advanced diet to full liquids and then to GI soft.  No problems tolerating.      Her urinalysis was consistent with UTI.  She was given ceftriaxone for her UTI in the emergency department this was continued in the hospital.  She had E. coli in her urine in March 2020 that was a fairly resistant isolate.  It was only susceptible to cephalosporins, carbapenems, nitrofurantoin, and Bactrim.  The isolate at this point in time is Enterobacter.  It is only resistant to ampicillin.  Finish her treatment duration with oral Omnicef.  She did not complain of any dysuria or frequency, but did complain of some suprapubic and low back discomfort at times as an outpatient recently.     Continue to hold Dyazide, losartan, and Lasix.   She does not have any signs of lower extremity edema even after volume repletion.  Her high systolic blood pressure over the last 24 hours was 115.  I do not think that she needs to be on antihypertensives or diuretics at this point in time.  Dr. Berg can reassess when he follows her in clinic.  The patient also states that she has lost 20 pounds and since early February secondary to COVID-19 and her persistent diarrheal illness.  Weights in the computer show that she is down 13 pounds since 2/11.     She is chronically on Xarelto for history of a pulmonary embolism that caused cardiac arrest in 2016.  This was continued and also served as her DVT  prophylaxis.     I feel that the patient should be evaluated by GI as an outpatient if she has recurrent problems with her N/V/D.  Hopefully, this will finally go away and not be a problem for her any longer.  My hope is that treating her UTI will also help.  I informed her that she could take Imodium if she has recurrence of her loose stool.  She states that she already has some Zofran left over from a prior visit.     She feels well, tolerating diet, and has no recurrent symptoms.  She would like to go home today.  Follow with Dr. Berg later this week.     The following portions of the patient's history were reviewed and updated as appropriate: allergies, current medications, past family history, past medical history, past social history, past surgical history and problem list.       Current Outpatient Medications:   •  Cholecalciferol (VITAMIN D) 2000 UNITS tablet, Take 1,000 Units by mouth Daily., Disp: , Rfl:   •  cyanocobalamin (VITAMIN B-12) 50 MCG tablet tablet, Take 50 mcg by mouth Daily., Disp: , Rfl:   •  esomeprazole (nexIUM) 40 MG capsule, TAKE 1 CAPSULE TWICE DAILY GENERIC FOR NEXIUM, Disp: 180 capsule, Rfl: 1  •  furosemide (LASIX) 20 MG tablet, Take 20 mg by mouth 2 (Two) Times a Day., Disp: , Rfl:   •  losartan (Cozaar) 50 MG tablet, Take 50 mg by mouth Daily., Disp: , Rfl:   •  Multiple Vitamin (MULTI VITAMIN DAILY PO), Take 1 tablet by mouth daily., Disp: , Rfl:   •  potassium chloride (K-DUR,KLOR-CON) 20 MEQ CR tablet, TAKE 1 TABLET BY MOUTH 2 (TWO) TIMES A DAY., Disp: 180 tablet, Rfl: 1  •  triamterene-hydrochlorothiazide (DYAZIDE) 37.5-25 MG per capsule, Take 1 capsule by mouth Every Morning., Disp: , Rfl:   •  vitamin E 600 UNIT capsule, Take 400 Units by mouth Daily., Disp: , Rfl:   •  Xarelto 20 MG tablet, TAKE ONE TABLET DAILY WITH DINNER, Disp: 90 tablet, Rfl: 1  •  Zinc 50 MG capsule, Take  by mouth., Disp: , Rfl:     Review of Systems  GENERAL:  Active/slower with limits, speed,  stamina for age and exertional fatigue. Sleep is ok. No fever now.  SKIN: No  rash/skin lesion of concern.   ENDO:  No syncope, near or diaphoretic sweaty spells.  HEENT: No recent head injury; or change in occ headache.   No vision change.  No significant hearing loss.  Ears without pain/drainage.  No sore throat.  No significant nasal/sinus congestion/drainage. No epistaxis.  CHEST: No chest wall tenderness or mass.  Occ/as now cough, without wheeze.  No SOB; no hemoptysis.  CV: No chest pain; same occ palpitations, ankle edema.  GI: No heartburn, dysphagia.  No abdominal pain, diarrhea, constipation.  No rectal bleeding, or melena.    :  Voids without dysuria, or incontinence to completion.  ORTHO: No painful/swollen joints but various on /off sore. .  No change occ/on-off  sore neck or back.  No acute neck or back pain without recent injury.  NEURO: Still on/off mild dizzy; no vertigo.  No weakness of extremities except above.  No numbness/paresthesias.   PSYCH: No memory loss.  Mood good but variable anxious, depressed but/and not suicidal.  Tries to tolerate stress .   Screening:  Gyne: 2018  Mammogram: 12.22.20  Bone density: 10.31.18/bone d-deca/Yolanda/Ts L1 +1.9, hip +0.3  Low dose CT chest:Tobacco-smoker/none: NA  GI:   Egd-hh-ring (d)//[facility]/4.1.19/prn  Colon-div//[facility]/4.1.19/5y  Prostate: NA  Usual lab order  6m CBC, CMP, TSH, fT4  12m CBC, CMP, LIPID, TSH, fT4, Vit D iron, ferritin, B12, folate     Lab Results:  CBC:  Lab Results - Last 18 Months   WBC 10*3/mm3 5.52 6.18 9.21 6.82 8.25 9.21 5.84 5.04   HEMOGLOBIN g/dL 14.8 11.6* 14.8 14.9 15.7 15.8 13.7 15.1   HEMATOCRIT % 44.6 33.8* 42.0 41.0 43.3 43.5 40.9 44.5   PLATELETS 10*3/mm3 347 259 369 282 290 316 298 320   IRON mcg/dL  --   --   --   --   --   --   --  121      BMP/CMP:  Lab Results - Last 18 Months   SODIUM mmol/L 143 142 139 135* 141 139 139 142   < > 142   POTASSIUM mmol/L 4.1 3.9 3.2* 2.8* 3.2* 3.2* 3.8 3.8   < > 3.7   CHLORIDE  mmol/L 102 109* 102 91* 100 97* 98 102   < > 98   TOTAL CO2 mmol/L 29.2*  --   --   --   --   --  26.7 28.5  --  30.4*   CO2 mmol/L  --  28.0 30.0* 27.0 29.0 28.0  --   --    < >  --    GLUCOSE mg/dL 91  --   --   --   --   --  104* 88  --  92   BUN mg/dL 11 4* 9 14 11 16 14 12   < > 15   CREATININE mg/dL 0.75 0.55* 0.68 0.90 0.82 0.85 0.84 0.85   < > 0.97   EGFR IF NONAFRICN AM mL/min/1.73 76 108 85 61 68 66 66 66   < > 56*   EGFR IF AFRICN AM mL/min/1.73 92  --   --   --   --   --  81 80  --  68   CALCIUM mg/dL 10.7* 8.5* 8.7 10.6* 9.2 10.0 9.8 9.5   < > 10.5    < > = values in this interval not displayed.     HEPATIC:  Lab Results - Last 18 Months   ALT (SGPT) U/L 19 13 20 17 23 17 16   AST (SGOT) U/L 22 19 22 18 22 18 19   ALK PHOS U/L 59 41 60 58 71 61 63     THYROID:  Lab Results - Last 18 Months   Lab Units 10/06/20  0712 03/30/20  0741   TSH uIU/mL 3.740 3.820   FREE T4 ng/dL 1.25 1.22       CT Abdomen Pelvis With Contrast    Result Date: 3/27/2021  1. No acute findings in the abdomen or pelvis.  2. Hepatic steatosis.  3. Small hiatal hernia. 4. Colonic diverticulosis.    This report was finalized on 03/27/2021 15:26 by Dr. Barrington Gamboa MD.    CT Abdomen Pelvis With Contrast    Result Date: 2/14/2021  1. No evidence of mechanical obstruction. There are scattered diverticula throughout the colon most concentrated in the descending and sigmoid colon. No evidence of acute diverticulitis. The appendix is not visualized and I suspect may be surgically absent. 2. Small hiatal hernia. 3. The patient is status post cholecystectomy. The lesion has also undergone prior hysterectomy. The appendix is not visualized and may also be surgically absent. 4. There is a ventral wall hernia above the umbilicus within the midline containing peritoneal fat. There is also a small fat-containing periumbilical hernia. No evidence of herniated bowel loop or incarceration. 5. Suspected pelvic relaxation with the lower aspect of the  "urinary bladder as well as the vaginal cuff rather low lying. No free fluid or localized inflammatory process is identified within the lower abdomen or pelvis.   This report was finalized on 02/14/2021 13:19 by Dr. Noah Ferro MD.      Objective   /74   Pulse 94   Temp 97.8 °F (36.6 °C) (Infrared)   Resp 16   Ht 160 cm (62.99\")   Wt 87.4 kg (192 lb 9.6 oz)   SpO2 97%   BMI 34.13 kg/m²   Body mass index is 34.13 kg/m².     Recent Vitals       3/29/2021 3/29/2021 4/8/2021       BP:  104/42  101/52  120/74     Pulse:  71  62  94     Temp:  98 °F (36.7 °C)  98.1 °F (36.7 °C)  97.8 °F (36.6 °C)     Weight:  --  --  87.4 kg (192 lb 9.6 oz)     BMI (Calculated):  --  --  34.1           Physical Exam  GENERAL:  Well nourished/developed in no acute distress.   SKIN: Turgor excellent, without wound, rash, lesion.   HEENT: Normal cephalic without trauma.  Pupils equal round reactive to light. Extraocular motions full without nystagmus.   External canals nonobstructive nontender without reddness. Tymphatic membranes sharri with shanika structures intact.   Oral cavity without growths, exudates, and moist.  Posterior pharynx without mass, obstruction, redness.  No thyromegaly, mass, tenderness, lymphadenopathy and supple.  CV: Regular rhythm.  No murmur, gallop, trace LE edema. Posterior pulses intact.  No carotid bruits.  CHEST: No chest wall tenderness or mass.   LUNGS: Symmetric motion with clear to auscultation.   ABD: Soft, nontender without mass.   ORTHO: Symmetric extremities without swelling/point tenderness; even anterior R ankle.  Full gross range of motion.  NEURO: CN 2-12 grossly intact.  Symmetric facies and UE/LE. 3/5 strength throughout. 1/4 x bicep knee equal reflexes.  Nonfocal use extremities. Speech clear.  Reduced minimal light touch with monofilament, vibratory sensation with tuning fork; equal toes/distal feet.    PSYCH: Oriented x 3.  Pleasant calm, well kept.  Purposeful/directed " conservation with intact short/long gross memory.       Assessment/Plan     1. Nausea and vomiting, intractability of vomiting not specified, unspecified vomiting type    2. Generalized abdominal pain    3. Lactic acidosis    4. Hypokalemia    5. Hypercalcemia    6. Volume depletion    7. History of COVID-19    8. HTN (hypertension), benign    9. Chronic anticoagulation for history of pulmonary embolism in 2016    10. Anticoagulated-PE/cardiac arrest/xarelto    11. Congestive heart failure, unspecified HF chronicity, unspecified heart failure type (CMS/HCC)    12. Urinary tract infection without hematuria, site unspecified    13. Bacteria in urine      Rx: reviewed/changes:  No orders of the defined types were placed in this encounter.    LAB/Testing/Referrals: reviewed/orders:   Today:   Orders Placed This Encounter   Procedures   • Comprehensive Metabolic Panel   • Urinalysis With Culture If Indicated -   • Microscopic Examination -   • CBC & Differential     Usual:   same    Discussions:   Labs today; see where some issues are  COVID; neuro discussed; she wants to observe LUE weakness  If recurrent UTI; gyne/urology eval    Body mass index is 34.13 kg/m².   Patient's Body mass index is 34.13 kg/m². BMI is above normal parameters. Recommendations include: exercise counseling and nutrition counseling.  Non-smoker  Lucy Suosa  reports that she has never smoked. She has never used smokeless tobacco..   There are no Patient Instructions on file for this visit.    Follow up: Return for lab today then move lab/Dr Berg .  Future Appointments   Date Time Provider Department Center   7/6/2021  8:40 AM LAB [facility]   7/7/2021 10:30 AM Rosas Berg MD [facility]              Current outpatient and discharge medications have been reconciled for the patient.

## 2021-04-09 LAB
ALBUMIN SERPL-MCNC: 4.6 G/DL (ref 3.5–5.2)
ALBUMIN/GLOB SERPL: 1.9 G/DL
ALP SERPL-CCNC: 59 U/L (ref 39–117)
ALT SERPL-CCNC: 19 U/L (ref 1–33)
APPEARANCE UR: CLEAR
AST SERPL-CCNC: 22 U/L (ref 1–32)
BACTERIA #/AREA URNS HPF: NORMAL /HPF
BASOPHILS # BLD AUTO: 0.06 10*3/MM3 (ref 0–0.2)
BASOPHILS NFR BLD AUTO: 1.1 % (ref 0–1.5)
BILIRUB SERPL-MCNC: 0.6 MG/DL (ref 0–1.2)
BILIRUB UR QL STRIP: NEGATIVE
BUN SERPL-MCNC: 11 MG/DL (ref 8–23)
BUN/CREAT SERPL: 14.7 (ref 7–25)
CALCIUM SERPL-MCNC: 10.7 MG/DL (ref 8.6–10.5)
CHLORIDE SERPL-SCNC: 102 MMOL/L (ref 98–107)
CO2 SERPL-SCNC: 29.2 MMOL/L (ref 22–29)
COLOR UR: YELLOW
CREAT SERPL-MCNC: 0.75 MG/DL (ref 0.57–1)
EOSINOPHIL # BLD AUTO: 0.19 10*3/MM3 (ref 0–0.4)
EOSINOPHIL NFR BLD AUTO: 3.4 % (ref 0.3–6.2)
EPI CELLS #/AREA URNS HPF: NORMAL /HPF (ref 0–10)
ERYTHROCYTE [DISTWIDTH] IN BLOOD BY AUTOMATED COUNT: 13.1 % (ref 12.3–15.4)
GLOBULIN SER CALC-MCNC: 2.4 GM/DL
GLUCOSE SERPL-MCNC: 91 MG/DL (ref 65–99)
GLUCOSE UR QL: NEGATIVE
HCT VFR BLD AUTO: 44.6 % (ref 34–46.6)
HGB BLD-MCNC: 14.8 G/DL (ref 12–15.9)
HGB UR QL STRIP: NEGATIVE
IMM GRANULOCYTES # BLD AUTO: 0.01 10*3/MM3 (ref 0–0.05)
IMM GRANULOCYTES NFR BLD AUTO: 0.2 % (ref 0–0.5)
KETONES UR QL STRIP: NEGATIVE
LEUKOCYTE ESTERASE UR QL STRIP: NEGATIVE
LYMPHOCYTES # BLD AUTO: 2.1 10*3/MM3 (ref 0.7–3.1)
LYMPHOCYTES NFR BLD AUTO: 38 % (ref 19.6–45.3)
MCH RBC QN AUTO: 33.7 PG (ref 26.6–33)
MCHC RBC AUTO-ENTMCNC: 33.2 G/DL (ref 31.5–35.7)
MCV RBC AUTO: 101.6 FL (ref 79–97)
MICRO URNS: NORMAL
MICRO URNS: NORMAL
MONOCYTES # BLD AUTO: 0.65 10*3/MM3 (ref 0.1–0.9)
MONOCYTES NFR BLD AUTO: 11.8 % (ref 5–12)
MUCOUS THREADS URNS QL MICRO: PRESENT /HPF
NEUTROPHILS # BLD AUTO: 2.51 10*3/MM3 (ref 1.7–7)
NEUTROPHILS NFR BLD AUTO: 45.5 % (ref 42.7–76)
NITRITE UR QL STRIP: NEGATIVE
NRBC BLD AUTO-RTO: 0 /100 WBC (ref 0–0.2)
PH UR STRIP: 7.5 [PH] (ref 5–7.5)
PLATELET # BLD AUTO: 347 10*3/MM3 (ref 140–450)
POTASSIUM SERPL-SCNC: 4.1 MMOL/L (ref 3.5–5.2)
PROT SERPL-MCNC: 7 G/DL (ref 6–8.5)
PROT UR QL STRIP: NEGATIVE
RBC # BLD AUTO: 4.39 10*6/MM3 (ref 3.77–5.28)
RBC #/AREA URNS HPF: NORMAL /HPF (ref 0–2)
SODIUM SERPL-SCNC: 143 MMOL/L (ref 136–145)
SP GR UR: 1.01 (ref 1–1.03)
URINALYSIS REFLEX: NORMAL
UROBILINOGEN UR STRIP-MCNC: 0.2 MG/DL (ref 0.2–1)
WBC # BLD AUTO: 5.52 10*3/MM3 (ref 3.4–10.8)
WBC #/AREA URNS HPF: NORMAL /HPF (ref 0–5)

## 2021-04-12 ENCOUNTER — READMISSION MANAGEMENT (OUTPATIENT)
Dept: CALL CENTER | Facility: HOSPITAL | Age: 71
End: 2021-04-12

## 2021-04-12 NOTE — OUTREACH NOTE
Medical Week 2 Survey      Responses   [facility] patient discharged from?  [place]   Does the patient have one of the following disease processes/diagnoses(primary or secondary)?  Other   Week 2 attempt successful?  No   Unsuccessful attempts  Attempt 2          Radha Jones RN

## 2021-04-19 ENCOUNTER — READMISSION MANAGEMENT (OUTPATIENT)
Dept: CALL CENTER | Facility: HOSPITAL | Age: 71
End: 2021-04-19

## 2021-04-19 NOTE — OUTREACH NOTE
Medical Week 3 Survey      Responses   [facility] patient discharged from?  [place]   Does the patient have one of the following disease processes/diagnoses(primary or secondary)?  Other   Week 3 attempt successful?  Yes   Call start time  1308   Call end time  1312   Discharge diagnosis  N/V/D, UTI, lactic acidosis, volume depletion   Meds reviewed with patient/caregiver?  Yes   Is the patient taking all medications as directed (includes completed medication regime)?  Yes   Medication comments  Completed antibiotic   Has the patient kept scheduled appointments due by today?  Yes   Comments  Will call for appt with GI Dr Mckeon as she is still having a few stomach issues   What is the patient's perception of their health status since discharge?  Improving   Week 3 Call Completed?  Yes          Brenda Hairston RN

## 2021-04-21 ENCOUNTER — TELEPHONE (OUTPATIENT)
Dept: FAMILY MEDICINE CLINIC | Facility: CLINIC | Age: 71
End: 2021-04-21

## 2021-04-21 ENCOUNTER — OFFICE VISIT (OUTPATIENT)
Dept: GASTROENTEROLOGY | Facility: CLINIC | Age: 71
End: 2021-04-21

## 2021-04-21 VITALS
OXYGEN SATURATION: 97 % | WEIGHT: 191 LBS | BODY MASS INDEX: 32.61 KG/M2 | HEART RATE: 98 BPM | TEMPERATURE: 96.2 F | SYSTOLIC BLOOD PRESSURE: 124 MMHG | DIASTOLIC BLOOD PRESSURE: 72 MMHG | HEIGHT: 64 IN

## 2021-04-21 DIAGNOSIS — R11.2 NAUSEA AND VOMITING, INTRACTABILITY OF VOMITING NOT SPECIFIED, UNSPECIFIED VOMITING TYPE: Primary | ICD-10-CM

## 2021-04-21 DIAGNOSIS — K76.0 FATTY LIVER: ICD-10-CM

## 2021-04-21 DIAGNOSIS — E66.9 OBESITY, UNSPECIFIED OBESITY SEVERITY, UNSPECIFIED OBESITY TYPE: ICD-10-CM

## 2021-04-21 DIAGNOSIS — Z78.9 NONSMOKER: ICD-10-CM

## 2021-04-21 DIAGNOSIS — R19.4 CHANGE IN BOWEL HABITS: ICD-10-CM

## 2021-04-21 DIAGNOSIS — R10.84 GENERALIZED ABDOMINAL PAIN: ICD-10-CM

## 2021-04-21 PROCEDURE — 99214 OFFICE O/P EST MOD 30 MIN: CPT | Performed by: CLINICAL NURSE SPECIALIST

## 2021-04-21 RX ORDER — METRONIDAZOLE 250 MG/1
250 TABLET ORAL 3 TIMES DAILY
Qty: 30 TABLET | Refills: 0 | Status: SHIPPED | OUTPATIENT
Start: 2021-04-21 | End: 2021-05-01

## 2021-04-21 RX ORDER — SODIUM, POTASSIUM,MAG SULFATES 17.5-3.13G
SOLUTION, RECONSTITUTED, ORAL ORAL
Qty: 177 ML | Refills: 0 | Status: ON HOLD | OUTPATIENT
Start: 2021-04-21 | End: 2021-06-02

## 2021-04-21 NOTE — TELEPHONE ENCOUNTER
Confirm with patient    1. She is agreeable to briefly holding xarelto for gi testing  A. Not zero risk of a clot problem while holding  B. But gi study is important    Anticoagulated-PE/cardiac arrest/xarelto

## 2021-04-21 NOTE — PROGRESS NOTES
Lucy Sousa  1950 4/21/2021  Chief Complaint   Patient presents with   • GI Problem     Abdominal pain and diarrhea     Subjective   HPI  Lucy Sousa is a 71 y.o. female who presents with a complaint of persistent nausea vomiting and diarrhea. She says that this began in Feb. 2021 and has continued. She says that it is worse when she eats. She says that when she eats she feels full very quickly. She was diagnosed with COVID during this time and she says that she also had blood in her urine. She has had a CT scan on 3/27/21 and this did not show any acute findings. Small hiatal hernia noted. Duodenal diverticulum which abuts to the pancreatic head. Ventral wall defect in the supraumbilical region which contains fat. Diffuse fatty liver noted. Stool cultures were negative. Today she says that the vomiting has subsided but she has nausea. She has not had a BM today. She has had a change in bowels however to more of an alternating stools. No BRBPR. No melena. She has mild recurrent dysphagia with solid foods. Dilatation in 2019 helped. Her abdominal pain is to the left side and today it is a 6 out of 10 and is described as a stabbing pain worse when she got up.   Colonoscopy reviewed from then as well and this was unremarkable.      Past Medical History:   Diagnosis Date   • Anxiety    • Cancer (CMS/HCC)     skin squamous cell   • Depression    • DJD (degenerative joint disease)    • GERD (gastroesophageal reflux disease)    • HTN (hypertension)    • Hx of colonic polyp    • Pulmonary embolism (CMS/HCC)     post surgical with cardiac arrest     Past Surgical History:   Procedure Laterality Date   • BREAST SURGERY      breast reduction   • CHOLECYSTECTOMY     • COLONOSCOPY  04/01/2019    Diverticulosis otherwise normal exam repeat in 5 years   • COLONOSCOPY W/ POLYPECTOMY  02/13/2014    Hyperplastic polyp at 20 cm, Diverticulosis repeat exam in 5 years   • EKOS CATHETER PLACEMENT Bilateral 11/17/2016     Procedure: Ekos catheter placement;  Surgeon: Daniel Underwood MD;  Location:  [facility];  Service:    • EKOS CATHETER REMOVAL Bilateral 11/18/2016    Procedure: Ekos catheter removal with stent placement;  Surgeon: Daniel Underwood MD;  Location:  [facility];  Service:    • ENDOSCOPY  01/22/2010    Negative endoscopy noting a healed gastric ulcer   • ENDOSCOPY  10/21/2009    Superficial mucosal erosion, chronic active gastritis   • ENDOSCOPY  04/01/2019    Distal esophageal ring dilated   • FLAP HEAD/NECK Right 10/26/2020    Procedure: POSSIBLE FLAP;  Surgeon: Vadim Le MD;  Location:  [facility] OR;  Service: ENT;  Laterality: Right;   • FOOT SURGERY     • HEAD/NECK LESION/CYST EXCISION Right 10/26/2020    Procedure: 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     • cyanocobalamin (VITAMIN B-12) 50 MCG tablet tablet Take 50 mcg by mouth Daily.     • esomeprazole (nexIUM) 40 MG capsule TAKE 1 CAPSULE TWICE DAILY GENERIC FOR NEXIUM 180 capsule 1   • furosemide (LASIX) 20 MG tablet Take 20 mg by mouth 2 (Two) Times a Day.     • losartan (Cozaar) 50 MG tablet Take 50 mg by mouth Daily.     • Multiple Vitamin (MULTI VITAMIN DAILY PO) Take 1 tablet by mouth daily.     • potassium chloride (K-DUR,KLOR-CON) 20 MEQ CR tablet TAKE 1 TABLET BY MOUTH 2 (TWO) TIMES A DAY. 180 tablet 1   • triamterene-hydrochlorothiazide (DYAZIDE) 37.5-25 MG per capsule Take 1 capsule  by mouth Every Morning.     • vitamin E 600 UNIT capsule Take 400 Units by mouth Daily.     • Xarelto 20 MG tablet TAKE ONE TABLET DAILY WITH DINNER 90 tablet 1   • Zinc 50 MG capsule Take  by mouth.       Allergies   Allergen Reactions   • Macrobid [Nitrofurantoin Macrocrystal] Rash     Social History     Socioeconomic History   • Marital status:      Spouse name: Nima   • Number of children: 1   • Years of education: 13   • Highest education level: Not on file   Tobacco Use   • Smoking status: Never Smoker   • Smokeless tobacco: Never Used   Vaping Use   • Vaping Use: Never used   Substance and Sexual Activity   • Alcohol use: No   • Drug use: No   • Sexual activity: Not Currently     Family History   Problem Relation Age of Onset   • Coronary artery disease Mother    • Coronary artery disease Father    • Diabetes Brother    • Hypertension Brother    • Colon polyps Brother    • Heart disease Maternal Grandfather    • Colon cancer Neg Hx      Health Maintenance   Topic Date Due   • COVID-19 Vaccine (1) Never done   • ZOSTER VACCINE (1 of 2) Never done   • DXA SCAN  10/31/2020   • LIPID PANEL  03/30/2021   • ANNUAL WELLNESS VISIT  07/01/2021   • INFLUENZA VACCINE  08/01/2021   • MAMMOGRAM  12/22/2022   • COLONOSCOPY  04/01/2024   • TDAP/TD VACCINES (2 - Td) 11/02/2030   • HEPATITIS C SCREENING  Completed   • Pneumococcal Vaccine 65+  Completed     Review of Systems   Constitutional: Negative for activity change, appetite change, chills, diaphoresis, fatigue, fever and unexpected weight change.   HENT: Negative for ear pain, hearing loss, mouth sores, sore throat, trouble swallowing and voice change.    Eyes: Negative.    Respiratory: Negative for cough, choking, shortness of breath and wheezing.    Cardiovascular: Negative for chest pain and palpitations.   Gastrointestinal: Positive for abdominal pain, diarrhea, nausea and vomiting. Negative for blood in stool and constipation.   Endocrine: Negative for  "cold intolerance and heat intolerance.   Genitourinary: Negative for decreased urine volume, dysuria, frequency, hematuria and urgency.   Musculoskeletal: Negative for back pain, gait problem and myalgias.   Skin: Negative for color change, pallor and rash.   Allergic/Immunologic: Negative for food allergies and immunocompromised state.   Neurological: Negative for dizziness, tremors, seizures, syncope, weakness, light-headedness, numbness and headaches.   Hematological: Negative for adenopathy. Does not bruise/bleed easily.   Psychiatric/Behavioral: Negative for agitation and confusion. The patient is not nervous/anxious.    All other systems reviewed and are negative.    Objective   Vitals:    04/21/21 0941   BP: 124/72   Pulse: 98   Temp: 96.2 °F (35.7 °C)   SpO2: 97%   Weight: 86.6 kg (191 lb)   Height: 162.6 cm (64\")     Body mass index is 32.79 kg/m².  Physical Exam  Constitutional:       Appearance: She is well-developed.   HENT:      Head: Normocephalic and atraumatic.   Eyes:      Pupils: Pupils are equal, round, and reactive to light.   Neck:      Trachea: No tracheal deviation.   Cardiovascular:      Rate and Rhythm: Normal rate and regular rhythm.      Heart sounds: Normal heart sounds. No murmur heard.   No friction rub. No gallop.    Pulmonary:      Effort: Pulmonary effort is normal. No respiratory distress.      Breath sounds: Normal breath sounds. No wheezing or rales.   Chest:      Chest wall: No tenderness.   Abdominal:      General: Bowel sounds are normal. There is no distension.      Palpations: Abdomen is soft. Abdomen is not rigid.      Tenderness: There is no abdominal tenderness. There is no guarding or rebound.   Musculoskeletal:         General: No tenderness or deformity. Normal range of motion.      Cervical back: Normal range of motion and neck supple.   Skin:     General: Skin is warm and dry.      Coloration: Skin is not pale.      Findings: No rash.   Neurological:      Mental " Status: She is alert and oriented to person, place, and time.      Deep Tendon Reflexes: Reflexes are normal and symmetric.   Psychiatric:         Behavior: Behavior normal.         Thought Content: Thought content normal.         Judgment: Judgment normal.       Assessment/Plan   Diagnoses and all orders for this visit:    1. Nausea and vomiting, intractability of vomiting not specified, unspecified vomiting type (Primary)  -     Case Request; Standing  -     Follow Anesthesia Guidelines / Standing Orders; Future  -     Obtain Informed Consent; Future  -     Implement Anesthesia Orders Day of Procedure; Standing  -     Obtain Informed Consent; Standing  -     Verify Bowel Prep Was Successful; Standing  -     Case Request    2. Change in bowel habits    3. Generalized abdominal pain    4. Obesity, unspecified obesity severity, unspecified obesity type    5. Nonsmoker    6. Fatty liver    Other orders  -     metroNIDAZOLE (Flagyl) 250 MG tablet; Take 1 tablet by mouth 3 (Three) Times a Day for 10 days. Avoid use of alcohol  Dispense: 30 tablet; Refill: 0    continue Nexium  No NSAIDS   will treat with Flagyl given her pain LLQ  Ct scans reviewed.     I discussed how fatty liver can lead to cirrhosis, disability and pre-mature death.  How it also can be a sign of increased risk for cardiovascular disease.  I discussed the importance of getting rid of fat in the liver by controlling lipids and glucose, avoiding etoh helps, and gradual weight loss to ideal body weight is very important.       ESOPHAGOGASTRODUODENOSCOPY WITH ANESTHESIA (N/A), COLONOSCOPY WITH ANESTHESIA (N/A)  Part of this note may be an electronic transcription/translation of spoken language to printed text using the Dragon Dictation System.  Body mass index is 32.79 kg/m².  Return if symptoms worsen or fail to improve.    Patient's Body mass index is 32.79 kg/m². BMI is above normal parameters. Recommendations include: nutrition counseling.      All  risks, benefits, alternatives, and indications of colonoscopy and/or Endoscopy procedure have been discussed with the patient. Risks to include perforation of the colon requiring possible surgery or colostomy, risk of bleeding from biopsies or removal of colon tissue, possibility of missing a colon polyp or cancer, or adverse drug reaction.  Benefits to include the diagnosis and management of disease of the colon and rectum. Alternatives to include barium enema, radiographic evaluation, lab testing or no intervention. Pt verbalizes understanding and agrees.     Kathy Lainez, APRN  4/21/2021  10:22 CDT          If you smoke or use tobacco, 4 minutes reading provided  Steps to Quit Smoking  Smoking tobacco can be harmful to your health and can affect almost every organ in your body. Smoking puts you, and those around you, at risk for developing many serious chronic diseases. Quitting smoking is difficult, but it is one of the best things that you can do for your health. It is never too late to quit.  What are the benefits of quitting smoking?  When you quit smoking, you lower your risk of developing serious diseases and conditions, such as:  · Lung cancer or lung disease, such as COPD.  · Heart disease.  · Stroke.  · Heart attack.  · Infertility.  · Osteoporosis and bone fractures.  Additionally, symptoms such as coughing, wheezing, and shortness of breath may get better when you quit. You may also find that you get sick less often because your body is stronger at fighting off colds and infections. If you are pregnant, quitting smoking can help to reduce your chances of having a baby of low birth weight.  How do I get ready to quit?  When you decide to quit smoking, create a plan to make sure that you are successful. Before you quit:  · Pick a date to quit. Set a date within the next two weeks to give you time to prepare.  · Write down the reasons why you are quitting. Keep this list in places where you will  see it often, such as on your bathroom mirror or in your car or wallet.  · Identify the people, places, things, and activities that make you want to smoke (triggers) and avoid them. Make sure to take these actions:  ¨ Throw away all cigarettes at home, at work, and in your car.  ¨ Throw away smoking accessories, such as ashtrays and lighters.  ¨ Clean your car and make sure to empty the ashtray.  ¨ Clean your home, including curtains and carpets.  · Tell your family, friends, and coworkers that you are quitting. Support from your loved ones can make quitting easier.  · Talk with your health care provider about your options for quitting smoking.  · Find out what treatment options are covered by your health insurance.  What strategies can I use to quit smoking?  Talk with your healthcare provider about different strategies to quit smoking. Some strategies include:  · Quitting smoking altogether instead of gradually lessening how much you smoke over a period of time. Research shows that quitting “cold turkey” is more successful than gradually quitting.  · Attending in-person counseling to help you build problem-solving skills. You are more likely to have success in quitting if you attend several counseling sessions. Even short sessions of 10 minutes can be effective.  · Finding resources and support systems that can help you to quit smoking and remain smoke-free after you quit. These resources are most helpful when you use them often. They can include:  ¨ Online chats with a counselor.  ¨ Telephone quitlines.  ¨ Printed self-help materials.  ¨ Support groups or group counseling.  ¨ Text messaging programs.  ¨ Mobile phone applications.  · Taking medicines to help you quit smoking. (If you are pregnant or breastfeeding, talk with your health care provider first.) Some medicines contain nicotine and some do not. Both types of medicines help with cravings, but the medicines that include nicotine help to relieve withdrawal  symptoms. Your health care provider may recommend:  ¨ Nicotine patches, gum, or lozenges.  ¨ Nicotine inhalers or sprays.  ¨ Non-nicotine medicine that is taken by mouth.  Talk with your health care provider about combining strategies, such as taking medicines while you are also receiving in-person counseling. Using these two strategies together makes you more likely to succeed in quitting than if you used either strategy on its own.  If you are pregnant or breastfeeding, talk with your health care provider about finding counseling or other support strategies to quit smoking. Do not take medicine to help you quit smoking unless told to do so by your health care provider.  What things can I do to make it easier to quit?  Quitting smoking might feel overwhelming at first, but there is a lot that you can do to make it easier. Take these important actions:  · Reach out to your family and friends and ask that they support and encourage you during this time. Call telephone quitlines, reach out to support groups, or work with a counselor for support.  · Ask people who smoke to avoid smoking around you.  · Avoid places that trigger you to smoke, such as bars, parties, or smoke-break areas at work.  · Spend time around people who do not smoke.  · Lessen stress in your life, because stress can be a smoking trigger for some people. To lessen stress, try:  ¨ Exercising regularly.  ¨ Deep-breathing exercises.  ¨ Yoga.  ¨ Meditating.  ¨ Performing a body scan. This involves closing your eyes, scanning your body from head to toe, and noticing which parts of your body are particularly tense. Purposefully relax the muscles in those areas.  · Download or purchase mobile phone or tablet apps (applications) that can help you stick to your quit plan by providing reminders, tips, and encouragement. There are many free apps, such as QuitGuide from the CDC (Centers for Disease Control and Prevention). You can find other support for  quitting smoking (smoking cessation) through smokefree.gov and other websites.  How will I feel when I quit smoking?  Within the first 24 hours of quitting smoking, you may start to feel some withdrawal symptoms. These symptoms are usually most noticeable 2-3 days after quitting, but they usually do not last beyond 2-3 weeks. Changes or symptoms that you might experience include:  · Mood swings.  · Restlessness, anxiety, or irritation.  · Difficulty concentrating.  · Dizziness.  · Strong cravings for sugary foods in addition to nicotine.  · Mild weight gain.  · Constipation.  · Nausea.  · Coughing or a sore throat.  · Changes in how your medicines work in your body.  · A depressed mood.  · Difficulty sleeping (insomnia).  After the first 2-3 weeks of quitting, you may start to notice more positive results, such as:  · Improved sense of smell and taste.  · Decreased coughing and sore throat.  · Slower heart rate.  · Lower blood pressure.  · Clearer skin.  · The ability to breathe more easily.  · Fewer sick days.  Quitting smoking is very challenging for most people. Do not get discouraged if you are not successful the first time. Some people need to make many attempts to quit before they achieve long-term success. Do your best to stick to your quit plan, and talk with your health care provider if you have any questions or concerns.  This information is not intended to replace advice given to you by your health care provider. Make sure you discuss any questions you have with your health care provider.  Document Released: 12/12/2002 Document Revised: 08/15/2017 Document Reviewed: 05/03/2016  [employer] Interactive Patient Education © 2017 [employer]

## 2021-04-22 RX ORDER — FUROSEMIDE 20 MG/1
TABLET ORAL
Qty: 90 TABLET | Refills: 2 | Status: SHIPPED | OUTPATIENT
Start: 2021-04-22 | End: 2021-11-01

## 2021-04-22 NOTE — TELEPHONE ENCOUNTER
With patients ok (we called; she accepts risks); hold xarelto 2 days and resume once Dr Vora will allow)  No lovonex needed

## 2021-04-22 NOTE — TELEPHONE ENCOUNTER
Called pt and willing to hold her xarelto, but wanted to know for how long, and advised would be determined with testing is scheduled, pt is agreeable

## 2021-04-28 ENCOUNTER — READMISSION MANAGEMENT (OUTPATIENT)
Dept: CALL CENTER | Facility: HOSPITAL | Age: 71
End: 2021-04-28

## 2021-04-28 NOTE — OUTREACH NOTE
Medical Week 4 Survey      Responses   [facility] patient discharged from?  [facility]   Does the patient have one of the following disease processes/diagnoses(primary or secondary)?  Other   Week 4 attempt successful?  No          Pia Ashley RN

## 2021-05-25 ENCOUNTER — TELEPHONE (OUTPATIENT)
Dept: FAMILY MEDICINE CLINIC | Facility: CLINIC | Age: 71
End: 2021-05-25

## 2021-05-25 ENCOUNTER — TRANSCRIBE ORDERS (OUTPATIENT)
Dept: LAB | Facility: HOSPITAL | Age: 71
End: 2021-05-25

## 2021-05-25 DIAGNOSIS — Z01.818 PRE-OP TESTING: Primary | ICD-10-CM

## 2021-05-25 NOTE — TELEPHONE ENCOUNTER
Note to GI; cleared for holding xarelto  [facility]      Current Outpatient Medications:   •  Cholecalciferol (VITAMIN D) 2000 UNITS tablet, Take 1,000 Units by mouth Daily., Disp: , Rfl:   •  cyanocobalamin (VITAMIN B-12) 50 MCG tablet tablet, Take 50 mcg by mouth Daily., Disp: , Rfl:   •  esomeprazole (nexIUM) 40 MG capsule, TAKE 1 CAPSULE TWICE DAILY GENERIC FOR NEXIUM, Disp: 180 capsule, Rfl: 1  •  furosemide (LASIX) 20 MG tablet, TAKE 1 TABLET BY MOUTH DAILY., Disp: 90 tablet, Rfl: 2  •  losartan (Cozaar) 50 MG tablet, Take 50 mg by mouth Daily., Disp: , Rfl:   •  Multiple Vitamin (MULTI VITAMIN DAILY PO), Take 1 tablet by mouth daily., Disp: , Rfl:   •  potassium chloride (K-DUR,KLOR-CON) 20 MEQ CR tablet, TAKE 1 TABLET BY MOUTH 2 (TWO) TIMES A DAY., Disp: 180 tablet, Rfl: 1  •  sodium-potassium-magnesium sulfates (Suprep Bowel Prep Kit) 17.5-3.13-1.6 GM/177ML solution oral solution, As Directed by Office, Disp: 177 mL, Rfl: 0  •  triamterene-hydrochlorothiazide (DYAZIDE) 37.5-25 MG per capsule, Take 1 capsule by mouth Every Morning., Disp: , Rfl:   •  vitamin E 600 UNIT capsule, Take 400 Units by mouth Daily., Disp: , Rfl:   •  Xarelto 20 MG tablet, TAKE ONE TABLET DAILY WITH DINNER, Disp: 90 tablet, Rfl: 1  •  Zinc 50 MG capsule, Take  by mouth., Disp: , Rfl:

## 2021-05-31 ENCOUNTER — LAB (OUTPATIENT)
Dept: LAB | Facility: HOSPITAL | Age: 71
End: 2021-05-31

## 2021-05-31 LAB — SARS-COV-2 ORF1AB RESP QL NAA+PROBE: NOT DETECTED

## 2021-05-31 PROCEDURE — U0005 INFEC AGEN DETEC AMPLI PROBE: HCPCS | Performed by: INTERNAL MEDICINE

## 2021-05-31 PROCEDURE — U0004 COV-19 TEST NON-CDC HGH THRU: HCPCS | Performed by: INTERNAL MEDICINE

## 2021-05-31 PROCEDURE — C9803 HOPD COVID-19 SPEC COLLECT: HCPCS | Performed by: INTERNAL MEDICINE

## 2021-06-02 ENCOUNTER — ANESTHESIA EVENT (OUTPATIENT)
Dept: GASTROENTEROLOGY | Facility: HOSPITAL | Age: 71
End: 2021-06-02

## 2021-06-02 ENCOUNTER — ANESTHESIA (OUTPATIENT)
Dept: GASTROENTEROLOGY | Facility: HOSPITAL | Age: 71
End: 2021-06-02

## 2021-06-02 ENCOUNTER — HOSPITAL ENCOUNTER (OUTPATIENT)
Facility: HOSPITAL | Age: 71
Setting detail: HOSPITAL OUTPATIENT SURGERY
Discharge: HOME | End: 2021-06-02
Attending: INTERNAL MEDICINE | Admitting: INTERNAL MEDICINE

## 2021-06-02 VITALS
RESPIRATION RATE: 18 BRPM | HEART RATE: 60 BPM | WEIGHT: 193 LBS | OXYGEN SATURATION: 98 % | DIASTOLIC BLOOD PRESSURE: 71 MMHG | HEIGHT: 64 IN | SYSTOLIC BLOOD PRESSURE: 105 MMHG | TEMPERATURE: 97.1 F | BODY MASS INDEX: 32.95 KG/M2

## 2021-06-02 DIAGNOSIS — R11.2 NAUSEA AND VOMITING, INTRACTABILITY OF VOMITING NOT SPECIFIED, UNSPECIFIED VOMITING TYPE: ICD-10-CM

## 2021-06-02 PROCEDURE — 43235 EGD DIAGNOSTIC BRUSH WASH: CPT | Performed by: INTERNAL MEDICINE

## 2021-06-02 PROCEDURE — 45380 COLONOSCOPY AND BIOPSY: CPT | Performed by: INTERNAL MEDICINE

## 2021-06-02 PROCEDURE — 25010000002 PROPOFOL 10 MG/ML EMULSION: Performed by: NURSE ANESTHETIST, CERTIFIED REGISTERED

## 2021-06-02 PROCEDURE — 88305 TISSUE EXAM BY PATHOLOGIST: CPT | Performed by: INTERNAL MEDICINE

## 2021-06-02 PROCEDURE — 43450 DILATE ESOPHAGUS 1/MULT PASS: CPT | Performed by: INTERNAL MEDICINE

## 2021-06-02 RX ORDER — SODIUM CHLORIDE 0.9 % (FLUSH) 0.9 %
10 SYRINGE (ML) INJECTION AS NEEDED
Status: DISCONTINUED | OUTPATIENT
Start: 2021-06-02 | End: 2021-06-02 | Stop reason: HOSPADM

## 2021-06-02 RX ORDER — LIDOCAINE HYDROCHLORIDE 20 MG/ML
INJECTION, SOLUTION EPIDURAL; INFILTRATION; INTRACAUDAL; PERINEURAL AS NEEDED
Status: DISCONTINUED | OUTPATIENT
Start: 2021-06-02 | End: 2021-06-02 | Stop reason: SURG

## 2021-06-02 RX ORDER — SODIUM CHLORIDE 9 MG/ML
500 INJECTION, SOLUTION INTRAVENOUS CONTINUOUS PRN
Status: DISCONTINUED | OUTPATIENT
Start: 2021-06-02 | End: 2021-06-02 | Stop reason: HOSPADM

## 2021-06-02 RX ORDER — PROPOFOL 10 MG/ML
VIAL (ML) INTRAVENOUS AS NEEDED
Status: DISCONTINUED | OUTPATIENT
Start: 2021-06-02 | End: 2021-06-02 | Stop reason: SURG

## 2021-06-02 RX ADMIN — SODIUM CHLORIDE 500 ML: 9 INJECTION, SOLUTION INTRAVENOUS at 09:15

## 2021-06-02 RX ADMIN — PROPOFOL 400 MG: 10 INJECTION, EMULSION INTRAVENOUS at 10:13

## 2021-06-02 RX ADMIN — LIDOCAINE HYDROCHLORIDE 100 MG: 20 INJECTION, SOLUTION EPIDURAL; INFILTRATION; INTRACAUDAL; PERINEURAL at 10:13

## 2021-06-02 NOTE — ANESTHESIA PREPROCEDURE EVALUATION
Anesthesia Evaluation     Patient summary reviewed   no history of anesthetic complications:  NPO Solid Status: > 8 hours  NPO Liquid Status: > 2 hours           Airway   Mallampati: II  TM distance: >3 FB  Neck ROM: full  Dental      Pulmonary    (+) pulmonary embolism (2016 with cardiac arrrest),   (-) COPD, asthma, sleep apnea, not a smoker  Cardiovascular   Exercise tolerance: good (4-7 METS)    ECG reviewed    (+) hypertension,   (-) pacemaker, past MI, dysrhythmias, angina, cardiac stents      Neuro/Psych  (-) seizures, TIA, CVA  GI/Hepatic/Renal/Endo    (+) obesity,  GERD,    (-) liver disease, no renal disease, diabetes    Musculoskeletal     Abdominal    Substance History      OB/GYN          Other   arthritis,                        Anesthesia Plan    ASA 2     MAC       Anesthetic plan, all risks, benefits, and alternatives have been provided, discussed and informed consent has been obtained with: patient.

## 2021-06-02 NOTE — ANESTHESIA POSTPROCEDURE EVALUATION
"Patient: Lucy Sousa    Procedure Summary     Date: 06/02/21 Room / Location: [facility] ENDOSCOPY 4 / [facility]    Anesthesia Start: 1009 Anesthesia Stop: 1037    Procedures:       ESOPHAGOGASTRODUODENOSCOPY WITH ANESTHESIA (N/A )      COLONOSCOPY WITH ANESTHESIA (N/A ) Diagnosis:       Nausea and vomiting, intractability of vomiting not specified, unspecified vomiting type      (Nausea and vomiting, intractability of vomiting not specified, unspecified vomiting type [R11.2])    Surgeons: Heath Mckeon MD Provider: Ari Caballero CRNA    Anesthesia Type: MAC ASA Status: 2          Anesthesia Type: MAC    Vitals  No vitals data found for the desired time range.          Post Anesthesia Care and Evaluation    Patient location during evaluation: PHASE II  Patient participation: complete - patient participated  Level of consciousness: awake and alert  Pain management: adequate  Airway patency: patent  Anesthetic complications: No anesthetic complications    Cardiovascular status: acceptable  Respiratory status: acceptable  Hydration status: acceptable    Comments: Blood pressure 160/76, pulse 83, temperature 97.1 °F (36.2 °C), temperature source Temporal, resp. rate 20, height 162.6 cm (64\"), weight 87.5 kg (193 lb), SpO2 97 %, not currently breastfeeding.    Pt discharged from PACU based on luna score >8      "

## 2021-06-02 NOTE — H&P
[facility] Gastroenterology  Pre Procedure History & Physical    Chief Complaint:   Nausea, change in bowel habits    Subjective     HPI:   Here today for endoscopy and colonoscopy.  Nausea.  Also with change in bowel habits.    Past Medical History:   Past Medical History:   Diagnosis Date   • Anxiety    • Cancer (CMS/HCC)     skin squamous cell   • Depression    • DJD (degenerative joint disease)    • GERD (gastroesophageal reflux disease)    • HTN (hypertension)    • Hx of colonic polyp    • Pulmonary embolism (CMS/HCC)     post surgical with cardiac arrest       Past Surgical History:  Past Surgical History:   Procedure Laterality Date   • BREAST SURGERY      breast reduction   • CHOLECYSTECTOMY     • COLONOSCOPY  04/01/2019    Diverticulosis otherwise normal exam repeat in 5 years   • COLONOSCOPY W/ POLYPECTOMY  02/13/2014    Hyperplastic polyp at 20 cm, Diverticulosis repeat exam in 5 years   • EKOS CATHETER PLACEMENT Bilateral 11/17/2016    Procedure: Ekos catheter placement;  Surgeon: Daniel Underwood MD;  Location:  [facility] CATH INVASIVE LOCATION;  Service:    • EKOS CATHETER REMOVAL Bilateral 11/18/2016    Procedure: Ekos catheter removal with stent placement;  Surgeon: Daniel Underwood MD;  Location:  [facility] CATH [facility];  Service:    • ENDOSCOPY  01/22/2010    Negative endoscopy noting a healed gastric ulcer   • ENDOSCOPY  10/21/2009    Superficial mucosal erosion, chronic active gastritis   • ENDOSCOPY  04/01/2019    Distal esophageal ring dilated   • FLAP HEAD/NECK Right 10/26/2020    Procedure: POSSIBLE FLAP;  Surgeon: Vadim Le MD;  Location: [facility] OR;  Service: ENT;  Laterality: Right;   • FOOT SURGERY     • HEAD/NECK LESION/CYST EXCISION Right 10/26/2020    Procedure: 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 with transposition flap;  Surgeon: Vadim Le MD;  Location: [facility] OR;  Service: ENT;  Laterality: Right;   • HERNIA REPAIR     •  HYSTERECTOMY     • OOPHORECTOMY     • REPLACEMENT TOTAL KNEE     • TOTAL ABDOMINAL HYSTERECTOMY     • TUBAL ABDOMINAL LIGATION         Family History:  Family History   Problem Relation Age of Onset   • Coronary artery disease Mother    • Coronary artery disease Father    • Diabetes Brother    • Hypertension Brother    • Colon polyps Brother    • Heart disease Maternal Grandfather    • Colon cancer Neg Hx        Social History:   reports that she has never smoked. She has never used smokeless tobacco. She reports that she does not drink alcohol and does not use drugs.    Medications:   Prior to Admission medications    Medication Sig Start Date End Date Taking? Authorizing Provider   Cholecalciferol (VITAMIN D) 2000 UNITS tablet Take 1,000 Units by mouth Daily.   Yes Germán Dukes MD   cyanocobalamin (VITAMIN B-12) 50 MCG tablet tablet Take 50 mcg by mouth Daily.   Yes Germán Dukes MD   esomeprazole (nexIUM) 40 MG capsule TAKE 1 CAPSULE TWICE DAILY GENERIC FOR NEXIUM 2/9/21  Yes Rosas Berg MD   furosemide (LASIX) 20 MG tablet TAKE 1 TABLET BY MOUTH DAILY. 4/22/21  Yes Rosas Berg MD   losartan (Cozaar) 50 MG tablet Take 50 mg by mouth Daily.   Yes Germán Dukes MD   Multiple Vitamin (MULTI VITAMIN DAILY PO) Take 1 tablet by mouth daily.   Yes Germán Dukes MD   potassium chloride (K-DUR,KLOR-CON) 20 MEQ CR tablet TAKE 1 TABLET BY MOUTH 2 (TWO) TIMES A DAY. 2/9/21  Yes Rosas Berg MD   triamterene-hydrochlorothiazide (DYAZIDE) 37.5-25 MG per capsule Take 1 capsule by mouth Every Morning.   Yes Germán Dukes MD   vitamin E 600 UNIT capsule Take 400 Units by mouth Daily.   Yes Germán Dukes MD   Zinc 50 MG capsule Take  by mouth.   Yes Germán Dukes MD   Xarelto 20 MG tablet TAKE ONE TABLET DAILY WITH DINNER 2/9/21   Rosas Berg MD   sodium-potassium-magnesium sulfates (Suprep Bowel Prep Kit) 17.5-3.13-1.6 GM/177ML  "solution oral solution As Directed by Office 4/21/21 6/2/21  Kathy Lainez, LANRE       Allergies:  Macrobid [nitrofurantoin macrocrystal]    Objective     Blood pressure 160/76, pulse 83, temperature 97.1 °F (36.2 °C), temperature source Temporal, resp. rate 20, height 162.6 cm (64\"), weight 87.5 kg (193 lb), SpO2 97 %, not currently breastfeeding.    Physical Exam   Constitutional: Pt is oriented to person, place, and in no distress.   HENT: Mouth/Throat: Oropharynx is clear.   Cardiovascular: Normal rate, regular rhythm.    Pulmonary/Chest: Effort normal. No respiratory distress. No  wheezes.   Abdominal: Soft. Non-distended.  Skin: Skin is warm and dry.   Psychiatric: Mood, memory, affect and judgment appear normal.     Assessment/Plan     Diagnosis:  Nausea, change in bowel habits    Anticipated Surgical Procedure:    Proceed with endoscopy and colonoscopy as scheduled    The following major R/B/A were discussed with the patient, however the list is not all inclusive . Risk:  Bleeding (immediate and delayed), perforation (rupture or tear), reaction to medication, missed lesion/cancer, pain during the procedure, infection, need for surgery, need for ostomy, need for mechanical ventilation (breathing machine), death.  Benefits: removal of polyp/tissue, burn/clip/or inject to stop bleeding, removal of foreign body, dilate any stricture.  Alternatives: Xray or CT, surgery, do nothing with associated risk   The patient was given time to ask question and received explanation, and agrees to proceed as per History and Physical.   No guarantee given or expressed.    EMR Dragon/transcription disclaimer: Much of this encounter note is an electronic transcription/translation of spoken language to printed text.  The electronic translation of spoken language may permit erroneous, or at times, nonsensical words or phrases to be inadvertently transcribed.  Although I have reviewed the note for such errors, some may still " exist.    Heath Mckeon MD  10:10 CDT  6/2/2021

## 2021-06-04 LAB
CYTO UR: NORMAL
LAB AP CASE REPORT: NORMAL
LAB AP CLINICAL INFORMATION: NORMAL
PATH REPORT.FINAL DX SPEC: NORMAL
PATH REPORT.GROSS SPEC: NORMAL

## 2021-06-07 DIAGNOSIS — Z79.01 ANTICOAGULATED: Primary | ICD-10-CM

## 2021-06-07 NOTE — TELEPHONE ENCOUNTER
"Per patient \"I have lost two bottles of my xarelto 20 mg, Delon PINO2 said to ask if we had samples?\"    We have samples of 10 mg tablets and advised will have to take two tablets daily and 4 week supply provided and stated understanding  "

## 2021-07-06 ENCOUNTER — LAB (OUTPATIENT)
Dept: FAMILY MEDICINE CLINIC | Facility: CLINIC | Age: 71
End: 2021-07-06

## 2021-07-06 DIAGNOSIS — E66.01 CLASS 2 SEVERE OBESITY WITH SERIOUS COMORBIDITY AND BODY MASS INDEX (BMI) OF 35.0 TO 35.9 IN ADULT, UNSPECIFIED OBESITY TYPE (HCC): ICD-10-CM

## 2021-07-06 DIAGNOSIS — Z79.01 ANTICOAGULATED: ICD-10-CM

## 2021-07-06 DIAGNOSIS — E83.52 HYPERCALCEMIA: ICD-10-CM

## 2021-07-06 DIAGNOSIS — E78.2 MIXED HYPERLIPIDEMIA: Primary | ICD-10-CM

## 2021-07-06 DIAGNOSIS — E11.65 TYPE 2 DIABETES MELLITUS WITH HYPERGLYCEMIA, UNSPECIFIED WHETHER LONG TERM INSULIN USE (HCC): ICD-10-CM

## 2021-07-06 DIAGNOSIS — E53.8 VITAMIN B12 DEFICIENCY: ICD-10-CM

## 2021-07-06 DIAGNOSIS — E55.9 VITAMIN D DEFICIENCY: ICD-10-CM

## 2021-07-06 DIAGNOSIS — I10 HTN (HYPERTENSION), BENIGN: ICD-10-CM

## 2021-07-06 DIAGNOSIS — E87.6 HYPOPOTASSEMIA: ICD-10-CM

## 2021-07-07 ENCOUNTER — OFFICE VISIT (OUTPATIENT)
Dept: FAMILY MEDICINE CLINIC | Facility: CLINIC | Age: 71
End: 2021-07-07

## 2021-07-07 VITALS
OXYGEN SATURATION: 97 % | WEIGHT: 192 LBS | RESPIRATION RATE: 18 BRPM | DIASTOLIC BLOOD PRESSURE: 78 MMHG | HEIGHT: 64 IN | TEMPERATURE: 98.4 F | SYSTOLIC BLOOD PRESSURE: 130 MMHG | HEART RATE: 88 BPM | BODY MASS INDEX: 32.78 KG/M2

## 2021-07-07 DIAGNOSIS — D75.89 MACROCYTOSIS: Chronic | ICD-10-CM

## 2021-07-07 DIAGNOSIS — E87.6 HYPOKALEMIA: ICD-10-CM

## 2021-07-07 DIAGNOSIS — K52.9 CHRONIC DIARRHEA: ICD-10-CM

## 2021-07-07 DIAGNOSIS — I10 HTN (HYPERTENSION), BENIGN: Chronic | ICD-10-CM

## 2021-07-07 DIAGNOSIS — Z79.01 ANTICOAGULATED: ICD-10-CM

## 2021-07-07 DIAGNOSIS — E78.2 MIXED HYPERLIPIDEMIA: ICD-10-CM

## 2021-07-07 DIAGNOSIS — F32.A DEPRESSION, UNSPECIFIED DEPRESSION TYPE: Chronic | ICD-10-CM

## 2021-07-07 DIAGNOSIS — I50.9 CONGESTIVE HEART FAILURE, UNSPECIFIED HF CHRONICITY, UNSPECIFIED HEART FAILURE TYPE (HCC): Chronic | ICD-10-CM

## 2021-07-07 PROBLEM — E86.9 VOLUME DEPLETION: Status: RESOLVED | Noted: 2021-03-27 | Resolved: 2021-07-07

## 2021-07-07 PROBLEM — E87.20 LACTIC ACIDOSIS: Status: RESOLVED | Noted: 2021-03-27 | Resolved: 2021-07-07

## 2021-07-07 PROBLEM — N39.0 UTI (URINARY TRACT INFECTION), BACTERIAL: Status: RESOLVED | Noted: 2021-03-27 | Resolved: 2021-07-07

## 2021-07-07 PROBLEM — A49.9 UTI (URINARY TRACT INFECTION), BACTERIAL: Status: RESOLVED | Noted: 2021-03-27 | Resolved: 2021-07-07

## 2021-07-07 PROCEDURE — 99214 OFFICE O/P EST MOD 30 MIN: CPT | Performed by: FAMILY MEDICINE

## 2021-07-07 RX ORDER — ATORVASTATIN CALCIUM 20 MG/1
20 TABLET, FILM COATED ORAL DAILY
Qty: 90 TABLET | Refills: 3 | Status: SHIPPED | OUTPATIENT
Start: 2021-07-07 | End: 2022-05-09

## 2021-07-07 NOTE — PROGRESS NOTES
Subjective   Lucy Sousa is a 71 y.o. female presenting with chief complaint of:   Chief Complaint   Patient presents with   • Hypertension     6 month follow-up   • Hyperlipidemia       History of Present Illness :  Alone.       Has multiple chronic problems to consider that might have a bearing on today's issues;  an interval appointment.       Chronic/acute problems reviewed today:   1. Macrocytosis- B12/folate-ok chronic mild elevation of MCV with normal B12 folate benign appearing CBC and clinical course   2. Depression, unspecified depression type chronic past significant  mood swings, down moods, nervousness, difficulty with concentration to function home/work.  Others close have not been concerned.  No suicide ideation/intent.  Rx helped past      3. Hypokalemia Chronic/variable high or low K as uses diuretic; has to have lab monitoring.  No significant fatigue or muscle cramps     4. Mixed hyperlipidemia Chronic/stable: prefers no statin to this point though offered.  Prefers lifestyle over Rx;  with diet-exercise advised and lab moitored.     5. HTN (hypertension), benign Chronic/stable. Stable here past/and home blood pressures.  No significant chest pain, SOB, LE edema, orthopnea, near syncope, dizziness/light headness.   Recent Vitals       6/2/2021 6/2/2021 7/7/2021       BP:  122/74  105/71  130/78     Pulse:  64  60  88     Temp:  --  --  98.4 °F (36.9 °C)     Weight:  --  --  87.1 kg (192 lb)     BMI (Calculated):  --  --  32.9            6. Congestive heart failure, unspecified HF chronicity, unspecified heart failure type: Chronic/stable.  Denies significant sob, orthopnea, leg edema, weight gain.  Aware of influence diet/salt and watching weight at home.       7. Anticoagulated-PE/cardiac arrest/xarelto Chronic/stable reason and use of.  Denies bleeding issues; especially epistaxis, melena, hematochezia.  Upper arms/others do bruise easily.  No significant bleeding or falls.      8. Chronic  diarrhea chronic appearing frequent looser stools; which at times can have some degree of urgency.  Complete GI evaluation was unremarkable.     Has an/another acute issue today: none.    The following portions of the patient's history were reviewed and updated as appropriate: allergies, current medications, past family history, past medical history, past social history, past surgical history and problem list.      Current Outpatient Medications:   •  Cholecalciferol (Vitamin D3) 25 MCG (1000 UT) capsule, Take 1,000 Units by mouth Daily., Disp: , Rfl:   •  cyanocobalamin (VITAMIN B-12) 50 MCG tablet tablet, Take 50 mcg by mouth Daily., Disp: , Rfl:   •  esomeprazole (nexIUM) 40 MG capsule, TAKE 1 CAPSULE TWICE DAILY GENERIC FOR NEXIUM, Disp: 180 capsule, Rfl: 1  •  furosemide (LASIX) 20 MG tablet, TAKE 1 TABLET BY MOUTH DAILY., Disp: 90 tablet, Rfl: 2  •  losartan (Cozaar) 50 MG tablet, Take 50 mg by mouth Daily., Disp: , Rfl:   •  Multiple Vitamin (MULTI VITAMIN DAILY PO), Take 1 tablet by mouth daily., Disp: , Rfl:   •  potassium chloride (K-DUR,KLOR-CON) 20 MEQ CR tablet, TAKE 1 TABLET BY MOUTH 2 (TWO) TIMES A DAY., Disp: 180 tablet, Rfl: 1  •  rivaroxaban (Xarelto) 10 MG tablet, Take 2 tablets by mouth Daily., Disp: 56 tablet, Rfl: 0  •  triamterene-hydrochlorothiazide (DYAZIDE) 37.5-25 MG per capsule, Take 1 capsule by mouth Every Morning., Disp: , Rfl:   •  vitamin E 600 UNIT capsule, Take 400 Units by mouth Daily., Disp: , Rfl:   •  Zinc 50 MG capsule, Take  by mouth., Disp: , Rfl:   •  Xarelto 20 MG tablet, TAKE ONE TABLET DAILY WITH DINNER, Disp: 90 tablet, Rfl: 1    No problems with medications.    Allergies   Allergen Reactions   • Macrobid [Nitrofurantoin Macrocrystal] Rash       Review of Systems  GENERAL:  Active/slower with limits, speed, stamina for age and exertional fatigue. Sleep is ok. No fever now.  SKIN: No rash/skin lesion of concern.   ENDO:  No syncope, near or diaphoretic sweaty  spells.  HEENT: No recent head injury; or change in occ headache.   No vision change.  No significant hearing loss.  Ears without pain/drainage.  No sore throat.  No significant nasal/sinus congestion/drainage. No epistaxis.  CHEST: No chest wall tenderness or mass.  Infrequent cough, without wheeze.  No SOB; no hemoptysis.  CV: No chest pain; same occ palpitations and minimal ankle edema.  GI: No heartburn, dysphagia.  No abdominal pain, constipation; stools trend loose.   No rectal bleeding, or melena.    :  Voids without dysuria, or incontinence to completion.  ORTHO: No painful/swollen joints but various on /off sore. .  No change occ/on-off  sore neck or back.  No acute neck or back pain without recent injury.  NEURO: Still on/off mild dizzy; no vertigo.  No weakness of extremities except above.  No numbness/paresthesias.   PSYCH: No memory loss.  Mood good but variable anxious, depressed but/and not suicidal.  Tries to tolerate stress .   Screening:  Gyne: SHUN+CATE/Ari/SARAH/[id number]  Mammogram: 12.22.20  Bone density: 10.31.18/bone d-deca/Yolanda/Ts L1 +1.9, hip +0.3  Low dose CT chest:Tobacco-smoker/none: NA  GI:   Colon-[facility]///6.2.21/7y  EGD-///6.2.21  Prostate: NA  Usual lab order  6m CBC, CMP, TSH, fT4  12m CBC, CMP, LIPID, TSH, fT4, Vit D iron, ferritin, B12, folate     Lab Results:  Results for orders placed or performed in visit on 07/06/21   Comprehensive Metabolic Panel    Specimen: Blood   Result Value Ref Range    Glucose 92 65 - 99 mg/dL    BUN 12 8 - 23 mg/dL    Creatinine 0.85 0.57 - 1.00 mg/dL    eGFR Non African Am 66 >60 mL/min/1.73    eGFR African Am 79 >60 mL/min/1.73    BUN/Creatinine Ratio 14.1 7.0 - 25.0    Sodium 144 136 - 145 mmol/L    Potassium 4.0 3.5 - 5.2 mmol/L    Chloride 103 98 - 107 mmol/L    Total CO2 29.4 (H) 22.0 - 29.0 mmol/L    Calcium 10.0 8.6 - 10.5 mg/dL    Total Protein 6.4 6.0 - 8.5 g/dL    Albumin 4.20 3.50 - 5.20 g/dL    Globulin 2.2 gm/dL    A/G Ratio  1.9 g/dL    Total Bilirubin 0.3 0.0 - 1.2 mg/dL    Alkaline Phosphatase 74 39 - 117 U/L    AST (SGOT) 16 1 - 32 U/L    ALT (SGPT) 13 1 - 33 U/L   Lipid Panel    Specimen: Blood   Result Value Ref Range    Total Cholesterol 220 (H) 0 - 200 mg/dL    Triglycerides 201 (H) 0 - 150 mg/dL    HDL Cholesterol 48 40 - 60 mg/dL    VLDL Cholesterol Johny 36 5 - 40 mg/dL    LDL Chol Calc (NIH) 136 (H) 0 - 100 mg/dL   TSH    Specimen: Blood   Result Value Ref Range    TSH 3.760 0.270 - 4.200 uIU/mL   T4, free    Specimen: Blood   Result Value Ref Range    Free T4 1.07 0.93 - 1.70 ng/dL   Vitamin D 25 Hydroxy    Specimen: Blood   Result Value Ref Range    25 Hydroxy, Vitamin D 36.9 30.0 - 100.0 ng/ml   Iron    Specimen: Blood   Result Value Ref Range    Iron 86 37 - 145 mcg/dL   Ferritin    Specimen: Blood   Result Value Ref Range    Ferritin 73.80 13.00 - 150.00 ng/mL   Vitamin B12    Specimen: Blood   Result Value Ref Range    Vitamin B-12 408 211 - 946 pg/mL   Folate    Specimen: Blood   Result Value Ref Range    Folate 16.60 4.78 - 24.20 ng/mL   CBC & Differential    Specimen: Blood   Result Value Ref Range    WBC 5.36 3.40 - 10.80 10*3/mm3    RBC 4.25 3.77 - 5.28 10*6/mm3    Hemoglobin 14.6 12.0 - 15.9 g/dL    Hematocrit 43.4 34.0 - 46.6 %    .1 (H) 79.0 - 97.0 fL    MCH 34.4 (H) 26.6 - 33.0 pg    MCHC 33.6 31.5 - 35.7 g/dL    RDW 11.9 (L) 12.3 - 15.4 %    Platelets 307 140 - 450 10*3/mm3    Neutrophil Rel % 53.5 42.7 - 76.0 %    Lymphocyte Rel % 34.0 19.6 - 45.3 %    Monocyte Rel % 7.5 5.0 - 12.0 %    Eosinophil Rel % 4.1 0.3 - 6.2 %    Basophil Rel % 0.7 0.0 - 1.5 %    Neutrophils Absolute 2.87 1.70 - 7.00 10*3/mm3    Lymphocytes Absolute 1.82 0.70 - 3.10 10*3/mm3    Monocytes Absolute 0.40 0.10 - 0.90 10*3/mm3    Eosinophils Absolute 0.22 0.00 - 0.40 10*3/mm3    Basophils Absolute 0.04 0.00 - 0.20 10*3/mm3    Immature Granulocyte Rel % 0.2 0.0 - 0.5 %    Immature Grans Absolute 0.01 0.00 - 0.05 10*3/mm3    nRBC 0.0  0.0 - 0.2 /100 WBC       A1C:No results for input(s): HGBA1C in the last 46078 hours.  LIPID:  Lab Results - Last 18 Months   Lab Units 07/06/21  0747 03/30/20  0741   CHOLESTEROL mg/dL 220* 233*   LDL CHOL mg/dL 136* 145*   HDL CHOL mg/dL 48 48   TRIGLYCERIDES mg/dL 201* 200*     PSA:No results for input(s): PSA in the last 83907 hours.  CBC:  Lab Results - Last 18 Months   Lab Units 07/06/21  0747 04/08/21  1038 03/28/21  0500 03/27/21  1352 02/21/21 2123 02/14/21  1028 02/11/21  1952 10/06/20  0712 03/30/20  0741   WBC 10*3/mm3 5.36 5.52 6.18 9.21 6.82 8.25 9.21   < > 5.04   HEMOGLOBIN g/dL 14.6 14.8 11.6* 14.8 14.9 15.7 15.8   < > 15.1   HEMATOCRIT % 43.4 44.6 33.8* 42.0 41.0 43.3 43.5   < > 44.5   PLATELETS 10*3/mm3 307 347 259 369 282 290 316   < > 320   IRON mcg/dL 86  --   --   --   --   --   --   --  121    < > = values in this interval not displayed.      BMP/CMP:  Lab Results - Last 18 Months   Lab Units 07/06/21  0747 04/08/21  1038 03/29/21  0408 03/28/21  0500 03/27/21  1352 02/21/21 2209 02/14/21  1028 02/12/21  1212 10/06/20  0712 03/30/20  0741 03/30/20  0741   SODIUM mmol/L 144 143 142 139 135* 141 139 139 142   < > 142   POTASSIUM mmol/L 4.0 4.1 3.9 3.2* 2.8* 3.2* 3.2* 3.8 3.8   < > 3.7   CHLORIDE mmol/L 103 102 109* 102 91* 100 97* 98 102   < > 98   TOTAL CO2 mmol/L 29.4* 29.2*  --   --   --   --   --  26.7 28.5  --  30.4*   CO2 mmol/L  --   --  28.0 30.0* 27.0 29.0 28.0  --   --    < >  --    GLUCOSE mg/dL 92 91  --   --   --   --   --  104* 88  --  92   BUN mg/dL 12 11 4* 9 14 11 16 14 12   < > 15   CREATININE mg/dL 0.85 0.75 0.55* 0.68 0.90 0.82 0.85 0.84 0.85   < > 0.97   EGFR IF NONAFRICN AM mL/min/1.73 66 76 108 85 61 68 66 66 66   < > 56*   EGFR IF AFRICN AM mL/min/1.73 79 92  --   --   --   --   --  81 80  --  68   CALCIUM mg/dL 10.0 10.7* 8.5* 8.7 10.6* 9.2 10.0 9.8 9.5   < > 10.5    < > = values in this interval not displayed.     HEPATIC:  Lab Results - Last 18 Months   Lab Units  "07/06/21  0747 04/08/21  1038 03/28/21  0500 03/27/21  1352 02/21/21  2209 02/14/21  1028 02/11/21 2020   ALT (SGPT) U/L 13 19 13 20 17 23 17   AST (SGOT) U/L 16 22 19 22 18 22 18   ALK PHOS U/L 74 59 41 60 58 71 61     THYROID:  Lab Results - Last 18 Months   Lab Units 07/06/21  0747 10/06/20  0712 03/30/20  0741   TSH uIU/mL 3.760 3.740 3.820   FREE T4 ng/dL 1.07 1.25 1.22       Objective   /78 (BP Location: Left arm, Patient Position: Sitting, Cuff Size: Adult)   Pulse 88   Temp 98.4 °F (36.9 °C) (Infrared)   Resp 18   Ht 162.6 cm (64\")   Wt 87.1 kg (192 lb)   SpO2 97%   BMI 32.96 kg/m²   Body mass index is 32.96 kg/m².    Recent Vitals       6/2/2021 6/2/2021 7/7/2021       BP:  122/74  105/71  130/78     Pulse:  64  60  88     Temp:  --  --  98.4 °F (36.9 °C)     Weight:  --  --  87.1 kg (192 lb)     BMI (Calculated):  --  --  32.9           Physical Exam  GENERAL:  Well nourished/developed in no acute distress.   SKIN: Turgor excellent, without wound, rash, lesion.   HEENT: Normal cephalic without trauma.  Pupils equal round reactive to light. Extraocular motions full without nystagmus.   External canals nonobstructive nontender without reddness. Tymphatic membranes sharri with shanika structures intact.   Oral cavity without growths, exudates, and moist.  Posterior pharynx without mass, obstruction, redness.  No thyromegaly, mass, tenderness, lymphadenopathy and supple.  CV: Regular rhythm.  No murmur, gallop, trace LE edema. Posterior pulses intact.  No carotid bruits.  CHEST: No chest wall tenderness or mass.   LUNGS: Symmetric motion with clear to auscultation.   ABD: Soft, nontender without mass.   ORTHO: Symmetric extremities without swelling/point tenderness; even anterior R ankle.  Full gross range of motion.  NEURO: CN 2-12 grossly intact.  Symmetric facies and UE/LE. 3/5 strength throughout. 1/4 x bicep knee equal reflexes.  Nonfocal use extremities. Speech clear.  Reduced minimal light " touch with monofilament, vibratory sensation with tuning fork; equal toes/distal feet.    PSYCH: Oriented x 3.  Pleasant calm, well kept.  Purposeful/directed conservation with intact short/long gross memory.    Assessment/Plan     1. Macrocytosis- B12/folate-ok    2. Depression, unspecified depression type    3. Hypokalemia    4. Mixed hyperlipidemia    5. HTN (hypertension), benign    6. Congestive heart failure, unspecified HF chronicity, unspecified heart failure type (CMS/HCC)    7. Anticoagulated-PE/cardiac arrest/xarelto    8. Chronic diarrhea      Discussion:  [facility] screening not unreasonable as ? Carotid disease  Pro/con statin; suggested-agreed  Waiting on ? Bladder intervention; ? dropped  Samples xarelto    Medical decision issues:   Data review above:   Rx: reviewed and decisions:     Visit today involved several chronic/significant illness and intensive drug monitoring: ie potential to cause serious morbidity or death-especially proper use anticoagulation.  New Medications Ordered This Visit   Medications   • atorvastatin (Lipitor) 20 MG tablet     Sig: Take 1 tablet by mouth Daily.     Dispense:  90 tablet     Refill:  3       Orders placed:   LAB/Testing/Referrals: reviewed/orders:   Today:   No orders of the defined types were placed in this encounter.    Chronic/recurrent labs above or change to:   same     Health maintenance:   Body mass index is 32.96 kg/m².  Patient's Body mass index is 32.96 kg/m². indicating that she is obese (BMI >30). Obesity-related health conditions include the following: dyslipidemias. Obesity is unchanged. BMI is is above average; BMI management plan is completed. We discussed portion control and increasing exercise..    Tobacco use reviewed:    Lucy Sousa  reports that she has never smoked. She has never used smokeless tobacco..     There are no Patient Instructions on file for this visit.    Follow up: Return for lab;, Dr Berg-, 6 m;.  Future Appointments    Date Time Provider Department Center   1/7/2022  8:10 AM LAB [facility] [place] [facility]   1/10/2022  9:45 AM Rosas Berg MD [facility]

## 2021-08-04 DIAGNOSIS — K21.9 GASTROESOPHAGEAL REFLUX DISEASE: ICD-10-CM

## 2021-08-04 DIAGNOSIS — I10 HTN (HYPERTENSION), BENIGN: Primary | ICD-10-CM

## 2021-08-04 DIAGNOSIS — E87.6 HYPOPOTASSEMIA: ICD-10-CM

## 2021-08-04 DIAGNOSIS — Z79.01 ANTICOAGULATED: ICD-10-CM

## 2021-08-04 RX ORDER — POTASSIUM CHLORIDE 20 MEQ/1
TABLET, EXTENDED RELEASE ORAL
Qty: 180 TABLET | Refills: 2 | Status: SHIPPED | OUTPATIENT
Start: 2021-08-04 | End: 2022-02-03

## 2021-08-04 RX ORDER — RIVAROXABAN 20 MG/1
TABLET, FILM COATED ORAL
Qty: 90 TABLET | Refills: 2 | Status: SHIPPED | OUTPATIENT
Start: 2021-08-04 | End: 2022-02-03

## 2021-08-04 RX ORDER — ESOMEPRAZOLE MAGNESIUM 40 MG/1
CAPSULE, DELAYED RELEASE ORAL
Qty: 180 CAPSULE | Refills: 2 | Status: SHIPPED | OUTPATIENT
Start: 2021-08-04 | End: 2022-02-03

## 2021-08-04 RX ORDER — LOSARTAN POTASSIUM 50 MG/1
TABLET ORAL
Qty: 90 TABLET | Refills: 2 | Status: SHIPPED | OUTPATIENT
Start: 2021-08-04 | End: 2022-02-03

## 2021-08-04 NOTE — TELEPHONE ENCOUNTER
Requested Prescriptions   Pending Prescriptions Disp Refills   • potassium chloride (K-DUR,KLOR-CON) 20 MEQ CR tablet [Pharmacy Med Name: POTASSIUM CHLORIDE ER 20MEQ ER TABLET ER] 180 tablet 2     Sig: TAKE ONE TABLET TWICE DAILY       There is no refill protocol information for this order      • losartan (COZAAR) 50 MG tablet [Pharmacy Med Name: LOSARTAN POTASSIUM 50MG TABLET] 90 tablet 2     Sig: TAKE ONE TABLET DAILY GENERIC FOR COZAAR       ARB Protocol Passed - 8/4/2021  9:43 AM        Passed - No active pregnancy on record        Passed - Normal serum potassium on file within the past year     Potassium   Date Value Ref Range Status   07/06/2021 4.0 3.5 - 5.2 mmol/L Final   03/29/2021 3.9 3.5 - 5.2 mmol/L Final             Passed - BP under 140/90 in past year     BP Readings from Last 1 Encounters:   07/07/21 130/78               Passed - No positive pregnancy test in past 12 months        Passed - Patient is not on Entresto        Passed - Patient is not on concurrent ACE        Passed - Normal serum creatinine on file within the past year     Creatinine   Date Value Ref Range Status   07/06/2021 0.85 0.57 - 1.00 mg/dL Final   03/29/2021 0.55 (L) 0.57 - 1.00 mg/dL Final             Passed - Visit with authorizing provider in past 12 months or upcoming 30 days         • esomeprazole (nexIUM) 40 MG capsule [Pharmacy Med Name: ESOMEPRAZOLE MAGNESIUM 40MG DR CAPSULE DR] 180 capsule 2     Sig: TAKE ONE CAPSULE TWICE DAILY GENERIC FOR NEXIUM       Proton Pump Inhibitors Protocol Passed - 8/4/2021  9:43 AM        Passed - No active pregnancy on record        Passed - No positive pregnancy test in past 12 months        Passed - No history of clostridium difficile diagnosis on record        Passed - Not on Clopidogrel (Plavix) or if on, refill is for Pantoprazole (Protonix)        Passed - No osteoporosis diagnosis on problem list        Passed - GFR>30 ml/min in past year        Passed - Recent or future visit with  authorizing provider         • Xarelto 20 MG tablet [Pharmacy Med Name: XARELTO 20MG TABLET] 90 tablet 2     Sig: TAKE ONE TABLET DAILY WITH DINNER       There is no refill protocol information for this order      last ov 7-7-21, next ov 1-10-22, E-rx's done

## 2021-08-05 RX ORDER — TRIAMTERENE AND HYDROCHLOROTHIAZIDE 37.5; 25 MG/1; MG/1
CAPSULE ORAL
Qty: 90 CAPSULE | Refills: 1 | Status: SHIPPED | OUTPATIENT
Start: 2021-08-05 | End: 2022-02-03

## 2021-08-05 NOTE — TELEPHONE ENCOUNTER
Rx Refill Note  Requested Prescriptions     Pending Prescriptions Disp Refills   • triamterene-hydrochlorothiazide (DYAZIDE) 37.5-25 MG per capsule [Pharmacy Med Name: TRIAMTERENE/HYDROCHLOROTHIAZIDE 37.5-25 CAPSULE] 90 capsule 1     Sig: TAKE ONE CAPSULE EVERY MORNING GENERIC FOR DYAZIDE      Last office visit with prescribing clinician: 7/7/2021      Next office visit with prescribing clinician: 1/10/2022            Alexandra Alarcon MA  08/05/21, 15:30 CDT

## 2021-10-15 ENCOUNTER — TELEPHONE (OUTPATIENT)
Dept: FAMILY MEDICINE CLINIC | Facility: CLINIC | Age: 71
End: 2021-10-15

## 2021-10-15 ENCOUNTER — OFFICE VISIT (OUTPATIENT)
Dept: FAMILY MEDICINE CLINIC | Facility: CLINIC | Age: 71
End: 2021-10-15

## 2021-10-15 VITALS — TEMPERATURE: 98 F | OXYGEN SATURATION: 98 % | HEART RATE: 63 BPM

## 2021-10-15 DIAGNOSIS — Z20.822 SUSPECTED COVID-19 VIRUS INFECTION: Primary | ICD-10-CM

## 2021-10-15 DIAGNOSIS — J01.90 ACUTE NON-RECURRENT SINUSITIS, UNSPECIFIED LOCATION: ICD-10-CM

## 2021-10-15 PROCEDURE — 99213 OFFICE O/P EST LOW 20 MIN: CPT | Performed by: NURSE PRACTITIONER

## 2021-10-15 RX ORDER — AMOXICILLIN AND CLAVULANATE POTASSIUM 875; 125 MG/1; MG/1
1 TABLET, FILM COATED ORAL 2 TIMES DAILY
Qty: 20 TABLET | Refills: 0 | Status: SHIPPED | OUTPATIENT
Start: 2021-10-15 | End: 2021-10-25

## 2021-10-15 NOTE — TELEPHONE ENCOUNTER
Caller: Lucy Sousa    Relationship: Self    Best call back number: [phone]    What orders are you requesting (i.e. lab or imaging): RAPID COVID TESTING      In what timeframe would the patient need to come in: ASAP    Where will you receive your lab/imaging services:[facility]    Additional notes: PATIENT IS NEEDING RAPID COVID TEST AND THEY SAID SHE WOULD NEED ORDERS TO RECEIVE

## 2021-10-15 NOTE — TELEPHONE ENCOUNTER
Caller: Lucy Sousa    Relationship: Self    Best call back number: [phone]    What medication are you requesting: SINUS MEDICATION     What are your current symptoms: COUGH, EARACHE    How long have you been experiencing symptoms: 3 DAYS    Have you had these symptoms before:    [x] Yes  [] No    Have you been treated for these symptoms before:   [x] Yes  [] No    If a prescription is needed, what is your preferred pharmacy and phone number: [facility] - [place] - [address] [phone] [facility] [phone] FX

## 2021-10-15 NOTE — TELEPHONE ENCOUNTER
I'm happy to see patient today to evaluate symptoms and determine if and what testing is needed and get the most specific treatment needed so she can feel better.  The message I have says she is requesting a rapid covid test.  I can't order that unless symptomatic which needs to be documented in a provider interaction.  If asymptomatic for something like an exposure, doesn't have to be seen but can be scheduled for PCR here.    Electronically signed by LANRE Hernandez, 10/15/21, 10:40 AM CDT.

## 2021-10-18 NOTE — PROGRESS NOTES
Subjective   Chief Complaint:  Throat congestion    History of Present Illness:  This 71 y.o. female was seen in the office today.  She reports thick whitebeige sputum and sore throat.  Reports both ears hurt lasting x5 days.  Reports associated sinus pressure as well.  Reports is vaccinated against COVID-19 and has had Covid in the past.    Allergies   Allergen Reactions   • Macrobid [Nitrofurantoin Macrocrystal] Rash      Current Outpatient Medications on File Prior to Visit   Medication Sig   • atorvastatin (Lipitor) 20 MG tablet Take 1 tablet by mouth Daily.   • Cholecalciferol (Vitamin D3) 25 MCG (1000 UT) capsule Take 1,000 Units by mouth Daily.   • cyanocobalamin (VITAMIN B-12) 50 MCG tablet tablet Take 50 mcg by mouth Daily.   • esomeprazole (nexIUM) 40 MG capsule TAKE ONE CAPSULE TWICE DAILY GENERIC FOR NEXIUM    • furosemide (LASIX) 20 MG tablet TAKE 1 TABLET BY MOUTH DAILY.   • losartan (COZAAR) 50 MG tablet TAKE ONE TABLET DAILY GENERIC FOR COZAAR    • Multiple Vitamin (MULTI VITAMIN DAILY PO) Take 1 tablet by mouth daily.   • potassium chloride (K-DUR,KLOR-CON) 20 MEQ CR tablet TAKE ONE TABLET TWICE DAILY    • triamterene-hydrochlorothiazide (DYAZIDE) 37.5-25 MG per capsule TAKE ONE CAPSULE EVERY MORNING GENERIC FOR DYAZIDE    • vitamin E 600 UNIT capsule Take 400 Units by mouth Daily.   • Xarelto 20 MG tablet TAKE ONE TABLET DAILY WITH DINNER    • Zinc 50 MG capsule Take  by mouth.     No current facility-administered medications on file prior to visit.      Past Medical, Surgical, Social, and Family History:  Past Medical History:   Diagnosis Date   • Anxiety    • Cancer (HCC)     skin squamous cell   • Depression    • DJD (degenerative joint disease)    • GERD (gastroesophageal reflux disease)    • HTN (hypertension)    • Hx of colonic polyp    • Pulmonary embolism (HCC)     post surgical with cardiac arrest     Past Surgical History:   Procedure Laterality Date   • BREAST SURGERY      breast  reduction   • CHOLECYSTECTOMY     • COLONOSCOPY  04/01/2019    Diverticulosis otherwise normal exam repeat in 5 years   • COLONOSCOPY N/A 6/2/2021    Procedure: COLONOSCOPY WITH ANESTHESIA;  Surgeon: Heath Mckeon MD;  Location: [facility] ENDOSCOPY;  Service: Gastroenterology;  Laterality: N/A;  Pre: Nausea  Post: Diverticulosis  Dr. Berg  CO2 Inflation Used   • COLONOSCOPY W/ POLYPECTOMY  02/13/2014    Hyperplastic polyp at 20 cm, Diverticulosis repeat exam in 5 years   • EKOS CATHETER PLACEMENT Bilateral 11/17/2016    Procedure: Ekos catheter placement;  Surgeon: Daniel Underwood MD;  Location: [facility] CATH INVASIVE LOCATION;  Service:    • EKOS CATHETER REMOVAL Bilateral 11/18/2016    Procedure: Ekos catheter removal with stent placement;  Surgeon: Daniel Underwood MD;  Location: [facility] CATH INVASIVE LOCATION;  Service:    • ENDOSCOPY  01/22/2010    Negative endoscopy noting a healed gastric ulcer   • ENDOSCOPY  10/21/2009    Superficial mucosal erosion, chronic active gastritis   • ENDOSCOPY  04/01/2019    Distal esophageal ring dilated   • ENDOSCOPY N/A 6/2/2021    Procedure: ESOPHAGOGASTRODUODENOSCOPY WITH ANESTHESIA;  Surgeon: Heath Mckeon MD;  Location: [facility] ENDOSCOPY;  Service: Gastroenterology;  Laterality: N/A;  Pre: Nausea and Vomiting, Dysphagia  Post: Dilated with 48 [place]  Dr. Berg  CO2 Inflation Used   • FLAP HEAD/NECK Right 10/26/2020    Procedure: POSSIBLE FLAP;  Surgeon: Vadim Le MD;  Location: [facility] OR;  Service: ENT;  Laterality: Right;   • FOOT SURGERY     • HEAD/NECK LESION/CYST EXCISION Right 10/26/2020    Procedure: 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 with transposition flap;  Surgeon: Vadim Le MD;  Location: [facility] OR;  Service: ENT;  Laterality: Right;   • HERNIA REPAIR     • HYSTERECTOMY     • OOPHORECTOMY     • REPLACEMENT TOTAL KNEE     • TOTAL ABDOMINAL HYSTERECTOMY     • TUBAL ABDOMINAL LIGATION       Social  History     Socioeconomic History   • Marital status:      Spouse name: Nima   • Number of children: 1   • Years of education: 13   Tobacco Use   • Smoking status: Never Smoker   • Smokeless tobacco: Never Used   Vaping Use   • Vaping Use: Never used   Substance and Sexual Activity   • Alcohol use: No   • Drug use: No   • Sexual activity: Not Currently     Family History   Problem Relation Age of Onset   • Coronary artery disease Mother    • Coronary artery disease Father    • Diabetes Brother    • Hypertension Brother    • Colon polyps Brother    • Heart disease Maternal Grandfather    • Colon cancer Neg Hx      Objective   Physical Exam  Constitutional:       General: She is not in acute distress.     Appearance: Normal appearance.   Pulmonary:      Effort: Pulmonary effort is normal. No respiratory distress.   Neurological:      Mental Status: She is alert.     Pulse 63   Temp 98 °F (36.7 °C) (Infrared)   SpO2 98%     Assessment/Plan   Diagnoses and all orders for this visit:    1. Suspected COVID-19 virus infection (Primary)  -     COVID-19,[facility],NP/OP Swab in Transport Media or ESwab 72 HR TAT - Swab, Nasopharynx; Future    2. Acute non-recurrent sinusitis, unspecified location    Other orders  -     amoxicillin-clavulanate (Augmentin) 875-125 MG per tablet; Take 1 tablet by mouth 2 (Two) Times a Day for 10 days.  Dispense: 20 tablet; Refill: 0    Discussion:  Advised and educated plan of care.      Follow-up:  Return for As Needed - Depending on Test Results - Will Call.    Electronically signed by LANRE Hernandez, 10/18/21, 12:53 PM CDT.

## 2021-11-01 RX ORDER — FUROSEMIDE 20 MG/1
TABLET ORAL
Qty: 90 TABLET | Refills: 3 | Status: SHIPPED | OUTPATIENT
Start: 2021-11-01 | End: 2022-11-21

## 2021-11-06 NOTE — PROGRESS NOTES
JENNIFER Colon     Chief Complaint   Patient presents with   • Skin Lesion     RT cheek        HISTORY OF PRESENT ILLNESS:     Lucy Sousa is a  70 y.o.  female who complains of a skin lesion. The symptoms are localized to the right cheek. The patient has had mild to moderate symptoms. The symptoms have been present for the last 2 years. The symptoms are aggravated by  no identifiable factors. The symptoms are improved by no identifiable factors.              Review of Systems   Constitutional: Negative for activity change, appetite change, chills, diaphoresis, fatigue, fever and unexpected weight change.   HENT: Negative for congestion, dental problem, drooling, ear discharge, ear pain, facial swelling, hearing loss, mouth sores, nosebleeds, postnasal drip, rhinorrhea, sinus pressure, sneezing, sore throat, tinnitus, trouble swallowing and voice change.    Eyes: Negative.    Respiratory: Negative.    Cardiovascular: Negative.    Gastrointestinal: Negative.    Endocrine: Negative.    Skin:        Right central malar cheek lesion   Allergic/Immunologic: Negative for environmental allergies, food allergies and immunocompromised state.   Neurological: Negative.    Hematological: Negative.    Psychiatric/Behavioral: Negative.    :    Past History:  Past Medical History:   Diagnosis Date   • Anxiety    • Depression    • DJD (degenerative joint disease)    • GERD (gastroesophageal reflux disease)    • HTN (hypertension)    • Hx of colonic polyp    • Pulmonary embolism (CMS/HCC)     post surgical with cardiac arrest     Past Surgical History:   Procedure Laterality Date   • BREAST SURGERY     • CHOLECYSTECTOMY     • COLONOSCOPY  04/01/2019    Diverticulosis otherwise normal exam repeat in 5 years   • COLONOSCOPY W/ POLYPECTOMY  02/13/2014    Hyperplastic polyp at 20 cm, Diverticulosis repeat exam in 5 years   • EKOS CATHETER PLACEMENT Bilateral 11/17/2016    Procedure: Ekos catheter placement;  Surgeon:  SUBJECTIVE:  Lan Moreland is a 52 year old male here with concerns about sinus infection.  He states onset of symptoms were 1 month(s) ago.  He has had frontal pressure. Course of illness is worsening. Severity moderate  Current and Associated symptoms: nasal congestion, rhinorrhea, facial pain/pressure and headache  Predisposing factors include none. Recent treatment has included: OTC meds  Has been given prednisone inhaler and antibiotic 3 weeks ago. [place] better went to [place] came back and now feeling worse again.     No past medical history on file.  Social History     Tobacco Use     Smoking status: Not on file   Substance Use Topics     Alcohol use: Not on file       ROS:  Review of systems negative except as stated above.    OBJECTIVE:  Exam:  GENERAL APPEARANCE: alert and no distress  HENT: frontal sinus tenderness   RESP: lungs clear no wheezing  CV: regular rates and rhythm  SKIN: no suspicious lesions or rashes    ASSESSMENT:  Sinusitis    PLAN:  Doxycyline 100 mg BID X 10 days  Follow up with primary clinic if not improving    Telephone time spent 11 minutes    JOSIE Lynch CNP     Daniel Underwood MD;  Location:  [facility] CATH INVASIVE LOCATION;  Service:    • EKOS CATHETER REMOVAL Bilateral 11/18/2016    Procedure: Ekos catheter removal with stent placement;  Surgeon: Daniel Underwood MD;  Location:  [facility] CATH INVASIVE LOCATION;  Service:    • ENDOSCOPY  01/22/2010    Negative endoscopy noting a healed gastric ulcer   • ENDOSCOPY  10/21/2009    Superficial mucosal erosion, chronic active gastritis   • FOOT SURGERY     • HERNIA REPAIR     • HYSTERECTOMY     • OOPHORECTOMY     • REPLACEMENT TOTAL KNEE     • TOTAL ABDOMINAL HYSTERECTOMY     • TUBAL ABDOMINAL LIGATION       Family History   Problem Relation Age of Onset   • Coronary artery disease Mother    • Coronary artery disease Father    • Diabetes Brother    • Hypertension Brother    • Colon polyps Brother    • Heart disease Maternal Grandfather    • Colon cancer Neg Hx      Social History     Tobacco Use   • Smoking status: Never Smoker   • Smokeless tobacco: Never Used   Substance Use Topics   • Alcohol use: No   • Drug use: No     Outpatient Medications Marked as Taking for the 10/15/20 encounter (Office Visit) with Ari Lee PA   Medication Sig Dispense Refill   • ALPRAZolam (XANAX) 0.5 MG tablet Take 1 tablet by mouth 2 (Two) Times a Day As Needed for Anxiety. 30 tablet 1   • budesonide-formoterol (SYMBICORT) 160-4.5 MCG/ACT inhaler Inhale 2 puffs 2 (Two) Times a Day. (Patient taking differently: Inhale 2 puffs 2 (Two) Times a Day As Needed.) 1 inhaler 5   • Cholecalciferol (VITAMIN D) 2000 UNITS tablet Take 1,000 Units by mouth Daily.     • cyanocobalamin (VITAMIN B-12) 50 MCG tablet tablet Take 50 mcg by mouth Daily.     • esomeprazole (nexIUM) 40 MG capsule Take 1 capsule by mouth 2 (Two) Times a Day. 180 capsule 1   • furosemide (LASIX) 20 MG tablet Take 1 tablet by mouth Daily. 90 tablet 1   • losartan (COZAAR) 50 MG tablet TAKE ONE TABLET DAILY GENERIC FOR COZAAR 90 tablet 3   • Multiple Vitamin (MULTI  VITAMIN DAILY PO) Take 1 tablet by mouth daily.     • potassium chloride (K-DUR,KLOR-CON) 20 MEQ CR tablet Take 1 tablet by mouth 2 (Two) Times a Day. 180 tablet 1   • triamterene-hydrochlorothiazide (DYAZIDE) 37.5-25 MG per capsule Take 1 capsule by mouth Every Morning. 30 capsule 5   • vitamin E 600 UNIT capsule Take 400 Units by mouth Daily.     • XARELTO 20 MG tablet TAKE ONE TABLET DAILY WITH DINNER 90 tablet 1     Allergies:  Macrobid [nitrofurantoin macrocrystal]          Vital Signs:   Vitals:    10/15/20 1318   BP: 144/91   Pulse: 87   Temp: 96.9 °F (36.1 °C)         EXAMINATION:   CONSTITUTIONAL: well nourished, alert, oriented, in no acute distress     COMMUNICATION AND VOICE: able to communicate normally, normal voice quality    HEAD: normocephalic, no lesions, atraumatic, no tenderness, no masses     FACE: appearance normal, right central malar cheek 9 mm RPP, no tenderness, no deformities, facial motion symmetric    EYES: ocular motility normal, eyelids normal, orbits normal, no proptosis, conjunctiva normal , pupils equal, round     EARS:  Hearing: response to conversational voice normal bilaterally   External Ears: auricles without lesions    NOSE:  External Nose: structure normal, no tenderness on palpation, no nasal discharge, no lesions, no evidence of trauma, nostrils patent     ORAL:  Lips: upper and lower lips without lesion     NECK: neck appearance normal    CHEST/RESPIRATORY: respiratory effort normal, normal breath sounds     CARDIOVASCULAR: rate and rhythm normal, extremities without cyanosis or edema      NEUROLOGIC/PSYCHIATRIC: oriented to time, place and person, mood normal, affect appropriate, CN II-XII intact grossly    RESULTS REVIEW:    I have reviewed the patients old records in the chart.       Assessment    Diagnosis Plan   1. 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 Anticoagulated  Case Request    Comprehensive Metabolic Panel    CBC and Differential    ECG 12 Lead    XR Chest 2 View    Case Request       Plan    Patient Instructions   Discussion of skin lesion. Discussed risks, benefits, alternatives, and possible complications of excision of the skin lesion with reconstruction utilizing local tissue rearrangement, full-thickness skin grafting, or local interpolated flaps. Risks include, but are not limited too: bleeding, infection, hematoma, recurrence, need for additional procedures, flap failure, cosmetic deformity. Patient understands risks and would like to proceed with surgery.     Patient sees Dr. Underwood and will need clearance to stop the xarelto.       Orders Placed This Encounter   Procedures   • XR Chest 2 View   • Comprehensive Metabolic Panel   • Follow Anesthesia Guidelines / Standing Orders   • Obtain Informed Consent   • Provide Patient With Instructions on NPO Status   • ECG 12 Lead   • CBC and Differential          Return for Follow-up post-operatively as directed.    JENNIFER Colon  10/15/20  13:39 CDT

## 2021-11-23 ENCOUNTER — FLU SHOT (OUTPATIENT)
Dept: FAMILY MEDICINE CLINIC | Facility: CLINIC | Age: 71
End: 2021-11-23

## 2021-11-23 DIAGNOSIS — Z23 NEED FOR INFLUENZA VACCINATION: Primary | ICD-10-CM

## 2021-11-23 PROCEDURE — 90662 IIV NO PRSV INCREASED AG IM: CPT | Performed by: FAMILY MEDICINE

## 2021-11-23 PROCEDURE — G0008 ADMIN INFLUENZA VIRUS VAC: HCPCS | Performed by: FAMILY MEDICINE

## 2022-01-07 ENCOUNTER — LAB (OUTPATIENT)
Dept: FAMILY MEDICINE CLINIC | Facility: CLINIC | Age: 72
End: 2022-01-07

## 2022-01-07 DIAGNOSIS — I46.9 CARDIAC ARREST: Primary | ICD-10-CM

## 2022-01-07 DIAGNOSIS — I10 HTN (HYPERTENSION), BENIGN: ICD-10-CM

## 2022-01-07 DIAGNOSIS — E66.01 CLASS 2 SEVERE OBESITY WITH SERIOUS COMORBIDITY AND BODY MASS INDEX (BMI) OF 35.0 TO 35.9 IN ADULT, UNSPECIFIED OBESITY TYPE: ICD-10-CM

## 2022-01-07 DIAGNOSIS — E78.2 MIXED HYPERLIPIDEMIA: ICD-10-CM

## 2022-01-07 DIAGNOSIS — Z86.711 HISTORY OF PULMONARY EMBOLISM: ICD-10-CM

## 2022-01-07 DIAGNOSIS — E87.6 HYPOPOTASSEMIA: ICD-10-CM

## 2022-01-20 ENCOUNTER — OFFICE VISIT (OUTPATIENT)
Dept: FAMILY MEDICINE CLINIC | Facility: CLINIC | Age: 72
End: 2022-01-20

## 2022-01-20 VITALS
SYSTOLIC BLOOD PRESSURE: 122 MMHG | HEART RATE: 84 BPM | WEIGHT: 196 LBS | RESPIRATION RATE: 18 BRPM | TEMPERATURE: 98.1 F | DIASTOLIC BLOOD PRESSURE: 84 MMHG | HEIGHT: 64 IN | OXYGEN SATURATION: 95 % | BODY MASS INDEX: 33.46 KG/M2

## 2022-01-20 DIAGNOSIS — I50.9 CONGESTIVE HEART FAILURE, UNSPECIFIED HF CHRONICITY, UNSPECIFIED HEART FAILURE TYPE: Chronic | ICD-10-CM

## 2022-01-20 DIAGNOSIS — E78.2 MIXED HYPERLIPIDEMIA: ICD-10-CM

## 2022-01-20 DIAGNOSIS — M15.0 PRIMARY GENERALIZED (OSTEO)ARTHRITIS: ICD-10-CM

## 2022-01-20 DIAGNOSIS — I10 HTN (HYPERTENSION), BENIGN: Chronic | ICD-10-CM

## 2022-01-20 DIAGNOSIS — Z71.85 VACCINE COUNSELING: ICD-10-CM

## 2022-01-20 DIAGNOSIS — E89.40 SURGICAL MENOPAUSE: Chronic | ICD-10-CM

## 2022-01-20 DIAGNOSIS — Z00.00 WELLNESS EXAMINATION: ICD-10-CM

## 2022-01-20 DIAGNOSIS — E87.6 HYPOPOTASSEMIA: ICD-10-CM

## 2022-01-20 DIAGNOSIS — Z86.2 HISTORY OF ANEMIA: ICD-10-CM

## 2022-01-20 DIAGNOSIS — Z79.01 ANTICOAGULATED: ICD-10-CM

## 2022-01-20 PROCEDURE — 1160F RVW MEDS BY RX/DR IN RCRD: CPT | Performed by: FAMILY MEDICINE

## 2022-01-20 PROCEDURE — 99214 OFFICE O/P EST MOD 30 MIN: CPT | Performed by: FAMILY MEDICINE

## 2022-01-20 PROCEDURE — 1170F FXNL STATUS ASSESSED: CPT | Performed by: FAMILY MEDICINE

## 2022-01-20 PROCEDURE — G0439 PPPS, SUBSEQ VISIT: HCPCS | Performed by: FAMILY MEDICINE

## 2022-01-20 PROCEDURE — 1126F AMNT PAIN NOTED NONE PRSNT: CPT | Performed by: FAMILY MEDICINE

## 2022-01-20 NOTE — PATIENT INSTRUCTIONS
"Medicare/insurances offer certain visits called \"wellness/annual\" that allows for time to deal with and  review the many aspects of \"being well\" that just might not get mentioned during other visits with your doctor through the year.  This includes things like reviews of health screenings (mammograms, various labs),  weight, exercise, vaccines for just a few examples.      In order to help you with this we wish to make you aware of a few things for you to consider:    1. Advanced directives.  These are documents used to help direct your care if your health/situation should reach a point that you cannot make your own decisions.  While it is likely you do not currently have a need for these documents now; it is something that we all might face at any time.   The hand outs you are being given today are simply for you to review and use to learn more about these documents and consider them as you wish.      2. Vaccines: Certain vaccines are important after age 50, 60, and 65 and some health situations (for example COPD), require even boosters beyond age 65.  We are happy to review with you your vaccine status and vaccines that might be needed for you at this point:      a. Tetanus.   Like anyone this needs to given every 10 years; sooner for/with lacerations/wounds.   Likely when getting this booster it needs to be a tetanus called Tdap (tetanus mixed with diptheria and pertussis).   Years ago you had this vaccine.  We now know we can lose our immunity to pertussis (a part of this vaccine) and run a risk of catching this.  Now only would this make us ill; but more importantly we can spread this to very young children (and for them it can be a much more dangerous illness).   We call this the grandparent vaccine for this reason.     b. Pneumonia (strept).   This comes now with two brands.   It is recommended to take pneumovax first; and a year later take the cousin prevnar.  Even if you have had these before; we need to " review when and your current health situation/s as you may need boosters and even recently the CDC has made recent/new recommendations for pneumovax.      c. Shingles.  You do not want to catch shingles.  Though you will recover from this; the pain associated with shingles can be severe.  Even if you have had the now older zostavax, or have had shingles; it is recommended you still get the Shingrix (the new vaccine just available early 2018 shingles vaccine).  A new shingles vaccine (a shot to lower your chance of catching shingles) is now available (shingrix).  This vaccine is the second vaccine created for this purpose; (we have had zostavax for years).  Shingrix provides a much better and longer immunity for shingles than zostavax.  For this and other reasons Shingrix can be started at age 50.  If you have had zostavax in the past; you can still take Shingrix.      This vaccine is not paid for in a doctor's office by medicare, medicaid and probably most insurances.  Like zostavax; this is covered in drug stores.  This is a vaccine that if you chose to get you need to get at a drug store that gives vaccines (like [facility], [facility] and [facility].      d. Yearly flu vaccine given from September through April each year (there is a special vaccine for those over 65).     e. Travel vaccines:  If you are one to do international travel; be sure and ask us for any particular unusual vaccines you may need.     f.  Miscellaneous:  If you have certain health situations/disease you may need specific/particular vaccines not give to the general public.     g.  Covid: currently recommended everyone over 5.  The brands Pfizer/Moderna are for 3 total shots as immunity will wane from less than this.  Ari/Ari has a version that comes with recommendations for an initial vaccine and booster after.  I no longer recommend J&J as a first choice as Pfizer/Moderna are readily available.  If you have had an  initial J&J I recommend you booster with Moderna.   I strongly recommend covid vaccination; being unvaccinated or partially vaccinated carries real risk for disease and even death.     Because of many restrictions on this office always having all the above vaccines; you may be advised to work with your local health department to keep up with your individual vaccine needs.    The vaccines we have on record for you include:   Immunization History   Administered Date(s) Administered   • COVID-19 (MODERNA) 1st, 2nd, 3rd Dose Only 01/22/2021, 04/16/2021, 12/15/2021   • Flu Vaccine Quad PF >18YRS 11/30/2016   • Flu Vaccine Quad PF >36MO 10/09/2017   • Fluad Quad 65+ 10/12/2020   • Fluzone High-Dose 65+yrs 11/23/2021   • Pneumococcal Conjugate 13-Valent (PCV13) 11/30/2016, 10/09/2017   • Pneumococcal Polysaccharide (PPSV23) 07/01/2020   • Tdap 11/02/2020       If you have record of other vaccines and want them to show in your chart here; please talk to our nurses about having your vaccine record updated.     3. Exercise: regular cardio exercise something everyone should consider and try to do; even if health limitations (ie find that exercise UE/LE/cardio that they can tolerate).   Normal weight a goal for everyone (as we discussed)    4. Healthy diet helpful for weight management, illness prevention.     5. If over 50-screening exams include men PSA/rectal exam, women mammograms, and everyone colonoscopy screening for colon cancer.    6. If you use tobacco of any kind or e-products you should stop. We are providing you some information to consider that could make this process easier.      ##################################

## 2022-01-20 NOTE — PROGRESS NOTES
Subjective   Lucy Sousa is a 72 y.o. female presenting with chief complaint of:   Chief Complaint   Patient presents with   • Medicare Wellness-subsequent     6mo follow up        History of Present Illness :  Alone.  Here for primarily f/u her chronic problems including HTN and others and her yearly medicare wellness exam.       Has multiple chronic problems to consider that might have a bearing on today's issues;  an interval appointment.       Chronic/acute problems reviewed today:   1. Mixed hyperlipidemia Chronic/stable.  Tolerated use of Rx with labs showing improved lipid values and tolerant liver labs. No muscle aches unexpected.      2. HTN (hypertension), benign Chronic/stable. Stable here past/no recent home blood pressures.  No significant chest pain, SOB, LE edema, orthopnea, near syncope, dizziness/light headness.   Recent Vitals       7/7/2021 10/15/2021 1/20/2022       BP: 130/78 -- 122/84     Pulse: 88 63 84     Temp: 98.4 °F (36.9 °C) 98 °F (36.7 °C) 98.1 °F (36.7 °C)     Weight: 87.1 kg (192 lb) -- 88.9 kg (196 lb)     BMI (Calculated): 32.9 -- 33.6            3. Congestive heart failure, unspecified HF chronicity, unspecified heart failure type (HCC) Chronic/stable.  Denies significant sob, orthopnea, leg edema, weight gain.  Aware of influence diet/salt and watching weight at home.       4. Anticoagulated-PE/cardiac arrest/xarelto Chronic/stable reason for stopping or use of.  Denies bleeding issues; especially epistaxis, melena, hematochezia.  Upper arms/others do not significantly bruise easily.  No significant bleeding or falls. May have to change to coumadin for cost.      5. Surgical menopause: [id number] to 5 Chronic/stable.  Last bone density/if below.   Has Rx.  Ok as needed further bone density screening when due.      6. Vaccine counseling Chronic/ongoing need to review pro/cons for various vaccinations based on age, health issues.  Needs vaccination today for: see below.      7. Primary generalized (osteo)arthritis Chronic/stable.  Various on/off joint pains/soreness/stiffness.  Particular joint problems with various.  No joint swelling.  Treats mainly with reduced activity, Rx listed, Tylenol.  No  NSAIDs, and no injections.      8. History of anemia: gi/benign, iron Chronic or past history/stable awhile: This has been present before.    There has been GI evaulation in the past. There is no current melena, hematochezia. It has been benign to date and stable/watching.  Contributing comorbidities to date: past iron def.     9. Wellness examination-done-no gyne Chronic ongoing over time screening or advice for general health care/wellness.      10.  K: Chronic/variable high or low K as uses diuretic; has to have lab monitoring.  No significant fatigue or muscle cramps       Has an/another acute issue today: none.    The following portions of the patient's history were reviewed and updated as appropriate: allergies, current medications, past family history, past medical history, past social history, past surgical history and problem list.      Current Outpatient Medications:   •  atorvastatin (Lipitor) 20 MG tablet, Take 1 tablet by mouth Daily., Disp: 90 tablet, Rfl: 3  •  Cholecalciferol (Vitamin D3) 25 MCG (1000 UT) capsule, Take 1,000 Units by mouth Daily., Disp: , Rfl:   •  cyanocobalamin (VITAMIN B-12) 50 MCG tablet tablet, Take 50 mcg by mouth Daily., Disp: , Rfl:   •  esomeprazole (nexIUM) 40 MG capsule, TAKE ONE CAPSULE TWICE DAILY GENERIC FOR NEXIUM , Disp: 180 capsule, Rfl: 2  •  furosemide (LASIX) 20 MG tablet, TAKE 1 TABLET BY MOUTH DAILY., Disp: 90 tablet, Rfl: 3  •  losartan (COZAAR) 50 MG tablet, TAKE ONE TABLET DAILY GENERIC FOR COZAAR , Disp: 90 tablet, Rfl: 2  •  Multiple Vitamin (MULTI VITAMIN DAILY PO), Take 1 tablet by mouth daily., Disp: , Rfl:   •  potassium chloride (K-DUR,KLOR-CON) 20 MEQ CR tablet, TAKE ONE TABLET TWICE DAILY , Disp: 180 tablet, Rfl: 2  •   triamterene-hydrochlorothiazide (DYAZIDE) 37.5-25 MG per capsule, TAKE ONE CAPSULE EVERY MORNING GENERIC FOR DYAZIDE , Disp: 90 capsule, Rfl: 1  •  vitamin E 600 UNIT capsule, Take 400 Units by mouth Daily., Disp: , Rfl:   •  Xarelto 20 MG tablet, TAKE ONE TABLET DAILY WITH DINNER , Disp: 90 tablet, Rfl: 2  •  Zinc 50 MG capsule, Take  by mouth., Disp: , Rfl:     No problems with medications except cost of xarelto    Allergies   Allergen Reactions   • Macrobid [Nitrofurantoin Macrocrystal] Rash       Review of Systems  GENERAL:  Active/slower with limits, speed, stamina for age and exertional fatigue. Sleep is ok. No fever now.  SKIN: No rash/skin lesion of concern.   ENDO:  No syncope, near or diaphoretic sweaty spells.  HEENT: No recent head injury; or change in occ headache.   No vision change.  No significant hearing loss.  Ears without pain/drainage.  No sore throat.  No significant nasal/sinus congestion/drainage. No epistaxis.  CHEST: No chest wall tenderness or mass.  Infrequent cough, without wheeze.  No SOB; no hemoptysis.  CV: No chest pain; same occ palpitations and minimal ankle edema.  GI: No heartburn, dysphagia.  No abdominal pain, constipation; stools trend loose.   No rectal bleeding, or melena.    :  Voids without dysuria, or incontinence to completion.  ORTHO: No painful/swollen joints but various on /off sore. .  No change occ/on-off  sore neck or back.  No acute neck or back pain without recent injury.  NEURO: Still on/off mild dizzy; no vertigo.  No weakness of extremities except above.  No numbness/paresthesias.   PSYCH: No memory loss.  Mood good but variable anxious, depressed but/and not suicidal.  Tries to tolerate stress .   Screening:  Gyne: SHUN+CATE/Ari/SARAH/[id number]  Mammogram: 1.18.22  Bone density: 10.31.18/bone d-paris/Yolanda/Ts L1 +1.9, hip +0.3  Low dose CT chest:Tobacco-smoker/none: NA  GI:   Colon-div///6.2.21/7y  EGD-///6.2.21  Prostate: NA  Usual lab order  6m  CBC, CMP, TSH, fT4  12m CBC, CMP, LIPID, TSH, fT4, Vit D iron, ferritin, B12, folate     Data reviewed:   Recent admit/ER/MD visits: reviewed  Last cardiac testing:   Echo: Results for orders placed during the hospital encounter of 09/22/17    Adult Stress Echo Only    Interpretation Summary  · Normal stress echo with no significant echocardiographic evidence for myocardial ischemia.  · The patient reported shortness of breath during the stress test. The patient experienced no angina during the stress test.  · There was no ST segment deviation noted during stress.  · Fair exercise tolerance.    Lab Results:  Results for orders placed or performed in visit on 01/07/22   Comprehensive Metabolic Panel    Specimen: Blood   Result Value Ref Range    Glucose 94 65 - 99 mg/dL    BUN 14 8 - 23 mg/dL    Creatinine 0.80 0.57 - 1.00 mg/dL    eGFR Non African Am 70 >60 mL/min/1.73    eGFR African Am 85 >60 mL/min/1.73    BUN/Creatinine Ratio 17.5 7.0 - 25.0    Sodium 142 136 - 145 mmol/L    Potassium 3.8 3.5 - 5.2 mmol/L    Chloride 103 98 - 107 mmol/L    Total CO2 30.1 (H) 22.0 - 29.0 mmol/L    Calcium 9.8 8.6 - 10.5 mg/dL    Total Protein 6.3 6.0 - 8.5 g/dL    Albumin 4.50 3.50 - 5.20 g/dL    Globulin 1.8 gm/dL    A/G Ratio 2.5 g/dL    Total Bilirubin 0.5 0.0 - 1.2 mg/dL    Alkaline Phosphatase 89 39 - 117 U/L    AST (SGOT) 22 1 - 32 U/L    ALT (SGPT) 23 1 - 33 U/L   TSH    Specimen: Blood   Result Value Ref Range    TSH 4.890 (H) 0.270 - 4.200 uIU/mL   T4, free    Specimen: Blood   Result Value Ref Range    Free T4 1.18 0.93 - 1.70 ng/dL   CBC & Differential    Specimen: Blood   Result Value Ref Range    WBC 5.64 3.40 - 10.80 10*3/mm3    RBC 4.20 3.77 - 5.28 10*6/mm3    Hemoglobin 13.9 12.0 - 15.9 g/dL    Hematocrit 42.0 34.0 - 46.6 %    .0 (H) 79.0 - 97.0 fL    MCH 33.1 (H) 26.6 - 33.0 pg    MCHC 33.1 31.5 - 35.7 g/dL    RDW 12.5 12.3 - 15.4 %    Platelets 281 140 - 450 10*3/mm3    Neutrophil Rel % 44.8 42.7 - 76.0 %     Lymphocyte Rel % 34.9 19.6 - 45.3 %    Monocyte Rel % 11.9 5.0 - 12.0 %    Eosinophil Rel % 7.3 (H) 0.3 - 6.2 %    Basophil Rel % 0.9 0.0 - 1.5 %    Neutrophils Absolute 2.53 1.70 - 7.00 10*3/mm3    Lymphocytes Absolute 1.97 0.70 - 3.10 10*3/mm3    Monocytes Absolute 0.67 0.10 - 0.90 10*3/mm3    Eosinophils Absolute 0.41 (H) 0.00 - 0.40 10*3/mm3    Basophils Absolute 0.05 0.00 - 0.20 10*3/mm3    Immature Granulocyte Rel % 0.2 0.0 - 0.5 %    Immature Grans Absolute 0.01 0.00 - 0.05 10*3/mm3    nRBC 0.0 0.0 - 0.2 /100 WBC       A1C:No results for input(s): HGBA1C in the last 83323 hours.  GLUCOSE:  Lab Results - Last 18 Months   Lab Units 01/07/22  0658 07/06/21  0747 04/08/21  1038 03/29/21  0408 03/28/21  0500 03/27/21  1352 02/21/21  2209   GLUCOSE mg/dL 94 92 91 85 93 176* 96     LIPID:  Lab Results - Last 18 Months   Lab Units 07/06/21  0747   CHOLESTEROL mg/dL 220*   LDL CHOL mg/dL 136*   HDL CHOL mg/dL 48   TRIGLYCERIDES mg/dL 201*     PSA:No results for input(s): PSA in the last 76950 hours.  CBC:  Lab Results - Last 18 Months   Lab Units 01/07/22  0658 07/06/21  0747 04/08/21  1038 03/28/21  0500 03/27/21  1352 02/21/21  2123 02/14/21  1028   WBC 10*3/mm3 5.64 5.36 5.52 6.18 9.21 6.82 8.25   HEMOGLOBIN g/dL 13.9 14.6 14.8 11.6* 14.8 14.9 15.7   HEMATOCRIT % 42.0 43.4 44.6 33.8* 42.0 41.0 43.3   PLATELETS 10*3/mm3 281 307 347 259 369 282 290   IRON mcg/dL  --  86  --   --   --   --   --       BMP/CMP:  Lab Results - Last 18 Months   Lab Units 01/07/22  0658 07/06/21  0747 04/08/21  1038 03/29/21  0408 03/28/21  0500 03/27/21  1352 02/21/21  2209 02/14/21  1028 02/12/21  1212 10/21/20  1050 10/06/20  0712   SODIUM mmol/L 142 144 143 142 139 135* 141   < > 139   < > 142   POTASSIUM mmol/L 3.8 4.0 4.1 3.9 3.2* 2.8* 3.2*   < > 3.8   < > 3.8   CHLORIDE mmol/L 103 103 102 109* 102 91* 100   < > 98   < > 102   TOTAL CO2 mmol/L 30.1* 29.4* 29.2*  --   --   --   --   --  26.7  --  28.5   CO2 mmol/L  --   --   --   "28.0 30.0* 27.0 29.0   < >  --    < >  --    GLUCOSE mg/dL 94 92 91 85 93 176* 96   < > 104*   < > 88   BUN mg/dL 14 12 11 4* 9 14 11   < > 14   < > 12   CREATININE mg/dL 0.80 0.85 0.75 0.55* 0.68 0.90 0.82   < > 0.84   < > 0.85   EGFR IF NONAFRICN AM mL/min/1.73 70 66 76 108 85 61 68   < > 66   < > 66   EGFR IF AFRICN AM mL/min/1.73 85 79 92  --   --   --   --   --  81  --  80   CALCIUM mg/dL 9.8 10.0 10.7* 8.5* 8.7 10.6* 9.2   < > 9.8   < > 9.5    < > = values in this interval not displayed.     HEPATIC:  Lab Results - Last 18 Months   Lab Units 01/07/22  0658 07/06/21  0747 04/08/21  1038 03/28/21  0500 03/27/21  1352 02/21/21  2209 02/14/21  1028   ALT (SGPT) U/L 23 13 19 13 20 17 23   AST (SGOT) U/L 22 16 22 19 22 18 22   ALK PHOS U/L 89 74 59 41 60 58 71     THYROID:  Lab Results - Last 18 Months   Lab Units 01/07/22  0658 07/06/21  0747 10/06/20  0712   TSH uIU/mL 4.890* 3.760 3.740   FREE T4 ng/dL 1.18 1.07 1.25       Objective   /84   Pulse 84   Temp 98.1 °F (36.7 °C) (Infrared)   Resp 18   Ht 162.6 cm (64\")   Wt 88.9 kg (196 lb)   SpO2 95%   BMI 33.64 kg/m²   Body mass index is 33.64 kg/m².    Recent Vitals       7/7/2021 10/15/2021 1/20/2022       BP: 130/78 -- 122/84     Pulse: 88 63 84     Temp: 98.4 °F (36.9 °C) 98 °F (36.7 °C) 98.1 °F (36.7 °C)     Weight: 87.1 kg (192 lb) -- 88.9 kg (196 lb)     BMI (Calculated): 32.9 -- 33.6         Physicial exam:   GENERAL:  Well nourished/developed in no acute distress.   SKIN: Turgor excellent, without wound, rash, lesion.   HEENT: Normal cephalic without trauma.  Pupils equal round reactive to light. Extraocular motions full without nystagmus.   External canals nonobstructive nontender without reddness. Tymphatic membranes sharri with shanika structures intact.   Oral cavity without growths, exudates, and moist.  Posterior pharynx without mass, obstruction, redness.  No thyromegaly, mass, tenderness, lymphadenopathy and supple.  CV: Regular rhythm.  " No murmur, gallop, trace LE edema. Posterior pulses intact.  No carotid bruits.  CHEST: No chest wall tenderness or mass.   LUNGS: Symmetric motion with clear to auscultation.   ABD: Soft, nontender without mass.   ORTHO: Symmetric extremities without swelling/point tenderness; even anterior R ankle.  Full gross range of motion.  NEURO: CN 2-12 grossly intact.  Symmetric facies and UE/LE. 3/5 strength throughout. 1/4 x bicep knee equal reflexes.  Nonfocal use extremities. Speech clear.  Reduced minimal light touch with monofilament, vibratory sensation with tuning fork; equal toes/distal feet.    PSYCH: Oriented x 3.  Pleasant calm, well kept.  Purposeful/directed conservation with intact short/long gross memory.    Assessment/Plan     1. Mixed hyperlipidemia    2. HTN (hypertension), benign    3. Congestive heart failure, unspecified HF chronicity, unspecified heart failure type (HCC)    4. Anticoagulated-PE/cardiac arrest/xarelto    5. Surgical menopause: [id number] to 5    6. Vaccine counseling    7. Primary generalized (osteo)arthritis    8. History of anemia: gi/benign, iron    9. Wellness examination-done-no gyne    10. Hypopotassemia-diuretic        Discussion:  BP Ok   DM/BS Ok   Thyroid Ok   Liver Ok   CBC Ok   Renal Ok   Lipid Ok   CA/Vit D Ok   PSA NA   other none     Wellness done   Immunization History   Administered Date(s) Administered   • COVID-19 (MODERNA) 1st, 2nd, 3rd Dose Only 01/22/2021, 04/16/2021, 12/15/2021   • Flu Vaccine Quad PF >18YRS 11/30/2016   • Flu Vaccine Quad PF >36MO 10/09/2017   • Fluad Quad 65+ 10/12/2020   • Fluzone High-Dose 65+yrs 11/23/2021   • Pneumococcal Conjugate 13-Valent (PCV13) 11/30/2016, 10/09/2017   • Pneumococcal Polysaccharide (PPSV23) 07/01/2020   • Tdap 11/02/2020     Vaccine reviewed: today none; later shingles shot now to avoid running before if any further covid  Screening reviewed/updated    Medical decision issues:   Data review above:   Rx: reviewed  "and decisions:   Same Rx for now-asked to review formulary for xarelto and we would consider coumadin if she had to    Visit today involved chronic significant medical problems or differentials and/or intensive drug monitoring: ie potential to cause serious morbidity or death:   No orders of the defined types were placed in this encounter.      Orders placed:   LAB/Testing/Referrals: reviewed/orders:   Today:   No orders of the defined types were placed in this encounter.    Chronic/recurrent labs above or change to:   same     Health maintenance:   Body mass index is 33.64 kg/m².  Patient's Body mass index is 33.64 kg/m². indicating that she is obese (BMI >30). Obesity-related health conditions include the following: hypertension. Obesity is unchanged. BMI is is above average; BMI management plan is completed. We discussed portion control and increasing exercise..      Tobacco use reviewed:   Lucy Sousa  reports that she has never smoked. She has never used smokeless tobacco..     Annual/wellness visit: also/today (see separate note)-medicare     Patient Instructions     Medicare/insurances offer certain visits called \"wellness/annual\" that allows for time to deal with and  review the many aspects of \"being well\" that just might not get mentioned during other visits with your doctor through the year.  This includes things like reviews of health screenings (mammograms, various labs),  weight, exercise, vaccines for just a few examples.      In order to help you with this we wish to make you aware of a few things for you to consider:    1. Advanced directives.  These are documents used to help direct your care if your health/situation should reach a point that you cannot make your own decisions.  While it is likely you do not currently have a need for these documents now; it is something that we all might face at any time.   The hand outs you are being given today are simply for you to review and use to learn " more about these documents and consider them as you wish.      2. Vaccines: Certain vaccines are important after age 50, 60, and 65 and some health situations (for example COPD), require even boosters beyond age 65.  We are happy to review with you your vaccine status and vaccines that might be needed for you at this point:      a. Tetanus.   Like anyone this needs to given every 10 years; sooner for/with lacerations/wounds.   Likely when getting this booster it needs to be a tetanus called Tdap (tetanus mixed with diptheria and pertussis).   Years ago you had this vaccine.  We now know we can lose our immunity to pertussis (a part of this vaccine) and run a risk of catching this.  Now only would this make us ill; but more importantly we can spread this to very young children (and for them it can be a much more dangerous illness).   We call this the grandparent vaccine for this reason.     b. Pneumonia (strept).   This comes now with two brands.   It is recommended to take pneumovax first; and a year later take the cousin prevnar.  Even if you have had these before; we need to review when and your current health situation/s as you may need boosters and even recently the CDC has made recent/new recommendations for pneumovax.      c. Shingles.  You do not want to catch shingles.  Though you will recover from this; the pain associated with shingles can be severe.  Even if you have had the now older zostavax, or have had shingles; it is recommended you still get the Shingrix (the new vaccine just available early 2018 shingles vaccine).  A new shingles vaccine (a shot to lower your chance of catching shingles) is now available (shingrix).  This vaccine is the second vaccine created for this purpose; (we have had zostavax for years).  Shingrix provides a much better and longer immunity for shingles than zostavax.  For this and other reasons Shingrix can be started at age 50.  If you have had zostavax in the past; you can  still take Shingrix.      This vaccine is not paid for in a doctor's office by medicare, medicaid and probably most insurances.  Like zostavax; this is covered in drug stores.  This is a vaccine that if you chose to get you need to get at a drug store that gives vaccines (like [facility], [facility] and [facility].      d. Yearly flu vaccine given from September through April each year (there is a special vaccine for those over 65).     e. Travel vaccines:  If you are one to do international travel; be sure and ask us for any particular unusual vaccines you may need.     f.  Miscellaneous:  If you have certain health situations/disease you may need specific/particular vaccines not give to the general public.     g.  Covid: currently recommended everyone over 5.  The brands Pfizer/Moderna are for 3 total shots as immunity will wane from less than this.  Nazar/Nazar has a version that comes with recommendations for an initial vaccine and booster after.  I no longer recommend J&J as a first choice as Pfizer/Moderna are readily available.  If you have had an initial J&J I recommend you booster with Moderna.   I strongly recommend covid vaccination; being unvaccinated or partially vaccinated carries real risk for disease and even death.     Because of many restrictions on this office always having all the above vaccines; you may be advised to work with your local health department to keep up with your individual vaccine needs.    The vaccines we have on record for you include:   Immunization History   Administered Date(s) Administered   • COVID-19 (MODERNA) 1st, 2nd, 3rd Dose Only 01/22/2021, 04/16/2021, 12/15/2021   • Flu Vaccine Quad PF >18YRS 11/30/2016   • Flu Vaccine Quad PF >36MO 10/09/2017   • Fluad Quad 65+ 10/12/2020   • Fluzone High-Dose 65+yrs 11/23/2021   • Pneumococcal Conjugate 13-Valent (PCV13) 11/30/2016, 10/09/2017   • Pneumococcal Polysaccharide (PPSV23) 07/01/2020   • Tdap 11/02/2020        If you have record of other vaccines and want them to show in your chart here; please talk to our nurses about having your vaccine record updated.     3. Exercise: regular cardio exercise something everyone should consider and try to do; even if health limitations (ie find that exercise UE/LE/cardio that they can tolerate).   Normal weight a goal for everyone (as we discussed)    4. Healthy diet helpful for weight management, illness prevention.     5. If over 50-screening exams include men PSA/rectal exam, women mammograms, and everyone colonoscopy screening for colon cancer.    6. If you use tobacco of any kind or e-products you should stop. We are providing you some information to consider that could make this process easier.      ##################################              Follow up: Return for lab;, Dr Berg-, 6 m;.  Future Appointments   Date Time Provider Department Center   7/25/2022 10:45 AM Rosas Berg MD [facility]

## 2022-01-21 NOTE — PROGRESS NOTES
QUICK REFERENCE INFORMATION:  The ABCs of the Annual Wellness Visit    Subsequent Medicare Wellness Visit     HEALTH RISK ASSESSMENT    : 1950    Recent Hospitalizations:  No hospitalization(s) within the last year..  ccc      Current Medical Providers:  Patient Care Team:  Rosas Berg MD as PCP - General (Family Medicine)  Rosas Berg MD as PCP - Family Medicine  Rosas Berg MD as Referring Physician (Family Medicine)  Daniel Underwood MD as Cardiologist (Cardiology)  Heath Mckeon MD as Consulting Physician (Gastroenterology)  Vadim Le MD as Consulting Physician (Otolaryngology)  Ari Lee PA as Physician Assistant (Otolaryngology)  Cassie Franco PA as Referring Physician (Physician Assistant)        Smoking Status:  Social History     Tobacco Use   Smoking Status Never Smoker   Smokeless Tobacco Never Used       Alcohol Consumption:  Social History     Substance and Sexual Activity   Alcohol Use No       Depression Screen:   PHQ-2/PHQ-9 Depression Screening 2022   Little interest or pleasure in doing things 0   Feeling down, depressed, or hopeless 0   Trouble falling or staying asleep, or sleeping too much 0   Feeling tired or having little energy 0   Poor appetite or overeating 0   Feeling bad about yourself - or that you are a failure or have let yourself or your family down 0   Trouble concentrating on things, such as reading the newspaper or watching television 0   Moving or speaking so slowly that other people could have noticed. Or the opposite - being so fidgety or restless that you have been moving around a lot more than usual 0   Thoughts that you would be better off dead, or of hurting yourself in some way 0   Total Score 0   If you checked off any problems, how difficult have these problems made it for you to do your work, take care of things at home, or get along with other people? Not difficult at all       Health Habits  and Functional and Cognitive Screening:  Functional & Cognitive Status 1/20/2022   Do you have difficulty preparing food and eating? No   Do you have difficulty bathing yourself, getting dressed or grooming yourself? No   Do you have difficulty using the toilet? No   Do you have difficulty moving around from place to place? No   Do you have trouble with steps or getting out of a bed or a chair? No   Current Diet Well Balanced Diet   Dental Exam Up to date   Eye Exam Up to date   Exercise (times per week) 1 times per week   Current Exercises Include Stationary Bicycling/Spin Class   Current Exercise Activities Include -   Do you need help using the phone?  No   Are you deaf or do you have serious difficulty hearing?  No   Do you need help with transportation? No   Do you need help shopping? No   Do you need help preparing meals?  No   Do you need help with housework?  No   Do you need help with laundry? No   Do you need help taking your medications? No   Do you need help managing money? No   Do you ever drive or ride in a car without wearing a seat belt? No   Have you felt unusual stress, anger or loneliness in the last month? No   Who do you live with? Spouse   If you need help, do you have trouble finding someone available to you? No   Have you been bothered in the last four weeks by sexual problems? No   Do you have difficulty concentrating, remembering or making decisions? No           Does the patient have evidence of cognitive impairment? No    Asiprin use counseling: Does not need ASA (and currently is not on it)      Recent Lab Results:          Lab Results   Component Value Date    TRIG 201 (H) 07/06/2021    HDL 48 07/06/2021    VLDL 36 07/06/2021           Age-appropriate Screening Schedule:  Refer to the list below for future screening recommendations based on patient's age, sex and/or medical conditions. Orders for these recommended tests are listed in the plan section. The patient has been provided with  a written plan.    Health Maintenance   Topic Date Due   • URINE MICROALBUMIN  Never done   • ZOSTER VACCINE (1 of 2) Never done   • DIABETIC FOOT EXAM  Never done   • HEMOGLOBIN A1C  Never done   • DIABETIC EYE EXAM  Never done   • DXA SCAN  10/31/2020   • LIPID PANEL  07/06/2022   • MAMMOGRAM  01/18/2024   • TDAP/TD VACCINES (2 - Td or Tdap) 11/02/2030   • INFLUENZA VACCINE  Completed        Subjective   History of Present Illness    Lucy Sousa is a 72 y.o. female who presents for an Annual Wellness Visit.    The following portions of the patient's history were reviewed and updated as appropriate: allergies, current medications, past family history, past medical history, past social history, past surgical history and problem list.    Outpatient Medications Prior to Visit   Medication Sig Dispense Refill   • atorvastatin (Lipitor) 20 MG tablet Take 1 tablet by mouth Daily. 90 tablet 3   • Cholecalciferol (Vitamin D3) 25 MCG (1000 UT) capsule Take 1,000 Units by mouth Daily.     • cyanocobalamin (VITAMIN B-12) 50 MCG tablet tablet Take 50 mcg by mouth Daily.     • esomeprazole (nexIUM) 40 MG capsule TAKE ONE CAPSULE TWICE DAILY GENERIC FOR NEXIUM  180 capsule 2   • furosemide (LASIX) 20 MG tablet TAKE 1 TABLET BY MOUTH DAILY. 90 tablet 3   • losartan (COZAAR) 50 MG tablet TAKE ONE TABLET DAILY GENERIC FOR COZAAR  90 tablet 2   • Multiple Vitamin (MULTI VITAMIN DAILY PO) Take 1 tablet by mouth daily.     • potassium chloride (K-DUR,KLOR-CON) 20 MEQ CR tablet TAKE ONE TABLET TWICE DAILY  180 tablet 2   • triamterene-hydrochlorothiazide (DYAZIDE) 37.5-25 MG per capsule TAKE ONE CAPSULE EVERY MORNING GENERIC FOR DYAZIDE  90 capsule 1   • vitamin E 600 UNIT capsule Take 400 Units by mouth Daily.     • Xarelto 20 MG tablet TAKE ONE TABLET DAILY WITH DINNER  90 tablet 2   • Zinc 50 MG capsule Take  by mouth.       No facility-administered medications prior to visit.       Patient Active Problem List   Diagnosis   •  Diffuse cystic mastopathy   • FH breast; Manuelito   • Peptic disease   • Primary generalized (osteo)arthritis   • Congestive heart failure (HCC)   • Depression   • Anxiety   • HTN (hypertension), benign   • Onychomycosis   • Macrocytosis- B12/folate-ok   • Surgical menopause: [id number]-8 to 5   • Cardiac arrest (CMS/HCC)- PE   • History of PE-ekos tx   • History of anemia: gi/benign, iron   • Hypopotassemia-diuretic   • Chronic fatigue   • Wellness examination-done-no gyne   • Anticoagulated-PE/cardiac arrest/xarelto   • Obesity   • Laboratory test*   • Chronic back pain-chiropracter   • SOB: #, hx PE, CHF,    • Dizziness   • Hx of colonic polyps   • Esophageal dysphagia   • Skin lesions-   • S/P laparoscopic cholecystectomy   • S/P reduction mammoplasty   • Chronic diarrhea   • Hyperlipemia-(offered) statin   • Squamous cell carcinoma of skin of right cheek   • Generalized abdominal pain   • Hypercalcemia   • History of COVID-19 (2/21/21)   • Nausea and vomiting       Advance Care Planning:  ACP discussion was held with the patient during this visit. Patient has an advance directive (not in EMR), copy requested.    Identification of Risk Factors:  Risk factors include: Breast Cancer/Mammogram Screening  Colon Cancer Screening  Immunizations Discussed/Encouraged (specific immunizations; Tdap, Influenza, Pneumococcal 23, Shingrix and COVID19 )  Obesity/Overweight   Osteoporosis Risk.    Review of Systems  GENERAL:  Active/slower with limits, speed, stamina for age and exertional fatigue. Sleep is ok. No fever now.  SKIN: No rash/skin lesion of concern.   ENDO:  No syncope, near or diaphoretic sweaty spells.  HEENT: No recent head injury; or change in occ headache.   No vision change.  No significant hearing loss.  Ears without pain/drainage.  No sore throat.  No significant nasal/sinus congestion/drainage. No epistaxis.  CHEST: No chest wall tenderness or mass.  Infrequent cough, without wheeze.  No SOB; no  hemoptysis.  CV: No chest pain; same occ palpitations and minimal ankle edema.  GI: No heartburn, dysphagia.  No abdominal pain, constipation; stools trend loose.   No rectal bleeding, or melena.    :  Voids without dysuria, or incontinence to completion.  ORTHO: No painful/swollen joints but various on /off sore. .  No change occ/on-off  sore neck or back.  No acute neck or back pain without recent injury.  NEURO: Still on/off mild dizzy; no vertigo.  No weakness of extremities except above.  No numbness/paresthesias.   PSYCH: No memory loss.  Mood good but variable anxious, depressed but/and not suicidal.  Tries to tolerate stress .   Screening:  Gyne: SHUN+BSO/Ari/[facility]/[id number]  Mammogram: 1.18.22  Bone density: 10.31.18/bone d-deca/Yolanda/Ts L1 +1.9, hip +0.3  Low dose CT chest:Tobacco-smoker/none: NA  GI:   Colon-div///6.2.21/7y  EGD-///6.2.21  Prostate: NA  Usual lab order  6m CBC, CMP, TSH, fT4  12m CBC, CMP, LIPID, TSH, fT4, Vit D iron, ferritin, B12, folate   Compared to one year ago, the patient feels her physical health is the same.  Compared to one year ago, the patient feels her mental health is the same.    Objective     Physical Exam  GENERAL:  Well nourished/developed in no acute distress.   SKIN: Turgor excellent, without wound, rash, lesion.   HEENT: Normal cephalic without trauma.  Pupils equal round reactive to light. Extraocular motions full without nystagmus.   External canals nonobstructive nontender without reddness. Tymphatic membranes sharri with shanika structures intact.   Oral cavity without growths, exudates, and moist.  Posterior pharynx without mass, obstruction, redness.  No thyromegaly, mass, tenderness, lymphadenopathy and supple.  CV: Regular rhythm.  No murmur, gallop, trace LE edema. Posterior pulses intact.  No carotid bruits.  CHEST: No chest wall tenderness or mass.   LUNGS: Symmetric motion with clear to auscultation.   ABD: Soft, nontender without mass.  "  ORTHO: Symmetric extremities without swelling/point tenderness; even anterior R ankle.  Full gross range of motion.  NEURO: CN 2-12 grossly intact.  Symmetric facies and UE/LE. 3/5 strength throughout. 1/4 x bicep knee equal reflexes.  Nonfocal use extremities. Speech clear.  Reduced minimal light touch with monofilament, vibratory sensation with tuning fork; equal toes/distal feet.    PSYCH: Oriented x 3.  Pleasant calm, well kept.  Purposeful/directed conservation with intact short/long gross memory.    Vitals:    01/20/22 1455   BP: 122/84   Pulse: 84   Resp: 18   Temp: 98.1 °F (36.7 °C)   TempSrc: Infrared   SpO2: 95%   Weight: 88.9 kg (196 lb)   Height: 162.6 cm (64\")   PainSc: 0-No pain       Patient's Body mass index is 33.64 kg/m². indicating that she is obese (BMI >30). Obesity-related health conditions include the following: hypertension. Obesity is unchanged. BMI is is above average; BMI management plan is completed. We discussed portion control and increasing exercise..      Assessment/Plan   Patient Self-Management and Personalized Health Advice  The patient has been provided with information about: diet, exercise and weight management and preventive services including:   · Annual Wellness Visit (AWV).    Visit Diagnoses:    ICD-10-CM ICD-9-CM   1. Mixed hyperlipidemia  E78.2 272.2   2. HTN (hypertension), benign  I10 401.1   3. Congestive heart failure, unspecified HF chronicity, unspecified heart failure type (HCC)  I50.9 428.0   4. Anticoagulated-PE/cardiac arrest/xarelto  Z79.01 V58.61   5. Surgical menopause: [id number]-8 to 5  E89.40 627.4   6. Vaccine counseling  Z71.85 V65.49   7. Primary generalized (osteo)arthritis  M15.0 715.09   8. History of anemia: gi/benign, iron  Z86.2 V12.3   9. Wellness examination-done-no gyne  Z00.00 V70.0   10. Hypopotassemia-diuretic  E87.6 276.8       No orders of the defined types were placed in this encounter.      Outpatient Encounter Medications as of " 1/20/2022   Medication Sig Dispense Refill   • atorvastatin (Lipitor) 20 MG tablet Take 1 tablet by mouth Daily. 90 tablet 3   • Cholecalciferol (Vitamin D3) 25 MCG (1000 UT) capsule Take 1,000 Units by mouth Daily.     • cyanocobalamin (VITAMIN B-12) 50 MCG tablet tablet Take 50 mcg by mouth Daily.     • esomeprazole (nexIUM) 40 MG capsule TAKE ONE CAPSULE TWICE DAILY GENERIC FOR NEXIUM  180 capsule 2   • furosemide (LASIX) 20 MG tablet TAKE 1 TABLET BY MOUTH DAILY. 90 tablet 3   • losartan (COZAAR) 50 MG tablet TAKE ONE TABLET DAILY GENERIC FOR COZAAR  90 tablet 2   • Multiple Vitamin (MULTI VITAMIN DAILY PO) Take 1 tablet by mouth daily.     • potassium chloride (K-DUR,KLOR-CON) 20 MEQ CR tablet TAKE ONE TABLET TWICE DAILY  180 tablet 2   • triamterene-hydrochlorothiazide (DYAZIDE) 37.5-25 MG per capsule TAKE ONE CAPSULE EVERY MORNING GENERIC FOR DYAZIDE  90 capsule 1   • vitamin E 600 UNIT capsule Take 400 Units by mouth Daily.     • Xarelto 20 MG tablet TAKE ONE TABLET DAILY WITH DINNER  90 tablet 2   • Zinc 50 MG capsule Take  by mouth.       No facility-administered encounter medications on file as of 1/20/2022.       Reviewed use of high risk medication in the elderly: not applicable  Reviewed for potential of harmful drug interactions in the elderly: not applicable    Follow Up:  Return for lab;, Dr Berg-, 6 m;.     An After Visit Summary and PPPS with all of these plans were given to the patient.

## 2022-02-03 DIAGNOSIS — I10 HTN (HYPERTENSION), BENIGN: ICD-10-CM

## 2022-02-03 DIAGNOSIS — E87.6 HYPOPOTASSEMIA: ICD-10-CM

## 2022-02-03 DIAGNOSIS — K21.9 GASTROESOPHAGEAL REFLUX DISEASE: ICD-10-CM

## 2022-02-03 DIAGNOSIS — Z79.01 ANTICOAGULATED: ICD-10-CM

## 2022-02-03 RX ORDER — RIVAROXABAN 20 MG/1
TABLET, FILM COATED ORAL
Qty: 90 TABLET | Refills: 2 | Status: SHIPPED | OUTPATIENT
Start: 2022-02-03 | End: 2022-03-21 | Stop reason: CLARIF

## 2022-02-03 RX ORDER — TRIAMTERENE AND HYDROCHLOROTHIAZIDE 37.5; 25 MG/1; MG/1
CAPSULE ORAL
Qty: 90 CAPSULE | Refills: 1 | Status: SHIPPED | OUTPATIENT
Start: 2022-02-03 | End: 2022-08-11

## 2022-02-03 RX ORDER — ESOMEPRAZOLE MAGNESIUM 40 MG/1
CAPSULE, DELAYED RELEASE ORAL
Qty: 180 CAPSULE | Refills: 2 | Status: SHIPPED | OUTPATIENT
Start: 2022-02-03 | End: 2023-02-16

## 2022-02-03 RX ORDER — LOSARTAN POTASSIUM 50 MG/1
TABLET ORAL
Qty: 90 TABLET | Refills: 2 | Status: SHIPPED | OUTPATIENT
Start: 2022-02-03 | End: 2022-11-18

## 2022-02-03 RX ORDER — POTASSIUM CHLORIDE 20 MEQ/1
TABLET, EXTENDED RELEASE ORAL
Qty: 180 TABLET | Refills: 2 | Status: SHIPPED | OUTPATIENT
Start: 2022-02-03 | End: 2022-11-18

## 2022-02-14 ENCOUNTER — TELEPHONE (OUTPATIENT)
Dept: FAMILY MEDICINE CLINIC | Facility: CLINIC | Age: 72
End: 2022-02-14

## 2022-02-14 RX ORDER — WARFARIN SODIUM 2 MG/1
TABLET ORAL
Qty: 90 TABLET | Refills: 0 | Status: SHIPPED | OUTPATIENT
Start: 2022-02-14 | End: 2022-05-09

## 2022-02-14 NOTE — TELEPHONE ENCOUNTER
"Stop xarelto now-Monday last dose  Coumadin 2mg Wednesday  PT/INR thursday    Pt here \"I talked to Dr Berg about the cost of xarelto and I spoke to Delon GARCIA he talked to me about coumadin. I have dose for tonight and tomorrow to take of xarelto\"    Please advise, [phone]    Will call with PT/INR date and how to start it?  "

## 2022-02-17 ENCOUNTER — CLINICAL SUPPORT (OUTPATIENT)
Dept: FAMILY MEDICINE CLINIC | Facility: CLINIC | Age: 72
End: 2022-02-17

## 2022-02-17 DIAGNOSIS — Z79.01 ANTICOAGULATED: ICD-10-CM

## 2022-02-17 LAB — INR PPP: 1 (ref 0.9–1.1)

## 2022-02-17 PROCEDURE — 36416 COLLJ CAPILLARY BLOOD SPEC: CPT | Performed by: FAMILY MEDICINE

## 2022-02-17 PROCEDURE — 99211 OFF/OP EST MAY X REQ PHY/QHP: CPT | Performed by: FAMILY MEDICINE

## 2022-02-17 PROCEDURE — 85610 PROTHROMBIN TIME: CPT | Performed by: FAMILY MEDICINE

## 2022-02-17 NOTE — PROGRESS NOTES
Here for PT/INR, PT was 11.6 and INR was 1.0. Pt was told to take 3 mg and to recheck PT tomorrow, copy of dosing schedule given to patient.

## 2022-02-18 ENCOUNTER — CLINICAL SUPPORT (OUTPATIENT)
Dept: FAMILY MEDICINE CLINIC | Facility: CLINIC | Age: 72
End: 2022-02-18

## 2022-02-18 DIAGNOSIS — Z79.01 CHRONIC ANTICOAGULATION: ICD-10-CM

## 2022-02-18 DIAGNOSIS — Z86.711 HISTORY OF PULMONARY EMBOLISM: ICD-10-CM

## 2022-02-18 DIAGNOSIS — Z79.01 ANTICOAGULATED: Primary | ICD-10-CM

## 2022-02-18 LAB — INR PPP: 1 (ref 0.9–1.1)

## 2022-02-18 PROCEDURE — 36416 COLLJ CAPILLARY BLOOD SPEC: CPT | Performed by: FAMILY MEDICINE

## 2022-02-18 PROCEDURE — 85610 PROTHROMBIN TIME: CPT | Performed by: FAMILY MEDICINE

## 2022-02-18 NOTE — PROGRESS NOTES
Here for PT/INR, PT was 12.2 and INR was 1.0. Pt was told to take 1 mg and to recheck PT one day, copy of dosing schedule given to patient. Order given to patient to go to [facility] and note added to call Dr Otis rodarte resulted.

## 2022-02-19 ENCOUNTER — TELEPHONE (OUTPATIENT)
Dept: FAMILY MEDICINE CLINIC | Facility: CLINIC | Age: 72
End: 2022-02-19

## 2022-02-21 ENCOUNTER — CLINICAL SUPPORT (OUTPATIENT)
Dept: FAMILY MEDICINE CLINIC | Facility: CLINIC | Age: 72
End: 2022-02-21

## 2022-02-21 DIAGNOSIS — Z79.01 ANTICOAGULATED: ICD-10-CM

## 2022-02-21 LAB — INR PPP: 1.4 (ref 0.9–1.1)

## 2022-02-21 PROCEDURE — 36416 COLLJ CAPILLARY BLOOD SPEC: CPT | Performed by: FAMILY MEDICINE

## 2022-02-21 PROCEDURE — 85610 PROTHROMBIN TIME: CPT | Performed by: FAMILY MEDICINE

## 2022-02-22 ENCOUNTER — CLINICAL SUPPORT (OUTPATIENT)
Dept: FAMILY MEDICINE CLINIC | Facility: CLINIC | Age: 72
End: 2022-02-22

## 2022-02-22 DIAGNOSIS — Z79.01 ANTICOAGULATED: ICD-10-CM

## 2022-02-22 LAB — INR PPP: 1.7 (ref 0.9–1.1)

## 2022-02-22 PROCEDURE — 36416 COLLJ CAPILLARY BLOOD SPEC: CPT | Performed by: FAMILY MEDICINE

## 2022-02-22 PROCEDURE — 85610 PROTHROMBIN TIME: CPT | Performed by: FAMILY MEDICINE

## 2022-02-23 ENCOUNTER — CLINICAL SUPPORT (OUTPATIENT)
Dept: FAMILY MEDICINE CLINIC | Facility: CLINIC | Age: 72
End: 2022-02-23

## 2022-02-23 DIAGNOSIS — Z79.01 ANTICOAGULATED: ICD-10-CM

## 2022-02-23 LAB — INR PPP: 2.4 (ref 0.9–1.1)

## 2022-02-23 PROCEDURE — 85610 PROTHROMBIN TIME: CPT | Performed by: FAMILY MEDICINE

## 2022-02-23 PROCEDURE — 36416 COLLJ CAPILLARY BLOOD SPEC: CPT | Performed by: FAMILY MEDICINE

## 2022-02-23 PROCEDURE — 93793 ANTICOAG MGMT PT WARFARIN: CPT | Performed by: FAMILY MEDICINE

## 2022-02-23 NOTE — PROGRESS NOTES
Here for PT/INR, PT was 28.2 and INR was 2.4. Pt was told to take 3 mg and to recheck PT one day, copy of dosing schedule given to patient.

## 2022-02-28 ENCOUNTER — CLINICAL SUPPORT (OUTPATIENT)
Dept: FAMILY MEDICINE CLINIC | Facility: CLINIC | Age: 72
End: 2022-02-28

## 2022-02-28 DIAGNOSIS — Z79.01 ANTICOAGULATED: ICD-10-CM

## 2022-02-28 LAB — INR PPP: 1.8 (ref 0.9–1.1)

## 2022-02-28 PROCEDURE — 36416 COLLJ CAPILLARY BLOOD SPEC: CPT | Performed by: FAMILY MEDICINE

## 2022-02-28 PROCEDURE — 85610 PROTHROMBIN TIME: CPT | Performed by: FAMILY MEDICINE

## 2022-03-03 ENCOUNTER — CLINICAL SUPPORT (OUTPATIENT)
Dept: FAMILY MEDICINE CLINIC | Facility: CLINIC | Age: 72
End: 2022-03-03

## 2022-03-03 DIAGNOSIS — Z79.01 ANTICOAGULATED: ICD-10-CM

## 2022-03-03 LAB — INR PPP: 2 (ref 0.9–1.1)

## 2022-03-03 PROCEDURE — 36416 COLLJ CAPILLARY BLOOD SPEC: CPT | Performed by: FAMILY MEDICINE

## 2022-03-03 PROCEDURE — 85610 PROTHROMBIN TIME: CPT | Performed by: FAMILY MEDICINE

## 2022-03-10 ENCOUNTER — CLINICAL SUPPORT (OUTPATIENT)
Dept: FAMILY MEDICINE CLINIC | Facility: CLINIC | Age: 72
End: 2022-03-10

## 2022-03-10 DIAGNOSIS — Z79.01 ANTICOAGULATED: ICD-10-CM

## 2022-03-10 LAB — INR PPP: 2.5 (ref 0.9–1.1)

## 2022-03-10 PROCEDURE — 36416 COLLJ CAPILLARY BLOOD SPEC: CPT | Performed by: FAMILY MEDICINE

## 2022-03-10 PROCEDURE — 85610 PROTHROMBIN TIME: CPT | Performed by: FAMILY MEDICINE

## 2022-03-10 PROCEDURE — 93793 ANTICOAG MGMT PT WARFARIN: CPT | Performed by: FAMILY MEDICINE

## 2022-03-10 NOTE — PROGRESS NOTES
Here for PT/INR, PT was 30.4 and INR was 2.5. Pt was told to take 4  mg and to recheck PT three weeks, copy of dosing schedule given to patient.

## 2022-03-21 DIAGNOSIS — Z79.01 ANTICOAGULATED: Primary | ICD-10-CM

## 2022-03-21 RX ORDER — WARFARIN SODIUM 4 MG/1
4 TABLET ORAL NIGHTLY
Qty: 90 TABLET | Refills: 3 | Status: SHIPPED | OUTPATIENT
Start: 2022-03-21 | End: 2022-04-07 | Stop reason: SDUPTHER

## 2022-03-21 NOTE — TELEPHONE ENCOUNTER
Rx Refill Note  Requested Prescriptions     Pending Prescriptions Disp Refills   • warfarin (Coumadin) 4 MG tablet 90 tablet 3     Sig: Take 1 tablet by mouth Every Night. Subject to change with titrations/ PT/INR      Last office visit with prescribing clinician: 1/20/2022      Next office visit with prescribing clinician: 7/25/2022            Allyn Hartman LPN  03/21/22, 11:55 CDT

## 2022-03-31 ENCOUNTER — CLINICAL SUPPORT (OUTPATIENT)
Dept: FAMILY MEDICINE CLINIC | Facility: CLINIC | Age: 72
End: 2022-03-31

## 2022-03-31 DIAGNOSIS — Z79.01 ANTICOAGULATED: ICD-10-CM

## 2022-03-31 LAB — INR PPP: 1.6 (ref 0.9–1.1)

## 2022-03-31 PROCEDURE — 99211 OFF/OP EST MAY X REQ PHY/QHP: CPT | Performed by: FAMILY MEDICINE

## 2022-03-31 PROCEDURE — 85610 PROTHROMBIN TIME: CPT | Performed by: FAMILY MEDICINE

## 2022-03-31 PROCEDURE — 36416 COLLJ CAPILLARY BLOOD SPEC: CPT | Performed by: FAMILY MEDICINE

## 2022-03-31 NOTE — PROGRESS NOTES
Here for PT/INR, PT was 18.7 and INR was 1.6. Pt was told to take 5 mg, 4 mg,  4 mg and to recheck PT 1 week, copy of dosing schedule given to patient

## 2022-04-07 ENCOUNTER — TELEPHONE (OUTPATIENT)
Dept: FAMILY MEDICINE CLINIC | Facility: CLINIC | Age: 72
End: 2022-04-07

## 2022-04-07 ENCOUNTER — CLINICAL SUPPORT (OUTPATIENT)
Dept: FAMILY MEDICINE CLINIC | Facility: CLINIC | Age: 72
End: 2022-04-07

## 2022-04-07 DIAGNOSIS — Z79.01 ANTICOAGULATED: ICD-10-CM

## 2022-04-07 DIAGNOSIS — R11.2 NAUSEA AND VOMITING, UNSPECIFIED VOMITING TYPE: Primary | ICD-10-CM

## 2022-04-07 DIAGNOSIS — J10.1 INFLUENZA A: Primary | ICD-10-CM

## 2022-04-07 DIAGNOSIS — J02.9 SORE THROAT: ICD-10-CM

## 2022-04-07 DIAGNOSIS — R51.9 HEADACHE ABOVE THE EYE REGION: ICD-10-CM

## 2022-04-07 LAB
EXPIRATION DATE: ABNORMAL
FLUAV AG NPH QL: POSITIVE
FLUBV AG NPH QL: NEGATIVE
INR PPP: 1.4 (ref 0.9–1.1)
INTERNAL CONTROL: ABNORMAL
Lab: ABNORMAL

## 2022-04-07 PROCEDURE — 85610 PROTHROMBIN TIME: CPT | Performed by: FAMILY MEDICINE

## 2022-04-07 PROCEDURE — 36416 COLLJ CAPILLARY BLOOD SPEC: CPT | Performed by: FAMILY MEDICINE

## 2022-04-07 PROCEDURE — 87804 INFLUENZA ASSAY W/OPTIC: CPT | Performed by: NURSE PRACTITIONER

## 2022-04-07 PROCEDURE — 99211 OFF/OP EST MAY X REQ PHY/QHP: CPT | Performed by: FAMILY MEDICINE

## 2022-04-07 RX ORDER — WARFARIN SODIUM 4 MG/1
8 TABLET ORAL NIGHTLY
Qty: 180 TABLET | Refills: 1 | Status: SHIPPED | OUTPATIENT
Start: 2022-04-07 | End: 2022-07-20

## 2022-04-07 RX ORDER — OSELTAMIVIR PHOSPHATE 75 MG/1
75 CAPSULE ORAL 2 TIMES DAILY
Qty: 10 CAPSULE | Refills: 0 | Status: SHIPPED | OUTPATIENT
Start: 2022-04-07 | End: 2022-04-12

## 2022-04-07 NOTE — PROGRESS NOTES
Here for PT/INR, PT was 17.4 and INR was 1.4. Pt was told to take 8 mg and to recheck PT one day, copy of dosing schedule given to patient.

## 2022-04-07 NOTE — TELEPHONE ENCOUNTER
"Pt here for PT/INR \"can something be called in for myself and my , I got the stomach bug on Tues night, nausea, vomiting, diarrhea. But now I have a headache, and nasal, chest congestion, cough, when I cough it is a cream color\" advised will send to provider  "

## 2022-04-07 NOTE — TELEPHONE ENCOUNTER
Rx Refill Note  Requested Prescriptions     Pending Prescriptions Disp Refills   • warfarin (Coumadin) 4 MG tablet 180 tablet 1     Sig: Take 2 tablets by mouth Every Night for 90 days. Subject to change with titrations/ PT/INR      Last office visit with prescribing clinician: 1/20/2022      Next office visit with prescribing clinician: 7/25/2022            Allyn Hartman LPN  04/07/22, 13:42 CDT

## 2022-04-08 ENCOUNTER — CLINICAL SUPPORT (OUTPATIENT)
Dept: FAMILY MEDICINE CLINIC | Facility: CLINIC | Age: 72
End: 2022-04-08

## 2022-04-08 DIAGNOSIS — Z79.01 ANTICOAGULATED: ICD-10-CM

## 2022-04-08 LAB — INR PPP: 1.5 (ref 0.9–1.1)

## 2022-04-08 PROCEDURE — 36416 COLLJ CAPILLARY BLOOD SPEC: CPT | Performed by: NURSE PRACTITIONER

## 2022-04-08 PROCEDURE — 85610 PROTHROMBIN TIME: CPT | Performed by: NURSE PRACTITIONER

## 2022-04-08 PROCEDURE — 93793 ANTICOAG MGMT PT WARFARIN: CPT | Performed by: NURSE PRACTITIONER

## 2022-04-08 NOTE — PROGRESS NOTES
Here for PT/INR, PT was 17.7 and INR was 1.5. Pt was told to take 8 mg and to recheck PT Monday, copy of dosing schedule given to patient.

## 2022-04-11 ENCOUNTER — OFFICE VISIT (OUTPATIENT)
Dept: FAMILY MEDICINE CLINIC | Facility: CLINIC | Age: 72
End: 2022-04-11

## 2022-04-11 ENCOUNTER — CLINICAL SUPPORT (OUTPATIENT)
Dept: FAMILY MEDICINE CLINIC | Facility: CLINIC | Age: 72
End: 2022-04-11

## 2022-04-11 VITALS
DIASTOLIC BLOOD PRESSURE: 76 MMHG | BODY MASS INDEX: 33.29 KG/M2 | HEIGHT: 64 IN | WEIGHT: 195 LBS | HEART RATE: 81 BPM | OXYGEN SATURATION: 98 % | SYSTOLIC BLOOD PRESSURE: 122 MMHG

## 2022-04-11 DIAGNOSIS — Z79.01 ANTICOAGULATED: ICD-10-CM

## 2022-04-11 DIAGNOSIS — J10.1 INFLUENZA A: Primary | ICD-10-CM

## 2022-04-11 DIAGNOSIS — R79.1 ELEVATED INR: ICD-10-CM

## 2022-04-11 LAB — INR PPP: 5 (ref 0.9–1.1)

## 2022-04-11 PROCEDURE — 85610 PROTHROMBIN TIME: CPT | Performed by: NURSE PRACTITIONER

## 2022-04-11 PROCEDURE — 36416 COLLJ CAPILLARY BLOOD SPEC: CPT | Performed by: NURSE PRACTITIONER

## 2022-04-11 PROCEDURE — 99213 OFFICE O/P EST LOW 20 MIN: CPT | Performed by: NURSE PRACTITIONER

## 2022-04-11 RX ORDER — METHYLPREDNISOLONE 4 MG/1
TABLET ORAL
Qty: 1 EACH | Refills: 0 | Status: SHIPPED | OUTPATIENT
Start: 2022-04-11 | End: 2022-05-16

## 2022-04-11 NOTE — PROGRESS NOTES
Here for PT/INR, PT was 5.0 and INR was 60.4. Rechecked PT/INR and INR 64.0 was PT was 5.3 Pt was told to hold coumadin tonight and to recheck PT tomorrow, copy of dosing schedule given to patient.

## 2022-04-11 NOTE — PROGRESS NOTES
Subjective   Chief Complaint:  Coumadin monitoring, persistent cough    History of Present Illness:  This 72 y.o. female was seen in the office today.  Reports persistent cough and fatigue after having influenza.  She is finished Tamiflu.  She has an elevated INR today-she received a higher 8 mg dose over the weekend-after a x1 dose the INR did not move on Friday-she presents today with an elevated INR-no bleeding bruising or intolerance.    Allergies   Allergen Reactions   • Macrobid [Nitrofurantoin Macrocrystal] Rash      Current Outpatient Medications on File Prior to Visit   Medication Sig   • atorvastatin (Lipitor) 20 MG tablet Take 1 tablet by mouth Daily.   • Cholecalciferol (Vitamin D3) 25 MCG (1000 UT) capsule Take 1,000 Units by mouth Daily.   • cyanocobalamin (VITAMIN B-12) 50 MCG tablet tablet Take 50 mcg by mouth Daily.   • esomeprazole (nexIUM) 40 MG capsule TAKE ONE CAPSULE TWICE DAILY GENERIC FOR NEXIUM   • furosemide (LASIX) 20 MG tablet TAKE 1 TABLET BY MOUTH DAILY.   • losartan (COZAAR) 50 MG tablet TAKE ONE TABLET DAILY GENERIC FOR COZAAR   • Multiple Vitamin (MULTI VITAMIN DAILY PO) Take 1 tablet by mouth daily.   • oseltamivir (Tamiflu) 75 MG capsule Take 1 capsule by mouth 2 (Two) Times a Day for 5 days.   • potassium chloride (K-DUR,KLOR-CON) 20 MEQ CR tablet TAKE ONE TABLET TWICE DAILY   • triamterene-hydrochlorothiazide (DYAZIDE) 37.5-25 MG per capsule TAKE ONE CAPSULE EVERY MORNING GENERIC FOR DYAZIDE   • vitamin E 600 UNIT capsule Take 400 Units by mouth Daily.   • warfarin (Coumadin) 2 MG tablet Take daily or as directed by the doctor.   • warfarin (Coumadin) 4 MG tablet Take 2 tablets by mouth Every Night for 90 days. Subject to change with titrations/ PT/INR   • Zinc 50 MG capsule Take  by mouth.     No current facility-administered medications on file prior to visit.      Past Medical, Surgical, Social, and Family History:  Past Medical History:   Diagnosis Date   • Anxiety    •  Cancer (HCC)     skin squamous cell   • Depression    • DJD (degenerative joint disease)    • GERD (gastroesophageal reflux disease)    • HTN (hypertension)    • Hx of colonic polyp    • Pulmonary embolism (HCC)     post surgical with cardiac arrest     Past Surgical History:   Procedure Laterality Date   • BREAST SURGERY      breast reduction   • CHOLECYSTECTOMY     • COLONOSCOPY  04/01/2019    Diverticulosis otherwise normal exam repeat in 5 years   • COLONOSCOPY N/A 6/2/2021    Procedure: COLONOSCOPY WITH ANESTHESIA;  Surgeon: Heath Mckeon MD;  Location: [facility] ENDOSCOPY;  Service: Gastroenterology;  Laterality: N/A;  Pre: Nausea  Post: Diverticulosis  Dr. Berg  CO2 Inflation Used   • COLONOSCOPY W/ POLYPECTOMY  02/13/2014    Hyperplastic polyp at 20 cm, Diverticulosis repeat exam in 5 years   • EKOS CATHETER PLACEMENT Bilateral 11/17/2016    Procedure: Ekos catheter placement;  Surgeon: Daniel Underwood MD;  Location: [facility] CATH INVASIVE LOCATION;  Service:    • EKOS CATHETER REMOVAL Bilateral 11/18/2016    Procedure: Ekos catheter removal with stent placement;  Surgeon: Daniel Underwood MD;  Location: [facility] CATH INVASIVE LOCATION;  Service:    • ENDOSCOPY  01/22/2010    Negative endoscopy noting a healed gastric ulcer   • ENDOSCOPY  10/21/2009    Superficial mucosal erosion, chronic active gastritis   • ENDOSCOPY  04/01/2019    Distal esophageal ring dilated   • ENDOSCOPY N/A 6/2/2021    Procedure: ESOPHAGOGASTRODUODENOSCOPY WITH ANESTHESIA;  Surgeon: Heath Mckeon MD;  Location: [facility] ENDOSCOPY;  Service: Gastroenterology;  Laterality: N/A;  Pre: Nausea and Vomiting, Dysphagia  Post: Dilated with 48 Romans[address] Hobbs  Dr. Berg  CO2 Inflation Used   • FLAP HEAD/NECK Right 10/26/2020    Procedure: POSSIBLE FLAP;  Surgeon: Vadim Le MD;  Location: [facility] OR;  Service: ENT;  Laterality: Right;   • FOOT SURGERY     • HEAD/NECK LESION/CYST EXCISION Right 10/26/2020    Procedure:  "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 with transposition flap;  Surgeon: Vadim Le MD;  Location: [facility] OR;  Service: ENT;  Laterality: Right;   • HERNIA REPAIR     • HYSTERECTOMY     • OOPHORECTOMY     • REPLACEMENT TOTAL KNEE     • TOTAL ABDOMINAL HYSTERECTOMY     • TUBAL ABDOMINAL LIGATION       Social History     Socioeconomic History   • Marital status:      Spouse name: Nima   • Number of children: 1   • Years of education: 13   Tobacco Use   • Smoking status: Never Smoker   • Smokeless tobacco: Never Used   Vaping Use   • Vaping Use: Never used   Substance and Sexual Activity   • Alcohol use: No   • Drug use: No   • Sexual activity: Not Currently     Family History   Problem Relation Age of Onset   • Coronary artery disease Mother    • Coronary artery disease Father    • Diabetes Brother    • Hypertension Brother    • Colon polyps Brother    • Heart disease Maternal Grandfather    • Colon cancer Neg Hx      Objective   Physical Exam  Vitals reviewed.   Constitutional:       General: She is not in acute distress.     Appearance: Normal appearance.      Comments: Tired appearing   Cardiovascular:      Rate and Rhythm: Normal rate and regular rhythm.   Pulmonary:      Effort: Pulmonary effort is normal.      Breath sounds: Normal breath sounds.      Comments: No diminishment in the bases-no clinical suspicion for pneumonia    /76   Pulse 81   Ht 162.6 cm (64\")   Wt 88.5 kg (195 lb)   SpO2 98%   BMI 33.47 kg/m²     Assessment/Plan   Diagnoses and all orders for this visit:    1. Influenza A (Primary)    2. Elevated INR    Other orders  -     methylPREDNISolone (MEDROL) 4 MG dose pack; Take as directed on package instructions.  Dispense: 1 each; Refill: 0    Discussion:  Advised and educated plan of care.  Hold Coumadin today-return in a.m. for INR-her new daily dosage will be less than 8 but more than the 4, 4, 5 regimen she was on.    Follow-up:  Return in about 1 " day (around 4/12/2022) for Nurse Visit:  INR.    Electronically signed by LANRE Hernandez, 04/11/22, 11:04 AM CDT.

## 2022-04-12 ENCOUNTER — CLINICAL SUPPORT (OUTPATIENT)
Dept: FAMILY MEDICINE CLINIC | Facility: CLINIC | Age: 72
End: 2022-04-12

## 2022-04-12 DIAGNOSIS — Z79.01 ANTICOAGULATED: ICD-10-CM

## 2022-04-12 LAB — INR PPP: 4.6 (ref 0.9–1.1)

## 2022-04-12 PROCEDURE — 36416 COLLJ CAPILLARY BLOOD SPEC: CPT | Performed by: NURSE PRACTITIONER

## 2022-04-12 PROCEDURE — 85610 PROTHROMBIN TIME: CPT | Performed by: NURSE PRACTITIONER

## 2022-04-12 NOTE — PROGRESS NOTES
Here for PT/INR, PT was 55.1 and INR was 4.6. Pt was told to take hold and to recheck PT one day, copy of dosing schedule given to patient.

## 2022-04-13 ENCOUNTER — CLINICAL SUPPORT (OUTPATIENT)
Dept: FAMILY MEDICINE CLINIC | Facility: CLINIC | Age: 72
End: 2022-04-13

## 2022-04-13 DIAGNOSIS — Z79.01 ANTICOAGULATED: ICD-10-CM

## 2022-04-13 LAB — INR PPP: 3.4 (ref 0.9–1.1)

## 2022-04-13 PROCEDURE — 36416 COLLJ CAPILLARY BLOOD SPEC: CPT | Performed by: NURSE PRACTITIONER

## 2022-04-13 PROCEDURE — 85610 PROTHROMBIN TIME: CPT | Performed by: NURSE PRACTITIONER

## 2022-04-14 ENCOUNTER — CLINICAL SUPPORT (OUTPATIENT)
Dept: FAMILY MEDICINE CLINIC | Facility: CLINIC | Age: 72
End: 2022-04-14

## 2022-04-14 DIAGNOSIS — Z79.01 ANTICOAGULATED: ICD-10-CM

## 2022-04-14 LAB — INR PPP: 2.1 (ref 0.9–1.1)

## 2022-04-14 PROCEDURE — 36416 COLLJ CAPILLARY BLOOD SPEC: CPT | Performed by: NURSE PRACTITIONER

## 2022-04-14 PROCEDURE — 85610 PROTHROMBIN TIME: CPT | Performed by: NURSE PRACTITIONER

## 2022-04-14 NOTE — PROGRESS NOTES
Here for PT/INR, PT was 24.9 and INR was 2.1. Pt was told to take 4 mg and to recheck PT one week, copy of dosing schedule given to patient.

## 2022-04-15 ENCOUNTER — TELEPHONE (OUTPATIENT)
Dept: FAMILY MEDICINE CLINIC | Facility: CLINIC | Age: 72
End: 2022-04-15

## 2022-04-15 DIAGNOSIS — R05.9 COUGH: ICD-10-CM

## 2022-04-15 DIAGNOSIS — J10.1 INFLUENZA A: Primary | ICD-10-CM

## 2022-04-15 RX ORDER — GUAIFENESIN AND CODEINE PHOSPHATE 100; 10 MG/5ML; MG/5ML
5 SOLUTION ORAL 3 TIMES DAILY PRN
Qty: 118 ML | Refills: 0 | Status: SHIPPED | OUTPATIENT
Start: 2022-04-15 | End: 2022-06-30

## 2022-04-15 NOTE — TELEPHONE ENCOUNTER
"Pt called \"is there any way brianne can call me in a cough medication with an expectorant in it? I am coughing so hard and it feels like something needs to come up, but it just wont and feels like im going to cough up a lung. I just dont want it to get worse over the weekend\"    Advised will send message, pt is saying she is sorry to be so much of a bother, and advised not to worry about that, will call her back asap  "

## 2022-04-21 ENCOUNTER — CLINICAL SUPPORT (OUTPATIENT)
Dept: FAMILY MEDICINE CLINIC | Facility: CLINIC | Age: 72
End: 2022-04-21

## 2022-04-21 ENCOUNTER — TELEPHONE (OUTPATIENT)
Dept: FAMILY MEDICINE CLINIC | Facility: CLINIC | Age: 72
End: 2022-04-21

## 2022-04-21 DIAGNOSIS — R06.02 SOB (SHORTNESS OF BREATH): ICD-10-CM

## 2022-04-21 DIAGNOSIS — Z86.711 HISTORY OF PULMONARY EMBOLISM: Primary | ICD-10-CM

## 2022-04-21 DIAGNOSIS — R05.9 COUGH: ICD-10-CM

## 2022-04-21 DIAGNOSIS — R06.2 WHEEZING: ICD-10-CM

## 2022-04-21 DIAGNOSIS — Z79.01 ANTICOAGULATED: ICD-10-CM

## 2022-04-21 LAB — INR PPP: 2.5 (ref 0.9–1.1)

## 2022-04-21 PROCEDURE — 85610 PROTHROMBIN TIME: CPT | Performed by: FAMILY MEDICINE

## 2022-04-21 PROCEDURE — 36416 COLLJ CAPILLARY BLOOD SPEC: CPT | Performed by: FAMILY MEDICINE

## 2022-04-21 NOTE — TELEPHONE ENCOUNTER
Called pt and agreeable and order placed and advised pt that [facility] will call her to set up appt, pt is still coughing, wheezing and SOB

## 2022-04-21 NOTE — TELEPHONE ENCOUNTER
"Prefer CT with/without AND [employer] system in case issues I have to share with pulmonary    Pt here requesting a chest xray at [facility] \"I am not getting over this, im still coughing and wheezing. I just want to be sure I dont have pneumonia?\"    Advised will send message to provider and let her know  "

## 2022-04-21 NOTE — PROGRESS NOTES
Here for PT/INR, PT was 29.5 and INR was 2.5. Pt was told to take 4 mg and to recheck PT two weeks, copy of dosing schedule given to patient.

## 2022-04-26 ENCOUNTER — HOSPITAL ENCOUNTER (OUTPATIENT)
Dept: CT IMAGING | Facility: HOSPITAL | Age: 72
Discharge: HOME | End: 2022-04-26
Admitting: FAMILY MEDICINE

## 2022-04-26 DIAGNOSIS — R06.02 SOB (SHORTNESS OF BREATH): ICD-10-CM

## 2022-04-26 DIAGNOSIS — Z86.711 HISTORY OF PULMONARY EMBOLISM: ICD-10-CM

## 2022-04-26 DIAGNOSIS — R06.2 WHEEZING: ICD-10-CM

## 2022-04-26 DIAGNOSIS — R05.9 COUGH: ICD-10-CM

## 2022-04-26 LAB — CREAT BLDA-MCNC: 1.1 MG/DL (ref 0.6–1.3)

## 2022-04-26 PROCEDURE — 82565 ASSAY OF CREATININE: CPT

## 2022-04-26 PROCEDURE — 25010000002 IOPAMIDOL 61 % SOLUTION: Performed by: FAMILY MEDICINE

## 2022-04-26 PROCEDURE — 71270 CT THORAX DX C-/C+: CPT

## 2022-04-26 RX ADMIN — IOPAMIDOL 100 ML: 612 INJECTION, SOLUTION INTRAVENOUS at 14:59

## 2022-04-27 ENCOUNTER — TELEPHONE (OUTPATIENT)
Dept: FAMILY MEDICINE CLINIC | Facility: CLINIC | Age: 72
End: 2022-04-27

## 2022-04-27 DIAGNOSIS — E04.2 MULTIPLE THYROID NODULES: Primary | ICD-10-CM

## 2022-05-04 ENCOUNTER — HOSPITAL ENCOUNTER (OUTPATIENT)
Dept: ULTRASOUND IMAGING | Facility: HOSPITAL | Age: 72
Discharge: HOME | End: 2022-05-04
Admitting: FAMILY MEDICINE

## 2022-05-04 PROBLEM — E04.1 THYROID NODULE: Status: ACTIVE | Noted: 2022-05-04

## 2022-05-04 PROCEDURE — 76536 US EXAM OF HEAD AND NECK: CPT

## 2022-05-05 ENCOUNTER — CLINICAL SUPPORT (OUTPATIENT)
Dept: FAMILY MEDICINE CLINIC | Facility: CLINIC | Age: 72
End: 2022-05-05

## 2022-05-05 DIAGNOSIS — Z79.01 ANTICOAGULATED: ICD-10-CM

## 2022-05-05 LAB — INR PPP: 2.4 (ref 0.9–1.1)

## 2022-05-05 PROCEDURE — 36416 COLLJ CAPILLARY BLOOD SPEC: CPT | Performed by: FAMILY MEDICINE

## 2022-05-05 PROCEDURE — 85610 PROTHROMBIN TIME: CPT | Performed by: FAMILY MEDICINE

## 2022-05-09 DIAGNOSIS — E78.2 MIXED HYPERLIPIDEMIA: ICD-10-CM

## 2022-05-09 DIAGNOSIS — Z79.01 ANTICOAGULATED: Primary | ICD-10-CM

## 2022-05-09 RX ORDER — WARFARIN SODIUM 2 MG/1
4 TABLET ORAL NIGHTLY
Qty: 180 TABLET | Refills: 3 | Status: SHIPPED | OUTPATIENT
Start: 2022-05-09 | End: 2022-07-20

## 2022-05-09 RX ORDER — ATORVASTATIN CALCIUM 20 MG/1
20 TABLET, FILM COATED ORAL DAILY
Qty: 90 TABLET | Refills: 3 | Status: SHIPPED | OUTPATIENT
Start: 2022-05-09 | End: 2022-05-09

## 2022-05-09 RX ORDER — ATORVASTATIN CALCIUM 20 MG/1
20 TABLET, FILM COATED ORAL DAILY
Qty: 90 TABLET | Refills: 3 | Status: SHIPPED | OUTPATIENT
Start: 2022-05-09 | End: 2023-02-16

## 2022-05-09 NOTE — TELEPHONE ENCOUNTER
Rx Refill Note  Requested Prescriptions     Pending Prescriptions Disp Refills   • warfarin (COUMADIN) 2 MG tablet [Pharmacy Med Name: WARFARIN SODIUM 2MG TABLET] 180 tablet 3     Sig: Take 2 tablets by mouth Every Night. Subject to change with PT/INR titrations   • atorvastatin (LIPITOR) 20 MG tablet [Pharmacy Med Name: ATORVASTATIN CALCIUM 20MG TABLET] 90 tablet 3     Sig: TAKE 1 TABLET BY MOUTH DAILY.      Last office visit with prescribing clinician: 1/20/2022      Next office visit with prescribing clinician: 7/25/2022            Allyn Hartman LPN  05/09/22, 15:18 CDT

## 2022-05-16 ENCOUNTER — TELEPHONE (OUTPATIENT)
Dept: FAMILY MEDICINE CLINIC | Facility: CLINIC | Age: 72
End: 2022-05-16

## 2022-05-16 NOTE — TELEPHONE ENCOUNTER
Caller: Lucy Sousa    Relationship: Self    Best call back number: [phone]      Who are you requesting to speak with     CLINICAL STAFF    Do you know the name of the person who called:     LUCY    What was the call regarding:    CONFIRM DAILY DOSAGE ON WARFARIN    Do you require a callback:     YES, PLEASE CALL BACK

## 2022-05-16 NOTE — TELEPHONE ENCOUNTER
Attempted to call pt cell, [place] and  left on both with instructions to take 4 mg as of 5-5-22    Also sent [employer] message

## 2022-06-06 ENCOUNTER — CLINICAL SUPPORT (OUTPATIENT)
Dept: FAMILY MEDICINE CLINIC | Facility: CLINIC | Age: 72
End: 2022-06-06

## 2022-06-06 DIAGNOSIS — Z79.01 ANTICOAGULATED: ICD-10-CM

## 2022-06-06 LAB — INR PPP: 2.2 (ref 0.9–1.1)

## 2022-06-06 PROCEDURE — 85610 PROTHROMBIN TIME: CPT | Performed by: NURSE PRACTITIONER

## 2022-06-06 PROCEDURE — 36416 COLLJ CAPILLARY BLOOD SPEC: CPT | Performed by: NURSE PRACTITIONER

## 2022-06-06 NOTE — PROGRESS NOTES
Here for PT/INR, PT was 26.0 and INR was 2.2. Pt was told to take 4 mg and to recheck PT one month, copy of dosing schedule given to patient.

## 2022-06-30 ENCOUNTER — OFFICE VISIT (OUTPATIENT)
Dept: OBSTETRICS AND GYNECOLOGY | Facility: CLINIC | Age: 72
End: 2022-06-30

## 2022-06-30 VITALS
BODY MASS INDEX: 33.12 KG/M2 | DIASTOLIC BLOOD PRESSURE: 82 MMHG | HEIGHT: 64 IN | WEIGHT: 194 LBS | SYSTOLIC BLOOD PRESSURE: 128 MMHG

## 2022-06-30 DIAGNOSIS — N39.45 CONTINUOUS LEAKAGE OF URINE: ICD-10-CM

## 2022-06-30 DIAGNOSIS — N89.8 VAGINAL ITCHING: Primary | ICD-10-CM

## 2022-06-30 DIAGNOSIS — B37.2 YEAST INFECTION OF THE SKIN: ICD-10-CM

## 2022-06-30 PROCEDURE — 87798 DETECT AGENT NOS DNA AMP: CPT | Performed by: NURSE PRACTITIONER

## 2022-06-30 PROCEDURE — 87481 CANDIDA DNA AMP PROBE: CPT | Performed by: NURSE PRACTITIONER

## 2022-06-30 PROCEDURE — 87512 GARDNER VAG DNA QUANT: CPT | Performed by: NURSE PRACTITIONER

## 2022-06-30 PROCEDURE — 87563 M. GENITALIUM AMP PROBE: CPT | Performed by: NURSE PRACTITIONER

## 2022-06-30 PROCEDURE — 99213 OFFICE O/P EST LOW 20 MIN: CPT | Performed by: NURSE PRACTITIONER

## 2022-06-30 PROCEDURE — 87661 TRICHOMONAS VAGINALIS AMPLIF: CPT | Performed by: NURSE PRACTITIONER

## 2022-06-30 RX ORDER — NYSTATIN 100000 [USP'U]/G
POWDER TOPICAL 3 TIMES DAILY PRN
Qty: 60 G | Refills: 3 | Status: SHIPPED | OUTPATIENT
Start: 2022-06-30 | End: 2023-03-13 | Stop reason: SDUPTHER

## 2022-06-30 RX ORDER — FLUCONAZOLE 150 MG/1
150 TABLET ORAL DAILY
Qty: 1 TABLET | Refills: 0 | Status: SHIPPED | OUTPATIENT
Start: 2022-06-30 | End: 2022-07-01

## 2022-06-30 RX ORDER — NYSTATIN 100000 U/G
1 CREAM TOPICAL 2 TIMES DAILY PRN
Qty: 30 G | Refills: 1 | Status: SHIPPED | OUTPATIENT
Start: 2022-06-30 | End: 2023-03-13 | Stop reason: SDUPTHER

## 2022-06-30 NOTE — PROGRESS NOTES
Chief Complaint   Patient presents with   • Vaginitis     Patient here with concerns of vaginal yeast infection and rash in her groin.        History:  Lucy Sousa is a 72 y.o. female who presents today for follow-up for evaluation of the above:    Vaginitis  This is a new problem. The current episode started in the past 7 days. The problem occurs constantly. The problem has been gradually worsening. Associated symptoms include a rash and urinary symptoms. Pertinent negatives include no congestion, coughing, fever, nausea, sore throat or vomiting. The symptoms are aggravated by standing. Treatments tried: topical OTC antibacterial ointment. The treatment provided no relief.         ROS:  Review of Systems   Constitutional: Negative for fever.   HENT: Negative for congestion and sore throat.    Respiratory: Negative for cough.    Gastrointestinal: Negative for nausea and vomiting.   Genitourinary: Vaginal pain: itching.   Skin: Positive for rash.       Ms. Sousa  reports that she has never smoked. She has never used smokeless tobacco. She reports that she does not drink alcohol and does not use drugs.      Current Outpatient Medications:   •  atorvastatin (LIPITOR) 20 MG tablet, TAKE 1 TABLET BY MOUTH DAILY., Disp: 90 tablet, Rfl: 3  •  Cholecalciferol (Vitamin D3) 25 MCG (1000 UT) capsule, Take 1,000 Units by mouth Daily., Disp: , Rfl:   •  cyanocobalamin (VITAMIN B-12) 50 MCG tablet tablet, Take 50 mcg by mouth Daily., Disp: , Rfl:   •  esomeprazole (nexIUM) 40 MG capsule, TAKE ONE CAPSULE TWICE DAILY GENERIC FOR NEXIUM, Disp: 180 capsule, Rfl: 2  •  furosemide (LASIX) 20 MG tablet, TAKE 1 TABLET BY MOUTH DAILY., Disp: 90 tablet, Rfl: 3  •  losartan (COZAAR) 50 MG tablet, TAKE ONE TABLET DAILY GENERIC FOR COZAAR, Disp: 90 tablet, Rfl: 2  •  Multiple Vitamin (MULTI VITAMIN DAILY PO), Take 1 tablet by mouth daily., Disp: , Rfl:   •  potassium chloride (K-DUR,KLOR-CON) 20 MEQ CR tablet, TAKE ONE TABLET TWICE  "DAILY, Disp: 180 tablet, Rfl: 2  •  triamterene-hydrochlorothiazide (DYAZIDE) 37.5-25 MG per capsule, TAKE ONE CAPSULE EVERY MORNING GENERIC FOR DYAZIDE, Disp: 90 capsule, Rfl: 1  •  vitamin E 600 UNIT capsule, Take 400 Units by mouth Daily., Disp: , Rfl:   •  warfarin (COUMADIN) 2 MG tablet, Take 2 tablets by mouth Every Night. Subject to change with PT/INR titrations, Disp: 180 tablet, Rfl: 3  •  warfarin (Coumadin) 4 MG tablet, Take 2 tablets by mouth Every Night for 90 days. Subject to change with titrations/ PT/INR, Disp: 180 tablet, Rfl: 1  •  Zinc 50 MG capsule, Take  by mouth., Disp: , Rfl:   •  fluconazole (Diflucan) 150 MG tablet, Take 1 tablet by mouth Daily for 1 dose., Disp: 1 tablet, Rfl: 0  •  nystatin (MYCOSTATIN) 131676 UNIT/GM cream, Apply 1 application topically to the appropriate area as directed 2 (Two) Times a Day As Needed (skin yeast rash)., Disp: 30 g, Rfl: 1  •  nystatin (MYCOSTATIN) 025919 UNIT/GM powder, Apply  topically to the appropriate area as directed 3 (Three) Times a Day As Needed (skin yeast rash)., Disp: 60 g, Rfl: 3      OBJECTIVE:  /82   Ht 162.6 cm (64\")   Wt 88 kg (194 lb)   Breastfeeding No   BMI 33.30 kg/m²    Physical Exam  Vitals reviewed. Exam conducted with a chaperone present.   Constitutional:       Appearance: She is well-developed.   HENT:      Head: Normocephalic and atraumatic.   Eyes:      Pupils: Pupils are equal, round, and reactive to light.   Cardiovascular:      Rate and Rhythm: Normal rate and regular rhythm.      Heart sounds: Normal heart sounds.   Pulmonary:      Effort: Pulmonary effort is normal.      Breath sounds: Normal breath sounds.   Abdominal:      General: Bowel sounds are normal.      Palpations: Abdomen is soft.   Genitourinary:     Labia:         Right: Rash and tenderness present.         Left: Rash and tenderness present.       Vagina: Normal.   Musculoskeletal:         General: Normal range of motion.      Cervical back: Normal " range of motion and neck supple.   Skin:     General: Skin is warm and dry.      Findings: Erythema and rash present.          Neurological:      Mental Status: She is alert and oriented to person, place, and time.         Assessment/Plan    Diagnoses and all orders for this visit:    1. Vaginal itching (Primary)  -     Gynecologic Fluid, Supplemental Testing  -     fluconazole (Diflucan) 150 MG tablet; Take 1 tablet by mouth Daily for 1 dose.  Dispense: 1 tablet; Refill: 0    2. Continuous leakage of urine  S/p hysterectomy  May consider pessary in the future.       3. Yeast infection of the skin  -     fluconazole (Diflucan) 150 MG tablet; Take 1 tablet by mouth Daily for 1 dose.  Dispense: 1 tablet; Refill: 0  -     nystatin (MYCOSTATIN) 203747 UNIT/GM powder; Apply  topically to the appropriate area as directed 3 (Three) Times a Day As Needed (skin yeast rash).  Dispense: 60 g; Refill: 3  -     nystatin (MYCOSTATIN) 606313 UNIT/GM cream; Apply 1 application topically to the appropriate area as directed 2 (Two) Times a Day As Needed (skin yeast rash).  Dispense: 30 g; Refill: 1         An After Visit Summary was printed and given to the patient at discharge.  Return for Next scheduled follow up. Sooner if problems arise.          Vy DE DIOS. 6/30/2022   Electronically Signed

## 2022-07-06 ENCOUNTER — CLINICAL SUPPORT (OUTPATIENT)
Dept: FAMILY MEDICINE CLINIC | Facility: CLINIC | Age: 72
End: 2022-07-06

## 2022-07-06 DIAGNOSIS — Z79.01 ANTICOAGULATED: ICD-10-CM

## 2022-07-06 LAB
INR PPP: 4.3 (ref 0.9–1.1)
LAB AP CASE REPORT: NORMAL
Lab: NORMAL
PATH INTERP SPEC-IMP: NORMAL
TRICHOMONAS VAGINALIS PCR: NOT DETECTED

## 2022-07-06 PROCEDURE — 36416 COLLJ CAPILLARY BLOOD SPEC: CPT | Performed by: FAMILY MEDICINE

## 2022-07-06 PROCEDURE — 85610 PROTHROMBIN TIME: CPT | Performed by: FAMILY MEDICINE

## 2022-07-06 NOTE — PROGRESS NOTES
Here for PT/INR, PT was 51.8, rechecked with same results,  and INR was 4.3. Pt was told to hold and to recheck PT in two days, copy of dosing schedule given to patient.

## 2022-07-08 ENCOUNTER — CLINICAL SUPPORT (OUTPATIENT)
Dept: FAMILY MEDICINE CLINIC | Facility: CLINIC | Age: 72
End: 2022-07-08

## 2022-07-08 DIAGNOSIS — Z79.01 ANTICOAGULATED: ICD-10-CM

## 2022-07-08 LAB — INR PPP: 1.7 (ref 0.9–1.1)

## 2022-07-08 PROCEDURE — 36416 COLLJ CAPILLARY BLOOD SPEC: CPT | Performed by: NURSE PRACTITIONER

## 2022-07-08 PROCEDURE — 85610 PROTHROMBIN TIME: CPT | Performed by: NURSE PRACTITIONER

## 2022-07-08 NOTE — PROGRESS NOTES
Here for PT/INR, PT was 20.0 and INR was 1.7. Pt was told to take 4 mg and to recheck PT 3 days, copy of dosing schedule given to patient.

## 2022-07-12 ENCOUNTER — CLINICAL SUPPORT (OUTPATIENT)
Dept: FAMILY MEDICINE CLINIC | Facility: CLINIC | Age: 72
End: 2022-07-12

## 2022-07-12 DIAGNOSIS — Z79.01 ANTICOAGULATED: ICD-10-CM

## 2022-07-12 LAB — INR PPP: 1.5 (ref 0.9–1.1)

## 2022-07-12 PROCEDURE — 85610 PROTHROMBIN TIME: CPT | Performed by: FAMILY MEDICINE

## 2022-07-12 PROCEDURE — 36416 COLLJ CAPILLARY BLOOD SPEC: CPT | Performed by: FAMILY MEDICINE

## 2022-07-12 NOTE — PROGRESS NOTES
Here for PT/INR, PT was 18.6 and INR was 1.5. Pt was told to take 6 mg, then 5 mg, then 4 mg and to recheck PT on Friday, copy of dosing schedule given to patient.

## 2022-07-15 ENCOUNTER — CLINICAL SUPPORT (OUTPATIENT)
Dept: FAMILY MEDICINE CLINIC | Facility: CLINIC | Age: 72
End: 2022-07-15

## 2022-07-15 DIAGNOSIS — Z79.01 ANTICOAGULATED: ICD-10-CM

## 2022-07-15 LAB — INR PPP: 2.5 (ref 0.9–1.1)

## 2022-07-15 PROCEDURE — 36416 COLLJ CAPILLARY BLOOD SPEC: CPT | Performed by: NURSE PRACTITIONER

## 2022-07-15 PROCEDURE — 85610 PROTHROMBIN TIME: CPT | Performed by: NURSE PRACTITIONER

## 2022-07-15 NOTE — PROGRESS NOTES
Here for PT/INR, PT was 30.4 and INR was 2.5. Pt was told to take 6 mg, 5 mg, 4 mg  and to recheck PT Monday copy of dosing schedule given to patient.

## 2022-07-18 ENCOUNTER — CLINICAL SUPPORT (OUTPATIENT)
Dept: FAMILY MEDICINE CLINIC | Facility: CLINIC | Age: 72
End: 2022-07-18

## 2022-07-18 DIAGNOSIS — Z86.2 HISTORY OF ANEMIA: ICD-10-CM

## 2022-07-18 DIAGNOSIS — R79.1 ELEVATED INR: ICD-10-CM

## 2022-07-18 DIAGNOSIS — E87.6 HYPOPOTASSEMIA: ICD-10-CM

## 2022-07-18 DIAGNOSIS — Z79.01 ANTICOAGULATED: ICD-10-CM

## 2022-07-18 DIAGNOSIS — D75.89 MACROCYTOSIS: ICD-10-CM

## 2022-07-18 DIAGNOSIS — I10 HTN (HYPERTENSION), BENIGN: ICD-10-CM

## 2022-07-18 DIAGNOSIS — E83.52 HYPERCALCEMIA: ICD-10-CM

## 2022-07-18 DIAGNOSIS — E78.2 MIXED HYPERLIPIDEMIA: Primary | ICD-10-CM

## 2022-07-18 DIAGNOSIS — E04.2 MULTIPLE THYROID NODULES: ICD-10-CM

## 2022-07-18 DIAGNOSIS — E55.9 VITAMIN D DEFICIENCY, UNSPECIFIED: ICD-10-CM

## 2022-07-18 LAB — INR PPP: 3.1 (ref 0.9–1.1)

## 2022-07-18 PROCEDURE — 85610 PROTHROMBIN TIME: CPT | Performed by: FAMILY MEDICINE

## 2022-07-18 PROCEDURE — 36416 COLLJ CAPILLARY BLOOD SPEC: CPT | Performed by: FAMILY MEDICINE

## 2022-07-18 NOTE — PROGRESS NOTES
Here for PT/INR, PT was 3.1 and INR was 37.2. Pt was told to take 4 mg and to recheck PT 1 week, copy of dosing schedule given to patient.

## 2022-07-19 LAB
25(OH)D3+25(OH)D2 SERPL-MCNC: 48.3 NG/ML (ref 30–100)
ALBUMIN SERPL-MCNC: 3.9 G/DL (ref 3.5–5.2)
ALBUMIN/GLOB SERPL: 1.3 G/DL
ALP SERPL-CCNC: 74 U/L (ref 39–117)
ALT SERPL-CCNC: 19 U/L (ref 1–33)
AST SERPL-CCNC: 20 U/L (ref 1–32)
BASOPHILS # BLD AUTO: 0.06 10*3/MM3 (ref 0–0.2)
BASOPHILS NFR BLD AUTO: 1.1 % (ref 0–1.5)
BILIRUB SERPL-MCNC: 0.4 MG/DL (ref 0–1.2)
BUN SERPL-MCNC: 17 MG/DL (ref 8–23)
BUN/CREAT SERPL: 17.9 (ref 7–25)
CALCIUM SERPL-MCNC: 9.8 MG/DL (ref 8.6–10.5)
CHLORIDE SERPL-SCNC: 100 MMOL/L (ref 98–107)
CHOLEST SERPL-MCNC: 161 MG/DL (ref 0–200)
CO2 SERPL-SCNC: 28.9 MMOL/L (ref 22–29)
CREAT SERPL-MCNC: 0.95 MG/DL (ref 0.57–1)
EGFRCR SERPLBLD CKD-EPI 2021: 63 ML/MIN/1.73
EOSINOPHIL # BLD AUTO: 0.22 10*3/MM3 (ref 0–0.4)
EOSINOPHIL NFR BLD AUTO: 4.2 % (ref 0.3–6.2)
ERYTHROCYTE [DISTWIDTH] IN BLOOD BY AUTOMATED COUNT: 12.5 % (ref 12.3–15.4)
FERRITIN SERPL-MCNC: 61.2 NG/ML (ref 13–150)
FOLATE SERPL-MCNC: >20 NG/ML (ref 4.78–24.2)
GLOBULIN SER CALC-MCNC: 3 GM/DL
GLUCOSE SERPL-MCNC: 106 MG/DL (ref 65–99)
HCT VFR BLD AUTO: 44.4 % (ref 34–46.6)
HDLC SERPL-MCNC: 51 MG/DL (ref 40–60)
HGB BLD-MCNC: 14.9 G/DL (ref 12–15.9)
IMM GRANULOCYTES # BLD AUTO: 0.01 10*3/MM3 (ref 0–0.05)
IMM GRANULOCYTES NFR BLD AUTO: 0.2 % (ref 0–0.5)
IRON SERPL-MCNC: 102 MCG/DL (ref 37–145)
LDLC SERPL CALC-MCNC: 87 MG/DL (ref 0–100)
LDLC/HDLC SERPL: 1.64 {RATIO}
LYMPHOCYTES # BLD AUTO: 1.68 10*3/MM3 (ref 0.7–3.1)
LYMPHOCYTES NFR BLD AUTO: 32.1 % (ref 19.6–45.3)
MCH RBC QN AUTO: 33 PG (ref 26.6–33)
MCHC RBC AUTO-ENTMCNC: 33.6 G/DL (ref 31.5–35.7)
MCV RBC AUTO: 98.2 FL (ref 79–97)
MONOCYTES # BLD AUTO: 0.57 10*3/MM3 (ref 0.1–0.9)
MONOCYTES NFR BLD AUTO: 10.9 % (ref 5–12)
NEUTROPHILS # BLD AUTO: 2.69 10*3/MM3 (ref 1.7–7)
NEUTROPHILS NFR BLD AUTO: 51.5 % (ref 42.7–76)
NRBC BLD AUTO-RTO: 0 /100 WBC (ref 0–0.2)
PLATELET # BLD AUTO: 293 10*3/MM3 (ref 140–450)
POTASSIUM SERPL-SCNC: 3.9 MMOL/L (ref 3.5–5.2)
PROT SERPL-MCNC: 6.9 G/DL (ref 6–8.5)
RBC # BLD AUTO: 4.52 10*6/MM3 (ref 3.77–5.28)
SODIUM SERPL-SCNC: 139 MMOL/L (ref 136–145)
T4 FREE SERPL-MCNC: 1.16 NG/DL (ref 0.93–1.7)
TRIGL SERPL-MCNC: 131 MG/DL (ref 0–150)
TSH SERPL DL<=0.005 MIU/L-ACNC: 4.12 UIU/ML (ref 0.27–4.2)
VIT B12 SERPL-MCNC: 771 PG/ML (ref 211–946)
VLDLC SERPL CALC-MCNC: 23 MG/DL (ref 5–40)
WBC # BLD AUTO: 5.23 10*3/MM3 (ref 3.4–10.8)

## 2022-07-20 ENCOUNTER — OFFICE VISIT (OUTPATIENT)
Dept: OBSTETRICS AND GYNECOLOGY | Facility: CLINIC | Age: 72
End: 2022-07-20

## 2022-07-20 VITALS
SYSTOLIC BLOOD PRESSURE: 132 MMHG | BODY MASS INDEX: 32.95 KG/M2 | DIASTOLIC BLOOD PRESSURE: 84 MMHG | WEIGHT: 193 LBS | HEIGHT: 64 IN

## 2022-07-20 DIAGNOSIS — N39.0 URINARY TRACT INFECTION WITHOUT HEMATURIA, SITE UNSPECIFIED: ICD-10-CM

## 2022-07-20 DIAGNOSIS — Z01.419 ENCOUNTER FOR GYNECOLOGICAL EXAMINATION WITHOUT ABNORMAL FINDING: Primary | ICD-10-CM

## 2022-07-20 DIAGNOSIS — N39.41 URGE INCONTINENCE: ICD-10-CM

## 2022-07-20 PROCEDURE — G0101 CA SCREEN;PELVIC/BREAST EXAM: HCPCS | Performed by: OBSTETRICS & GYNECOLOGY

## 2022-07-20 RX ORDER — WARFARIN SODIUM 4 MG/1
4 TABLET ORAL
COMMUNITY
End: 2022-08-11

## 2022-07-20 NOTE — PROGRESS NOTES
Subjective   Lucy Sousa is a 72 y.o. female  YOB: 1950    Chief Complaint   Patient presents with   • Annual Exam     Pt here today for annual exam. Pt voices having some vaginal pressure and also voices having trouble emptying her bladder sometimes. Pt voices no other concerns.        72 year old postmenopausal female  presents for annual examination. Patient reports that she has constant pain and pressure at this time. At time she feels like she is not completely emptying her bladder. She also reports issues with incontinence when she has the urge to go to the bathroom. She previously had a hysterectomy due to abnormal uterine bleeding. She reports a history of normal pap smears prior to her hysterectomy. Her medical and surgical history are up to date. She is not sexually active at this time. She reports a previous history of vaginal deliveries with the largest being seven pounds.       Allergies   Allergen Reactions   • Macrobid [Nitrofurantoin Macrocrystal] Rash       Past Medical History:   Diagnosis Date   • Cancer (HCC)     skin squamous cell   • DJD (degenerative joint disease)    • GERD (gastroesophageal reflux disease)    • HTN (hypertension)    • Hx of colonic polyp    • Pulmonary embolism (HCC)     post surgical with cardiac arrest       Family History   Problem Relation Age of Onset   • Coronary artery disease Father    • Coronary artery disease Mother    • Diabetes Brother    • Hypertension Brother    • Colon polyps Brother    • Breast cancer Maternal Grandmother    • Heart disease Maternal Grandfather    • Breast cancer Maternal Cousin    • Colon cancer Neg Hx        Social History     Socioeconomic History   • Marital status:      Spouse name: Nima   • Number of children: 1   • Years of education: 13   Tobacco Use   • Smoking status: Never Smoker   • Smokeless tobacco: Never Used   Vaping Use   • Vaping Use: Never used   Substance and Sexual Activity   • Alcohol use:  No   • Drug use: No   • Sexual activity: Not Currently     Partners: Male     Birth control/protection: Post-menopausal         Current Outpatient Medications:   •  atorvastatin (LIPITOR) 20 MG tablet, TAKE 1 TABLET BY MOUTH DAILY., Disp: 90 tablet, Rfl: 3  •  Cholecalciferol (Vitamin D3) 25 MCG (1000 UT) capsule, Take 1,000 Units by mouth Daily., Disp: , Rfl:   •  cyanocobalamin (VITAMIN B-12) 50 MCG tablet tablet, Take 50 mcg by mouth Daily., Disp: , Rfl:   •  esomeprazole (nexIUM) 40 MG capsule, TAKE ONE CAPSULE TWICE DAILY GENERIC FOR NEXIUM, Disp: 180 capsule, Rfl: 2  •  furosemide (LASIX) 20 MG tablet, TAKE 1 TABLET BY MOUTH DAILY., Disp: 90 tablet, Rfl: 3  •  losartan (COZAAR) 50 MG tablet, TAKE ONE TABLET DAILY GENERIC FOR COZAAR, Disp: 90 tablet, Rfl: 2  •  Multiple Vitamin (MULTI VITAMIN DAILY PO), Take 1 tablet by mouth daily., Disp: , Rfl:   •  nystatin (MYCOSTATIN) 527038 UNIT/GM cream, Apply 1 application topically to the appropriate area as directed 2 (Two) Times a Day As Needed (skin yeast rash)., Disp: 30 g, Rfl: 1  •  nystatin (MYCOSTATIN) 662839 UNIT/GM powder, Apply  topically to the appropriate area as directed 3 (Three) Times a Day As Needed (skin yeast rash)., Disp: 60 g, Rfl: 3  •  potassium chloride (K-DUR,KLOR-CON) 20 MEQ CR tablet, TAKE ONE TABLET TWICE DAILY, Disp: 180 tablet, Rfl: 2  •  triamterene-hydrochlorothiazide (DYAZIDE) 37.5-25 MG per capsule, TAKE ONE CAPSULE EVERY MORNING GENERIC FOR DYAZIDE, Disp: 90 capsule, Rfl: 1  •  vitamin E 600 UNIT capsule, Take 400 Units by mouth Daily., Disp: , Rfl:   •  warfarin (COUMADIN) 4 MG tablet, Take 4 mg by mouth Daily., Disp: , Rfl:   •  Zinc 50 MG capsule, Take  by mouth., Disp: , Rfl:     No LMP recorded. Patient has had a hysterectomy.    Sexual History:         Could not be calculated    Past Surgical History:   Procedure Laterality Date   • BREAST SURGERY      breast reduction   • CHOLECYSTECTOMY     • COLONOSCOPY  04/01/2019     Diverticulosis otherwise normal exam repeat in 5 years   • COLONOSCOPY N/A 06/02/2021    Procedure: COLONOSCOPY WITH ANESTHESIA;  Surgeon: Heath Mckeon MD;  Location: [facility] ENDOSCOPY;  Service: Gastroenterology;  Laterality: N/A;  Pre: Nausea  Post: Diverticulosis  Dr. Berg  CO2 Inflation Used   • COLONOSCOPY W/ POLYPECTOMY  02/13/2014    Hyperplastic polyp at 20 cm, Diverticulosis repeat exam in 5 years   • EKOS CATHETER PLACEMENT Bilateral 11/17/2016    Procedure: Ekos catheter placement;  Surgeon: Daniel Underwood MD;  Location: [facility] CATH INVASIVE LOCATION;  Service:    • EKOS CATHETER REMOVAL Bilateral 11/18/2016    Procedure: Ekos catheter removal with stent placement;  Surgeon: Daniel Underwood MD;  Location: [facility] CATH INVASIVE LOCATION;  Service:    • ENDOSCOPY  01/22/2010    Negative endoscopy noting a healed gastric ulcer   • ENDOSCOPY  10/21/2009    Superficial mucosal erosion, chronic active gastritis   • ENDOSCOPY  04/01/2019    Distal esophageal ring dilated   • ENDOSCOPY N/A 06/02/2021    Procedure: ESOPHAGOGASTRODUODENOSCOPY WITH ANESTHESIA;  Surgeon: Heath Mckeon MD;  Location: [facility] ENDOSCOPY;  Service: Gastroenterology;  Laterality: N/A;  Pre: Nausea and Vomiting, Dysphagia  Post: Dilated with 48 [place]  Dr. 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   HENT: Negative for congestion.    Cardiovascular: Negative for chest pain.   Gastrointestinal: Positive for diarrhea. Negative for blood in stool and constipation.   Endocrine: Negative for cold intolerance and heat intolerance.   Genitourinary: Negative for pelvic pain and vaginal discharge.   Musculoskeletal: Positive for back pain. Negative for arthralgias and neck pain.   Skin: Positive for rash.   Neurological: Positive for headache. Negative for dizziness.   Psychiatric/Behavioral: Negative for sleep disturbance. The patient is not nervous/anxious.        Objective   Physical Exam  Vitals and nursing note reviewed. Exam conducted with a chaperone present.   Constitutional:       General: She is not in acute distress.     Appearance: She is well-developed.   HENT:      Head: Normocephalic and atraumatic.   Neck:      Thyroid: No thyromegaly.   Cardiovascular:      Rate and Rhythm: Normal rate and regular rhythm.      Heart sounds: No murmur heard.  Pulmonary:      Effort: Pulmonary effort is normal.      Breath sounds: Normal breath sounds.   Chest:   Breasts:      Right: No inverted nipple or mass.      Left: No inverted nipple or mass.       Abdominal:      General: There is no distension.      Palpations: Abdomen is soft.      Tenderness: There is no abdominal tenderness.   Genitourinary:     Exam position: Supine.      Labia:         Right: No tenderness or lesion.         Left: No tenderness or lesion.       Vagina: Normal. No vaginal discharge or bleeding.      Uterus: Absent.       Adnexa:         Right: No tenderness or fullness.          Left: No tenderness or fullness.        Rectum: Normal anal tone.      Comments: Patient s/ophysterectomy, Vaginal cuff unremarkable. Labia normal, no lesions noted. Urethral meatus unremarkable, no prolapse of mucosa. Anus/perineum normal  Grade 2 cystocele, grade 2 rectocele   Musculoskeletal:         General: Normal range of  "motion.      Cervical back: Normal range of motion and neck supple.   Skin:     General: Skin is warm and dry.   Neurological:      Mental Status: She is alert and oriented to person, place, and time.   Psychiatric:         Behavior: Behavior normal.         Judgment: Judgment normal.           Vitals:    07/20/22 1332   BP: 132/84   Weight: 87.5 kg (193 lb)   Height: 162.6 cm (64\")       Diagnoses and all orders for this visit:    1. Encounter for gynecological examination without abnormal finding (Primary)    2. Urinary tract infection without hematuria, site unspecified  -     Urine Culture - Urine, Urine, Clean Catch    3. Urge incontinence    Normal GYN exam.   Encouraged SBE, pt is aware how to do self breast exam and the importance of same.   Discussed weight management and importance of maintaining a healthy weight.   PAP smear not indicated at this time   Discussed with patient different management options at this time for urge incontinence as well as management for pelvic organ prolapse. Her main complaint is incontinence at this time.   Patient started on Mybetriq at this time. Discussed possible side effects including increased blood pressure   RTC In 6 weeks for follow up or sooner.   All questions answered and patient verbalized understanding of plan.     Kaitlin Massey, DO       "

## 2022-07-22 LAB
BACTERIA UR CULT: NO GROWTH
BACTERIA UR CULT: NORMAL

## 2022-07-25 ENCOUNTER — OFFICE VISIT (OUTPATIENT)
Dept: FAMILY MEDICINE CLINIC | Facility: CLINIC | Age: 72
End: 2022-07-25

## 2022-07-25 VITALS
DIASTOLIC BLOOD PRESSURE: 78 MMHG | SYSTOLIC BLOOD PRESSURE: 118 MMHG | BODY MASS INDEX: 33.8 KG/M2 | OXYGEN SATURATION: 98 % | TEMPERATURE: 97.3 F | RESPIRATION RATE: 16 BRPM | HEIGHT: 64 IN | HEART RATE: 94 BPM | WEIGHT: 198 LBS

## 2022-07-25 DIAGNOSIS — D64.9 ANEMIA, UNSPECIFIED TYPE: ICD-10-CM

## 2022-07-25 DIAGNOSIS — E87.6 HYPOPOTASSEMIA: ICD-10-CM

## 2022-07-25 DIAGNOSIS — E78.2 MIXED HYPERLIPIDEMIA: ICD-10-CM

## 2022-07-25 DIAGNOSIS — R73.01 ELEVATED FASTING GLUCOSE: ICD-10-CM

## 2022-07-25 DIAGNOSIS — Z79.01 ANTICOAGULATED: ICD-10-CM

## 2022-07-25 DIAGNOSIS — F32.A DEPRESSION, UNSPECIFIED DEPRESSION TYPE: Chronic | ICD-10-CM

## 2022-07-25 DIAGNOSIS — I50.9 CONGESTIVE HEART FAILURE, UNSPECIFIED HF CHRONICITY, UNSPECIFIED HEART FAILURE TYPE: Chronic | ICD-10-CM

## 2022-07-25 DIAGNOSIS — M15.0 PRIMARY GENERALIZED (OSTEO)ARTHRITIS: ICD-10-CM

## 2022-07-25 DIAGNOSIS — E04.1 THYROID NODULE: ICD-10-CM

## 2022-07-25 DIAGNOSIS — N32.81 OVERACTIVE BLADDER: ICD-10-CM

## 2022-07-25 DIAGNOSIS — I10 HTN (HYPERTENSION), BENIGN: Chronic | ICD-10-CM

## 2022-07-25 LAB — INR PPP: 1.9 (ref 0.9–1.1)

## 2022-07-25 PROCEDURE — 85610 PROTHROMBIN TIME: CPT | Performed by: FAMILY MEDICINE

## 2022-07-25 PROCEDURE — 99214 OFFICE O/P EST MOD 30 MIN: CPT | Performed by: FAMILY MEDICINE

## 2022-07-25 PROCEDURE — 36416 COLLJ CAPILLARY BLOOD SPEC: CPT | Performed by: FAMILY MEDICINE

## 2022-07-25 NOTE — PROGRESS NOTES
Here for PT/INR, PT was 22.7 and INR was 1.9.   Pt was told to take 4 mg and to recheck PT one month, copy of dosing schedule given to patient.

## 2022-07-25 NOTE — PROGRESS NOTES
"Subjective   Lucy Sousa is a 72 y.o. female presenting with chief complaint of:   Chief Complaint   Patient presents with   • Follow-up   • over active bladder     \"I want to talk to Dr Berg about the medication, to be sure it will not effect my coumadin\"       History of Present Illness :  Alone.   Here for review of chronic problems that includes Hx PE/ekos and coumadin tx for and others.     Has multiple chronic problems to consider that might have a bearing on today's issues;  an interval appointment.       Chronic/acute problems reviewed today:   1. Thyroid nodule 5.2022-12m recent thyroid nodule on imaging that was benign on follow-up imaging.  No dysphagia or neck mass or tenderness.   2. Mixed hyperlipidemia Chronic/stable.  Tolerated use of Rx with labs showing improved lipid values and tolerant liver labs. No muscle aches unexpected.      3. Anticoagulated-PE/cardiac arrest/(xarelto) coumadin Chronic/stable reason for stopping or use of.  Denies bleeding issues; especially epistaxis, melena, hematochezia.  Upper arms/others do not significantly bruise easily.  No significant bleeding or falls.  She still pleased with her decision to switch from Xarelto to Coumadin     4. HTN (hypertension), benign Chronic/stable. Stable here past/and home blood pressures.  No significant chest pain, SOB, LE edema, orthopnea, near syncope, dizziness/light headness.   Recent Vitals       6/30/2022 7/20/2022 7/25/2022       BP: 128/82 132/84 118/78     Pulse: -- -- 94     Temp: -- -- 97.3 °F (36.3 °C)     Weight: 88 kg (194 lb) 87.5 kg (193 lb) 89.8 kg (198 lb)     BMI (Calculated): 33.3 33.1 34            5. Congestive heart failure, unspecified HF chronicity, unspecified heart failure type (HCC) Chronic/stable.  Denies significant sob, orthopnea, leg edema, weight gain.  Aware of influence diet/salt and watching weight at home.       6. Hypopotassemia-diuretic Chronic/variable high or low K as uses diuretic; has to " have lab monitoring.  No significant fatigue or muscle cramps     7. Depression, unspecified depression type chronic past significant  mood swings, down moods, nervousness, difficulty with concentration to function home/work.  Others close have not been concerned.  No suicide ideation/intent.  Rx helped past.       8. Primary generalized (osteo)arthritis Chronic/stable.  Various on/off joint pains/soreness/stiffness.  Particular joint problems with various.  No joint swelling.  Treats mainly with reduced activity, Rx listed, Tylenol.  No  NSAIDs, and no recent injections.      9. Overactive bladder chronic worsening difficulty with urinary incontinence or leakage, frequency without infection or hematuria, for which urology or gynecology is suggesting myrbrig.  With her recent Diflucan and rash in the groin and the affected having her Coumadin she wants to discuss adding this drug to her Coumadin   10. Elevated fasting glucose this is a new issue.  Her fasting blood sugar has reached a point of diagnosis.   11. Anemia, unspecified type Chronic or past history/stable recently: This has been present before.    There has been GI evaulation in the past. There is no current melena, hematochezia. It has been benign to date and stable/treated/watching.  Contributing comorbidities to date: benign to date.       Has an/another acute issue today: none.    The following portions of the patient's history were reviewed and updated as appropriate: allergies, current medications, past family history, past medical history, past social history, past surgical history and problem list.      Current Outpatient Medications:   •  atorvastatin (LIPITOR) 20 MG tablet, TAKE 1 TABLET BY MOUTH DAILY., Disp: 90 tablet, Rfl: 3  •  Cholecalciferol (Vitamin D3) 25 MCG (1000 UT) capsule, Take 1,000 Units by mouth Daily., Disp: , Rfl:   •  cyanocobalamin (VITAMIN B-12) 50 MCG tablet tablet, Take 50 mcg by mouth Daily., Disp: , Rfl:   •  esomeprazole  (nexIUM) 40 MG capsule, TAKE ONE CAPSULE TWICE DAILY GENERIC FOR NEXIUM, Disp: 180 capsule, Rfl: 2  •  furosemide (LASIX) 20 MG tablet, TAKE 1 TABLET BY MOUTH DAILY., Disp: 90 tablet, Rfl: 3  •  losartan (COZAAR) 50 MG tablet, TAKE ONE TABLET DAILY GENERIC FOR COZAAR, Disp: 90 tablet, Rfl: 2  •  Multiple Vitamin (MULTI VITAMIN DAILY PO), Take 1 tablet by mouth daily., Disp: , Rfl:   •  nystatin (MYCOSTATIN) 264899 UNIT/GM powder, Apply  topically to the appropriate area as directed 3 (Three) Times a Day As Needed (skin yeast rash)., Disp: 60 g, Rfl: 3  •  potassium chloride (K-DUR,KLOR-CON) 20 MEQ CR tablet, TAKE ONE TABLET TWICE DAILY, Disp: 180 tablet, Rfl: 2  •  triamterene-hydrochlorothiazide (DYAZIDE) 37.5-25 MG per capsule, TAKE ONE CAPSULE EVERY MORNING GENERIC FOR DYAZIDE, Disp: 90 capsule, Rfl: 1  •  warfarin (COUMADIN) 4 MG tablet, Take 4 mg by mouth Daily., Disp: , Rfl:   •  Zinc 50 MG capsule, Take  by mouth., Disp: , Rfl:   •  nystatin (MYCOSTATIN) 206410 UNIT/GM cream, Apply 1 application topically to the appropriate area as directed 2 (Two) Times a Day As Needed (skin yeast rash)., Disp: 30 g, Rfl: 1  •  vitamin E 600 UNIT capsule, Take 400 Units by mouth Daily., Disp: , Rfl:     No problems with medications.    Allergies   Allergen Reactions   • Macrobid [Nitrofurantoin Macrocrystal] Rash       Review of Systems  GENERAL:  Active/slower with limits, speed, stamina for age and exertional fatigue. Sleep is ok. No fever now.  SKIN: No rash/skin lesion of concern.   ENDO:  No syncope, near or diaphoretic sweaty spells.  HEENT: No recent head injury; or change in occ headache.   No vision change.  No significant hearing loss.  Ears without pain/drainage.  No sore throat.  No significant nasal/sinus congestion/drainage. No epistaxis.  CHEST: No chest wall tenderness or mass.  Infrequent cough, without wheeze.  No SOB; no hemoptysis.  CV: No chest pain; same occ palpitations and minimal ankle edema.  GI: No  heartburn, dysphagia.  No abdominal pain, constipation; stools trend loose.   No rectal bleeding, or melena.    :  Voids without dysuria, or incontinence to completion but urgency, leakage, frequency.  ORTHO: No painful/swollen joints but various on /off sore. .  No change occ/on-off  sore neck or back.  No acute neck or back pain without recent injury.  NEURO: Still on/off mild dizzy; no vertigo.  No weakness of extremities except above.  No numbness/paresthesias.   PSYCH: No memory loss.  Mood good but variable anxious, depressed but/and not suicidal.  Tries to tolerate stress .   Screening:  Gyne: SHUN+BSO/Ari/[facility]/[id number]  Mammogram: 1.18.22  Bone density: 10.31.18/bone d-deca/Yolanda/Ts L1 +1.9, hip +0.3: 10.31.18  Low dose CT chest:Tobacco-smoker/none: NA  GI:   Colon-div///6.2.21/7y  EGD-///6.2.21  Prostate: NA  Usual lab order  6m CBC, CMP, TSH, fT4  12m CBC, CMP, LIPID, TSH, fT4, Vit D iron, ferritin, B12, folate     Copy/paste function used for ROS/exam AND each area of these were reviewed, updated, confirmed and supplemented as needed.   Data reviewed:   Recent admit/ER/MD visits: last visit; recent phone messages  Last cardiac testing:   Echo:   Results for orders placed during the hospital encounter of 09/22/17    Adult Stress Echo Only    Interpretation Summary  · Normal stress echo with no significant echocardiographic evidence for myocardial ischemia.  · The patient reported shortness of breath during the stress test. The patient experienced no angina during the stress test.  · There was no ST segment deviation noted during stress.  · Fair exercise tolerance.    Echo; 9.12.17  · Left ventricular systolic function is normal. Estimated EF appears to be in the range of 56 - 60%.  · Trace aortic valve regurgitation is present.  · Trace tricuspid valve regurgitation is present. Estimated right ventricular systolic pressure from tricuspid regurgitation is normal (<35 mmHg).    Radiology  considered:   CT Chest With & Without Contrast Diagnostic    Result Date: 4/26/2022  1.. Bilateral lower lobe and left lingular subsegmental atelectasis. No evidence of consolidative pneumonia or effusion. No discrete endobronchial lesion is present. There are no enlarged mediastinal or axillary lymph nodes present. 2. Mild enlargement of the right lobe of the thyroid gland with associated nodularity. There is no mass effect on the airway. 3. The thoracic aorta and great vessels as well as the pulmonary arteries are normal in caliber. 4. Mild steatosis of the liver. A small hiatal hernia is present. This report was finalized on 04/26/2022 17:04 by Dr. Noah Ferro MD.    US Thyroid    Result Date: 5/4/2022  1. Few thyroid nodules described in detail above. The largest is present within the mid to inferior right thyroid lobe consistent with a TI RADS level 3 lesion for which follow-up imaging assessment recommended to document stability at 1, 3, and 5 years. This report was finalized on 05/04/2022 16:13 by Dr. Kathy Sherman MD.      Lab Results:  Results for orders placed or performed in visit on 07/25/22   POC INR    Specimen: Blood   Result Value Ref Range    INR 1.90 (A) 0.9 - 1.1       A1C:No results for input(s): HGBA1C in the last 69449 hours.  GLUCOSE:  Lab Results - Last 18 Months   Lab Units 07/18/22  0858 01/07/22  0658 07/06/21  0747 04/08/21  1038 03/29/21  0408 03/28/21  0500 03/27/21  1352   GLUCOSE mg/dL 106* 94 92 91 85 93 176*     LIPID:  Lab Results - Last 18 Months   Lab Units 07/18/22  0858 07/06/21  0747   CHOLESTEROL mg/dL 161 220*   LDL CHOL mg/dL 87 136*   HDL CHOL mg/dL 51 48   TRIGLYCERIDES mg/dL 131 201*     PSA:No results for input(s): PSA in the last 82756 hours.    CBC:  Lab Results - Last 18 Months   Lab Units 07/18/22  0858 01/07/22  0658 07/06/21  0747 04/08/21  1038 03/28/21  0500 03/27/21  1352 02/21/21  2123   WBC 10*3/mm3 5.23 5.64 5.36 5.52 6.18 9.21 6.82   HEMOGLOBIN g/dL  14.9 13.9 14.6 14.8 11.6* 14.8 14.9   HEMATOCRIT % 44.4 42.0 43.4 44.6 33.8* 42.0 41.0   PLATELETS 10*3/mm3 293 281 307 347 259 369 282   IRON mcg/dL 102  --  86  --   --   --   --       BMP/CMP:  Lab Results - Last 18 Months   Lab Units 07/18/22  0858 04/26/22  1453 01/07/22  0658 07/06/21  0747 04/08/21  1038 03/29/21  0408 03/28/21  0500 03/27/21  1352 02/21/21  2209 02/14/21  1028 02/12/21  1212   SODIUM mmol/L 139  --  142 144 143 142 139 135* 141   < > 139   POTASSIUM mmol/L 3.9  --  3.8 4.0 4.1 3.9 3.2* 2.8* 3.2*   < > 3.8   CHLORIDE mmol/L 100  --  103 103 102 109* 102 91* 100   < > 98   TOTAL CO2 mmol/L 28.9  --  30.1* 29.4* 29.2*  --   --   --   --   --  26.7   CO2 mmol/L  --   --   --   --   --  28.0 30.0* 27.0 29.0   < >  --    GLUCOSE mg/dL 106*  --  94 92 91 85 93 176* 96   < > 104*   BUN mg/dL 17  --  14 12 11 4* 9 14 11   < > 14   CREATININE mg/dL 0.95 1.10 0.80 0.85 0.75 0.55* 0.68 0.90 0.82   < > 0.84   EGFR IF NONAFRICN AM mL/min/1.73  --   --  70 66 76 108 85 61 68   < > 66   EGFR IF AFRICN AM mL/min/1.73  --   --  85 79 92  --   --   --   --   --  81   EGFR RESULT mL/min/1.73 63.0  --   --   --   --   --   --   --   --   --   --    CALCIUM mg/dL 9.8  --  9.8 10.0 10.7* 8.5* 8.7 10.6* 9.2   < > 9.8    < > = values in this interval not displayed.     HEPATIC:  Lab Results - Last 18 Months   Lab Units 07/18/22  0858 01/07/22  0658 07/06/21  0747 04/08/21  1038 03/28/21  0500 03/27/21  1352 02/21/21  2209   ALT (SGPT) U/L 19 23 13 19 13 20 17   AST (SGOT) U/L 20 22 16 22 19 22 18   ALK PHOS U/L 74 89 74 59 41 60 58     Vit D:  Lab Results - Last 18 Months   Lab Units 07/18/22  0858 07/06/21  0747   VIT D 25 HYDROXY ng/ml 48.3 36.9     THYROID:  Lab Results - Last 18 Months   Lab Units 07/18/22  0858 01/07/22  0658 07/06/21  0747   TSH uIU/mL 4.120 4.890* 3.760   FREE T4 ng/dL 1.16 1.18 1.07       Objective   /78 (BP Location: Right arm, Patient Position: Sitting, Cuff Size: Large Adult)    "Pulse 94   Temp 97.3 °F (36.3 °C) (Infrared)   Resp 16   Ht 162.6 cm (64\")   Wt 89.8 kg (198 lb)   SpO2 98%   Breastfeeding No   BMI 33.99 kg/m²   Body mass index is 33.99 kg/m².    Recent Vitals       4/11/2022 6/30/2022 7/20/2022       BP: 122/76 128/82 132/84     Pulse: 81 -- --     Weight: 88.5 kg (195 lb) 88 kg (194 lb) 87.5 kg (193 lb)     BMI (Calculated): 33.5 33.3 33.1           Physical Exam  GENERAL:  Well nourished/developed in no acute distress.   SKIN: Turgor ok without wound, rash, lesion.   HEENT: Normal cephalic without trauma.  Pupils equal round reactive to light. Extraocular motions full without nystagmus.   External canals nonobstructive nontender without reddness. Tymphatic membranes sharri with shanika structures intact.   Oral cavity without growths, exudates, and moist.  Posterior pharynx without mass, obstruction, redness.  No thyromegaly, mass, tenderness, lymphadenopathy and supple.  CV: Regular rhythm.  No murmur, gallop, trace LE edema. Posterior pulses intact.  No carotid bruits.  CHEST: No chest wall tenderness or mass.   LUNGS: Symmetric motion with clear to auscultation.   ABD: Soft, nontender without mass.   ORTHO: Symmetric extremities without swelling/point tenderness; even anterior R ankle.  Full gross range of motion.  NEURO: CN 2-12 grossly intact.  Symmetric facies and UE/LE. 3/5 strength throughout. 1/4 x bicep knee equal reflexes.  Nonfocal use extremities. Speech clear.  Reduced minimal light touch with monofilament, vibratory sensation with tuning fork; equal toes/distal feet.    PSYCH: Oriented x 3.  Pleasant calm, well kept.  Purposeful/directed conservation with intact short/long gross memory.    Assessment & Plan     1. Thyroid nodule 5.2022-12m    2. Mixed hyperlipidemia    3. Anticoagulated-PE/cardiac arrest/(xarelto) coumadin    4. HTN (hypertension), benign    5. Congestive heart failure, unspecified HF chronicity, unspecified heart failure type (HCC)    6. " Hypopotassemia-diuretic    7. Depression, unspecified depression type    8. Primary generalized (osteo)arthritis    9. Overactive bladder    10. Elevated fasting glucose    11. Anemia, unspecified type        Discussions/medical decisions/reviews:  BP ok  Other vitals ok  DM/  Lipid LDL 87  PSA NA  CBC ok  Renal ok  Liver ok  Vit D ok  Thyroid ok    Reviewed INR; same dose today  Warned again of abx/coumadin interaction  mybritrig ok with coumadin; but advise 1w INR if starts  Difficulty treating overactive bladder discussed; especially meds/interactions  Elevated fasting glucose; for now suggest diet/exercise/weight loss; maybe soon metformin  (BS may have helped cause recent yeast infection/rash)      Medical decision issues:   Data review above:   Rx: reviewed and decisions:   Visit today involved chronic significant medical problems or differentials and/or intensive drug monitoring: ie potential to cause serious morbidity or death:   Rx changes: none  No orders of the defined types were placed in this encounter.    Orders placed:   LAB/Testing/Referrals: reviewed/orders:   Today:   Orders Placed This Encounter   Procedures   • POC INR     Chronic/recurrent labs above or change to:   6m CBC, CMP, A1c, TSH, fT4  12m CBC, CMP, A1c, LIPID, TSH, fT4, Vit D iron, ferritin, B12, folate     Screening reviewed/updated     Immunization History   Administered Date(s) Administered   • COVID-19 (MODERNA) 1st, 2nd, 3rd Dose Only 01/22/2021, 04/16/2021, 12/15/2021   • Flu Vaccine Quad PF >36MO 10/09/2017   • Fluad Quad 65+ 10/12/2020   • Fluzone High-Dose 65+yrs 11/23/2021   • Fluzone Split Quad (Multi-dose) 11/30/2016   • Pneumococcal Conjugate 13-Valent (PCV13) 11/30/2016, 10/09/2017   • Pneumococcal Polysaccharide (PPSV23) 07/01/2020   • Tdap 11/02/2020     Vaccine reviewed: today none; later we advised/reaffirmed our support/suggestion for staying complete with covid- covid boosters, seasonal flu/yearly and any  missing vaccine from list we supplied; we suggest contact with local health department office to review missing/needed vaccines and then bring nursing documentation for these vaccines to this office.     Health maintenance:   Body mass index is 33.99 kg/m².  BMI is >= 30 and <35. (Class 1 Obesity). The following options were offered after discussion;: exercise counseling/recommendations and nutrition counseling/recommendations      Tobacco use reviewed:   Lucy Sousa  reports that she has never smoked. She has never used smokeless tobacco..    There are no Patient Instructions on file for this visit.    Follow up: Return for INR one week; .  Future Appointments   Date Time Provider Department Center   8/1/2022  9:00 AM NURSE/MA [facility]   8/25/2022  9:00 AM NURSE/MA [facility]   8/31/2022  1:45 PM Kaitlin Massey DO [facility]

## 2022-08-01 ENCOUNTER — CLINICAL SUPPORT (OUTPATIENT)
Dept: FAMILY MEDICINE CLINIC | Facility: CLINIC | Age: 72
End: 2022-08-01

## 2022-08-01 DIAGNOSIS — Z79.01 ANTICOAGULATED: ICD-10-CM

## 2022-08-01 LAB — INR PPP: 2.1 (ref 0.9–1.1)

## 2022-08-01 PROCEDURE — 36416 COLLJ CAPILLARY BLOOD SPEC: CPT | Performed by: FAMILY MEDICINE

## 2022-08-01 PROCEDURE — 85610 PROTHROMBIN TIME: CPT | Performed by: FAMILY MEDICINE

## 2022-08-01 NOTE — PROGRESS NOTES
Here for PT/INR, PT was 25.6 and INR was 2.1. Pt was told to take 4 mg and to recheck PT one month, copy of dosing schedule given to patient.

## 2022-08-11 RX ORDER — TRIAMTERENE AND HYDROCHLOROTHIAZIDE 37.5; 25 MG/1; MG/1
CAPSULE ORAL
Qty: 90 CAPSULE | Refills: 1 | Status: SHIPPED | OUTPATIENT
Start: 2022-08-11 | End: 2023-02-16

## 2022-08-11 RX ORDER — WARFARIN SODIUM 4 MG/1
TABLET ORAL
Qty: 180 TABLET | Refills: 1 | Status: SHIPPED | OUTPATIENT
Start: 2022-08-11 | End: 2023-02-22 | Stop reason: SDUPTHER

## 2022-08-11 NOTE — TELEPHONE ENCOUNTER
Rx Refill Note  Requested Prescriptions     Pending Prescriptions Disp Refills   • triamterene-hydrochlorothiazide (DYAZIDE) 37.5-25 MG per capsule [Pharmacy Med Name: TRIAMTERENE/HYDROCHLOROTHIAZIDE 37.5-25 CAPSULE] 90 capsule 1     Sig: TAKE ONE CAPSULE EVERY MORNING GENERIC FOR DYAZIDE   • warfarin (COUMADIN) 4 MG tablet [Pharmacy Med Name: WARFARIN SODIUM 4MG TABLET] 180 tablet 1     Sig: TAKE 2 TABLETS BY MOUTH EVERY NIGHT FOR 90 DAYS. SUBJECT TO CHANGE WITH TITRATIONS/ PT/INR      Last office visit with prescribing clinician: 7/25/2022      Next office visit with prescribing clinician: Visit date not found            Allyn Hartman LPN  08/11/22, 09:56 CDT

## 2022-08-17 ENCOUNTER — OFFICE VISIT (OUTPATIENT)
Dept: FAMILY MEDICINE CLINIC | Facility: CLINIC | Age: 72
End: 2022-08-17

## 2022-08-17 ENCOUNTER — TELEPHONE (OUTPATIENT)
Dept: FAMILY MEDICINE CLINIC | Facility: CLINIC | Age: 72
End: 2022-08-17

## 2022-08-17 VITALS
WEIGHT: 190 LBS | TEMPERATURE: 97.8 F | BODY MASS INDEX: 32.44 KG/M2 | HEART RATE: 101 BPM | RESPIRATION RATE: 14 BRPM | OXYGEN SATURATION: 95 % | HEIGHT: 64 IN

## 2022-08-17 DIAGNOSIS — U07.1 COVID-19: Primary | ICD-10-CM

## 2022-08-17 PROCEDURE — 99213 OFFICE O/P EST LOW 20 MIN: CPT | Performed by: NURSE PRACTITIONER

## 2022-08-17 NOTE — PROGRESS NOTES
Subjective   Chief Complaint:  Evaluation of Respiratory Symptoms    History of Present Illness:  This 72 y.o. female was seen in the office today in the drive-thru.  Reports has been experiencing cough, nasal congestion, ear pressure, sinus pressure, head congestion, diarrhea with an acute onset starting 4 days ago.    Pertinent negatives include no report of fever, loss of taste or smell.    Patient denies known recent exposure to active Covid-19 cases.        Covid-19 Vaccination Status: Moderna x2, boosted times 1 with Moderna    Allergies   Allergen Reactions   • Macrobid [Nitrofurantoin Macrocrystal] Rash      Current Outpatient Medications on File Prior to Visit   Medication Sig   • atorvastatin (LIPITOR) 20 MG tablet TAKE 1 TABLET BY MOUTH DAILY.   • Cholecalciferol (Vitamin D3) 25 MCG (1000 UT) capsule Take 1,000 Units by mouth Daily.   • cyanocobalamin (VITAMIN B-12) 50 MCG tablet tablet Take 50 mcg by mouth Daily.   • esomeprazole (nexIUM) 40 MG capsule TAKE ONE CAPSULE TWICE DAILY GENERIC FOR NEXIUM   • furosemide (LASIX) 20 MG tablet TAKE 1 TABLET BY MOUTH DAILY.   • losartan (COZAAR) 50 MG tablet TAKE ONE TABLET DAILY GENERIC FOR COZAAR   • Multiple Vitamin (MULTI VITAMIN DAILY PO) Take 1 tablet by mouth daily.   • nystatin (MYCOSTATIN) 857521 UNIT/GM cream Apply 1 application topically to the appropriate area as directed 2 (Two) Times a Day As Needed (skin yeast rash).   • nystatin (MYCOSTATIN) 063594 UNIT/GM powder Apply  topically to the appropriate area as directed 3 (Three) Times a Day As Needed (skin yeast rash).   • potassium chloride (K-DUR,KLOR-CON) 20 MEQ CR tablet TAKE ONE TABLET TWICE DAILY   • triamterene-hydrochlorothiazide (DYAZIDE) 37.5-25 MG per capsule TAKE ONE CAPSULE EVERY MORNING GENERIC FOR DYAZIDE   • vitamin E 600 UNIT capsule Take 400 Units by mouth Daily.   • warfarin (COUMADIN) 4 MG tablet TAKE 2 TABLETS BY MOUTH EVERY NIGHT FOR 90 DAYS. SUBJECT TO CHANGE WITH TITRATIONS/  PT/INR   • Zinc 50 MG capsule Take  by mouth.     No current facility-administered medications on file prior to visit.      Past Medical, Surgical, Social, and Family History:  Past Medical History:   Diagnosis Date   • Cancer (HCC)     skin squamous cell   • DJD (degenerative joint disease)    • GERD (gastroesophageal reflux disease)    • HTN (hypertension)    • Hx of colonic polyp    • Pulmonary embolism (HCC)     post surgical with cardiac arrest     Past Surgical History:   Procedure Laterality Date   • BREAST SURGERY      breast reduction   • CHOLECYSTECTOMY     • COLONOSCOPY  04/01/2019    Diverticulosis otherwise normal exam repeat in 5 years   • COLONOSCOPY N/A 06/02/2021    Procedure: COLONOSCOPY WITH ANESTHESIA;  Surgeon: Heath Mckeon MD;  Location: [facility] ENDOSCOPY;  Service: Gastroenterology;  Laterality: N/A;  Pre: Nausea  Post: Diverticulosis  Dr. Berg  CO2 Inflation Used   • COLONOSCOPY W/ POLYPECTOMY  02/13/2014    Hyperplastic polyp at 20 cm, Diverticulosis repeat exam in 5 years   • EKOS CATHETER PLACEMENT Bilateral 11/17/2016    Procedure: Ekos catheter placement;  Surgeon: Daniel Underwood MD;  Location:  [facility] CATH INVASIVE [facility];  Service:    • EKOS CATHETER REMOVAL Bilateral 11/18/2016    Procedure: Ekos catheter removal with stent placement;  Surgeon: Daniel Underwood MD;  Location:  [facility] CATH INVASIVE [facility];  Service:    • ENDOSCOPY  01/22/2010    Negative endoscopy noting a healed gastric ulcer   • ENDOSCOPY  10/21/2009    Superficial mucosal erosion, chronic active gastritis   • ENDOSCOPY  04/01/2019    Distal esophageal ring dilated   • ENDOSCOPY N/A 06/02/2021    Procedure: ESOPHAGOGASTRODUODENOSCOPY WITH ANESTHESIA;  Surgeon: Heath Mckeon MD;  Location: [facility] ENDOSCOPY;  Service: Gastroenterology;  Laterality: N/A;  Pre: Nausea and Vomiting, Dysphagia  Post: Dilated with 48 [place]bs  Dr. 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 with transposition flap;  Surgeon: Vadim Le MD;  Location:  [facility] OR;  Service: ENT;  Laterality: Right;   • HERNIA REPAIR     • REPLACEMENT TOTAL KNEE     • TOTAL ABDOMINAL HYSTERECTOMY      SHUN bilateral oophorectomy for bleeding   • TUBAL ABDOMINAL LIGATION       Social History     Socioeconomic History   • Marital status:      Spouse name: Nima   • Number of children: 1   • Years of education: 13   Tobacco Use   • Smoking status: Never Smoker   • Smokeless tobacco: Never Used   Vaping Use   • Vaping Use: Never used   Substance and Sexual Activity   • Alcohol use: No   • Drug use: No   • Sexual activity: Not Currently     Partners: Male     Birth control/protection: Post-menopausal     Family History   Problem Relation Age of Onset   • Coronary artery disease Father    • Coronary artery disease Mother    • Diabetes Brother    • Hypertension Brother    • Colon polyps Brother    • Breast cancer Maternal Grandmother    • Heart disease Maternal Grandfather    • Breast cancer Maternal Cousin    • Colon cancer Neg Hx      Objective   Covid Precautions Maintained  Physical Exam  Constitutional:       General: She is not in acute distress.     Appearance: She is ill-appearing.   Pulmonary:      Effort: Pulmonary effort is normal. No respiratory distress.   Neurological:      Mental Status: She is alert.     Pulse 101   Temp 97.8 °F (36.6 °C)   Resp 14   Ht 162.6 cm (64\")   Wt 86.2 kg (190 lb)   SpO2 95%   BMI 32.61 kg/m²     Assessment & Plan   Diagnoses and all orders for this visit:    1. COVID-19 (Primary)  -     Molnupiravir (LAGEVRIO) 200 MG capsule; Take 4 capsules by mouth Every 12 (Twelve) Hours for 5 days.  Dispense: 40 capsule; " Refill: 0    Discussion:  Advised and educated plan of care.  Advise ED guidelines, quarantine guidelines-antivirals to follow.    Follow-up:  Return if symptoms worsen or fail to improve.    Electronically signed by LANRE Hernandez, 08/17/22, 11:25 AM CDT.

## 2022-08-17 NOTE — TELEPHONE ENCOUNTER
"Vm \"I want to get the medication for covid, we are both sick and im on my 5th day and really need it\"  "

## 2022-08-25 ENCOUNTER — CLINICAL SUPPORT (OUTPATIENT)
Dept: FAMILY MEDICINE CLINIC | Facility: CLINIC | Age: 72
End: 2022-08-25

## 2022-08-25 DIAGNOSIS — Z79.01 ANTICOAGULATED: ICD-10-CM

## 2022-08-25 LAB — INR PPP: 2.4 (ref 0.9–1.1)

## 2022-08-25 PROCEDURE — 85610 PROTHROMBIN TIME: CPT | Performed by: FAMILY MEDICINE

## 2022-08-25 PROCEDURE — 36416 COLLJ CAPILLARY BLOOD SPEC: CPT | Performed by: FAMILY MEDICINE

## 2022-08-25 NOTE — PROGRESS NOTES
Here for PT/INR, PT was 28.7 and INR was 2.4. Pt was told to take 4 mg and to recheck PT one month, copy of dosing schedule given to patient.

## 2022-08-31 ENCOUNTER — OFFICE VISIT (OUTPATIENT)
Dept: OBSTETRICS AND GYNECOLOGY | Facility: CLINIC | Age: 72
End: 2022-08-31

## 2022-08-31 VITALS
BODY MASS INDEX: 32.95 KG/M2 | HEIGHT: 64 IN | DIASTOLIC BLOOD PRESSURE: 80 MMHG | SYSTOLIC BLOOD PRESSURE: 120 MMHG | WEIGHT: 193 LBS

## 2022-08-31 DIAGNOSIS — N39.46 MIXED INCONTINENCE: Primary | ICD-10-CM

## 2022-08-31 PROCEDURE — 99213 OFFICE O/P EST LOW 20 MIN: CPT | Performed by: OBSTETRICS & GYNECOLOGY

## 2022-09-22 ENCOUNTER — OUTSIDE FACILITY SERVICE (OUTPATIENT)
Dept: OBSTETRICS AND GYNECOLOGY | Facility: CLINIC | Age: 72
End: 2022-09-22

## 2022-09-22 PROCEDURE — 51784 ANAL/URINARY MUSCLE STUDY: CPT | Performed by: OBSTETRICS & GYNECOLOGY

## 2022-09-22 PROCEDURE — 51729 CYSTOMETROGRAM W/VP&UP: CPT | Performed by: OBSTETRICS & GYNECOLOGY

## 2022-09-22 PROCEDURE — 51797 INTRAABDOMINAL PRESSURE TEST: CPT | Performed by: OBSTETRICS & GYNECOLOGY

## 2022-09-22 PROCEDURE — 51741 ELECTRO-UROFLOWMETRY FIRST: CPT | Performed by: OBSTETRICS & GYNECOLOGY

## 2022-09-23 ENCOUNTER — CLINICAL SUPPORT (OUTPATIENT)
Dept: FAMILY MEDICINE CLINIC | Facility: CLINIC | Age: 72
End: 2022-09-23

## 2022-09-23 DIAGNOSIS — Z79.01 ANTICOAGULATED: ICD-10-CM

## 2022-09-23 LAB — INR PPP: 2.5 (ref 0.9–1.1)

## 2022-09-23 PROCEDURE — 36416 COLLJ CAPILLARY BLOOD SPEC: CPT | Performed by: FAMILY MEDICINE

## 2022-09-23 PROCEDURE — 85610 PROTHROMBIN TIME: CPT | Performed by: FAMILY MEDICINE

## 2022-09-23 NOTE — PROGRESS NOTES
Here for PT/INR, PT was 29.9 and INR was 2.5. Pt was told to take 4mg and to recheck PT 1 month, copy of dosing schedule given to patient.

## 2022-10-06 ENCOUNTER — OFFICE VISIT (OUTPATIENT)
Dept: OBSTETRICS AND GYNECOLOGY | Facility: CLINIC | Age: 72
End: 2022-10-06

## 2022-10-06 VITALS
DIASTOLIC BLOOD PRESSURE: 70 MMHG | WEIGHT: 195 LBS | SYSTOLIC BLOOD PRESSURE: 122 MMHG | BODY MASS INDEX: 33.29 KG/M2 | HEIGHT: 64 IN

## 2022-10-06 DIAGNOSIS — N81.10 FEMALE CYSTOCELE: ICD-10-CM

## 2022-10-06 DIAGNOSIS — R39.15 URINARY URGENCY: ICD-10-CM

## 2022-10-06 DIAGNOSIS — Z79.01 CHRONIC ANTICOAGULATION: ICD-10-CM

## 2022-10-06 DIAGNOSIS — E66.9 OBESITY (BMI 30-39.9): ICD-10-CM

## 2022-10-06 DIAGNOSIS — N99.3 VAGINAL VAULT PROLAPSE AFTER HYSTERECTOMY: ICD-10-CM

## 2022-10-06 DIAGNOSIS — N39.3 SUI (STRESS URINARY INCONTINENCE, FEMALE): Primary | ICD-10-CM

## 2022-10-06 DIAGNOSIS — Z86.711 HISTORY OF PULMONARY EMBOLUS (PE): ICD-10-CM

## 2022-10-06 DIAGNOSIS — Z01.818 PRE-OP EVALUATION: ICD-10-CM

## 2022-10-06 PROCEDURE — 99214 OFFICE O/P EST MOD 30 MIN: CPT | Performed by: OBSTETRICS & GYNECOLOGY

## 2022-10-06 RX ORDER — ACETAMINOPHEN 500 MG
1000 TABLET ORAL ONCE
Status: CANCELLED | OUTPATIENT
Start: 2022-10-06 | End: 2022-10-06

## 2022-10-06 RX ORDER — SODIUM CHLORIDE 0.9 % (FLUSH) 0.9 %
10 SYRINGE (ML) INJECTION EVERY 12 HOURS SCHEDULED
Status: CANCELLED | OUTPATIENT
Start: 2022-10-06

## 2022-10-06 RX ORDER — SODIUM CHLORIDE 0.9 % (FLUSH) 0.9 %
10 SYRINGE (ML) INJECTION AS NEEDED
Status: CANCELLED | OUTPATIENT
Start: 2022-10-06

## 2022-10-06 RX ORDER — GABAPENTIN 100 MG/1
600 CAPSULE ORAL ONCE
Status: CANCELLED | OUTPATIENT
Start: 2022-10-06 | End: 2022-10-06

## 2022-10-06 NOTE — PROGRESS NOTES
Subjective     Chief Complaint   Patient presents with   • Results     Pt states that she is here to discuss urodynamics test results.        Lucy Sousa is a 72 y.o. year old who presents to be seen for pelvic prolapse and urinary incontinence.      The patient is s/p hysterectomy.  She reports positive pelvic pain, vaginal pressure, bulge outside her body, urinary incontinence and urinary hesitancy, but denies stool trapping.  Patient was admitted to hospital with a UTI last year, but that has not been a frequent problem.  The patient feels like the problem began many years ago, but has gotten a lot worse in the past year.  She is not currently sexually active, and is not interested in being sexually active in the future because her  has ED.  Lucy Sousa has not had surgery for this problem in the past.    Patient has already tried anticholinergic medication for urgency, but did not find that helpful.  Patient drinks mostly water during the day, but starts day with milk or OJ. She will have tea with lunch and dinner.      MONTY occurs with coughing, sneezing, or standing from a seated position.  Patient also reports leakage with any strong urge to void.    Past Medical History:   Diagnosis Date   • Cancer (HCC)     skin squamous cell   • DJD (degenerative joint disease)    • GERD (gastroesophageal reflux disease)    • HTN (hypertension)    • Hx of colonic polyp    • Pulmonary embolism (HCC)     post surgical with cardiac arrest       Current Outpatient Medications:   •  atorvastatin (LIPITOR) 20 MG tablet, TAKE 1 TABLET BY MOUTH DAILY., Disp: 90 tablet, Rfl: 3  •  Cholecalciferol (Vitamin D3) 25 MCG (1000 UT) capsule, Take 1,000 Units by mouth Daily., Disp: , Rfl:   •  cyanocobalamin (VITAMIN B-12) 50 MCG tablet tablet, Take 50 mcg by mouth Daily., Disp: , Rfl:   •  esomeprazole (nexIUM) 40 MG capsule, TAKE ONE CAPSULE TWICE DAILY GENERIC FOR NEXIUM, Disp: 180 capsule, Rfl: 2  •  furosemide (LASIX) 20  MG tablet, TAKE 1 TABLET BY MOUTH DAILY., Disp: 90 tablet, Rfl: 3  •  losartan (COZAAR) 50 MG tablet, TAKE ONE TABLET DAILY GENERIC FOR COZAAR, Disp: 90 tablet, Rfl: 2  •  Multiple Vitamin (MULTI VITAMIN DAILY PO), Take 1 tablet by mouth daily., Disp: , Rfl:   •  nystatin (MYCOSTATIN) 743598 UNIT/GM cream, Apply 1 application topically to the appropriate area as directed 2 (Two) Times a Day As Needed (skin yeast rash)., Disp: 30 g, Rfl: 1  •  nystatin (MYCOSTATIN) 984721 UNIT/GM powder, Apply  topically to the appropriate area as directed 3 (Three) Times a Day As Needed (skin yeast rash)., Disp: 60 g, Rfl: 3  •  potassium chloride (K-DUR,KLOR-CON) 20 MEQ CR tablet, TAKE ONE TABLET TWICE DAILY, Disp: 180 tablet, Rfl: 2  •  triamterene-hydrochlorothiazide (DYAZIDE) 37.5-25 MG per capsule, TAKE ONE CAPSULE EVERY MORNING GENERIC FOR DYAZIDE, Disp: 90 capsule, Rfl: 1  •  vitamin E 600 UNIT capsule, Take 400 Units by mouth Daily., Disp: , Rfl:   •  warfarin (COUMADIN) 4 MG tablet, TAKE 2 TABLETS BY MOUTH EVERY NIGHT FOR 90 DAYS. SUBJECT TO CHANGE WITH TITRATIONS/ PT/INR, Disp: 180 tablet, Rfl: 1  •  Zinc 50 MG capsule, Take  by mouth., Disp: , Rfl:   Family History   Problem Relation Age of Onset   • Coronary artery disease Father    • Coronary artery disease Mother    • Diabetes Brother    • Hypertension Brother    • Colon polyps Brother    • Breast cancer Maternal Grandmother    • Heart disease Maternal Grandfather    • Breast cancer Maternal Cousin    • Colon cancer Neg Hx      Social History     Socioeconomic History   • Marital status:      Spouse name: Nima   • Number of children: 1   • Years of education: 13   Tobacco Use   • Smoking status: Never Smoker   • Smokeless tobacco: Never Used   Vaping Use   • Vaping Use: Never used   Substance and Sexual Activity   • Alcohol use: No   • Drug use: No   • Sexual activity: Not Currently     Partners: Male     Birth control/protection: Post-menopausal     Allergies  "  Allergen Reactions   • Macrobid [Nitrofurantoin Macrocrystal] Rash       Family History   Problem Relation Age of Onset   • Coronary artery disease Father    • Coronary artery disease Mother    • Diabetes Brother    • Hypertension Brother    • Colon polyps Brother    • Breast cancer Maternal Grandmother    • Heart disease Maternal Grandfather    • Breast cancer Maternal Cousin    • Colon cancer Neg Hx      Review of Systems   Constitutional: Negative for activity change and unexpected weight change.   Respiratory: Negative for shortness of breath.    Cardiovascular: Negative for chest pain.   Gastrointestinal: Negative for abdominal pain.        No problems passing stool   Genitourinary: Positive for difficulty urinating, enuresis, frequency and urgency.           Objective   /70   Ht 162.6 cm (64\")   Wt 88.5 kg (195 lb)   BMI 33.47 kg/m²     Physical Exam  Vitals and nursing note reviewed. Exam conducted with a chaperone present.   Constitutional:       General: She is not in acute distress.     Appearance: She is well-developed. She is obese.   HENT:      Head: Normocephalic and atraumatic.   Neck:      Thyroid: No thyromegaly.   Pulmonary:      Effort: Pulmonary effort is normal.   Abdominal:      General: There is no distension.      Palpations: Abdomen is soft.      Tenderness: There is no abdominal tenderness.   Genitourinary:     General: Normal vulva.      Comments: Grade IV cystocele with vaginal vault prolapse.  Smaller rectocele.  Vaginal mucosa pale with loss of rugae  Musculoskeletal:         General: Normal range of motion.      Cervical back: Normal range of motion.   Skin:     General: Skin is warm and dry.   Neurological:      Mental Status: She is alert and oriented to person, place, and time.   Psychiatric:         Behavior: Behavior normal.         Judgment: Judgment normal.         Imaging   No data reviewed       Assessment & Plan    Diagnoses and all orders for this visit:    1. MONTY " "(stress urinary incontinence, female) (Primary): Urodynamic testing not consistent with patient's history of leakage.  Patient says leakage is elicited by coughing, sneezing, and standing up from a seated position.  Patient reports leakage with these triggers is significant and is \"ruling mt life\".  Treatment options for the patient's stress urinary incontinence were reviewed to include consistent use of pelvic weights, PT for pelvic strengthening and a mid-urethral sling.  The patient was advised that the success rate of a mid-urethral sling is approximately 85%; in addition, she was also informed that studies have indicated PT to be equally successful if patients are compliant.  The risks of surgery were described to include the short term need for a avila catheter due to local swelling, the possible need to self-cath for three months if the sling is tensioned too tightly, and the possibility of surgical site infection.  Questions were answered, and the patient voiced understanding.  Patient understands that she may have to go home with a catheter on the day of surgery    2. Urinary urgency: Likely due to incomplete emptying, with large cystocele, but will test for infection  -     Urinalysis With Microscopic If Indicated (No Culture) - Urine, Clean Catch  -     Urine Culture - Urine, Urine, Clean Catch    3. Vaginal vault prolapse after hysterectomy: Anterior repair not recommended since the patient has no apical support.  Colpocleisis with a mid urethral sling has been recommended.  Patient is not sexually active and reports there is no chance that she will be, or want to be, in the future.  Colpocleisis procedure described, and the patient looks forward to having pressure from her prolapse relieved.  I think this will help with bladder emptying and decrease her urgency and frequency.  -     Case Request; Standing  -     ECG 12 Lead; Future  -     XR Chest 1 View; Future  -     Case Request    4. Female " cystocele    5. Obesity (BMI 30-39.9)    6. Pre-op evaluation  -     CBC and Differential; Future    7. History of pulmonary embolus (PE)  Comments:  in 2016    8. Chronic anticoagulation  Comments:  PE in 2016.  Pt has stopped anticoagulation for elective procedures in the past.  She will hold coumadin for 5 days prior to surgery    Other orders  -     Follow Anesthesia Guidelines / Protocol; Future  -     Chlorhexidine Skin Prep; Future        Kaci King MD  10/6/2022  08:42 CDT

## 2022-10-10 LAB
APPEARANCE UR: ABNORMAL
BACTERIA #/AREA URNS HPF: ABNORMAL /HPF
BACTERIA UR CULT: ABNORMAL
BACTERIA UR CULT: ABNORMAL
BILIRUB UR QL STRIP: NEGATIVE
CASTS URNS MICRO: ABNORMAL
COLOR UR: ABNORMAL
EPI CELLS #/AREA URNS HPF: ABNORMAL /HPF
GLUCOSE UR QL STRIP: NEGATIVE
HGB UR QL STRIP: NEGATIVE
KETONES UR QL STRIP: ABNORMAL
LEUKOCYTE ESTERASE UR QL STRIP: ABNORMAL
NITRITE UR QL STRIP: POSITIVE
OTHER ANTIBIOTIC SUSC ISLT: ABNORMAL
PH UR STRIP: 6.5 [PH] (ref 5–8)
PROT UR QL STRIP: ABNORMAL
RBC #/AREA URNS HPF: ABNORMAL /HPF
SP GR UR STRIP: ABNORMAL (ref 1–1.03)
UROBILINOGEN UR STRIP-MCNC: ABNORMAL MG/DL
WBC #/AREA URNS HPF: ABNORMAL /HPF

## 2022-10-11 DIAGNOSIS — N30.01 ACUTE CYSTITIS WITH HEMATURIA: Primary | ICD-10-CM

## 2022-10-11 DIAGNOSIS — N30.00 ACUTE CYSTITIS WITHOUT HEMATURIA: ICD-10-CM

## 2022-10-11 RX ORDER — CEPHALEXIN 500 MG/1
500 CAPSULE ORAL 3 TIMES DAILY
Qty: 21 CAPSULE | Refills: 0 | Status: SHIPPED | OUTPATIENT
Start: 2022-10-11 | End: 2022-10-18

## 2022-10-12 ENCOUNTER — PRE-ADMISSION TESTING (OUTPATIENT)
Dept: PREADMISSION TESTING | Facility: HOSPITAL | Age: 72
End: 2022-10-12

## 2022-10-12 ENCOUNTER — HOSPITAL ENCOUNTER (OUTPATIENT)
Dept: GENERAL RADIOLOGY | Facility: HOSPITAL | Age: 72
Discharge: HOME | End: 2022-10-12

## 2022-10-12 VITALS
HEART RATE: 71 BPM | SYSTOLIC BLOOD PRESSURE: 145 MMHG | WEIGHT: 200.4 LBS | RESPIRATION RATE: 16 BRPM | OXYGEN SATURATION: 98 % | HEIGHT: 63 IN | BODY MASS INDEX: 35.51 KG/M2 | DIASTOLIC BLOOD PRESSURE: 95 MMHG

## 2022-10-12 DIAGNOSIS — N99.3 VAGINAL VAULT PROLAPSE AFTER HYSTERECTOMY: ICD-10-CM

## 2022-10-12 LAB
ANION GAP SERPL CALCULATED.3IONS-SCNC: 7 MMOL/L (ref 5–15)
BUN SERPL-MCNC: 16 MG/DL (ref 8–23)
BUN/CREAT SERPL: 17.4 (ref 7–25)
CALCIUM SPEC-SCNC: 9.8 MG/DL (ref 8.6–10.5)
CHLORIDE SERPL-SCNC: 104 MMOL/L (ref 98–107)
CO2 SERPL-SCNC: 30 MMOL/L (ref 22–29)
CREAT SERPL-MCNC: 0.92 MG/DL (ref 0.57–1)
EGFRCR SERPLBLD CKD-EPI 2021: 65.5 ML/MIN/1.73
GLUCOSE SERPL-MCNC: 106 MG/DL (ref 65–99)
POTASSIUM SERPL-SCNC: 3.7 MMOL/L (ref 3.5–5.2)
SODIUM SERPL-SCNC: 141 MMOL/L (ref 136–145)

## 2022-10-12 PROCEDURE — 80048 BASIC METABOLIC PNL TOTAL CA: CPT

## 2022-10-12 PROCEDURE — 85025 COMPLETE CBC W/AUTO DIFF WBC: CPT | Performed by: OBSTETRICS & GYNECOLOGY

## 2022-10-12 PROCEDURE — 93010 ELECTROCARDIOGRAM REPORT: CPT | Performed by: INTERNAL MEDICINE

## 2022-10-12 PROCEDURE — 36415 COLL VENOUS BLD VENIPUNCTURE: CPT

## 2022-10-12 PROCEDURE — 71045 X-RAY EXAM CHEST 1 VIEW: CPT

## 2022-10-12 PROCEDURE — 93005 ELECTROCARDIOGRAM TRACING: CPT

## 2022-10-12 NOTE — DISCHARGE INSTRUCTIONS
Before you come to the hospital        Arrival time: AS DIRECTED BY OFFICE     YOU MAY TAKE THE FOLLOWING MEDICATION(S) THE MORNING OF SURGERY WITH A SIP OF WATER: NONE, PLEASE HOLD LOSARTAN 24 HOURS PREOP           ALL OTHER HOME MEDICATION CHECK WITH YOUR PHYSICIAN (especially if you are taking diabetes medicines or blood thinners)    Do not take any Erectile Dysfunction medications (EX: CIALIS, VIAGRA) 24 hours prior to surgery.      If you were given and instructed to use a germ- killing soap, use as directed the night before surgery and again the morning of surgery or as directed by your surgeon.    (See attached information for How to Use Chlorhexidine for Bathing if applicable.)            Eating and drinking restrictions prior to scheduled arrival time    2 Hours before arrival time STOP   Drinking Clear liquids (water, apple juice-no pulp)     6 Hours before arrival time STOP   Milk or drinks that contain milk, full liquids    6 Hours before arrival time STOP   Light meals or foods, such as toast or cereal    8 Hours before arrival time STOP   Heavy foods, such as meat, fried foods, or fatty foods    (It is extremely important that you follow these guidelines to prevent delay or cancelation of your procedure)     Clear Liquids  Water and flavored water                                                                      Clear Fruit juices, such as cranberry juice and apple juice.  Black coffee (NO cream of any kind, including powdered).  Plain tea  Clear bouillon or broth.  Flavored gelatin.  Soda.  Gatorade or Powerade.  Full liquid examples  Juices that have pulp.  Frozen ice pops that contain fruit pieces.  Coffee with creamer  Milk.  Yogurt.                MANAGING PAIN AFTER SURGERY    We know you are probably wondering what your pain will be like after surgery.  Following surgery it is unrealistic to expect you will not have pain.   Pain is how our bodies let us know that something is wrong or  cautions us to be careful.  That said, our goal is to make your pain tolerable.    Methods we may use to treat your pain include (oral or IV medications, PCAs, epidurals, nerve blocks, etc.)   While some procedures require IV pain medications for a short time after surgery, transitioning to pain medications by mouth allows for better management of pain.   Your nurse will encourage you to take oral pain medications whenever possible.  IV medications work almost immediately, but only last a short while.  Taking medications by mouth allows for a more constant level of medication in your blood stream for a longer period of time.      Once your pain is out of control it is harder to get back under control.  It is important you are aware when your next dose of pain medication is due.  If you are admitted, your nurse may write the time of your next dose on the white board in your room to help you remember.      We are interested in your pain and encourage you to inform us about aggravating factors during your visit.   Many times a simple repositioning every few hours can make a big difference.    If your physician says it is okay, do not let your pain prevent you from getting out of bed. Be sure to call your nurse for assistance prior to getting up so you do not fall.      Before surgery, please decide your tolerable pain goal.  These faces help describe the pain ratings we use on a 0-10 scale.   Be prepared to tell us your goal and whether or not you take pain or anxiety medications at home.          Preparing for Surgery  Preparing for surgery is an important part of your care. It can make things go more smoothly and help you avoid complications. The steps leading up to surgery may vary among hospitals. Follow all instructions given to you by your health care providers. Ask questions if you do not understand something. Talk about any concerns that you have.  Here are some questions to consider asking before your  surgery:  If my surgery is not an emergency (is elective), when would be the best time to have the surgery?  What arrangements do I need to make for work, home, or school?  What will my recovery be like? How long will it be before I can return to normal activities?  Will I need to prepare my home? Will I need to arrange care for me or my children?  Should I expect to have pain after surgery? What are my pain management options? Are there nonmedical options that I can try for pain?  Tell a health care provider about:  Any allergies you have.  All medicines you are taking, including vitamins, herbs, eye drops, creams, and over-the-counter medicines.  Any problems you or family members have had with anesthetic medicines.  Any blood disorders you have.  Any surgeries you have had.  Any medical conditions you have.  Whether you are pregnant or may be pregnant.  What are the risks?  The risks and complications of surgery depend on the specific procedure that you have. Discuss all the risks with your health care providers before your surgery. Ask about common surgical complications, which may include:  Infection.  Bleeding or a need for blood replacement (transfusion).  Allergic reactions to medicines.  Damage to surrounding nerves, tissues, or structures.  A blood clot.  Scarring.  Failure of the surgery to correct the problem.  Follow these instructions before the procedure:  Several days or weeks before your procedure  You may have a physical exam by your primary health care provider to make sure it is safe for you to have surgery.  You may have testing. This may include a chest X-ray, blood and urine tests, electrocardiogram (ECG), or other testing.  Ask your health care provider about:  Changing or stopping your regular medicines. This is especially important if you are taking diabetes medicines or blood thinners.  Taking medicines such as aspirin and ibuprofen. These medicines can thin your blood. Do not take these  medicines unless your health care provider tells you to take them.  Taking over-the-counter medicines, vitamins, herbs, and supplements.  Do not use any products that contain nicotine or tobacco, such as cigarettes and e-cigarettes. If you need help quitting, ask your health care provider.  Avoid alcohol.  Ask your health care provider if there are exercises you can do to prepare for surgery.  Eat a healthy diet.   Plan to have someone take you home from the hospital or clinic.  Plan to have a responsible adult care for you for at least 24 hours after you leave the hospital or clinic. This is important.  The day before your procedure  You may be given antibiotic medicine to take by mouth to help prevent infection. Take it as told by your health care provider.  You may be asked to shower with a germ-killing soap.  Follow instructions from your health care provider about eating and drinking restrictions. This includes gum, mints and hard candy.  Pack comfortable clothes according to your procedure.   The day of your procedure  You may need to take another shower with a germ-killing soap before you leave home in the morning.  With a small sip of water, take only the medicines that you are told to take.  Remove all jewelry including rings.   Leave anything you consider valuable at home except hearing aids if needed.  Do not wear any makeup, nail polish, powder, deodorant, lotion, hair accessories, or anything on your skin or body except your clothes.  If you will be staying in the hospital, bring a case to hold your glasses, contacts, or dentures. You may also want to bring your robe and non-skid footwear.  If you wear oxygen at home, bring it with you the day of surgery.  If instructed by your health care provider, bring your sleep apnea device with you on the day of your surgery (if this applies to you).  You may want to leave your suitcase and sleep apnea device in the car until after surgery.   Arrive at the  hospital as scheduled.  Bring a friend or family member with you who can help to answer questions and be present while you meet with your health care provider.  At the hospital  When you arrive at the hospital:  Go to registration located at the main entrance of the hospital. You will be registered and given a beeper and a sticker sheet. Take the stickers to the Outpatient nurses desk and place in the black tray. This is to notify staff that you have arrived. Then return to the lobby to wait.   When your beeper lights up and vibrates proceed through the double doors, under the stairs, and a member of the Outpatient Surgery staff will escort you to your preoperative room.  You may have to wear compression sleeves. These help to prevent blood clots and reduce swelling in your legs.  An IV may be inserted into one of your veins.              In the operating room, you may be given one or more of the following:        A medicine to help you relax (sedative).        A medicine to numb the area (local anesthetic).        A medicine to make you fall asleep (general anesthetic).        A medicine that is injected into an area of your body to numb everything below the                      injection site (regional anesthetic).  You may be given an antibiotic through your IV to help prevent infection.  Your surgical site will be marked or identified.    Contact a health care provider if you:  Develop a fever of more than 100.4°F (38°C) or other feelings of illness during the 48 hours before your surgery.  Have symptoms that get worse.  Have questions or concerns about your surgery.  Summary  Preparing for surgery can make the procedure go more smoothly and lower your risk of complications.  Before surgery, make a list of questions and concerns to discuss with your surgeon. Ask about the risks and possible complications.  In the days or weeks before your surgery, follow all instructions from your health care provider. You may  need to stop smoking, avoid alcohol, follow eating restrictions, and change or stop your regular medicines.  Contact your surgeon if you develop a fever or other signs of illness during the few days before your surgery.  This information is not intended to replace advice given to you by your health care provider. Make sure you discuss any questions you have with your health care provider.  Document Revised: 12/21/2018 Document Reviewed: 10/23/2018  [employer] Patient Education © 2021 [employer]

## 2022-10-13 LAB
QT INTERVAL: 426 MS
QTC INTERVAL: 414 MS

## 2022-10-17 ENCOUNTER — TELEPHONE (OUTPATIENT)
Dept: FAMILY MEDICINE CLINIC | Facility: CLINIC | Age: 72
End: 2022-10-17

## 2022-10-17 ENCOUNTER — CLINICAL SUPPORT (OUTPATIENT)
Dept: FAMILY MEDICINE CLINIC | Facility: CLINIC | Age: 72
End: 2022-10-17

## 2022-10-17 DIAGNOSIS — Z23 NEED FOR INFLUENZA VACCINATION: Primary | ICD-10-CM

## 2022-10-17 PROCEDURE — G0008 ADMIN INFLUENZA VIRUS VAC: HCPCS | Performed by: FAMILY MEDICINE

## 2022-10-17 PROCEDURE — 90662 IIV NO PRSV INCREASED AG IM: CPT | Performed by: FAMILY MEDICINE

## 2022-10-17 NOTE — TELEPHONE ENCOUNTER
Patient stated she is having surgery on 10/25/2022 an stated the physician would like for her to stop her warfarin for 5 days and wanted to clear that with you.

## 2022-10-17 NOTE — TELEPHONE ENCOUNTER
Suggest lovonex coverage if needed a couple days    Stop 5 days before  INR 10.23; maybe lovonex 1mg/kg bid; last dose AM before surgery AND resume coumadin when surgeon will allow AND lovonex staying on lovonex till INR > 2.0

## 2022-10-24 ENCOUNTER — CLINICAL SUPPORT (OUTPATIENT)
Dept: FAMILY MEDICINE CLINIC | Facility: CLINIC | Age: 72
End: 2022-10-24

## 2022-10-24 DIAGNOSIS — Z79.01 ANTICOAGULATED: ICD-10-CM

## 2022-10-24 LAB — INR PPP: 1 (ref 0.9–1.1)

## 2022-10-24 PROCEDURE — 85610 PROTHROMBIN TIME: CPT | Performed by: NURSE PRACTITIONER

## 2022-10-24 PROCEDURE — 36416 COLLJ CAPILLARY BLOOD SPEC: CPT | Performed by: NURSE PRACTITIONER

## 2022-10-24 NOTE — PROGRESS NOTES
Here for PT/INR, PT was 12.0 and INR was 1.0. Pt was told to take 0 mg and to recheck PT after surgery, copy of dosing schedule given to patient.

## 2022-10-25 ENCOUNTER — ANESTHESIA EVENT (OUTPATIENT)
Dept: PERIOP | Facility: HOSPITAL | Age: 72
End: 2022-10-25

## 2022-10-25 ENCOUNTER — HOSPITAL ENCOUNTER (OUTPATIENT)
Facility: HOSPITAL | Age: 72
Setting detail: HOSPITAL OUTPATIENT SURGERY
Discharge: HOME | End: 2022-10-25
Attending: OBSTETRICS & GYNECOLOGY | Admitting: OBSTETRICS & GYNECOLOGY

## 2022-10-25 ENCOUNTER — ANESTHESIA (OUTPATIENT)
Dept: PERIOP | Facility: HOSPITAL | Age: 72
End: 2022-10-25

## 2022-10-25 VITALS
SYSTOLIC BLOOD PRESSURE: 121 MMHG | HEART RATE: 50 BPM | RESPIRATION RATE: 14 BRPM | TEMPERATURE: 97.5 F | OXYGEN SATURATION: 97 % | DIASTOLIC BLOOD PRESSURE: 53 MMHG

## 2022-10-25 DIAGNOSIS — N99.3 VAGINAL VAULT PROLAPSE AFTER HYSTERECTOMY: ICD-10-CM

## 2022-10-25 DIAGNOSIS — Z09 S/P GYNECOLOGICAL SURGERY, FOLLOW-UP EXAM: Primary | ICD-10-CM

## 2022-10-25 LAB
ABO GROUP BLD: NORMAL
BLD GP AB SCN SERPL QL: NEGATIVE
INR PPP: 1.07 (ref 0.91–1.09)
PROTHROMBIN TIME: 13.5 SECONDS (ref 11.9–14.6)
RH BLD: POSITIVE
T&S EXPIRATION DATE: NORMAL

## 2022-10-25 PROCEDURE — 25010000002 ONDANSETRON PER 1 MG: Performed by: NURSE ANESTHETIST, CERTIFIED REGISTERED

## 2022-10-25 PROCEDURE — 85610 PROTHROMBIN TIME: CPT | Performed by: OBSTETRICS & GYNECOLOGY

## 2022-10-25 PROCEDURE — 57288 REPAIR BLADDER DEFECT: CPT | Performed by: OBSTETRICS & GYNECOLOGY

## 2022-10-25 PROCEDURE — C1771 REP DEV, URINARY, W/SLING: HCPCS | Performed by: OBSTETRICS & GYNECOLOGY

## 2022-10-25 PROCEDURE — 57120 COLPOCLEISIS LE FORT TYPE: CPT | Performed by: OBSTETRICS & GYNECOLOGY

## 2022-10-25 PROCEDURE — 25010000002 DEXAMETHASONE PER 1 MG: Performed by: ANESTHESIOLOGY

## 2022-10-25 PROCEDURE — 25010000002 ONDANSETRON PER 1 MG: Performed by: ANESTHESIOLOGY

## 2022-10-25 PROCEDURE — 86900 BLOOD TYPING SEROLOGIC ABO: CPT | Performed by: OBSTETRICS & GYNECOLOGY

## 2022-10-25 PROCEDURE — 25010000002 CEFAZOLIN PER 500 MG: Performed by: OBSTETRICS & GYNECOLOGY

## 2022-10-25 PROCEDURE — 86850 RBC ANTIBODY SCREEN: CPT | Performed by: OBSTETRICS & GYNECOLOGY

## 2022-10-25 PROCEDURE — 25010000002 FENTANYL CITRATE (PF) 100 MCG/2ML SOLUTION: Performed by: NURSE ANESTHETIST, CERTIFIED REGISTERED

## 2022-10-25 PROCEDURE — 86901 BLOOD TYPING SEROLOGIC RH(D): CPT | Performed by: OBSTETRICS & GYNECOLOGY

## 2022-10-25 PROCEDURE — S0260 H&P FOR SURGERY: HCPCS | Performed by: OBSTETRICS & GYNECOLOGY

## 2022-10-25 PROCEDURE — 25010000002 PROPOFOL 10 MG/ML EMULSION: Performed by: NURSE ANESTHETIST, CERTIFIED REGISTERED

## 2022-10-25 DEVICE — SIS SYSTEM
Type: IMPLANTABLE DEVICE | Site: VAGINA | Status: FUNCTIONAL
Brand: SOLYX™ SIS SYSTEM

## 2022-10-25 RX ORDER — ONDANSETRON 2 MG/ML
INJECTION INTRAMUSCULAR; INTRAVENOUS AS NEEDED
Status: DISCONTINUED | OUTPATIENT
Start: 2022-10-25 | End: 2022-10-25 | Stop reason: SURG

## 2022-10-25 RX ORDER — SODIUM CHLORIDE, SODIUM LACTATE, POTASSIUM CHLORIDE, CALCIUM CHLORIDE 600; 310; 30; 20 MG/100ML; MG/100ML; MG/100ML; MG/100ML
1000 INJECTION, SOLUTION INTRAVENOUS CONTINUOUS
Status: DISCONTINUED | OUTPATIENT
Start: 2022-10-25 | End: 2022-10-25 | Stop reason: HOSPADM

## 2022-10-25 RX ORDER — SODIUM CHLORIDE 0.9 % (FLUSH) 0.9 %
3 SYRINGE (ML) INJECTION AS NEEDED
Status: DISCONTINUED | OUTPATIENT
Start: 2022-10-25 | End: 2022-10-25 | Stop reason: HOSPADM

## 2022-10-25 RX ORDER — NALOXONE HCL 0.4 MG/ML
0.4 VIAL (ML) INJECTION AS NEEDED
Status: DISCONTINUED | OUTPATIENT
Start: 2022-10-25 | End: 2022-10-25 | Stop reason: HOSPADM

## 2022-10-25 RX ORDER — LIDOCAINE HYDROCHLORIDE 10 MG/ML
0.5 INJECTION, SOLUTION EPIDURAL; INFILTRATION; INTRACAUDAL; PERINEURAL ONCE AS NEEDED
Status: DISCONTINUED | OUTPATIENT
Start: 2022-10-25 | End: 2022-10-25 | Stop reason: HOSPADM

## 2022-10-25 RX ORDER — GABAPENTIN 300 MG/1
600 CAPSULE ORAL ONCE
Status: COMPLETED | OUTPATIENT
Start: 2022-10-25 | End: 2022-10-25

## 2022-10-25 RX ORDER — SODIUM CHLORIDE 9 MG/ML
INJECTION, SOLUTION INTRAVENOUS AS NEEDED
Status: DISCONTINUED | OUTPATIENT
Start: 2022-10-25 | End: 2022-10-25 | Stop reason: HOSPADM

## 2022-10-25 RX ORDER — SODIUM CHLORIDE, SODIUM LACTATE, POTASSIUM CHLORIDE, CALCIUM CHLORIDE 600; 310; 30; 20 MG/100ML; MG/100ML; MG/100ML; MG/100ML
500 INJECTION, SOLUTION INTRAVENOUS CONTINUOUS
Status: DISCONTINUED | OUTPATIENT
Start: 2022-10-25 | End: 2022-10-25 | Stop reason: HOSPADM

## 2022-10-25 RX ORDER — BUPIVACAINE HCL/0.9 % NACL/PF 0.1 %
2 PLASTIC BAG, INJECTION (ML) EPIDURAL ONCE
Status: COMPLETED | OUTPATIENT
Start: 2022-10-25 | End: 2022-10-25

## 2022-10-25 RX ORDER — SODIUM CHLORIDE 0.9 % (FLUSH) 0.9 %
10 SYRINGE (ML) INJECTION AS NEEDED
Status: DISCONTINUED | OUTPATIENT
Start: 2022-10-25 | End: 2022-10-25 | Stop reason: HOSPADM

## 2022-10-25 RX ORDER — DEXTROSE MONOHYDRATE 25 G/50ML
12.5 INJECTION, SOLUTION INTRAVENOUS AS NEEDED
Status: DISCONTINUED | OUTPATIENT
Start: 2022-10-25 | End: 2022-10-25 | Stop reason: HOSPADM

## 2022-10-25 RX ORDER — OXYCODONE AND ACETAMINOPHEN 7.5; 325 MG/1; MG/1
2 TABLET ORAL EVERY 4 HOURS PRN
Status: DISCONTINUED | OUTPATIENT
Start: 2022-10-25 | End: 2022-10-25 | Stop reason: HOSPADM

## 2022-10-25 RX ORDER — ROCURONIUM BROMIDE 10 MG/ML
INJECTION, SOLUTION INTRAVENOUS AS NEEDED
Status: DISCONTINUED | OUTPATIENT
Start: 2022-10-25 | End: 2022-10-25 | Stop reason: SURG

## 2022-10-25 RX ORDER — LABETALOL HYDROCHLORIDE 5 MG/ML
5 INJECTION, SOLUTION INTRAVENOUS
Status: DISCONTINUED | OUTPATIENT
Start: 2022-10-25 | End: 2022-10-25 | Stop reason: HOSPADM

## 2022-10-25 RX ORDER — SODIUM CHLORIDE, SODIUM LACTATE, POTASSIUM CHLORIDE, CALCIUM CHLORIDE 600; 310; 30; 20 MG/100ML; MG/100ML; MG/100ML; MG/100ML
9 INJECTION, SOLUTION INTRAVENOUS CONTINUOUS
Status: DISCONTINUED | OUTPATIENT
Start: 2022-10-25 | End: 2022-10-25 | Stop reason: HOSPADM

## 2022-10-25 RX ORDER — PROPOFOL 10 MG/ML
VIAL (ML) INTRAVENOUS AS NEEDED
Status: DISCONTINUED | OUTPATIENT
Start: 2022-10-25 | End: 2022-10-25 | Stop reason: SURG

## 2022-10-25 RX ORDER — FLUMAZENIL 0.1 MG/ML
0.2 INJECTION INTRAVENOUS AS NEEDED
Status: DISCONTINUED | OUTPATIENT
Start: 2022-10-25 | End: 2022-10-25 | Stop reason: HOSPADM

## 2022-10-25 RX ORDER — IBUPROFEN 600 MG/1
600 TABLET ORAL ONCE AS NEEDED
Status: DISCONTINUED | OUTPATIENT
Start: 2022-10-25 | End: 2022-10-25 | Stop reason: HOSPADM

## 2022-10-25 RX ORDER — MAGNESIUM HYDROXIDE 1200 MG/15ML
LIQUID ORAL AS NEEDED
Status: DISCONTINUED | OUTPATIENT
Start: 2022-10-25 | End: 2022-10-25 | Stop reason: HOSPADM

## 2022-10-25 RX ORDER — SCOLOPAMINE TRANSDERMAL SYSTEM 1 MG/1
1 PATCH, EXTENDED RELEASE TRANSDERMAL ONCE
Status: DISCONTINUED | OUTPATIENT
Start: 2022-10-25 | End: 2022-10-25 | Stop reason: HOSPADM

## 2022-10-25 RX ORDER — SODIUM CHLORIDE 0.9 % (FLUSH) 0.9 %
10 SYRINGE (ML) INJECTION EVERY 12 HOURS SCHEDULED
Status: DISCONTINUED | OUTPATIENT
Start: 2022-10-25 | End: 2022-10-25 | Stop reason: HOSPADM

## 2022-10-25 RX ORDER — NEOSTIGMINE METHYLSULFATE 5 MG/5 ML
SYRINGE (ML) INTRAVENOUS AS NEEDED
Status: DISCONTINUED | OUTPATIENT
Start: 2022-10-25 | End: 2022-10-25 | Stop reason: SURG

## 2022-10-25 RX ORDER — DROPERIDOL 2.5 MG/ML
0.62 INJECTION, SOLUTION INTRAMUSCULAR; INTRAVENOUS ONCE AS NEEDED
Status: DISCONTINUED | OUTPATIENT
Start: 2022-10-25 | End: 2022-10-25 | Stop reason: HOSPADM

## 2022-10-25 RX ORDER — FENTANYL CITRATE 50 UG/ML
25 INJECTION, SOLUTION INTRAMUSCULAR; INTRAVENOUS
Status: DISCONTINUED | OUTPATIENT
Start: 2022-10-25 | End: 2022-10-25 | Stop reason: HOSPADM

## 2022-10-25 RX ORDER — ACETAMINOPHEN 500 MG
1000 TABLET ORAL ONCE
Status: COMPLETED | OUTPATIENT
Start: 2022-10-25 | End: 2022-10-25

## 2022-10-25 RX ORDER — HYDROCODONE BITARTRATE AND ACETAMINOPHEN 5; 325 MG/1; MG/1
1-2 TABLET ORAL EVERY 6 HOURS PRN
Qty: 12 TABLET | Refills: 0 | Status: SHIPPED | OUTPATIENT
Start: 2022-10-25 | End: 2022-11-09

## 2022-10-25 RX ORDER — SODIUM CHLORIDE 0.9 % (FLUSH) 0.9 %
10 SYRINGE (ML) INJECTION EVERY 12 HOURS SCHEDULED
Status: DISCONTINUED | OUTPATIENT
Start: 2022-10-25 | End: 2022-10-25

## 2022-10-25 RX ORDER — SODIUM CHLORIDE 0.9 % (FLUSH) 0.9 %
10 SYRINGE (ML) INJECTION AS NEEDED
Status: DISCONTINUED | OUTPATIENT
Start: 2022-10-25 | End: 2022-10-25

## 2022-10-25 RX ORDER — OXYCODONE AND ACETAMINOPHEN 10; 325 MG/1; MG/1
1 TABLET ORAL ONCE AS NEEDED
Status: DISCONTINUED | OUTPATIENT
Start: 2022-10-25 | End: 2022-10-25 | Stop reason: HOSPADM

## 2022-10-25 RX ORDER — DEXAMETHASONE SODIUM PHOSPHATE 4 MG/ML
4 INJECTION, SOLUTION INTRA-ARTICULAR; INTRALESIONAL; INTRAMUSCULAR; INTRAVENOUS; SOFT TISSUE ONCE AS NEEDED
Status: COMPLETED | OUTPATIENT
Start: 2022-10-25 | End: 2022-10-25

## 2022-10-25 RX ORDER — FENTANYL CITRATE 50 UG/ML
INJECTION, SOLUTION INTRAMUSCULAR; INTRAVENOUS AS NEEDED
Status: DISCONTINUED | OUTPATIENT
Start: 2022-10-25 | End: 2022-10-25 | Stop reason: SURG

## 2022-10-25 RX ORDER — ONDANSETRON 2 MG/ML
4 INJECTION INTRAMUSCULAR; INTRAVENOUS ONCE AS NEEDED
Status: COMPLETED | OUTPATIENT
Start: 2022-10-25 | End: 2022-10-25

## 2022-10-25 RX ADMIN — PROPOFOL 150 MG: 10 INJECTION, EMULSION INTRAVENOUS at 11:27

## 2022-10-25 RX ADMIN — LIDOCAINE HYDROCHLORIDE 50 MG: 20 INJECTION, SOLUTION INTRAVENOUS at 11:27

## 2022-10-25 RX ADMIN — ACETAMINOPHEN 1000 MG: 500 TABLET ORAL at 09:31

## 2022-10-25 RX ADMIN — SCOPALAMINE 1 PATCH: 1 PATCH, EXTENDED RELEASE TRANSDERMAL at 10:32

## 2022-10-25 RX ADMIN — GLYCOPYRROLATE 0.4 MG: 0.2 INJECTION INTRAMUSCULAR; INTRAVENOUS at 12:20

## 2022-10-25 RX ADMIN — Medication 3 MG: at 12:20

## 2022-10-25 RX ADMIN — SODIUM CHLORIDE, POTASSIUM CHLORIDE, SODIUM LACTATE AND CALCIUM CHLORIDE 1000 ML: 600; 310; 30; 20 INJECTION, SOLUTION INTRAVENOUS at 09:32

## 2022-10-25 RX ADMIN — ONDANSETRON 4 MG: 2 INJECTION INTRAMUSCULAR; INTRAVENOUS at 12:20

## 2022-10-25 RX ADMIN — FENTANYL CITRATE 100 MCG: 50 INJECTION INTRAMUSCULAR; INTRAVENOUS at 11:27

## 2022-10-25 RX ADMIN — SODIUM CHLORIDE, POTASSIUM CHLORIDE, SODIUM LACTATE AND CALCIUM CHLORIDE 500 ML/HR: 600; 310; 30; 20 INJECTION, SOLUTION INTRAVENOUS at 17:03

## 2022-10-25 RX ADMIN — ROCURONIUM BROMIDE 30 MG: 10 INJECTION, SOLUTION INTRAVENOUS at 11:27

## 2022-10-25 RX ADMIN — Medication 2 G: at 11:33

## 2022-10-25 RX ADMIN — DEXAMETHASONE SODIUM PHOSPHATE 4 MG: 4 INJECTION INTRA-ARTICULAR; INTRALESIONAL; INTRAMUSCULAR; INTRAVENOUS; SOFT TISSUE at 10:32

## 2022-10-25 RX ADMIN — GABAPENTIN 600 MG: 300 CAPSULE ORAL at 09:30

## 2022-10-25 RX ADMIN — ONDANSETRON 4 MG: 2 INJECTION INTRAMUSCULAR; INTRAVENOUS at 14:30

## 2022-10-25 NOTE — ANESTHESIA PROCEDURE NOTES
Airway  Urgency: elective    Airway not difficult    General Information and Staff    Patient location during procedure: OR  CRNA/CAA: Allen Atkins CRNA    Indications and Patient Condition  Indications for airway management: airway protection    Preoxygenated: yes  Mask difficulty assessment: 1 - vent by mask    Final Airway Details  Final airway type: endotracheal airway      Successful airway: ETT  Cuffed: yes   Successful intubation technique: direct laryngoscopy  Endotracheal tube insertion site: oral  Blade: Jaja  Blade size: 3.5  ETT size (mm): 7.5  Cormack-Lehane Classification: grade I - full view of glottis  Placement verified by: chest auscultation and capnometry   Cuff volume (mL): 8  Measured from: lips  ETT/EBT  to lips (cm): 21  Number of attempts at approach: 1  Assessment: lips, teeth, and gum same as pre-op and atraumatic intubation

## 2022-10-25 NOTE — OP NOTE
Luzma Sousa  : 1950  MRN: [id number]  CSN: [id number]  Date: 10/25/2022    Operative Note    COLPOCLEISIS, MID-URETHRAL SLING WITH CYSTOSCOPY      Pre-op Diagnosis:  Vaginal vault prolapse after hysterectomy [N99.3]   Post-op Diagnosis:  Post-Op Diagnosis Codes:     * Vaginal vault prolapse after hysterectomy [N99.3]   Procedure: Colpocliesis   Surgeon: Surgeon(s):  Kaci King MD       Anesthesia: General   Estimated Blood Loss: 100   mls   Fluids: 1000   mls   UOP: clear   mls   ABx: 2 grams ancef   Specimens:  None   Findings: Good support, with complete obliteration of the vagina at the conslusion of the procedure       Complications:       none     Description of Procedure:       Into the operating room, where she was prepped and draped in the usual sterile fashion in the dorsal lithotomy position using Amilcar stirrups.  Patient's bladder was drained using an in and out catheter.  A rectangular shaped area of mucosa covering the entire posterior vaginal wall was outlined using a scalpel.  The mucosa within this border was removed using Metzenbaum scissors and pickups.  The tissue was discarded.  Bovie cautery was used where necessary to control any small bleeding.  Attention was then turned to the anterior vaginal wall, where a rectangular shaped piece of mucosa was outlined with the scalpel in a similar fashion.  Again, Metzenbaum scissors and pickups were used to remove the mucosa from the anterior vaginal wall, just as had been previously done for the posterior vaginal wall.  At this point in the procedure, all of the vaginal mucosa had been removed except for small tracts of epithelialized mucosa approximately 1 cm wide which remained along the right and left lateral sidewalls of the vagina.  The patient's prolapse was then reduced in a stepwise fashion, placing horizontal rows of 3-0 Vicryl interrupted figure-of-eight sutures.  At the right and left angle suture at each  horizontal row, the epithelialized mucosa was inverted to create a small tract for drainage of any vaginal discharge.  With each horizontal row of suture the patient's prolapse was successively reduced even further until the entire length of the vagina had been completely obliterated.  Just underneath the urethral meatus, before closure of the most distal row sutures, a selects mid urethral sling was placed.  The sling was tensioned so that a right angle clamp could just be placed behind it.  Care was taken to make sure that there were no folds or twists in the mesh.  A Avila catheter with a catheter guide was used to deflect the urethra toward the contralateral side to prevent urethral injury, as the sling was being inserted and tensioned.   At the introitus the mucosal edges were reapproximated in a running fashion with 2-0 Vicryl swedged-on suture.  Cystoscopy revealed a normal bladder, without any injury by the sling.  At the conclusion of the procedure, the patient had complete reduction of her prolapse, good support at her perineum, and unchanged appearance of her external genitalia.  The patient experienced minimal blood loss for the procedure.  Her bladder was drained again by placing a avila catheter; sponge lap and needle counts were correct ×2.  The patient tolerated the procedure well.  And was taken to the recovery room in stable condition.      Kaci King MD   10/25/2022  12:38 CDT

## 2022-10-25 NOTE — H&P
Subjective     No chief complaint on file.      Lucy Sousa is a 72 y.o. year old who presents to be seen for pelvic prolapse and urinary incontinence.      The patient is s/p hysterectomy.  She reports positive pelvic pain, vaginal pressure, bulge outside her body, urinary incontinence and urinary hesitancy, but denies stool trapping.  Patient was admitted to hospital with a UTI last year, but that has not been a frequent problem.  The patient feels like the problem began many years ago, but has gotten a lot worse in the past year.  She is not currently sexually active, and is not interested in being sexually active in the future because her  has ED.  Lucy Sousa has not had surgery for this problem in the past.    Patient has already tried anticholinergic medication for urgency, but did not find that helpful.  Patient drinks mostly water during the day, but starts day with milk or OJ. She will have tea with lunch and dinner.      MONTY occurs with coughing, sneezing, or standing from a seated position.  Patient also reports leakage with any strong urge to void.    Past Medical History:   Diagnosis Date   • Cancer (HCC)     skin squamous cell   • DJD (degenerative joint disease)    • GERD (gastroesophageal reflux disease)    • HTN (hypertension)    • Hx of colonic polyp    • Pulmonary embolism (HCC)     post surgical with cardiac arrest       Current Facility-Administered Medications:   •  ceFAZolin in 0.9% normal saline (ANCEF) IVPB solution 2 g, 2 g, Intravenous, Once, Kaci King MD  •  dextrose (D50W) (25 g/50 mL) IV injection 12.5 g, 12.5 g, Intravenous, PRN, Phill Conley MD  •  fentaNYL citrate (PF) (SUBLIMAZE) injection 25 mcg, 25 mcg, Intravenous, Q5 Min PRN, Phill Conley MD  •  lactated ringers infusion 1,000 mL, 1,000 mL, Intravenous, Continuous, Kaci King MD, Last Rate: 25 mL/hr at 10/25/22 0932, 1,000 mL at 10/25/22 0932  •  lactated ringers infusion, 9  mL/hr, Intravenous, Continuous, Phill Conley MD  •  lidocaine PF 1% (XYLOCAINE) injection 0.5 mL, 0.5 mL, Intradermal, Once PRN, Kaci King MD  •  lidocaine PF 1% (XYLOCAINE) injection 0.5 mL, 0.5 mL, Injection, Once PRN, Phill Conley MD  •  scopolamine patch 1 mg/72 hr, 1 patch, Transdermal, Once, Phill Conley MD, 1 patch at 10/25/22 1032  •  sodium chloride 0.9 % flush 10 mL, 10 mL, Intravenous, Q12H, Phill Conley MD  •  sodium chloride 0.9 % flush 10 mL, 10 mL, Intravenous, PRN, Phill Conley MD  •  sodium chloride 0.9 % flush 3 mL, 3 mL, Intravenous, PRN, Kaci King MD  Family History   Problem Relation Age of Onset   • Coronary artery disease Father    • Coronary artery disease Mother    • Diabetes Brother    • Hypertension Brother    • Colon polyps Brother    • Breast cancer Maternal Grandmother    • Heart disease Maternal Grandfather    • Breast cancer Maternal Cousin    • Colon cancer Neg Hx      Social History     Socioeconomic History   • Marital status:      Spouse name: Nima   • Number of children: 1   • Years of education: 13   Tobacco Use   • Smoking status: Never   • Smokeless tobacco: Never   Vaping Use   • Vaping Use: Never used   Substance and Sexual Activity   • Alcohol use: No   • Drug use: No   • Sexual activity: Not Currently     Partners: Male     Birth control/protection: Post-menopausal     Allergies   Allergen Reactions   • Macrobid [Nitrofurantoin Macrocrystal] Rash       Family History   Problem Relation Age of Onset   • Coronary artery disease Father    • Coronary artery disease Mother    • Diabetes Brother    • Hypertension Brother    • Colon polyps Brother    • Breast cancer Maternal Grandmother    • Heart disease Maternal Grandfather    • Breast cancer Maternal Cousin    • Colon cancer Neg Hx      Review of Systems   Constitutional: Negative for activity change and unexpected weight change.  "  Respiratory: Negative for shortness of breath.    Cardiovascular: Negative for chest pain.   Gastrointestinal: Negative for abdominal pain.        No problems passing stool   Genitourinary: Positive for difficulty urinating, enuresis, frequency and urgency.           Objective   BP (!) 123/101 (BP Location: Right arm, Patient Position: Sitting)   Pulse 57   Temp 96.8 °F (36 °C) (Temporal)   Resp 16   SpO2 97%     Physical Exam  Vitals and nursing note reviewed. Exam conducted with a chaperone present.   Constitutional:       General: She is not in acute distress.     Appearance: She is well-developed. She is obese.   HENT:      Head: Normocephalic and atraumatic.   Neck:      Thyroid: No thyromegaly.   Pulmonary:      Effort: Pulmonary effort is normal.   Abdominal:      General: There is no distension.      Palpations: Abdomen is soft.      Tenderness: There is no abdominal tenderness.   Genitourinary:     General: Normal vulva.      Comments: Grade IV cystocele with vaginal vault prolapse.  Smaller rectocele.  Vaginal mucosa pale with loss of rugae  Musculoskeletal:         General: Normal range of motion.      Cervical back: Normal range of motion.   Skin:     General: Skin is warm and dry.   Neurological:      Mental Status: She is alert and oriented to person, place, and time.   Psychiatric:         Behavior: Behavior normal.         Judgment: Judgment normal.         Imaging   No data reviewed       Assessment & Plan    Diagnoses and all orders for this visit:    1. MONTY (stress urinary incontinence, female) (Primary): Urodynamic testing not consistent with patient's history of leakage.  Patient says leakage is elicited by coughing, sneezing, and standing up from a seated position.  Patient reports leakage with these triggers is significant and is \"ruling mt life\".  Treatment options for the patient's stress urinary incontinence were reviewed to include consistent use of pelvic weights, PT for pelvic " strengthening and a mid-urethral sling.  The patient was advised that the success rate of a mid-urethral sling is approximately 85%; in addition, she was also informed that studies have indicated PT to be equally successful if patients are compliant.  The risks of surgery were described to include the short term need for a avila catheter due to local swelling, the possible need to self-cath for three months if the sling is tensioned too tightly, and the possibility of surgical site infection.  Questions were answered, and the patient voiced understanding.  Patient understands that she may have to go home with a catheter on the day of surgery    2. Urinary urgency: Likely due to incomplete emptying, with large cystocele, but will test for infection  -     Urinalysis With Microscopic If Indicated (No Culture) - Urine, Clean Catch  -     Urine Culture - Urine, Urine, Clean Catch    3. Vaginal vault prolapse after hysterectomy: Anterior repair not recommended since the patient has no apical support.  Colpocleisis with a mid urethral sling has been recommended.  Patient is not sexually active and reports there is no chance that she will be, or want to be, in the future.  Colpocleisis procedure described, and the patient looks forward to having pressure from her prolapse relieved.  I think this will help with bladder emptying and decrease her urgency and frequency.  -     Case Request; Standing  -     ECG 12 Lead; Future  -     XR Chest 1 View; Future  -     Case Request    4. Female cystocele    5. Obesity (BMI 30-39.9)    6. Pre-op evaluation  -     CBC and Differential; Future    7. History of pulmonary embolus (PE)  Comments:  in 2016    8. Chronic anticoagulation  Comments:  PE in 2016.  Pt has stopped anticoagulation for elective procedures in the past.  She will hold coumadin for 5 days prior to surgery    Other orders  -     Follow Anesthesia Guidelines / Protocol; Future  -     Chlorhexidine Skin Prep;  Future    Patient seen in holding are and had no changes to status or symptoms.  Patient without questions or concerns about procedure    Kaci King MD  10/25/2022  11:08 CDT

## 2022-10-25 NOTE — ANESTHESIA POSTPROCEDURE EVALUATION
Patient: Lucy Sousa    Procedure Summary     Date: 10/25/22 Room / Location:  [facility] /  [facility]    Anesthesia Start: 1125 Anesthesia Stop: 1245    Procedures:       COLPOCLEISIS, MID-URETHRAL SLING WITH CYSTOSCOPY (Vagina)      MID-URETHRAL SLING WITH CYSTOSCOPY (Vagina) Diagnosis:       Vaginal vault prolapse after hysterectomy      (Vaginal vault prolapse after hysterectomy [N99.3])    Surgeons: Kaci King MD Provider: Paxton Swartz CRNA    Anesthesia Type: general ASA Status: 2          Anesthesia Type: general    Vitals  Vitals Value Taken Time   /80 10/25/22 1307   Temp 97.5 °F (36.4 °C) 10/25/22 1243   Pulse 73 10/25/22 1307   Resp 14 10/25/22 1305   SpO2 97 % 10/25/22 1307   Vitals shown include unvalidated device data.        Post Anesthesia Care and Evaluation    PONV Status: none  Comments: Patient d/c from PACU prior to anes eval based on Jolie score.  Please see RN notes for details of d/c criteria.    Blood pressure 142/70, pulse (!) 47, temperature 97.5 °F (36.4 °C), temperature source Temporal, resp. rate 14, SpO2 98 %, not currently breastfeeding.

## 2022-10-25 NOTE — ANESTHESIA PREPROCEDURE EVALUATION
Anesthesia Evaluation     Patient summary reviewed   no history of anesthetic complications:  NPO Solid Status: > 8 hours             Airway   Mallampati: II  TM distance: >3 FB  Neck ROM: full  Dental      Pulmonary    (+) pulmonary embolism,   (-) COPD, asthma, sleep apnea, not a smoker  Cardiovascular   Exercise tolerance: good (4-7 METS)    (+) hypertension, hyperlipidemia,   (-) pacemaker, past MI, angina, cardiac stents      Neuro/Psych  (-) seizures, TIA, CVA  GI/Hepatic/Renal/Endo    (+) obesity,     (-) GERD, liver disease, no renal disease, diabetes    Musculoskeletal     Abdominal    Substance History      OB/GYN          Other                        Anesthesia Plan    ASA 2     general     intravenous induction     Anesthetic plan, risks, benefits, and alternatives have been provided, discussed and informed consent has been obtained with: patient.        CODE STATUS:

## 2022-11-02 ENCOUNTER — CLINICAL SUPPORT (OUTPATIENT)
Dept: FAMILY MEDICINE CLINIC | Facility: CLINIC | Age: 72
End: 2022-11-02

## 2022-11-02 DIAGNOSIS — Z79.01 ANTICOAGULATED: ICD-10-CM

## 2022-11-02 PROCEDURE — 36416 COLLJ CAPILLARY BLOOD SPEC: CPT | Performed by: FAMILY MEDICINE

## 2022-11-02 PROCEDURE — 85610 PROTHROMBIN TIME: CPT | Performed by: FAMILY MEDICINE

## 2022-11-02 NOTE — PROGRESS NOTES
Here for PT/INR, PT was 16.3 and INR was 1.4. Pt was told to take 5 mg tonight and to take 4 mg next day  recheck PT 11-4-22, copy of dosing schedule given to patient.

## 2022-11-04 ENCOUNTER — CLINICAL SUPPORT (OUTPATIENT)
Dept: FAMILY MEDICINE CLINIC | Facility: CLINIC | Age: 72
End: 2022-11-04

## 2022-11-04 DIAGNOSIS — Z79.01 ANTICOAGULATED: ICD-10-CM

## 2022-11-04 LAB — INR PPP: 1.9 (ref 0.9–1.1)

## 2022-11-04 PROCEDURE — 36416 COLLJ CAPILLARY BLOOD SPEC: CPT | Performed by: FAMILY MEDICINE

## 2022-11-04 PROCEDURE — 85610 PROTHROMBIN TIME: CPT | Performed by: FAMILY MEDICINE

## 2022-11-04 NOTE — PROGRESS NOTES
Here for PT/INR, PT was 22.4 and INR was 1.9,  Pt was told to take 5 mg and alternate with 4 mg and to recheck PT one week, copy of dosing schedule given to patient.

## 2022-11-09 ENCOUNTER — OFFICE VISIT (OUTPATIENT)
Dept: OBSTETRICS AND GYNECOLOGY | Facility: CLINIC | Age: 72
End: 2022-11-09

## 2022-11-09 VITALS
HEIGHT: 63 IN | WEIGHT: 195 LBS | DIASTOLIC BLOOD PRESSURE: 82 MMHG | SYSTOLIC BLOOD PRESSURE: 122 MMHG | BODY MASS INDEX: 34.55 KG/M2

## 2022-11-09 DIAGNOSIS — Z09 S/P GYNECOLOGICAL SURGERY, FOLLOW-UP EXAM: Primary | ICD-10-CM

## 2022-11-09 PROCEDURE — 99024 POSTOP FOLLOW-UP VISIT: CPT | Performed by: OBSTETRICS & GYNECOLOGY

## 2022-11-09 NOTE — PROGRESS NOTES
"Subjective   Chief Complaint   Patient presents with   • Post-op     Patient here today for 1st post op s/p colpocleisis. She is doing well and voices no complaints or concerns.       Lucy Sousa is a 72 y.o. year old  presenting to be seen for her post-operative visit.  She had a Colpocliesis 2 weeks ago.  Currently she reports never really having much pain.  She is not having any vaginal bleeding.  Bladder now empties completely, although she is having just a tiny amount of leakage when she squats.  Bowel function normal now.    No Additional Complaints Reported    The following portions of the patient's history were reviewed and updated as appropriate:current medications and allergies    Review of Systems      Objective   /82   Ht 160 cm (62.99\")   Wt 88.5 kg (195 lb)   BMI 34.55 kg/m²     General:  well developed; well nourished  no acute distress   Abdomen: soft, non-tender; no masses   Pelvis: Clinical staff was present for exam. Surgical site healing well and only minimally tender to palpation.           Assessment   1. Pt is 2 weeks s/p Colpocliesis  2. Very pleased     Plan   1. Healing well  2. May need physical therapy after next visit    No orders of the defined types were placed in this encounter.         This note was electronically signed.    Kaci King MD  2022  "

## 2022-11-11 ENCOUNTER — CLINICAL SUPPORT (OUTPATIENT)
Dept: FAMILY MEDICINE CLINIC | Facility: CLINIC | Age: 72
End: 2022-11-11

## 2022-11-11 DIAGNOSIS — Z79.01 ANTICOAGULATED: ICD-10-CM

## 2022-11-11 NOTE — PROGRESS NOTES
Here for PT/INR, PT was 20.4 and INR was 1.7. Pt was told to take 5 mg and to recheck PT one week copy of dosing schedule given to patient.

## 2022-11-18 ENCOUNTER — CLINICAL SUPPORT (OUTPATIENT)
Dept: FAMILY MEDICINE CLINIC | Facility: CLINIC | Age: 72
End: 2022-11-18

## 2022-11-18 DIAGNOSIS — E87.6 HYPOPOTASSEMIA: ICD-10-CM

## 2022-11-18 DIAGNOSIS — Z79.01 ANTICOAGULATED: Primary | ICD-10-CM

## 2022-11-18 DIAGNOSIS — I10 HTN (HYPERTENSION), BENIGN: ICD-10-CM

## 2022-11-18 PROCEDURE — 85610 PROTHROMBIN TIME: CPT | Performed by: NURSE PRACTITIONER

## 2022-11-18 PROCEDURE — 36416 COLLJ CAPILLARY BLOOD SPEC: CPT | Performed by: NURSE PRACTITIONER

## 2022-11-18 RX ORDER — POTASSIUM CHLORIDE 20 MEQ/1
TABLET, EXTENDED RELEASE ORAL
Qty: 180 TABLET | Refills: 2 | Status: SHIPPED | OUTPATIENT
Start: 2022-11-18

## 2022-11-18 RX ORDER — LOSARTAN POTASSIUM 50 MG/1
TABLET ORAL
Qty: 90 TABLET | Refills: 2 | Status: SHIPPED | OUTPATIENT
Start: 2022-11-18

## 2022-11-18 RX ORDER — WARFARIN SODIUM 5 MG/1
TABLET ORAL
Qty: 60 TABLET | Refills: 0 | Status: SHIPPED | OUTPATIENT
Start: 2022-11-18

## 2022-11-18 NOTE — PROGRESS NOTES
Here for PT/INR, PT was 40.0 and INR was 3.3. Pt was told to take 5mg, 5mg, 4mg and to recheck PT 1 week copy of dosing schedule given to patient.

## 2022-11-18 NOTE — TELEPHONE ENCOUNTER
Rx Refill Note  Requested Prescriptions     Pending Prescriptions Disp Refills   • potassium chloride (K-DUR,KLOR-CON) 20 MEQ CR tablet [Pharmacy Med Name: POTASSIUM CHLORIDE ER 20MEQ ER TABLET ER] 180 tablet 2     Sig: TAKE ONE TABLET TWICE DAILY   • losartan (COZAAR) 50 MG tablet [Pharmacy Med Name: LOSARTAN POTASSIUM 50MG TABLET] 90 tablet 2     Sig: TAKE ONE TABLET DAILY GENERIC FOR COZAAR      Last office visit with prescribing clinician: 7/25/2022      Next office visit with prescribing clinician: 2/6/2023            Jerald Rizvi MA  11/18/22, 14:06 CST

## 2022-11-21 RX ORDER — FUROSEMIDE 20 MG/1
TABLET ORAL
Qty: 90 TABLET | Refills: 3 | Status: SHIPPED | OUTPATIENT
Start: 2022-11-21

## 2022-11-21 NOTE — TELEPHONE ENCOUNTER
Rx Refill Note  Requested Prescriptions     Pending Prescriptions Disp Refills   • furosemide (LASIX) 20 MG tablet [Pharmacy Med Name: FUROSEMIDE 20MG TABLET] 90 tablet 3     Sig: TAKE 1 TABLET BY MOUTH DAILY.      Last office visit with prescribing clinician: 7/25/2022      Next office visit with prescribing clinician: 11/18/2022            Jerald Rizvi MA  11/21/22, 09:02 CST

## 2022-11-23 ENCOUNTER — CLINICAL SUPPORT (OUTPATIENT)
Dept: FAMILY MEDICINE CLINIC | Facility: CLINIC | Age: 72
End: 2022-11-23

## 2022-11-23 DIAGNOSIS — Z79.01 ANTICOAGULATED: Primary | ICD-10-CM

## 2022-11-23 DIAGNOSIS — Z86.711 HISTORY OF PULMONARY EMBOLISM: ICD-10-CM

## 2022-11-23 DIAGNOSIS — R79.1 ELEVATED INR: ICD-10-CM

## 2022-11-23 PROCEDURE — 85610 PROTHROMBIN TIME: CPT | Performed by: FAMILY MEDICINE

## 2022-11-23 PROCEDURE — 36416 COLLJ CAPILLARY BLOOD SPEC: CPT | Performed by: FAMILY MEDICINE

## 2022-11-23 NOTE — PROGRESS NOTES
Here for PT/INR, PT was 44.0 and INR was 3.7. Pt was told to hold and to recheck PT on 11-25-22 at [facility] copy of dosing schedule given to patient.  Order faxed to [facility] for standing order to check PT/INR

## 2022-11-28 ENCOUNTER — CLINICAL SUPPORT (OUTPATIENT)
Dept: FAMILY MEDICINE CLINIC | Facility: CLINIC | Age: 72
End: 2022-11-28

## 2022-11-28 ENCOUNTER — TELEPHONE (OUTPATIENT)
Dept: FAMILY MEDICINE CLINIC | Facility: CLINIC | Age: 72
End: 2022-11-28

## 2022-11-28 DIAGNOSIS — Z79.01 ANTICOAGULATED: ICD-10-CM

## 2022-11-28 PROCEDURE — 36416 COLLJ CAPILLARY BLOOD SPEC: CPT | Performed by: FAMILY MEDICINE

## 2022-11-28 PROCEDURE — 85610 PROTHROMBIN TIME: CPT | Performed by: FAMILY MEDICINE

## 2022-11-28 NOTE — TELEPHONE ENCOUNTER
Caller: ANIL HAYNES     Relationship: SELF     Best call back number:    [phone] (Mobile)         What is the best time to reach you: ANYTIME     Who are you requesting to speak with (clinical staff, provider,  specific staff member): CLINICAL     Do you know the name of the person who called: CLINICAL     What was the call regarding: REQUESTING CALL BACK TO BE ADVISED SHE STATES SHE HAS BEEN OFF HER BLOOD THINNER SINCE LAST Tuesday AND NEEDS TO BE ADVISED IF SHE SHOULD START TAKING IT AGAIN     SHE STATES HER LAB ORDER WAS NOT SENT TO [facility] BUT THEY HAD ANOTHER ORDER FROM 12 MONTHS AGO AND [facility] HER LABS FROM THAT ORDER     Do you require a callback:  YES

## 2022-11-28 NOTE — PROGRESS NOTES
Here for PT/INR, PT was 12.3 and INR was 1.0. Pt was told to take 5 mg and to recheck PT two days, copy of dosing schedule given to patient.  
Statement Selected

## 2022-11-28 NOTE — TELEPHONE ENCOUNTER
Called pt to come get INR ASAP as we dont have her lab from [facility] over the weekend    Advised that this writer has her order with confirmation and will provide it to her when she gets here    Never got a call from Dr Berg, has been hold coumadin since Wed 11-23-22

## 2022-11-28 NOTE — TELEPHONE ENCOUNTER
I never got any calls    If she is cleared from a procedure (ie md that did something) ; she can resume coumadin and link 2d

## 2022-11-30 ENCOUNTER — CLINICAL SUPPORT (OUTPATIENT)
Dept: FAMILY MEDICINE CLINIC | Facility: CLINIC | Age: 72
End: 2022-11-30

## 2022-11-30 DIAGNOSIS — Z79.01 ANTICOAGULATED: ICD-10-CM

## 2022-11-30 PROCEDURE — 36416 COLLJ CAPILLARY BLOOD SPEC: CPT | Performed by: FAMILY MEDICINE

## 2022-11-30 PROCEDURE — 85610 PROTHROMBIN TIME: CPT | Performed by: FAMILY MEDICINE

## 2022-12-01 ENCOUNTER — CLINICAL SUPPORT (OUTPATIENT)
Dept: FAMILY MEDICINE CLINIC | Facility: CLINIC | Age: 72
End: 2022-12-01

## 2022-12-01 DIAGNOSIS — Z79.01 ANTICOAGULATED: ICD-10-CM

## 2022-12-01 PROCEDURE — 85610 PROTHROMBIN TIME: CPT | Performed by: FAMILY MEDICINE

## 2022-12-01 PROCEDURE — 36416 COLLJ CAPILLARY BLOOD SPEC: CPT | Performed by: FAMILY MEDICINE

## 2022-12-01 NOTE — PROGRESS NOTES
Here for PT/INR, PT was 16.6 and INR was 1.4. Pt was told to take 6 mg and to recheck PT one day copy of dosing schedule given to patient.

## 2022-12-01 NOTE — PROGRESS NOTES
Late entry for 11-30-22 documenting for LUIS ANTONIO Brownlee MA, Here for PT/INR, PT was 13.2 and INR was 1.1. Pt was told to take 7.5 and to recheck PT one day copy of dosing schedule given to patient.

## 2022-12-02 ENCOUNTER — CLINICAL SUPPORT (OUTPATIENT)
Dept: FAMILY MEDICINE CLINIC | Facility: CLINIC | Age: 72
End: 2022-12-02

## 2022-12-02 DIAGNOSIS — Z79.01 LONG TERM (CURRENT) USE OF ANTICOAGULANTS: ICD-10-CM

## 2022-12-02 LAB — INR PPP: 1.8 (ref 0.9–1.1)

## 2022-12-02 PROCEDURE — 85610 PROTHROMBIN TIME: CPT | Performed by: FAMILY MEDICINE

## 2022-12-02 PROCEDURE — 36416 COLLJ CAPILLARY BLOOD SPEC: CPT | Performed by: FAMILY MEDICINE

## 2022-12-05 ENCOUNTER — CLINICAL SUPPORT (OUTPATIENT)
Dept: FAMILY MEDICINE CLINIC | Facility: CLINIC | Age: 72
End: 2022-12-05

## 2022-12-05 DIAGNOSIS — Z79.01 ANTICOAGULATED: ICD-10-CM

## 2022-12-05 LAB — INR PPP: 2.7 (ref 0.9–1.1)

## 2022-12-05 PROCEDURE — 85610 PROTHROMBIN TIME: CPT | Performed by: FAMILY MEDICINE

## 2022-12-05 PROCEDURE — 36416 COLLJ CAPILLARY BLOOD SPEC: CPT | Performed by: FAMILY MEDICINE

## 2022-12-05 NOTE — PROGRESS NOTES
Here for PT/INR, PT was 31.8 and INR was 2.7. Pt was told to take 5 and to recheck PT four days copy of dosing schedule given to patient.

## 2022-12-07 ENCOUNTER — OFFICE VISIT (OUTPATIENT)
Dept: OBSTETRICS AND GYNECOLOGY | Facility: CLINIC | Age: 72
End: 2022-12-07

## 2022-12-07 VITALS
SYSTOLIC BLOOD PRESSURE: 130 MMHG | HEIGHT: 62 IN | BODY MASS INDEX: 36.44 KG/M2 | DIASTOLIC BLOOD PRESSURE: 82 MMHG | WEIGHT: 198 LBS

## 2022-12-07 DIAGNOSIS — R10.2 SUPRAPUBIC PAIN: Primary | ICD-10-CM

## 2022-12-07 PROCEDURE — 99024 POSTOP FOLLOW-UP VISIT: CPT | Performed by: OBSTETRICS & GYNECOLOGY

## 2022-12-07 NOTE — PROGRESS NOTES
"Subjective   Chief Complaint   Patient presents with   • Post-op     Pt here today for 6 week post op colpocleisis and solyx sling. Pt feeling well. Pt voices having some pelvic discomfort. Pt voices no concerns     Lucy Sousa is a 72 y.o. year old  presenting to be seen for her post-operative visit.  She had a Colpocliesis 6 weeks ago.  Currently she reports never really having much pain, although she does have a low aching feeling in suprapubic region that makes her wonder if she might have a UTI.  She is not having any vaginal bleeding.  Bladder now empties completely, although she is having just a tiny amount of leakage if she tries to wait too long.  No leakage now with squatting.  Bowel function normal now.    No Additional Complaints Reported    The following portions of the patient's history were reviewed and updated as appropriate:current medications and allergies    Review of Systems      Objective   /82   Ht 157.5 cm (62\")   Wt 89.8 kg (198 lb)   BMI 36.21 kg/m²     General:  well developed; well nourished  no acute distress   Abdomen: soft, non-tender; no masses   Pelvis: Clinical staff was present for exam. Surgical site healed well and non-tender           Assessment   1. Pt is 6 weeks s/p Colpocliesis  2. Very pleased     Plan   1. Healed  2. May need physical therapy for mild MONTY in the future, but not ready to do so at this time  3. AU with C&S for possible UTI  4. RTO prn    No orders of the defined types were placed in this encounter.         This note was electronically signed.    Kaci King MD  2022  "
Male

## 2022-12-09 ENCOUNTER — CLINICAL SUPPORT (OUTPATIENT)
Dept: FAMILY MEDICINE CLINIC | Facility: CLINIC | Age: 72
End: 2022-12-09

## 2022-12-09 DIAGNOSIS — Z79.01 ANTICOAGULATED: ICD-10-CM

## 2022-12-09 LAB
APPEARANCE UR: CLEAR
BACTERIA #/AREA URNS HPF: ABNORMAL /HPF
BACTERIA UR CULT: NORMAL
BACTERIA UR CULT: NORMAL
BILIRUB UR QL STRIP: NEGATIVE
COLOR UR: YELLOW
EPI CELLS #/AREA URNS HPF: ABNORMAL /HPF
GLUCOSE UR QL STRIP: NEGATIVE
HGB UR QL STRIP: NEGATIVE
INR PPP: 2.5 (ref 0.9–1.1)
KETONES UR QL STRIP: NEGATIVE
LEUKOCYTE ESTERASE UR QL STRIP: ABNORMAL
NITRITE UR QL STRIP: NEGATIVE
PH UR STRIP: 8.5 [PH] (ref 5–8)
PROT UR QL STRIP: ABNORMAL
RBC #/AREA URNS HPF: ABNORMAL /HPF
SP GR UR STRIP: 1.02 (ref 1–1.03)
UROBILINOGEN UR STRIP-MCNC: ABNORMAL MG/DL
WBC #/AREA URNS HPF: ABNORMAL /HPF

## 2022-12-09 PROCEDURE — 36416 COLLJ CAPILLARY BLOOD SPEC: CPT | Performed by: NURSE PRACTITIONER

## 2022-12-09 PROCEDURE — 85610 PROTHROMBIN TIME: CPT | Performed by: NURSE PRACTITIONER

## 2022-12-09 NOTE — PROGRESS NOTES
Here for PT/INR, PT was 42.4 and INR was 3.5. Pt was told to take 4mg and to recheck PT 12/14/2022 copy of dosing schedule given to patient.

## 2022-12-14 ENCOUNTER — CLINICAL SUPPORT (OUTPATIENT)
Dept: FAMILY MEDICINE CLINIC | Facility: CLINIC | Age: 72
End: 2022-12-14
Payer: MEDICARE

## 2022-12-14 DIAGNOSIS — Z79.01 ANTICOAGULATED: ICD-10-CM

## 2022-12-14 PROCEDURE — 85610 PROTHROMBIN TIME: CPT | Performed by: FAMILY MEDICINE

## 2022-12-14 PROCEDURE — 36416 COLLJ CAPILLARY BLOOD SPEC: CPT | Performed by: FAMILY MEDICINE

## 2022-12-14 NOTE — PROGRESS NOTES
Here for PT/INR, PT was 23.4 and INR was 1.9. Pt was told to take 4 mg and to recheck PT two weeks copy of dosing schedule given to patient.

## 2022-12-28 ENCOUNTER — CLINICAL SUPPORT (OUTPATIENT)
Dept: FAMILY MEDICINE CLINIC | Facility: CLINIC | Age: 72
End: 2022-12-28

## 2022-12-28 DIAGNOSIS — Z79.01 ANTICOAGULATED: ICD-10-CM

## 2022-12-28 LAB — INR PPP: 2.5 (ref 0.9–1.1)

## 2022-12-28 PROCEDURE — 85610 PROTHROMBIN TIME: CPT | Performed by: FAMILY MEDICINE

## 2022-12-28 PROCEDURE — 36416 COLLJ CAPILLARY BLOOD SPEC: CPT | Performed by: FAMILY MEDICINE

## 2023-01-24 ENCOUNTER — CLINICAL SUPPORT (OUTPATIENT)
Dept: FAMILY MEDICINE CLINIC | Facility: CLINIC | Age: 73
End: 2023-01-24
Payer: MEDICARE

## 2023-01-24 DIAGNOSIS — Z79.01 ANTICOAGULATED: ICD-10-CM

## 2023-01-24 LAB — INR PPP: 1.7 (ref 0.9–1.1)

## 2023-01-24 PROCEDURE — 85610 PROTHROMBIN TIME: CPT | Performed by: FAMILY MEDICINE

## 2023-01-24 PROCEDURE — 36416 COLLJ CAPILLARY BLOOD SPEC: CPT | Performed by: FAMILY MEDICINE

## 2023-02-03 ENCOUNTER — CLINICAL SUPPORT (OUTPATIENT)
Dept: FAMILY MEDICINE CLINIC | Facility: CLINIC | Age: 73
End: 2023-02-03
Payer: MEDICARE

## 2023-02-03 ENCOUNTER — LAB (OUTPATIENT)
Dept: FAMILY MEDICINE CLINIC | Facility: CLINIC | Age: 73
End: 2023-02-03
Payer: MEDICARE

## 2023-02-03 DIAGNOSIS — I50.9 CONGESTIVE HEART FAILURE, UNSPECIFIED HF CHRONICITY, UNSPECIFIED HEART FAILURE TYPE: ICD-10-CM

## 2023-02-03 DIAGNOSIS — R73.01 ELEVATED FASTING GLUCOSE: ICD-10-CM

## 2023-02-03 DIAGNOSIS — Z79.01 ANTICOAGULATED: ICD-10-CM

## 2023-02-03 DIAGNOSIS — E87.6 HYPOPOTASSEMIA: Primary | ICD-10-CM

## 2023-02-03 DIAGNOSIS — I10 HTN (HYPERTENSION), BENIGN: ICD-10-CM

## 2023-02-03 DIAGNOSIS — D64.9 ANEMIA, UNSPECIFIED TYPE: ICD-10-CM

## 2023-02-03 DIAGNOSIS — E04.1 THYROID NODULE: ICD-10-CM

## 2023-02-03 LAB — INR PPP: 2.3 (ref 0.9–1.1)

## 2023-02-03 PROCEDURE — 36416 COLLJ CAPILLARY BLOOD SPEC: CPT | Performed by: FAMILY MEDICINE

## 2023-02-03 PROCEDURE — 85610 PROTHROMBIN TIME: CPT | Performed by: FAMILY MEDICINE

## 2023-02-03 NOTE — PROGRESS NOTES
Here for PT/INR, PT was 27.9 and INR was 2.3. Pt was told to take 4,4,5 and to recheck PT 2 weeks copy of dosing schedule given to patient.

## 2023-02-04 LAB
ALBUMIN SERPL-MCNC: 4.3 G/DL (ref 3.5–5.2)
ALBUMIN/GLOB SERPL: 2.4 G/DL
ALP SERPL-CCNC: 74 U/L (ref 39–117)
ALT SERPL-CCNC: 21 U/L (ref 1–33)
AST SERPL-CCNC: 21 U/L (ref 1–32)
BASOPHILS # BLD AUTO: 0.07 10*3/MM3 (ref 0–0.2)
BASOPHILS NFR BLD AUTO: 1.3 % (ref 0–1.5)
BILIRUB SERPL-MCNC: 0.5 MG/DL (ref 0–1.2)
BUN SERPL-MCNC: 19 MG/DL (ref 8–23)
BUN/CREAT SERPL: 20.2 (ref 7–25)
CALCIUM SERPL-MCNC: 10 MG/DL (ref 8.6–10.5)
CHLORIDE SERPL-SCNC: 101 MMOL/L (ref 98–107)
CO2 SERPL-SCNC: 30 MMOL/L (ref 22–29)
CREAT SERPL-MCNC: 0.94 MG/DL (ref 0.57–1)
EGFRCR SERPLBLD CKD-EPI 2021: 63.4 ML/MIN/1.73
EOSINOPHIL # BLD AUTO: 0.29 10*3/MM3 (ref 0–0.4)
EOSINOPHIL NFR BLD AUTO: 5.2 % (ref 0.3–6.2)
ERYTHROCYTE [DISTWIDTH] IN BLOOD BY AUTOMATED COUNT: 12.6 % (ref 12.3–15.4)
GLOBULIN SER CALC-MCNC: 1.8 GM/DL
GLUCOSE SERPL-MCNC: 97 MG/DL (ref 65–99)
HBA1C MFR BLD: 5.8 % (ref 4.8–5.6)
HCT VFR BLD AUTO: 43.6 % (ref 34–46.6)
HGB BLD-MCNC: 14.7 G/DL (ref 12–15.9)
IMM GRANULOCYTES # BLD AUTO: 0.01 10*3/MM3 (ref 0–0.05)
IMM GRANULOCYTES NFR BLD AUTO: 0.2 % (ref 0–0.5)
LYMPHOCYTES # BLD AUTO: 2.11 10*3/MM3 (ref 0.7–3.1)
LYMPHOCYTES NFR BLD AUTO: 37.7 % (ref 19.6–45.3)
MCH RBC QN AUTO: 33.3 PG (ref 26.6–33)
MCHC RBC AUTO-ENTMCNC: 33.7 G/DL (ref 31.5–35.7)
MCV RBC AUTO: 98.9 FL (ref 79–97)
MONOCYTES # BLD AUTO: 0.57 10*3/MM3 (ref 0.1–0.9)
MONOCYTES NFR BLD AUTO: 10.2 % (ref 5–12)
NEUTROPHILS # BLD AUTO: 2.54 10*3/MM3 (ref 1.7–7)
NEUTROPHILS NFR BLD AUTO: 45.4 % (ref 42.7–76)
NRBC BLD AUTO-RTO: 0.2 /100 WBC (ref 0–0.2)
PLATELET # BLD AUTO: 287 10*3/MM3 (ref 140–450)
POTASSIUM SERPL-SCNC: 3.5 MMOL/L (ref 3.5–5.2)
PROT SERPL-MCNC: 6.1 G/DL (ref 6–8.5)
RBC # BLD AUTO: 4.41 10*6/MM3 (ref 3.77–5.28)
SODIUM SERPL-SCNC: 139 MMOL/L (ref 136–145)
T4 FREE SERPL-MCNC: 1.12 NG/DL (ref 0.93–1.7)
TSH SERPL DL<=0.005 MIU/L-ACNC: 4.5 UIU/ML (ref 0.27–4.2)
WBC # BLD AUTO: 5.59 10*3/MM3 (ref 3.4–10.8)

## 2023-02-06 ENCOUNTER — OFFICE VISIT (OUTPATIENT)
Dept: FAMILY MEDICINE CLINIC | Facility: CLINIC | Age: 73
End: 2023-02-06
Payer: MEDICARE

## 2023-02-06 VITALS
HEIGHT: 62 IN | SYSTOLIC BLOOD PRESSURE: 134 MMHG | BODY MASS INDEX: 37.54 KG/M2 | HEART RATE: 78 BPM | RESPIRATION RATE: 16 BRPM | OXYGEN SATURATION: 98 % | WEIGHT: 204 LBS | TEMPERATURE: 97.1 F | DIASTOLIC BLOOD PRESSURE: 84 MMHG

## 2023-02-06 DIAGNOSIS — R73.01 ELEVATED FASTING GLUCOSE: ICD-10-CM

## 2023-02-06 DIAGNOSIS — K30 PEPTIC DISEASE: Chronic | ICD-10-CM

## 2023-02-06 DIAGNOSIS — E78.2 MIXED HYPERLIPIDEMIA: ICD-10-CM

## 2023-02-06 DIAGNOSIS — I10 HTN (HYPERTENSION), BENIGN: Chronic | ICD-10-CM

## 2023-02-06 DIAGNOSIS — I50.9 CONGESTIVE HEART FAILURE, UNSPECIFIED HF CHRONICITY, UNSPECIFIED HEART FAILURE TYPE: Chronic | ICD-10-CM

## 2023-02-06 DIAGNOSIS — M15.0 PRIMARY GENERALIZED (OSTEO)ARTHRITIS: ICD-10-CM

## 2023-02-06 DIAGNOSIS — R20.0 NUMBNESS OF UPPER EXTREMITY: ICD-10-CM

## 2023-02-06 DIAGNOSIS — Z79.01 ANTICOAGULATED: ICD-10-CM

## 2023-02-06 DIAGNOSIS — E87.6 HYPOPOTASSEMIA: ICD-10-CM

## 2023-02-06 DIAGNOSIS — Z00.00 WELLNESS EXAMINATION: ICD-10-CM

## 2023-02-06 PROCEDURE — 1170F FXNL STATUS ASSESSED: CPT | Performed by: FAMILY MEDICINE

## 2023-02-06 PROCEDURE — 1160F RVW MEDS BY RX/DR IN RCRD: CPT | Performed by: FAMILY MEDICINE

## 2023-02-06 PROCEDURE — G0439 PPPS, SUBSEQ VISIT: HCPCS | Performed by: FAMILY MEDICINE

## 2023-02-06 PROCEDURE — 99213 OFFICE O/P EST LOW 20 MIN: CPT | Performed by: FAMILY MEDICINE

## 2023-02-06 NOTE — PATIENT INSTRUCTIONS
"Medicare/insurances offer certain visits called \"wellness/annual\" that allows for time to deal with and  review the many aspects of \"being well\" that just might not get mentioned during other visits with your doctor through the year.  This includes things like reviews of health screenings (mammograms, various labs),  weight, exercise, vaccines for just a few examples.      In order to help you with this we wish to make you aware of a few things for you to consider:    1. Advanced directives.  These are documents used to help direct your care if your health/situation should reach a point that you cannot make your own decisions.  While it is likely you do not currently have a need for these documents now; it is something that we all might face at any time.   The hand outs you are being given today are simply for you to review and use to learn more about these documents and consider them as you wish.      2. Vaccines: Certain vaccines are important after age 50, 60, and 65 and some health situations (for example COPD), require even boosters beyond age 65.  We are happy to review with you your vaccine status and vaccines that might be needed for you at this point:      a. Tetanus.   Like anyone this needs to given every 10 years; sooner for/with lacerations/wounds.   Likely when getting this booster it needs to be a tetanus called Tdap (tetanus mixed with diptheria and pertussis).   Years ago you had this vaccine.  We now know we can lose our immunity to pertussis (a part of this vaccine) and run a risk of catching this.  Now only would this make us ill; but more importantly we can spread this to very young children (and for them it can be a much more dangerous illness).   We call this the grandparent vaccine for this reason.     b. Pneumonia (strept).   This comes now with three brands.   Previously it was recommended to take prevnar and a year later pneumovax 23.  Now pneumonia 20 is replacing these with a one time " pneumonia vaccine.   Even if you have had these before; we need to review when and your current health situation/s as you may need boosters and even recently the CDC has made recent/new recommendations for pneumovax.      c. Shingles.  You do not want to catch shingles.  Though you will recover from this; the pain associated with shingles can be severe.  Even if you have had the now older zostavax, or have had shingles; it is recommended you still get the Shingrix (the new vaccine just available early 2018 shingles vaccine).  A new shingles vaccine (a shot to lower your chance of catching shingles) is now available (shingrix).  This vaccine is the second vaccine created for this purpose; (we have had zostavax for years).  Shingrix provides a much better and longer immunity for shingles than zostavax.  For this and other reasons Shingrix can be started at age 50.  If you have had zostavax in the past; you can still take Shingrix.  Beginning 2023 medicare no longer requires a co-pay for this (ie: it is free)    This vaccine is not paid for in a doctor's office by medicare, medicaid and probably most insurances.  Like zostavax; this is covered in drug stores.  This is a vaccine that if you chose to get you need to get at a drug store that gives vaccines (like [facility], [facility] and [facility].      d. Yearly flu vaccine given from September through April each year (there is a special vaccine for those over 65).     e. Travel vaccines:  If you are one to do international travel; be sure and ask us for any particular unusual vaccines you may need.     f.  Miscellaneous:  If you have certain health situations/disease you may need specific/particular vaccines not give to the general public.     g.  Covid: currently recommended everyone over 6m  The brands Pfizer/Moderna are for 3 total shots as immunity will wane from less than this.  [employer] has a version that comes with recommendations for an  initial vaccine and booster after.  I no longer recommend J&J as a first choice as Pfizer/Moderna are readily available.  If you have had an initial J&J I recommend you booster with Moderna.   I strongly recommend covid vaccination; being unvaccinated or partially vaccinated carries real risk for disease and even death.     Because of many restrictions on this office always having all the above vaccines; you may be advised to work with your local health department to keep up with your individual vaccine needs.    The vaccines we have on record for you include:   Immunization History   Administered Date(s) Administered    COVID-19 (MODERNA) 1st, 2nd, 3rd Dose Only 01/22/2021, 04/16/2021, 12/15/2021    COVID-19 (MODERNA) BOOSTER 12/02/2022    Flu Vaccine Quad PF >36MO 10/09/2017    Fluad Quad 65+ 10/12/2020    Fluzone High-Dose 65+yrs 11/23/2021, 10/17/2022    Fluzone Quad >6mos (Multi-dose) 11/30/2016    Pneumococcal Conjugate 13-Valent (PCV13) 11/30/2016, 10/09/2017    Pneumococcal Polysaccharide (PPSV23) 07/01/2020    Tdap 11/02/2020       If you have record of other vaccines from vaccine clinics, [facility], [facility] and like and want them to show in your chart here; please talk to our nurses about having your vaccine record updated. We would be very pleased if you would take the time to get us this information to keep you main health record here current.     3. Exercise: regular cardio exercise something everyone should consider and try to do; even if health limitations (ie find that exercise UE/LE/cardio that they can tolerate).   Normal weight a goal for everyone (as we discussed)    4. Healthy diet helpful for weight management, illness prevention.     5. If over 50-screening exams include men PSA/rectal exam, women mammograms, and everyone colonoscopy screening for colon cancer.    6. If you use tobacco of any kind or e-products you should stop. We are providing you some information to consider that could make this  process easier.      ##################################

## 2023-02-06 NOTE — PROGRESS NOTES
E/M encounter  Subjective   Lucy Sousa is a 73 y.o. female presenting with chief complaint of:   Chief Complaint   Patient presents with   • Medicare Wellness-subsequent   • Numbness     My hands go to sleep a lot when I am just sitting in a chair, my feet hurt     AWV 1.20.22    History of Present Illness :  Alone.   Here for review of chronic problems that includes HTN and others.  Also yearly medicare wellness exam.    Has multiple chronic problems to consider that might have a bearing on today's issues;  an interval appointment.       Chronic/acute problems reviewed today:   1. Numbness of upper extremity upper extremity right greater than left occasional numbness without weakness   2. Primary generalized (osteo)arthritis Chronic/stable.  Various on/off joint pains/soreness/stiffness.  Particular joint problems with various.  No joint swelling.  Treats mainly with reduced activity, Rx listed, Tylenol.  No  NSAIDs, and no recent injections.      3. Wellness examination-done-no gyne Chronic ongoing over time screening or advice for general health care/wellness.      4. Congestive heart failure, unspecified HF chronicity, unspecified heart failure type (HCC) Chronic/stable.  Denies significant sob, orthopnea, leg edema, weight gain.  Aware of influence diet/salt and watching weight at home.       5. HTN (hypertension), benign Chronic/stable. Stable here past/and occ home blood pressures.  No significant chest pain, SOB, LE edema, orthopnea, near syncope, dizziness/light headness.   Recent Vitals       11/9/2022 12/7/2022 2/6/2023       BP: 122/82 130/82 134/84     Pulse: -- -- 78     Temp: -- -- 97.1 °F (36.2 °C)     Weight: 88.5 kg (195 lb) 89.8 kg (198 lb) 92.5 kg (204 lb)     BMI (Calculated): 34.6 36.2 37.3            6. Mixed hyperlipidemia Chronic/stable.  Tolerated use of Rx with labs showing improved lipid values and tolerant liver labs. No muscle aches unexpected.      7. Anticoagulated-PE/cardiac  arrest/(xarelto) coumadin Chronic/stable reason for stopping or use of.  Denies bleeding issues; especially epistaxis, melena, hematochezia.  Upper arms/others do not significantly bruise easily.  No significant bleeding or falls.      8. Elevated fasting glucose Chronic/stable.  No problem/pattern hypoglycemia/hyperglycemia manifest by poly- dypsia, phagia, uria, or sweats, diaphoretic episodes, syncope/near.     9. Hypopotassemia-diuretic Chronic/variable high or low K as uses diuretic; has to have lab monitoring.  No significant fatigue or muscle cramps     10. Peptic disease Chronic/stable.  Controlled heartburn, reflux without dysphagia, melena.  Rx used, periods not used proven currently needed with symptoms -currently doing ok.        Has an/another acute issue today: none.    The following portions of the patient's history were reviewed and updated as appropriate: allergies, current medications, past family history, past medical history, past social history, past surgical history and problem list.      Current Outpatient Medications:   •  atorvastatin (LIPITOR) 20 MG tablet, TAKE 1 TABLET BY MOUTH DAILY., Disp: 90 tablet, Rfl: 3  •  Cholecalciferol (Vitamin D3) 25 MCG (1000 UT) capsule, Take 1,000 Units by mouth Daily., Disp: , Rfl:   •  cyanocobalamin (VITAMIN B-12) 50 MCG tablet tablet, Take 50 mcg by mouth Daily., Disp: , Rfl:   •  esomeprazole (nexIUM) 40 MG capsule, TAKE ONE CAPSULE TWICE DAILY GENERIC FOR NEXIUM (Patient taking differently: Take 40 mg by mouth Daily As Needed.), Disp: 180 capsule, Rfl: 2  •  furosemide (LASIX) 20 MG tablet, TAKE 1 TABLET BY MOUTH DAILY., Disp: 90 tablet, Rfl: 3  •  losartan (COZAAR) 50 MG tablet, TAKE ONE TABLET DAILY GENERIC FOR COZAAR, Disp: 90 tablet, Rfl: 2  •  Multiple Vitamin (MULTI VITAMIN DAILY PO), Take 1 tablet by mouth daily., Disp: , Rfl:   •  potassium chloride (K-DUR,KLOR-CON) 20 MEQ CR tablet, TAKE ONE TABLET TWICE DAILY, Disp: 180 tablet, Rfl: 2  •   triamterene-hydrochlorothiazide (DYAZIDE) 37.5-25 MG per capsule, TAKE ONE CAPSULE EVERY MORNING GENERIC FOR DYAZIDE, Disp: 90 capsule, Rfl: 1  •  warfarin (COUMADIN) 4 MG tablet, TAKE 2 TABLETS BY MOUTH EVERY NIGHT FOR 90 DAYS. SUBJECT TO CHANGE WITH TITRATIONS/ PT/INR (Patient taking differently: Take 4 mg by mouth Every Night.), Disp: 180 tablet, Rfl: 1  •  warfarin (Coumadin) 5 MG tablet, 5mg, 5mg ,4mg rotation, Disp: 60 tablet, Rfl: 0  •  Zinc 50 MG capsule, Take 1 tablet/day by mouth Daily., Disp: , Rfl:   •  nystatin (MYCOSTATIN) 196781 UNIT/GM cream, Apply 1 application topically to the appropriate area as directed 2 (Two) Times a Day As Needed (skin yeast rash)., Disp: 30 g, Rfl: 1  •  nystatin (MYCOSTATIN) 759070 UNIT/GM powder, Apply  topically to the appropriate area as directed 3 (Three) Times a Day As Needed (skin yeast rash)., Disp: 60 g, Rfl: 3    No problems with medications.    Allergies   Allergen Reactions   • Macrobid [Nitrofurantoin Macrocrystal] Rash       Review of Systems  GENERAL:  Active/slower with limits, speed, stamina for age and exertional fatigue. Sleep is ok. No fever now.  SKIN: No rash/skin lesion of concern.   ENDO:  No syncope, near or diaphoretic sweaty spells.  HEENT: No recent head injury; or change in occ headache.   No vision change.  No significant hearing loss.  Ears without pain/drainage.  No sore throat.  No significant nasal/sinus congestion/drainage. No epistaxis.  CHEST: No chest wall tenderness or mass.  Infrequent cough, without wheeze.  No SOB; no hemoptysis.  CV: No chest pain; same occ palpitations and minimal ankle edema.  GI: No heartburn, dysphagia.  No abdominal pain, constipation; stools trend loose.   No rectal bleeding, or melena.    :  Voids without dysuria, or incontinence to completion but urgency, leakage, frequency.  ORTHO: No painful/swollen joints but various on /off sore. .  No change occ/on-off  sore neck or back.  No acute neck or back pain  without recent injury.  NEURO: Still on/off mild dizzy; no vertigo.  No weakness of extremities except above.  New UE numbness/paresthesias.   PSYCH: No memory loss.  Mood good but variable anxious, depressed but/and not suicidal.  Tries to tolerate stress .   Screening:  Gyne: SHUN+BSO/Ari/WBHONORIO/[id number]  Mammogram: 1.9.23-[facility]  Bone density: 10.31.18/bone d-deca/Yolanda/Ts L1 +1.9, hip +0.3: 10.31.18  Low dose CT chest:Tobacco-smoker/none: NA  GI:   Colon-div///6.2.21/7y  EGD-hh///6.2.21  Prostate: NA  Usual lab order  6m CBC, CMP, TSH, fT4  12m CBC, CMP, LIPID, TSH, fT4, Vit D iron, ferritin, B12, folate      Copy/paste function used for ROS/exam AND each area of these were reviewed, updated, confirmed and supplemented as needed.  Data reviewed:   Recent admit/ER/MD visits: 7.25.22    1. Thyroid nodule 5.2022-12m    2. Mixed hyperlipidemia    3. Anticoagulated-PE/cardiac arrest/(xarelto) coumadin    4. HTN (hypertension), benign    5. Congestive heart failure, unspecified HF chronicity, unspecified heart failure type (HCC)    6. Hypopotassemia-diuretic    7. Depression, unspecified depression type    8. Primary generalized (osteo)arthritis    9. Overactive bladder    10. Elevated fasting glucose    11. Anemia, unspecified type          Discussions/medical decisions/reviews:  BP ok  Other vitals ok  DM/  Lipid LDL 87  PSA NA  CBC ok  Renal ok  Liver ok  Vit D ok  Thyroid ok     Reviewed INR; same dose today  Warned again of abx/coumadin interaction  mybritrig ok with coumadin; but advise 1w INR if starts  Difficulty treating overactive bladder discussed; especially meds/interactions  Elevated fasting glucose; for now suggest diet/exercise/weight loss; maybe soon metformin  (BS may have helped cause recent yeast infection/rash)    Last cardiac testing:   Echo: Results for orders placed during the hospital encounter of 09/22/17    Adult Stress Echo Only    Interpretation Summary  · Normal stress  echo with no significant echocardiographic evidence for myocardial ischemia.  · The patient reported shortness of breath during the stress test. The patient experienced no angina during the stress test.  · There was no ST segment deviation noted during stress.  · Fair exercise tolerance.    Radiology considered:   No radiology results for the last 90 days.    Lab Results:  Results for orders placed or performed in visit on 02/03/23   Comprehensive Metabolic Panel    Specimen: Blood   Result Value Ref Range    Glucose 97 65 - 99 mg/dL    BUN 19 8 - 23 mg/dL    Creatinine 0.94 0.57 - 1.00 mg/dL    EGFR Result 63.4 >60.0 mL/min/1.73    BUN/Creatinine Ratio 20.2 7.0 - 25.0    Sodium 139 136 - 145 mmol/L    Potassium 3.5 3.5 - 5.2 mmol/L    Chloride 101 98 - 107 mmol/L    Total CO2 30.0 (H) 22.0 - 29.0 mmol/L    Calcium 10.0 8.6 - 10.5 mg/dL    Total Protein 6.1 6.0 - 8.5 g/dL    Albumin 4.3 3.5 - 5.2 g/dL    Globulin 1.8 gm/dL    A/G Ratio 2.4 g/dL    Total Bilirubin 0.5 0.0 - 1.2 mg/dL    Alkaline Phosphatase 74 39 - 117 U/L    AST (SGOT) 21 1 - 32 U/L    ALT (SGPT) 21 1 - 33 U/L   Hemoglobin A1c    Specimen: Blood   Result Value Ref Range    Hemoglobin A1C 5.80 (H) 4.80 - 5.60 %   TSH    Specimen: Blood   Result Value Ref Range    TSH 4.500 (H) 0.270 - 4.200 uIU/mL   T4, free    Specimen: Blood   Result Value Ref Range    Free T4 1.12 0.93 - 1.70 ng/dL   CBC & Differential    Specimen: Blood   Result Value Ref Range    WBC 5.59 3.40 - 10.80 10*3/mm3    RBC 4.41 3.77 - 5.28 10*6/mm3    Hemoglobin 14.7 12.0 - 15.9 g/dL    Hematocrit 43.6 34.0 - 46.6 %    MCV 98.9 (H) 79.0 - 97.0 fL    MCH 33.3 (H) 26.6 - 33.0 pg    MCHC 33.7 31.5 - 35.7 g/dL    RDW 12.6 12.3 - 15.4 %    Platelets 287 140 - 450 10*3/mm3    Neutrophil Rel % 45.4 42.7 - 76.0 %    Lymphocyte Rel % 37.7 19.6 - 45.3 %    Monocyte Rel % 10.2 5.0 - 12.0 %    Eosinophil Rel % 5.2 0.3 - 6.2 %    Basophil Rel % 1.3 0.0 - 1.5 %    Neutrophils Absolute 2.54 1.70 -  7.00 10*3/mm3    Lymphocytes Absolute 2.11 0.70 - 3.10 10*3/mm3    Monocytes Absolute 0.57 0.10 - 0.90 10*3/mm3    Eosinophils Absolute 0.29 0.00 - 0.40 10*3/mm3    Basophils Absolute 0.07 0.00 - 0.20 10*3/mm3    Immature Granulocyte Rel % 0.2 0.0 - 0.5 %    Immature Grans Absolute 0.01 0.00 - 0.05 10*3/mm3    nRBC 0.2 0.0 - 0.2 /100 WBC       A1C:  Lab Results - Last 18 Months   Lab Units 02/03/23  0804   HEMOGLOBIN A1C % 5.80*     GLUCOSE:  Lab Results - Last 18 Months   Lab Units 02/03/23  0804 10/12/22  0926 07/18/22  0858 01/07/22  0658   GLUCOSE mg/dL 97 106* 106* 94     LIPID:  Lab Results - Last 18 Months   Lab Units 07/18/22  0858   CHOLESTEROL mg/dL 161   LDL CHOL mg/dL 87   HDL CHOL mg/dL 51   TRIGLYCERIDES mg/dL 131     PSA:No results found for: PSA    CBC:  Lab Results - Last 18 Months   Lab Units 02/03/23  0804 10/12/22  0926 07/18/22  0858 01/07/22  0658   WBC 10*3/mm3 5.59 4.52 5.23 5.64   HEMOGLOBIN g/dL 14.7 14.1 14.9 13.9   HEMATOCRIT % 43.6 42.5 44.4 42.0   PLATELETS 10*3/mm3 287 280 293 281   IRON mcg/dL  --   --  102  --       BMP/CMP:  Lab Results - Last 18 Months   Lab Units 02/03/23  0804 10/12/22  0926 07/18/22  0858 04/26/22  1453 01/07/22  0658   SODIUM mmol/L 139 141 139  --  142   POTASSIUM mmol/L 3.5 3.7 3.9  --  3.8   CHLORIDE mmol/L 101 104 100  --  103   TOTAL CO2 mmol/L 30.0*  --  28.9  --  30.1*   CO2 mmol/L  --  30.0*  --   --   --    GLUCOSE mg/dL 97 106* 106*  --  94   BUN mg/dL 19 16 17  --  14   CREATININE mg/dL 0.94 0.92 0.95 1.10 0.80   EGFR IF NONAFRICN AM mL/min/1.73  --   --   --   --  70   EGFR IF AFRICN AM mL/min/1.73  --   --   --   --  85   EGFR RESULT mL/min/1.73 63.4  --  63.0  --   --    CALCIUM mg/dL 10.0 9.8 9.8  --  9.8     HEPATIC:  Lab Results - Last 18 Months   Lab Units 02/03/23  0804 07/18/22  0858 01/07/22  0658   ALT (SGPT) U/L 21 19 23   AST (SGOT) U/L 21 20 22   ALK PHOS U/L 74 74 89     Vit D:  Lab Results - Last 18 Months   Lab Units 07/18/22  0858  "  VIT D 25 HYDROXY ng/ml 48.3     THYROID:  Lab Results - Last 18 Months   Lab Units 02/03/23  0804 07/18/22  0858 01/07/22  0658   TSH uIU/mL 4.500* 4.120 4.890*   FREE T4 ng/dL 1.12 1.16 1.18       Objective   /84 (BP Location: Right arm, Patient Position: Sitting, Cuff Size: Adult)   Pulse 78   Temp 97.1 °F (36.2 °C) (Infrared)   Resp 16   Ht 157.5 cm (62\")   Wt 92.5 kg (204 lb)   SpO2 98%   BMI 37.31 kg/m²   Body mass index is 37.31 kg/m².    Recent Vitals       11/9/2022 12/7/2022 2/6/2023       BP: 122/82 130/82 134/84     Pulse: -- -- 78     Temp: -- -- 97.1 °F (36.2 °C)     Weight: 88.5 kg (195 lb) 89.8 kg (198 lb) 92.5 kg (204 lb)     BMI (Calculated): 34.6 36.2 37.3         Wt Readings from Last 15 Encounters:   02/06/23 1517 92.5 kg (204 lb)   12/07/22 1112 89.8 kg (198 lb)   11/09/22 1043 88.5 kg (195 lb)   10/12/22 0908 90.9 kg (200 lb 6.4 oz)   10/06/22 0817 88.5 kg (195 lb)   08/31/22 1342 87.5 kg (193 lb)   08/17/22 1113 86.2 kg (190 lb)   07/25/22 1045 89.8 kg (198 lb)   07/20/22 1332 87.5 kg (193 lb)   06/30/22 1404 88 kg (194 lb)   04/11/22 1057 88.5 kg (195 lb)   01/20/22 1455 88.9 kg (196 lb)   07/07/21 1023 87.1 kg (192 lb)   06/02/21 0854 87.5 kg (193 lb)   04/21/21 0941 86.6 kg (191 lb)       Physical Exam  GENERAL:  Well nourished/developed in no acute distress.   SKIN: Turgor excellent, without wound, rash, lesion.   HEENT: Normal cephalic without trauma.  Pupils equal round reactive to light. Extraocular motions full without nystagmus.   External canals nonobstructive nontender without reddness. Tymphatic membranes sharri with shanika structures intact.   Oral cavity without growths, exudates, and moist.  Posterior pharynx without mass, obstruction, redness.  No thyromegaly, mass, tenderness, lymphadenopathy and supple.  CV: Regular rhythm.  No murmur, gallop, trace LE edema. Posterior pulses intact.  No carotid bruits.  CHEST: No chest wall tenderness or mass.   LUNGS: Symmetric " motion with clear to auscultation.   ABD: Soft, nontender without mass.   ORTHO: Symmetric extremities without swelling/point tenderness; even anterior R ankle.  Full gross range of motion.  NEURO: CN 2-12 grossly intact.  Symmetric facies and UE/LE. 3/5 strength throughout. 1/4 x bicep knee equal reflexes.  Nonfocal use extremities. Speech clear.  Reduced minimal light touch with monofilament, vibratory sensation with tuning fork; equal toes/distal feet.  Neg Tinel's wrist x 2.   PSYCH: Oriented x 3.  Pleasant calm, well kept.  Purposeful/directed conservation with intact short/long gross memory.    Assessment & Plan     1. Numbness of upper extremity    2. Primary generalized (osteo)arthritis    3. Wellness examination-done-no gyne    4. Congestive heart failure, unspecified HF chronicity, unspecified heart failure type (HCC)    5. HTN (hypertension), benign    6. Mixed hyperlipidemia    7. Anticoagulated-PE/cardiac arrest/(xarelto) coumadin    8. Elevated fasting glucose    9. Hypopotassemia-diuretic    10. Peptic disease        Issues that are new, uncontrolled, or required review HPI/ROS/exam and decisions beyond wellness today: (ie: requiring the service of a physician and/or not likely to resolve independently without clinical intervention.)   Numbness UE    Discussions/medical decisions/reviews:  BP ok  Other vitals ok  DM/BS 5.8 2.3.23  Lipid LDL 87 7.18.22; lipitor 20  PSA NA  CBC ok 2.3.23  Renal ok 2.3.23  Liver ok 2.3.23  Vit D 48 7.18.22  Thyroid TSH 4.5H, fT4 ok 1.12 2.3.23    Wrist splint nightly awhile and see if helps numbness  EMG/NCV if worsens  Xray hands if any joint gets too bad  Exercise encouraged    Data review above:   Rx: reviewed and decisions:   Rx new/changes: none  No orders of the defined types were placed in this encounter.    Orders placed:   LAB/Testing/Referrals: reviewed/orders:   Today:   No orders of the defined types were placed in this encounter.    Chronic/recurrent labs  "above or change to:   Same     Wellness/or annual done   Screening reviewed/updated     Immunization History   Administered Date(s) Administered   • COVID-19 (MODERNA) 1st, 2nd, 3rd Dose Only 01/22/2021, 04/16/2021, 12/15/2021   • COVID-19 (MODERNA) BOOSTER 12/02/2022   • Flu Vaccine Quad PF >36MO 10/09/2017   • Fluad Quad 65+ 10/12/2020   • Fluzone High-Dose 65+yrs 11/23/2021, 10/17/2022   • Fluzone Quad >6mos (Multi-dose) 11/30/2016   • Pneumococcal Conjugate 13-Valent (PCV13) 11/30/2016, 10/09/2017   • Pneumococcal Polysaccharide (PPSV23) 07/01/2020   • Tdap 11/02/2020     Vaccine reviewed: today none; later we advised/reaffirmed our support/suggestion for staying complete with covid- covid boosters, seasonal flu/yearly and any missing vaccine from list we supplied; we suggest contact with local health department office to review missing/needed vaccines and then bring nursing documentation for these vaccines to this office.     Health maintenance:   Body mass index is 37.31 kg/m².  Class 2 Severe Obesity (BMI >=35 and <=39.9). Obesity-related health conditions include the following: hypertension. Obesity is unchanged. BMI is is above average; BMI management plan is completed. We discussed portion control and increasing exercise.      Tobacco use reviewed:   Lucy Sousa  reports that she has never smoked. She has never used smokeless tobacco..    Annual/wellness also done today.  Issues as appropriate discussed as counseling, anticipatory guidance:   Nutrition, physical activity, healthy weight, injury prevention, misuse of tobacco, alcohol and drugs, sexual behavior and STDs, contraception, dental health, mental health, immunizations, screenings as appropriate. As appropriate see AVS.      Patient Instructions     Medicare/insurances offer certain visits called \"wellness/annual\" that allows for time to deal with and  review the many aspects of \"being well\" that just might not get mentioned during other visits " with your doctor through the year.  This includes things like reviews of health screenings (mammograms, various labs),  weight, exercise, vaccines for just a few examples.      In order to help you with this we wish to make you aware of a few things for you to consider:    1. Advanced directives.  These are documents used to help direct your care if your health/situation should reach a point that you cannot make your own decisions.  While it is likely you do not currently have a need for these documents now; it is something that we all might face at any time.   The hand outs you are being given today are simply for you to review and use to learn more about these documents and consider them as you wish.      2. Vaccines: Certain vaccines are important after age 50, 60, and 65 and some health situations (for example COPD), require even boosters beyond age 65.  We are happy to review with you your vaccine status and vaccines that might be needed for you at this point:      a. Tetanus.   Like anyone this needs to given every 10 years; sooner for/with lacerations/wounds.   Likely when getting this booster it needs to be a tetanus called Tdap (tetanus mixed with diptheria and pertussis).   Years ago you had this vaccine.  We now know we can lose our immunity to pertussis (a part of this vaccine) and run a risk of catching this.  Now only would this make us ill; but more importantly we can spread this to very young children (and for them it can be a much more dangerous illness).   We call this the grandparent vaccine for this reason.     b. Pneumonia (strept).   This comes now with three brands.   Previously it was recommended to take prevnar and a year later pneumovax 23.  Now pneumonia 20 is replacing these with a one time pneumonia vaccine.   Even if you have had these before; we need to review when and your current health situation/s as you may need boosters and even recently the CDC has made recent/new recommendations  for pneumovax.      c. Shingles.  You do not want to catch shingles.  Though you will recover from this; the pain associated with shingles can be severe.  Even if you have had the now older zostavax, or have had shingles; it is recommended you still get the Shingrix (the new vaccine just available early 2018 shingles vaccine).  A new shingles vaccine (a shot to lower your chance of catching shingles) is now available (shingrix).  This vaccine is the second vaccine created for this purpose; (we have had zostavax for years).  Shingrix provides a much better and longer immunity for shingles than zostavax.  For this and other reasons Shingrix can be started at age 50.  If you have had zostavax in the past; you can still take Shingrix.  Beginning 2023 medicare no longer requires a co-pay for this (ie: it is free)    This vaccine is not paid for in a doctor's office by medicare, medicaid and probably most insurances.  Like zostavax; this is covered in drug stores.  This is a vaccine that if you chose to get you need to get at a drug store that gives vaccines (like [facility], [facility] and [facility].      d. Yearly flu vaccine given from September through April each year (there is a special vaccine for those over 65).     e. Travel vaccines:  If you are one to do international travel; be sure and ask us for any particular unusual vaccines you may need.     f.  Miscellaneous:  If you have certain health situations/disease you may need specific/particular vaccines not give to the general public.     g.  Covid: currently recommended everyone over 6m  The brands Pfizer/Moderna are for 3 total shots as immunity will wane from less than this.  TellmeGen/TellmeGen has a version that comes with recommendations for an initial vaccine and booster after.  I no longer recommend J&J as a first choice as Pfizer/Moderna are readily available.  If you have had an initial J&J I recommend you booster with Moderna.   I strongly  recommend covid vaccination; being unvaccinated or partially vaccinated carries real risk for disease and even death.     Because of many restrictions on this office always having all the above vaccines; you may be advised to work with your local health department to keep up with your individual vaccine needs.    The vaccines we have on record for you include:   Immunization History   Administered Date(s) Administered   • COVID-19 (MODERNA) 1st, 2nd, 3rd Dose Only 01/22/2021, 04/16/2021, 12/15/2021   • COVID-19 (MODERNA) BOOSTER 12/02/2022   • Flu Vaccine Quad PF >36MO 10/09/2017   • Fluad Quad 65+ 10/12/2020   • Fluzone High-Dose 65+yrs 11/23/2021, 10/17/2022   • Fluzone Quad >6mos (Multi-dose) 11/30/2016   • Pneumococcal Conjugate 13-Valent (PCV13) 11/30/2016, 10/09/2017   • Pneumococcal Polysaccharide (PPSV23) 07/01/2020   • Tdap 11/02/2020       If you have record of other vaccines from vaccine clinics, [facility], [facility] and like and want them to show in your chart here; please talk to our nurses about having your vaccine record updated. We would be very pleased if you would take the time to get us this information to keep you main health record here current.     3. Exercise: regular cardio exercise something everyone should consider and try to do; even if health limitations (ie find that exercise UE/LE/cardio that they can tolerate).   Normal weight a goal for everyone (as we discussed)    4. Healthy diet helpful for weight management, illness prevention.     5. If over 50-screening exams include men PSA/rectal exam, women mammograms, and everyone colonoscopy screening for colon cancer.    6. If you use tobacco of any kind or e-products you should stop. We are providing you some information to consider that could make this process easier.      ##################################             Follow up: Return for lab/dr Berg 6m.  Future Appointments   Date Time Provider Department Center   2/17/2023  9:00 AM  [facility] [place] [facility]   8/15/2023  8:20 AM [facility]   2023  2:30 PM Rosas Berg MD [facility]             ###################################################################################################################################################    AWV Encounter  Subsequent Medicare Wellness Visit    HEALTH RISK ASSESSMENT    : 1950    Recent Hospitalizations:  No hospitalization(s) within the last year..  ccc      Current Medical Providers:  Patient Care Team:  Rosas Berg MD as PCP - General (Family Medicine)  Rosas Berg MD as PCP - Family Medicine  Rosas Berg MD as Referring Physician (Family Medicine)  Daniel Underwood MD as Cardiologist (Cardiology)  Heath Mckeon MD as Consulting Physician (Gastroenterology)  Vadim Le MD as Consulting Physician (Otolaryngology)  Ari Lee PA as Physician Assistant (Otolaryngology)  Cassie Franco PA as Referring Physician (Physician Assistant)  Kaci King MD as Obstetrician (Obstetrics and Gynecology)        Smoking Status:  Social History     Tobacco Use   Smoking Status Never   Smokeless Tobacco Never       Alcohol Consumption:  Social History     Substance and Sexual Activity   Alcohol Use No       Depression Screen:   PHQ-2/PHQ-9 Depression Screening 2023   Retired PHQ-9 Total Score -   Retired Total Score -   Little Interest or Pleasure in Doing Things 0-->not at all   Feeling Down, Depressed or Hopeless 0-->not at all   PHQ-9: Brief Depression Severity Measure Score 0       Health Habits and Functional and Cognitive Screening:  Functional & Cognitive Status 2023   Do you have difficulty preparing food and eating? No   Do you have difficulty bathing yourself, getting dressed or grooming yourself? No   Do you have difficulty using the toilet? No   Do you have difficulty moving around from place to place? No   Do  you have trouble with steps or getting out of a bed or a chair? Yes   Current Diet Well Balanced Diet   Dental Exam Up to date   Eye Exam Up to date   Exercise (times per week) 2 times per week   Current Exercises Include Walking;House Cleaning;Gardening   Current Exercise Activities Include -   Do you need help using the phone?  No   Are you deaf or do you have serious difficulty hearing?  No   Do you need help with transportation? No   Do you need help shopping? No   Do you need help preparing meals?  No   Do you need help with housework?  No   Do you need help with laundry? No   Do you need help taking your medications? No   Do you need help managing money? No   Do you ever drive or ride in a car without wearing a seat belt? No   Have you felt unusual stress, anger or loneliness in the last month? No   Who do you live with? Spouse   If you need help, do you have trouble finding someone available to you? No   Have you been bothered in the last four weeks by sexual problems? No   Do you have difficulty concentrating, remembering or making decisions? No           Does the patient have evidence of cognitive impairment? No    Asiprin use counseling: Does not need ASA (and currently is not on it)        Age-appropriate Screening Schedule:  Refer to the list below for future screening recommendations based on patient's age, sex and/or medical conditions. Orders for these recommended tests are listed in the plan section. The patient has been provided with a written plan.    Health Maintenance   Topic Date Due   • URINE MICROALBUMIN  Never done   • ZOSTER VACCINE (1 of 2) Never done   • DIABETIC FOOT EXAM  Never done   • DIABETIC EYE EXAM  Never done   • DXA SCAN  10/31/2020   • LIPID PANEL  07/18/2023   • HEMOGLOBIN A1C  08/03/2023   • MAMMOGRAM  01/09/2025   • TDAP/TD VACCINES (2 - Td or Tdap) 11/02/2030   • INFLUENZA VACCINE  Completed        Subjective   History of Present Illness    Patient Active Problem List  "  Diagnosis   • Diffuse cystic mastopathy   • FH breast; [place]   • Peptic disease   • Primary generalized (osteo)arthritis   • Congestive heart failure (HCC)   • Depression   • Anxiety   • HTN (hypertension), benign   • Onychomycosis   • Macrocytosis- B12/folate-ok   • Surgical menopause: [id number]-6 to 5   • Cardiac arrest (CMS/HCC)- PE   • History of PE-ekos tx   • *History of anemia: gi/benign, iron   • Hypopotassemia-diuretic   • Chronic fatigue   • Wellness examination-done-no gyne   • Anticoagulated-PE/cardiac arrest/(xarelto) coumadin   • Obesity   • Laboratory test*   • Chronic back pain-chiropracter   • SOB: #, hx PE, CHF,    • Dizziness   • Hx of colonic polyps   • Esophageal dysphagia   • Skin lesions-   • S/P laparoscopic cholecystectomy   • S/P reduction mammoplasty   • Chronic diarrhea   • Hyperlipemia-(offered) statin   • Squamous cell carcinoma of skin of right cheek   • Generalized abdominal pain   • Hypercalcemia   • History of COVID-19 (2/21/21)   • Nausea and vomiting   • Thyroid nodule 5.2022-12m   • Overactive bladder   • Elevated fasting glucose       /84 (BP Location: Right arm, Patient Position: Sitting, Cuff Size: Adult)   Pulse 78   Temp 97.1 °F (36.2 °C) (Infrared)   Resp 16   Ht 157.5 cm (62\")   Wt 92.5 kg (204 lb)   SpO2 98%   BMI 37.31 kg/m²   Body mass index is 37.31 kg/m².    Advance Care Planning:  ACP discussion was held with the patient during this visit. Patient has an advance directive (not in EMR), copy requested.    Identification of Risk Factors:  Risk factors include: Breast Cancer/Mammogram Screening  Colon Cancer Screening  Immunizations Discussed/Encouraged (specific immunizations; Shingrix )  Obesity/Overweight   Osteoporosis Risk.    Compared to one year ago, the patient feels her physical health is the same.  Compared to one year ago, the patient feels her mental health is the same.      Assessment & Plan   Patient Self-Management and Personalized " Health Advice  The patient has been provided with information about: diet and exercise and preventive services including:   · Annual Wellness Visit (AWV).    Reviewed use of high risk medication in the elderly: yes  Reviewed for potential of harmful drug interactions in the elderly: yes    An After Visit Summary and PPPS with all of these plans were given to the patient.

## 2023-02-16 DIAGNOSIS — E78.2 MIXED HYPERLIPIDEMIA: ICD-10-CM

## 2023-02-16 DIAGNOSIS — K21.9 GASTROESOPHAGEAL REFLUX DISEASE: ICD-10-CM

## 2023-02-16 DIAGNOSIS — I10 HTN (HYPERTENSION), BENIGN: Primary | Chronic | ICD-10-CM

## 2023-02-16 RX ORDER — ESOMEPRAZOLE MAGNESIUM 40 MG/1
CAPSULE, DELAYED RELEASE ORAL
Qty: 180 CAPSULE | Refills: 2 | Status: SHIPPED | OUTPATIENT
Start: 2023-02-16

## 2023-02-16 RX ORDER — TRIAMTERENE AND HYDROCHLOROTHIAZIDE 37.5; 25 MG/1; MG/1
CAPSULE ORAL
Qty: 90 CAPSULE | Refills: 1 | Status: SHIPPED | OUTPATIENT
Start: 2023-02-16

## 2023-02-16 RX ORDER — ATORVASTATIN CALCIUM 20 MG/1
20 TABLET, FILM COATED ORAL DAILY
Qty: 90 TABLET | Refills: 3 | Status: SHIPPED | OUTPATIENT
Start: 2023-02-16

## 2023-02-16 NOTE — TELEPHONE ENCOUNTER
Rx Refill Note  Requested Prescriptions     Pending Prescriptions Disp Refills   • esomeprazole (nexIUM) 40 MG capsule [Pharmacy Med Name: ESOMEPRAZOLE MAGNESIUM 40MG DR CAPSULE DR] 180 capsule 2     Sig: TAKE ONE CAPSULE TWICE DAILY GENERIC FOR NEXIUM   • atorvastatin (LIPITOR) 20 MG tablet [Pharmacy Med Name: ATORVASTATIN CALCIUM 20MG TABLET] 90 tablet 3     Sig: TAKE 1 TABLET BY MOUTH DAILY.   • triamterene-hydrochlorothiazide (DYAZIDE) 37.5-25 MG per capsule [Pharmacy Med Name: TRIAMTERENE/HYDROCHLOROTHIAZIDE 37.5-25 CAPSULE] 90 capsule 1     Sig: TAKE ONE CAPSULE EVERY MORNING GENERIC FOR DYAZIDE      Last office visit with prescribing clinician: 2/6/2023      Next office visit with prescribing clinician: 8/22/2023            Allyn Hartman LPN  02/16/23, 16:57 CST

## 2023-02-17 ENCOUNTER — CLINICAL SUPPORT (OUTPATIENT)
Dept: FAMILY MEDICINE CLINIC | Facility: CLINIC | Age: 73
End: 2023-02-17
Payer: MEDICARE

## 2023-02-17 DIAGNOSIS — Z79.01 ANTICOAGULATED: Primary | ICD-10-CM

## 2023-02-17 LAB — INR PPP: 2.2 (ref 0.9–1.1)

## 2023-02-17 PROCEDURE — 36416 COLLJ CAPILLARY BLOOD SPEC: CPT | Performed by: FAMILY MEDICINE

## 2023-02-17 PROCEDURE — 85610 PROTHROMBIN TIME: CPT | Performed by: FAMILY MEDICINE

## 2023-02-17 NOTE — PROGRESS NOTES
Here for PT/INR, PT was 26.4 and INR was 2.2. Pt was told to take 4 mg, 4 mg, 5 mg and to recheck PT one month copy of dosing schedule given to patient.

## 2023-02-22 RX ORDER — WARFARIN SODIUM 4 MG/1
4 TABLET ORAL NIGHTLY
Qty: 90 TABLET | Refills: 3 | Status: SHIPPED | OUTPATIENT
Start: 2023-02-22

## 2023-02-22 NOTE — TELEPHONE ENCOUNTER
Rx Refill Note  Requested Prescriptions     Pending Prescriptions Disp Refills   • warfarin (COUMADIN) 4 MG tablet 90 tablet 3     Sig: Take 1 tablet by mouth Every Night.      Last office visit with prescribing clinician: 2/6/2023      Next office visit with prescribing clinician: 8/22/2023            Jerald Rizvi MA  02/22/23, 13:47 CST

## 2023-03-13 ENCOUNTER — TELEPHONE (OUTPATIENT)
Dept: OBSTETRICS AND GYNECOLOGY | Facility: CLINIC | Age: 73
End: 2023-03-13
Payer: MEDICARE

## 2023-03-13 ENCOUNTER — TELEPHONE (OUTPATIENT)
Dept: FAMILY MEDICINE CLINIC | Facility: CLINIC | Age: 73
End: 2023-03-13
Payer: MEDICARE

## 2023-03-13 DIAGNOSIS — B37.2 YEAST INFECTION OF THE SKIN: ICD-10-CM

## 2023-03-13 RX ORDER — NYSTATIN 100000 U/G
1 CREAM TOPICAL 2 TIMES DAILY PRN
Qty: 30 G | Refills: 1 | Status: SHIPPED | OUTPATIENT
Start: 2023-03-13

## 2023-03-13 RX ORDER — NYSTATIN 100000 [USP'U]/G
POWDER TOPICAL 3 TIMES DAILY PRN
Qty: 60 G | Refills: 3 | Status: SHIPPED | OUTPATIENT
Start: 2023-03-13

## 2023-03-13 NOTE — TELEPHONE ENCOUNTER
Pt called and reported a yeast infection and wanted to have refills sent to pharmacy.  I informed pt that our office policy is to come in to the office for evaluation and proper tx, pt stated she would review her schedule and call back to make an appt.

## 2023-03-13 NOTE — TELEPHONE ENCOUNTER
Caller: Lars Lucy LUIS ANTONIO    Relationship: Self    Best call back number: [phone]    Requested Prescriptions:   Requested Prescriptions     Pending Prescriptions Disp Refills   • nystatin (MYCOSTATIN) 516916 UNIT/GM cream 30 g 1     Sig: Apply 1 application topically to the appropriate area as directed 2 (Two) Times a Day As Needed (skin yeast rash).   • nystatin (MYCOSTATIN) 910736 UNIT/GM powder 60 g 3     Sig: Apply  topically to the appropriate area as directed 3 (Three) Times a Day As Needed (skin yeast rash).        Pharmacy where request should be sent: [facility] - [place], [place] - [address] [facility] [phone] [facility] [phone] FX     Additional details provided by patient: PATIENT HAS A YEAST INFECTION AND WOULD LIKE THESE TO BE REFILLED. SHE ALSO HAS A BLADDER SWING.    Does the patient have less than a 3 day supply:  [x] Yes  [] No    Would you like a call back once the refill request has been completed: [x] Yes [] No    If the office needs to give you a call back, can they leave a voicemail: [x] Yes [] No    Shayla Arevalo Rep   03/13/23 11:31 CDT

## 2023-03-17 ENCOUNTER — CLINICAL SUPPORT (OUTPATIENT)
Dept: FAMILY MEDICINE CLINIC | Facility: CLINIC | Age: 73
End: 2023-03-17
Payer: MEDICARE

## 2023-03-17 DIAGNOSIS — Z79.01 ANTICOAGULATED: ICD-10-CM

## 2023-03-17 LAB — INR PPP: 2.4 (ref 0.9–1.1)

## 2023-03-17 PROCEDURE — 85610 PROTHROMBIN TIME: CPT | Performed by: FAMILY MEDICINE

## 2023-03-17 PROCEDURE — 36416 COLLJ CAPILLARY BLOOD SPEC: CPT | Performed by: FAMILY MEDICINE

## 2023-04-17 ENCOUNTER — CLINICAL SUPPORT (OUTPATIENT)
Dept: FAMILY MEDICINE CLINIC | Facility: CLINIC | Age: 73
End: 2023-04-17
Payer: MEDICARE

## 2023-04-17 DIAGNOSIS — Z79.01 ANTICOAGULATED: ICD-10-CM

## 2023-04-17 LAB — INR PPP: 3.3 (ref 0.9–1.1)

## 2023-05-02 ENCOUNTER — CLINICAL SUPPORT (OUTPATIENT)
Dept: FAMILY MEDICINE CLINIC | Facility: CLINIC | Age: 73
End: 2023-05-02
Payer: MEDICARE

## 2023-05-02 DIAGNOSIS — Z79.01 ANTICOAGULATED: ICD-10-CM

## 2023-05-02 LAB — INR PPP: 1.8 (ref 0.9–1.1)

## 2023-05-23 ENCOUNTER — CLINICAL SUPPORT (OUTPATIENT)
Dept: FAMILY MEDICINE CLINIC | Facility: CLINIC | Age: 73
End: 2023-05-23
Payer: MEDICARE

## 2023-05-23 DIAGNOSIS — Z79.01 ANTICOAGULATED: Primary | ICD-10-CM

## 2023-05-23 LAB — INR PPP: 1.9 (ref 0.9–1.1)

## 2023-05-23 PROCEDURE — 85610 PROTHROMBIN TIME: CPT | Performed by: FAMILY MEDICINE

## 2023-05-23 PROCEDURE — 36416 COLLJ CAPILLARY BLOOD SPEC: CPT | Performed by: FAMILY MEDICINE

## 2023-06-06 ENCOUNTER — OFFICE VISIT (OUTPATIENT)
Dept: FAMILY MEDICINE CLINIC | Facility: CLINIC | Age: 73
End: 2023-06-06
Payer: MEDICARE

## 2023-06-06 ENCOUNTER — TELEPHONE (OUTPATIENT)
Dept: FAMILY MEDICINE CLINIC | Facility: CLINIC | Age: 73
End: 2023-06-06
Payer: MEDICARE

## 2023-06-06 VITALS
WEIGHT: 201 LBS | TEMPERATURE: 97.5 F | OXYGEN SATURATION: 97 % | SYSTOLIC BLOOD PRESSURE: 130 MMHG | BODY MASS INDEX: 36.99 KG/M2 | DIASTOLIC BLOOD PRESSURE: 82 MMHG | HEART RATE: 72 BPM | RESPIRATION RATE: 18 BRPM | HEIGHT: 62 IN

## 2023-06-06 DIAGNOSIS — J01.90 ACUTE NON-RECURRENT SINUSITIS, UNSPECIFIED LOCATION: ICD-10-CM

## 2023-06-06 DIAGNOSIS — Z79.01 ANTICOAGULATED: ICD-10-CM

## 2023-06-06 DIAGNOSIS — R30.0 DYSURIA: Primary | ICD-10-CM

## 2023-06-06 RX ORDER — AMOXICILLIN AND CLAVULANATE POTASSIUM 875; 125 MG/1; MG/1
1 TABLET, FILM COATED ORAL 2 TIMES DAILY
Qty: 20 TABLET | Refills: 0 | Status: SHIPPED | OUTPATIENT
Start: 2023-06-06 | End: 2023-06-16

## 2023-06-06 NOTE — TELEPHONE ENCOUNTER
Patient called stated she is having low back pain, low grade fever and request UA to check for UTI. Patient also requestign antibiotic for head congestion be sent to [facility]

## 2023-06-06 NOTE — PROGRESS NOTES
Subjective   Chief Complaint:  Sinus pain and pressure and dysuria.    History of Present Illness:  This 73 y.o. female was seen in the office today.      The patient reports over the last week, developing frequency and burning with urination. She also has sinus pain and pressure with congestion in the right ear.    Allergies   Allergen Reactions    Macrobid [Nitrofurantoin Macrocrystal] Rash      Current Outpatient Medications on File Prior to Visit   Medication Sig    atorvastatin (LIPITOR) 20 MG tablet TAKE 1 TABLET BY MOUTH DAILY.    Cholecalciferol (Vitamin D3) 25 MCG (1000 UT) capsule Take 1 capsule by mouth Daily.    cyanocobalamin (VITAMIN B-12) 50 MCG tablet tablet Take 1 tablet by mouth Daily.    esomeprazole (nexIUM) 40 MG capsule TAKE ONE CAPSULE TWICE DAILY GENERIC FOR NEXIUM    furosemide (LASIX) 20 MG tablet TAKE 1 TABLET BY MOUTH DAILY.    losartan (COZAAR) 50 MG tablet TAKE ONE TABLET DAILY GENERIC FOR COZAAR    Multiple Vitamin (MULTI VITAMIN DAILY PO) Take 1 tablet by mouth Daily.    mupirocin (BACTROBAN) 2 % ointment     nystatin (MYCOSTATIN) 074211 UNIT/GM cream Apply 1 application topically to the appropriate area as directed 2 (Two) Times a Day As Needed (skin yeast rash).    nystatin (MYCOSTATIN) 286877 UNIT/GM powder Apply  topically to the appropriate area as directed 3 (Three) Times a Day As Needed (skin yeast rash).    potassium chloride (K-DUR,KLOR-CON) 20 MEQ CR tablet TAKE ONE TABLET TWICE DAILY    triamterene-hydrochlorothiazide (DYAZIDE) 37.5-25 MG per capsule TAKE ONE CAPSULE EVERY MORNING GENERIC FOR DYAZIDE    warfarin (COUMADIN) 4 MG tablet Take 1 tablet by mouth Every Night.    warfarin (Coumadin) 5 MG tablet 5mg, 5mg ,4mg rotation    Zinc 50 MG capsule Take 1 tablet/day by mouth Daily.     No current facility-administered medications on file prior to visit.      Past Medical, Surgical, Social, and Family History:  Past Medical History:   Diagnosis Date    Cancer     skin  squamous cell    DJD (degenerative joint disease)     GERD (gastroesophageal reflux disease)     HTN (hypertension)     Hx of colonic polyp     Pulmonary embolism     post surgical with cardiac arrest     Past Surgical History:   Procedure Laterality Date    BREAST SURGERY      breast reduction    CHOLECYSTECTOMY      COLONOSCOPY  04/01/2019    Diverticulosis otherwise normal exam repeat in 5 years    COLONOSCOPY N/A 06/02/2021    Procedure: COLONOSCOPY WITH ANESTHESIA;  Surgeon: Heath Mckeon MD;  Location: [facility] ENDOSCOPY;  Service: Gastroenterology;  Laterality: N/A;  Pre: Nausea  Post: Diverticulosis  Dr. Berg  CO2 Inflation Used    COLONOSCOPY W/ POLYPECTOMY  02/13/2014    Hyperplastic polyp at 20 cm, Diverticulosis repeat exam in 5 years    COLPOCLEISIS N/A 10/25/2022    Procedure: COLPOCLEISIS, MID-URETHRAL SLING WITH CYSTOSCOPY;  Surgeon: Kaci King MD;  Location: [facility] OR;  Service: Obstetrics/Gynecology;  Laterality: N/A;    EKOS CATHETER PLACEMENT Bilateral 11/17/2016    Procedure: Ekos catheter placement;  Surgeon: Daniel Underwood MD;  Location: [facility] CATH INVASIVE LOCATION;  Service:     EKOS CATHETER REMOVAL Bilateral 11/18/2016    Procedure: Ekos catheter removal with stent placement;  Surgeon: Daniel Underwood MD;  Location: [facility] CATH INVASIVE LOCATION;  Service:     ENDOSCOPY  01/22/2010    Negative endoscopy noting a healed gastric ulcer    ENDOSCOPY  10/21/2009    Superficial mucosal erosion, chronic active gastritis    ENDOSCOPY  04/01/2019    Distal esophageal ring dilated    ENDOSCOPY N/A 06/02/2021    Procedure: ESOPHAGOGASTRODUODENOSCOPY WITH ANESTHESIA;  Surgeon: Heath Mckeon MD;  Location: [facility] ENDOSCOPY;  Service: Gastroenterology;  Laterality: N/A;  Pre: Nausea and Vomiting, Dysphagia  Post: Dilated with 48 [place] [employer]  Dr. Berg  CO2 Inflation Used    FLAP HEAD/NECK Right 10/26/2020    Procedure: POSSIBLE FLAP;  Surgeon: Vadim Le MD;   Location:  [facility] OR;  Service: ENT;  Laterality: Right;    FOOT SURGERY      HEAD/NECK LESION/CYST EXCISION Right 10/26/2020    Procedure: 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 with transposition flap;  Surgeon: Vadim Le MD;  Location:  [facility] OR;  Service: ENT;  Laterality: Right;    HERNIA REPAIR      MID-URETHRAL SLING WITH CYSTOSCOPY N/A 10/25/2022    Procedure: MID-URETHRAL SLING WITH CYSTOSCOPY;  Surgeon: Kaci King MD;  Location:  [facility] OR;  Service: Obstetrics/Gynecology;  Laterality: N/A;    REPLACEMENT TOTAL KNEE      TOTAL ABDOMINAL HYSTERECTOMY      SHUN bilateral oophorectomy for bleeding    TUBAL ABDOMINAL LIGATION       Social History     Socioeconomic History    Marital status:      Spouse name: Nima    Number of children: 1    Years of education: 13   Tobacco Use    Smoking status: Never    Smokeless tobacco: Never   Vaping Use    Vaping Use: Never used   Substance and Sexual Activity    Alcohol use: No    Drug use: No    Sexual activity: Not Currently     Partners: Male     Birth control/protection: Post-menopausal     Family History   Problem Relation Age of Onset    Coronary artery disease Father     Coronary artery disease Mother     Diabetes Brother     Hypertension Brother     Colon polyps Brother     Breast cancer Maternal Grandmother     Heart disease Maternal Grandfather     Breast cancer Maternal Cousin     Colon cancer Neg Hx        Objective   Physical Exam  Vitals reviewed.   Constitutional:       General: She is not in acute distress.     Appearance: Normal appearance. She is obese.   HENT:      Ears:      Comments: Bilateral ears with convex curvatures with mid effusions.       Nose:      Comments: Thick postnasal drip.  Cardiovascular:      Rate and Rhythm: Normal rate and regular rhythm.   Pulmonary:      Effort: Pulmonary effort is normal.      Breath sounds: Normal breath sounds.   /82 (BP Location: Left arm, Patient Position: Sitting,  "Cuff Size: Adult)   Pulse 72   Temp 97.5 °F (36.4 °C) (Infrared)   Resp 18   Ht 157.5 cm (62\")   Wt 91.2 kg (201 lb)   SpO2 97%   BMI 36.76 kg/m²     Prior Visit Notes/Records, Lab, Imaging, and Diagnostic Results Reviewed:  CBC:  Lab Results - Last 18 Months   Lab Units 02/03/23  0804 10/12/22  0926 07/18/22  0858 01/07/22  0658   WBC 10*3/mm3 5.59 4.52 5.23 5.64   HEMOGLOBIN g/dL 14.7 14.1 14.9 13.9   HEMATOCRIT % 43.6 42.5 44.4 42.0   PLATELETS 10*3/mm3 287 280 293 281   IRON mcg/dL  --   --  102  --       Chemistry:  Lab Results - Last 18 Months   Lab Units 02/03/23  0804 10/12/22  0926 07/18/22  0858 04/26/22  1453 01/07/22  0658   SODIUM mmol/L 139 141 139  --  142   POTASSIUM mmol/L 3.5 3.7 3.9  --  3.8   CHLORIDE mmol/L 101 104 100  --  103   CO2 mmol/L 30.0* 30.0* 28.9  --  30.1*   GLUCOSE mg/dL 97 106* 106*  --  94   BUN mg/dL 19 16 17  --  14   CREATININE mg/dL 0.94 0.92 0.95 1.10 0.80   EGFR IF NONAFRICN AM mL/min/1.73  --   --   --   --  70   EGFR IF AFRICN AM mL/min/1.73  --   --   --   --  85   EGFR RESULT mL/min/1.73 63.4  --  63.0  --   --    CALCIUM mg/dL 10.0 9.8 9.8  --  9.8     BMI Trend:  BMI Readings from Last 10 Encounters:   06/06/23 36.76 kg/m²   02/06/23 37.31 kg/m²   12/07/22 36.21 kg/m²   11/09/22 34.55 kg/m²   10/12/22 35.51 kg/m²   10/06/22 33.47 kg/m²   08/31/22 33.13 kg/m²   08/17/22 32.61 kg/m²   07/25/22 33.99 kg/m²   07/20/22 33.13 kg/m²     Assessment & Plan   Diagnoses and all orders for this visit:    1. Dysuria (Primary)  -     UA / M With / Rflx Culture([facility] ONLY) - Urine, Clean Catch    2. Acute non-recurrent sinusitis, unspecified location    3. Anticoagulated    Other orders  -     amoxicillin-clavulanate (AUGMENTIN) 875-125 MG per tablet; Take 1 tablet by mouth 2 (Two) Times a Day for 10 days.  Dispense: 20 tablet; Refill: 0    Discussion:  Advised and educated plan of care. Advised on anticoagulants with starting antibiotics. Return to the clinic in 3 days for " a spot check-in INR.    Follow-up:  Return for 3 days Nurse Visit - INR check.    Transcribed from ambient dictation for LANRE Hernandez by Sawyer Mcleod.  06/06/23   16:14 CDT    I have personally performed the services described in this document as transcribed by the above individual, and it is both accurate and complete.    Electronically signed by Jef Rodriguez, 06/06/23, 4:14 PM CDT.

## 2023-06-09 ENCOUNTER — CLINICAL SUPPORT (OUTPATIENT)
Dept: FAMILY MEDICINE CLINIC | Facility: CLINIC | Age: 73
End: 2023-06-09
Payer: MEDICARE

## 2023-06-09 DIAGNOSIS — Z79.01 ANTICOAGULATED: Primary | ICD-10-CM

## 2023-06-09 LAB — INR PPP: 2 (ref 0.9–1.1)

## 2023-06-13 LAB
APPEARANCE UR: CLEAR
BACTERIA #/AREA URNS HPF: NORMAL /HPF
BACTERIA UR CULT: ABNORMAL
BACTERIA UR CULT: ABNORMAL
BILIRUB UR QL STRIP: NEGATIVE
CASTS URNS QL MICRO: NORMAL /LPF
COLOR UR: YELLOW
EPI CELLS #/AREA URNS HPF: NORMAL /HPF (ref 0–10)
GLUCOSE UR QL STRIP: NEGATIVE
HGB UR QL STRIP: NEGATIVE
KETONES UR QL STRIP: NEGATIVE
LEUKOCYTE ESTERASE UR QL STRIP: ABNORMAL
MICRO URNS: ABNORMAL
NITRITE UR QL STRIP: NEGATIVE
OTHER ANTIBIOTIC SUSC ISLT: ABNORMAL
PH UR STRIP: 7 [PH] (ref 5–7.5)
PROT UR QL STRIP: NEGATIVE
RBC #/AREA URNS HPF: NORMAL /HPF (ref 0–2)
SP GR UR STRIP: 1.01 (ref 1–1.03)
URINALYSIS REFLEX: ABNORMAL
UROBILINOGEN UR STRIP-MCNC: 0.2 MG/DL (ref 0.2–1)
WBC #/AREA URNS HPF: NORMAL /HPF (ref 0–5)

## 2023-08-10 ENCOUNTER — CLINICAL SUPPORT (OUTPATIENT)
Dept: FAMILY MEDICINE CLINIC | Facility: CLINIC | Age: 73
End: 2023-08-10
Payer: MEDICARE

## 2023-08-10 DIAGNOSIS — Z79.01 ANTICOAGULATED: Primary | ICD-10-CM

## 2023-08-10 LAB — INR PPP: 2.9 (ref 0.9–1.1)

## 2023-08-15 DIAGNOSIS — I10 HTN (HYPERTENSION), BENIGN: Chronic | ICD-10-CM

## 2023-08-15 DIAGNOSIS — E87.6 HYPOPOTASSEMIA: ICD-10-CM

## 2023-08-15 RX ORDER — LOSARTAN POTASSIUM 50 MG/1
TABLET ORAL
Qty: 90 TABLET | Refills: 2 | Status: SHIPPED | OUTPATIENT
Start: 2023-08-15

## 2023-08-15 RX ORDER — POTASSIUM CHLORIDE 20 MEQ/1
TABLET, EXTENDED RELEASE ORAL
Qty: 180 TABLET | Refills: 2 | Status: SHIPPED | OUTPATIENT
Start: 2023-08-15

## 2023-08-15 RX ORDER — TRIAMTERENE AND HYDROCHLOROTHIAZIDE 37.5; 25 MG/1; MG/1
CAPSULE ORAL
Qty: 90 CAPSULE | Refills: 1 | Status: SHIPPED | OUTPATIENT
Start: 2023-08-15

## 2023-08-22 ENCOUNTER — OFFICE VISIT (OUTPATIENT)
Dept: FAMILY MEDICINE CLINIC | Facility: CLINIC | Age: 73
End: 2023-08-22
Payer: MEDICARE

## 2023-08-22 VITALS
WEIGHT: 198.8 LBS | SYSTOLIC BLOOD PRESSURE: 124 MMHG | TEMPERATURE: 96.9 F | OXYGEN SATURATION: 98 % | HEART RATE: 84 BPM | DIASTOLIC BLOOD PRESSURE: 82 MMHG | RESPIRATION RATE: 18 BRPM | HEIGHT: 62 IN | BODY MASS INDEX: 36.58 KG/M2

## 2023-08-22 DIAGNOSIS — Z12.31 ENCOUNTER FOR SCREENING MAMMOGRAM FOR BREAST CANCER: ICD-10-CM

## 2023-08-22 DIAGNOSIS — K30 PEPTIC DISEASE: Chronic | ICD-10-CM

## 2023-08-22 DIAGNOSIS — M15.0 PRIMARY GENERALIZED (OSTEO)ARTHRITIS: ICD-10-CM

## 2023-08-22 DIAGNOSIS — R69 MULTIPLE CHRONIC DISEASES: ICD-10-CM

## 2023-08-22 DIAGNOSIS — D64.9 ANEMIA, UNSPECIFIED TYPE: ICD-10-CM

## 2023-08-22 DIAGNOSIS — E87.6 HYPOPOTASSEMIA: ICD-10-CM

## 2023-08-22 DIAGNOSIS — I10 HTN (HYPERTENSION), BENIGN: Chronic | ICD-10-CM

## 2023-08-22 DIAGNOSIS — R73.01 ELEVATED FASTING GLUCOSE: ICD-10-CM

## 2023-08-22 DIAGNOSIS — E78.2 MIXED HYPERLIPIDEMIA: ICD-10-CM

## 2023-08-22 DIAGNOSIS — M25.551 RIGHT HIP PAIN: ICD-10-CM

## 2023-08-22 DIAGNOSIS — Z78.0 MENOPAUSE: ICD-10-CM

## 2023-08-22 DIAGNOSIS — Z79.01 ANTICOAGULATED: ICD-10-CM

## 2023-08-22 DIAGNOSIS — R19.7 DIARRHEA, UNSPECIFIED TYPE: ICD-10-CM

## 2023-08-22 DIAGNOSIS — F32.A DEPRESSION, UNSPECIFIED DEPRESSION TYPE: Chronic | ICD-10-CM

## 2023-08-22 DIAGNOSIS — R13.19 ESOPHAGEAL DYSPHAGIA: ICD-10-CM

## 2023-08-22 PROBLEM — Z96.60 STATUS POST JOINT REPLACEMENT: Status: ACTIVE | Noted: 2023-08-22

## 2023-08-22 RX ORDER — DICYCLOMINE HYDROCHLORIDE 10 MG/1
10 CAPSULE ORAL
Qty: 45 CAPSULE | Refills: 1 | Status: SHIPPED | OUTPATIENT
Start: 2023-08-22

## 2023-08-22 NOTE — PROGRESS NOTES
Subjective   Lucy Sousa is a 73 y.o. female presenting with chief complaint of:   Chief Complaint   Patient presents with    Follow-up     AWV 2.6.23  Last Completed Annual Wellness Visit            ANNUAL WELLNESS VISIT (Yearly) Next due on 2/6/2024 02/06/2023  Level of Service: FL PPPS, SUBSEQ VISIT    01/20/2022  Level of Service: FL PPPS, SUBSEQ VISIT    01/20/2022  Done    07/01/2020  Level of Service: FL PPPS, SUBSEQ VISIT    03/12/2018  Patient-Reported (Performed Externally) - [facility]                     History of Present Illness :  Alone.   Here for review of chronic problems that includes HTN and others.     Has multiple chronic problems to consider that might have a bearing on today's issues;  an interval appointment.       Chronic/acute problems reviewed today:   1. Mixed hyperlipidemia Chronic/stable.  Tolerated use of Rx with labs showing improved lipid values and tolerant liver labs. No muscle aches unexpected.      2. Anticoagulated-PE/cardiac arrest/(xarelto) coumadin Chronic/stable reason for stopping or use of.  Denies bleeding issues; especially epistaxis, melena, hematochezia.  Upper arms/others do not significantly bruise easily.  No significant bleeding or falls.      3. HTN (hypertension), benign Chronic/stable. Stable here past/and occasional home blood pressures.  No significant chest pain, SOB, LE edema, orthopnea, near syncope, dizziness/light headness.   Recent Vitals         2/6/2023 6/6/2023 8/22/2023       BP: 134/84 130/82 124/82     Pulse: 78 72 84     Temp: 97.1 øF (36.2 øC) 97.5 øF (36.4 øC) 96.9 øF (36.1 øC)     Weight: 92.5 kg (204 lb) 91.2 kg (201 lb) 90.2 kg (198 lb 12.8 oz)     BMI (Calculated): 37.3 36.8 36.4              4. Elevated fasting glucose Chronic/stable  No problem/pattern hypoglycemia/hyperglycemia manifest by poly- dypsia, phagia, uria, or sweats, diaphoretic episodes, syncope/near.     5. Esophageal dysphagia a couple years  since her last upper GI endoscopy she started having little trouble swallowing again   6. Peptic disease Chronic/stable.  Controlledheartburn, reflux without dysphagia, melena.  Rx used, periods not used proven currently needed with symptoms -currently doing ok.      7. Hypopotassemia-diuretic Chronic/variable high or low K as uses diuretic; has to have lab monitoring.  No significant fatigue or muscle cramps     8. Anemia, unspecified type Chronic or past history/stable more recent: This has been present before.    There has been GI evaulation in the past. There is no current melena, hematochezia. It has been benign to date and stable/watching.  Contributing comorbidities to date: benign.    Cath/Quintero/9-  Colonoscopy-lipoma/Darin/03  EGD-distal ring-gastric dysmotility/2003  EGD+kzme-btsizafzm-wa-/[facility]//4-21-08  Colonoscopy+div/[facility]//3-30-09/5y  Cath-neg/Jerald/[facility]/8-21-09  EGD+gastric ulcer-biop/[facility]//10-21-09  EGD+neg-healing ulcer/[facility]//1-22-10  Colonoscopy+polyp-div/[facility]//2.13.14/5y  Egd-hh-ring (d)/[facility]/4.1.19/prn  Colon-div/[facility]/4.1.19/5y  Colon-div/[facility]//6.2.21/7y  EGD-hh/[facility]//6.2.21    SURGERIES:  R Inguinal Hernia/1975  SHUN+BSO/Ari/[facility]/1991  Mammoplasty-reduction/Tastak/[facility]/1999  L total knee/[facility]//6-25-08    Anticoagulated-PE/cardiac arrest/(xarelto) coumadin     Iron always normal: 70N 8.15.23-no iron  Ferritin always normal; 54N 8.15.23  B12 always normal: 1291 8.15.23  Folate always normal; 18.5 8.15.23  Retic 4.52H 11.20.16     9. Depression, unspecified depression type past significant  mood swings, down moods, nervousness, difficulty with concentration to function home/work.  Others close have not been concerned.  No suicide ideation/intent.  Rx helped     10. Primary generalized (osteo)arthritis Chronic/stable.  Various on/off joint pains/soreness/stiffness.  Particular joint problems with various.  No joint swelling.  Treats mainly with reduced activity, Rx listed,  Tylenol.  No  NSAIDs, and no recent injections.      11. Multiple chronic diseases Has multiple chronic problems with increased risks for management (involves polypharmacy, multiple providers, many required labs/imaging/ to manage)     12. Right hip pain without injury she is having acute to subacute discomfort over her right hip; worse when she lays on it touches it or when she moves it   13. Menopause Chronic/stable.  Last bone density/if below.   Has had Rx.  Ok further bone density screening when due.      14. Encounter for screening mammogram for breast cancer Chronic/ongoing yearly need to review for breast cancer.  No breast pain, masses, discharge.       15. Diarrhea, unspecified type chronic continued after she eats loose and urgent stools.  No incontinence.  No melena     Has an/another acute issue today: none.    The following portions of the patient's history were reviewed and updated as appropriate: allergies, current medications, past family history, past medical history, past social history, past surgical history, and problem list.      Current Outpatient Medications:     atorvastatin (LIPITOR) 20 MG tablet, TAKE 1 TABLET BY MOUTH DAILY., Disp: 90 tablet, Rfl: 3    Cholecalciferol (Vitamin D3) 25 MCG (1000 UT) capsule, Take 1 capsule by mouth Daily., Disp: , Rfl:     cyanocobalamin (VITAMIN B-12) 50 MCG tablet tablet, Take 1 tablet by mouth Daily., Disp: , Rfl:     esomeprazole (nexIUM) 40 MG capsule, TAKE ONE CAPSULE TWICE DAILY GENERIC FOR NEXIUM, Disp: 180 capsule, Rfl: 2    furosemide (LASIX) 20 MG tablet, TAKE 1 TABLET BY MOUTH DAILY., Disp: 90 tablet, Rfl: 3    losartan (COZAAR) 50 MG tablet, TAKE ONE TABLET DAILY GENERIC FOR COZAAR, Disp: 90 tablet, Rfl: 2    Multiple Vitamin (MULTI VITAMIN DAILY PO), Take 1 tablet by mouth Daily., Disp: , Rfl:     mupirocin (BACTROBAN) 2 % ointment, , Disp: , Rfl:     nystatin (MYCOSTATIN) 026315 UNIT/GM cream, Apply 1 application topically to the appropriate  area as directed 2 (Two) Times a Day As Needed (skin yeast rash)., Disp: 30 g, Rfl: 1    nystatin (MYCOSTATIN) 312815 UNIT/GM powder, Apply  topically to the appropriate area as directed 3 (Three) Times a Day As Needed (skin yeast rash)., Disp: 60 g, Rfl: 3    potassium chloride (K-DUR,KLOR-CON) 20 MEQ CR tablet, TAKE ONE TABLET TWICE DAILY, Disp: 180 tablet, Rfl: 2    triamterene-hydrochlorothiazide (DYAZIDE) 37.5-25 MG per capsule, TAKE ONE CAPSULE EVERY MORNING GENERIC FOR DYAZIDE, Disp: 90 capsule, Rfl: 1    warfarin (COUMADIN) 4 MG tablet, Take 1 tablet by mouth Every Night., Disp: 90 tablet, Rfl: 3    warfarin (Coumadin) 5 MG tablet, 5mg, 5mg ,4mg rotation, Disp: 60 tablet, Rfl: 0    Zinc 50 MG capsule, Take 1 tablet/day by mouth Daily., Disp: , Rfl:     dicyclomine (BENTYL) 10 MG capsule, Take 1 capsule by mouth 3 (Three) Times a Day Before Meals., Disp: 45 capsule, Rfl: 1    No problems with medications.    Allergies   Allergen Reactions    Macrobid [Nitrofurantoin Macrocrystal] Rash       Review of Systems  GENERAL:  Active/slower with limits, speed, stamina for age and exertional fatigue. Sleep is ok. No fever now.  SKIN: No rash/skin lesion of concern.   ENDO:  No syncope, near or diaphoretic sweaty spells.  HEENT: No recent head injury; or change in occ headache.   No vision change.  No significant hearing loss.  Ears without pain/drainage.  No sore throat.  No significant nasal/sinus congestion/drainage. No epistaxis.  CHEST: No chest wall tenderness or mass.  Infrequent cough, without wheeze.  No SOB; no hemoptysis.  CV: No chest pain; same occ palpitations and minimal ankle edema.  GI: No heartburn, dysphagia.  No abdominal pain, constipation; stools trend loose.   No rectal bleeding, or melena.    :  Voids without dysuria, or incontinence to completion but urgency, leakage, frequency.  ORTHO: No painful/swollen joints but various on /off sore. .  No change occ/on-off  sore neck or back.  No acute  neck or back pain without recent injury.  NEURO: Still on/off mild dizzy; no vertigo.  No weakness of extremities except above.  New UE numbness/paresthesias.   PSYCH: No memory loss.  Mood good but variable anxious, depressed but/and not suicidal.  Tries to tolerate stress .   Screening:  Gyne: SHUN+BSO/Ari/[facility]/[id number]  M[facility]: 1.9.23-[facility]  Bone density: 10.31.18/bone d-deca/Yolanda/Ts L1 +1.9, hip +0.3: 10.31.18-strong encourage   Low dose CT chest:Tobacco-smoker/none: NA  GI:   Colon-div///6.2.21/7y  EGD-///6.2.21  Prostate: NA  Usual lab order  6m CBC, CMP, TSH, fT4  12m CBC, CMP, LIPID, TSH, fT4, Vit D iron, ferritin, B12, folate      Copy/paste function used for ROS/exam AND each area of these were reviewed, updated, confirmed and supplemented as needed.  Data reviewed:   Recent admit/ER/MD visits: 2.6.23    1. Numbness of upper extremity    2. Primary generalized (osteo)arthritis    3. Wellness examination-done-no gyne    4. Congestive heart failure, unspecified HF chronicity, unspecified heart failure type (HCC)    5. HTN (hypertension), benign    6. Mixed hyperlipidemia    7. Anticoagulated-PE/cardiac arrest/(xarelto) coumadin    8. Elevated fasting glucose    9. Hypopotassemia-diuretic    10. Peptic disease          Issues that are new, uncontrolled, or required review HPI/ROS/exam and decisions beyond wellness today: (ie: requiring the service of a physician and/or not likely to resolve independently without clinical intervention.)   Numbness UE     Discussions/medical decisions/reviews:  BP ok  Other vitals ok  DM/BS 5.8 2.3.23  Lipid LDL 87 7.18.22; lipitor 20  PSA NA  CBC ok 2.3.23  Renal ok 2.3.23  Liver ok 2.3.23  Vit D 48 7.18.22  Thyroid TSH 4.5H, fT4 ok 1.12 2.3.23     Wrist splint nightly awhile and see if helps numbness  EMG/NCV if worsens  Xray hands if any joint gets too bad-not as bad/never called for  Exercise encouraged    Last cardiac testing:   Echo:   Results for  orders placed during the hospital encounter of 09/22/17    Adult Stress Echo Only    Interpretation Summary  ú Normal stress echo with no significant echocardiographic evidence for myocardial ischemia.  ú The patient reported shortness of breath during the stress test. The patient experienced no angina during the stress test.  ú There was no ST segment deviation noted during stress.  ú Fair exercise tolerance.      Radiology considered:   No radiology results for the last 90 days.]    Lab Results:  Results for orders placed or performed in visit on 08/15/23   Comprehensive Metabolic Panel    Specimen: Blood   Result Value Ref Range    Glucose 96 65 - 99 mg/dL    BUN 10 8 - 23 mg/dL    Creatinine 0.83 0.57 - 1.00 mg/dL    EGFR Result 73.6 >60.0 mL/min/1.73    BUN/Creatinine Ratio 12.0 7.0 - 25.0    Sodium 141 136 - 145 mmol/L    Potassium 3.7 3.5 - 5.2 mmol/L    Chloride 102 98 - 107 mmol/L    Total CO2 27.3 22.0 - 29.0 mmol/L    Calcium 9.7 8.6 - 10.5 mg/dL    Total Protein 5.9 (L) 6.0 - 8.5 g/dL    Albumin 4.0 3.5 - 5.2 g/dL    Globulin 1.9 gm/dL    A/G Ratio 2.1 g/dL    Total Bilirubin 0.3 0.0 - 1.2 mg/dL    Alkaline Phosphatase 66 39 - 117 U/L    AST (SGOT) 17 1 - 32 U/L    ALT (SGPT) 17 1 - 33 U/L   Hemoglobin A1c    Specimen: Blood   Result Value Ref Range    Hemoglobin A1C 5.70 (H) 4.80 - 5.60 %   Lipid Panel With / Chol / HDL Ratio    Specimen: Blood   Result Value Ref Range    Total Cholesterol 217 (H) 0 - 200 mg/dL    Triglycerides 182 (H) 0 - 150 mg/dL    HDL Cholesterol 45 40 - 60 mg/dL    VLDL Cholesterol Johny 33 5 - 40 mg/dL    LDL Chol Calc (NIH) 139 (H) 0 - 100 mg/dL    Chol/HDL Ratio 4.82    TSH    Specimen: Blood   Result Value Ref Range    TSH 4.120 0.270 - 4.200 uIU/mL   T4, Free    Specimen: Blood   Result Value Ref Range    Free T4 1.07 0.93 - 1.70 ng/dL   Vitamin D,25-Hydroxy    Specimen: Blood   Result Value Ref Range    25 Hydroxy, Vitamin D 47.4 30.0 - 100.0 ng/ml   Iron    Specimen: Blood    Result Value Ref Range    Iron 70 37 - 145 mcg/dL   Ferritin    Specimen: Blood   Result Value Ref Range    Ferritin 54.30 13.00 - 150.00 ng/mL   Vitamin B12    Specimen: Blood   Result Value Ref Range    Vitamin B-12 1,291 (H) 211 - 946 pg/mL   Folate    Specimen: Blood   Result Value Ref Range    Folate 18.50 4.78 - 24.20 ng/mL   Microalbumin / Creatinine Urine Ratio - Urine, Clean Catch    Specimen: Urine, Clean Catch   Result Value Ref Range    Creatinine, Urine 62.5 Not Estab. mg/dL    Microalbumin, Urine 6.2 Not Estab. ug/mL    Microalbumin/Creatinine Ratio 10 0 - 29 mg/g creat   CBC & Differential    Specimen: Blood   Result Value Ref Range    WBC 4.60 3.40 - 10.80 10*3/mm3    RBC 4.27 3.77 - 5.28 10*6/mm3    Hemoglobin 14.5 12.0 - 15.9 g/dL    Hematocrit 42.6 34.0 - 46.6 %    MCV 99.8 (H) 79.0 - 97.0 fL    MCH 34.0 (H) 26.6 - 33.0 pg    MCHC 34.0 31.5 - 35.7 g/dL    RDW 12.8 12.3 - 15.4 %    Platelets 293 140 - 450 10*3/mm3    Neutrophil Rel % 46.9 42.7 - 76.0 %    Lymphocyte Rel % 37.8 19.6 - 45.3 %    Monocyte Rel % 10.7 5.0 - 12.0 %    Eosinophil Rel % 3.5 0.3 - 6.2 %    Basophil Rel % 0.9 0.0 - 1.5 %    Neutrophils Absolute 2.16 1.70 - 7.00 10*3/mm3    Lymphocytes Absolute 1.74 0.70 - 3.10 10*3/mm3    Monocytes Absolute 0.49 0.10 - 0.90 10*3/mm3    Eosinophils Absolute 0.16 0.00 - 0.40 10*3/mm3    Basophils Absolute 0.04 0.00 - 0.20 10*3/mm3    Immature Granulocyte Rel % 0.2 0.0 - 0.5 %    Immature Grans Absolute 0.01 0.00 - 0.05 10*3/mm3    nRBC 0.0 0.0 - 0.2 /100 WBC       A1C:  Lab Results - Last 18 Months   Lab Units 08/15/23  0839 02/03/23  0804   HEMOGLOBIN A1C % 5.70* 5.80*     GLUCOSE:  Lab Results - Last 18 Months   Lab Units 08/15/23  0839 02/03/23  0804 10/12/22  0926 07/18/22  0858   GLUCOSE mg/dL 96 97 106* 106*     LIPID:  Lab Results - Last 18 Months   Lab Units 08/15/23  0839 07/18/22  0858   CHOLESTEROL mg/dL 217* 161   LDL CHOL mg/dL 139* 87   HDL CHOL mg/dL 45 51   TRIGLYCERIDES  "mg/dL 182* 131     PSA:No results found for: PSA    CBC:  Lab Results - Last 18 Months   Lab Units 08/15/23  0839 02/03/23  0804 10/12/22  0926 07/18/22  0858   WBC 10*3/mm3 4.60 5.59 4.52 5.23   HEMOGLOBIN g/dL 14.5 14.7 14.1 14.9   HEMATOCRIT % 42.6 43.6 42.5 44.4   PLATELETS 10*3/mm3 293 287 280 293   IRON mcg/dL 70  --   --  102   VITAMIN B 12 pg/mL 1,291*  --   --  771   FOLATE ng/mL 18.50  --   --  >20.00     BMP/CMP:  Lab Results - Last 18 Months   Lab Units 08/15/23  0839 02/03/23  0804 10/12/22  0926 07/18/22  0858 04/26/22  1453   SODIUM mmol/L 141 139 141 139  --    POTASSIUM mmol/L 3.7 3.5 3.7 3.9  --    CHLORIDE mmol/L 102 101 104 100  --    CO2 mmol/L 27.3 30.0* 30.0* 28.9  --    GLUCOSE mg/dL 96 97 106* 106*  --    BUN mg/dL 10 19 16 17  --    CREATININE mg/dL 0.83 0.94 0.92 0.95 1.10   EGFR RESULT mL/min/1.73 73.6 63.4  --  63.0  --    CALCIUM mg/dL 9.7 10.0 9.8 9.8  --        HEPATIC:  Lab Results - Last 18 Months   Lab Units 08/15/23  0839 02/03/23  0804 07/18/22  0858   ALT (SGPT) U/L 17 21 19   AST (SGOT) U/L 17 21 20   ALK PHOS U/L 66 74 74     HEPATITIS C ANTIBODY:   Lab Results   Component Value Date/Time    HEPCVIRUSABY <0.1 08/31/2017 07:18 AM     Vit D:  Lab Results - Last 18 Months   Lab Units 08/15/23  0839 07/18/22  0858   VIT D 25 HYDROXY ng/ml 47.4 48.3     THYROID:  Lab Results - Last 18 Months   Lab Units 08/15/23  0839 02/03/23  0804 07/18/22  0858   TSH uIU/mL 4.120 4.500* 4.120   FREE T4 ng/dL 1.07 1.12 1.16       Objective   /82   Pulse 84   Temp 96.9 øF (36.1 øC) (Temporal)   Resp 18   Ht 157.5 cm (62\")   Wt 90.2 kg (198 lb 12.8 oz)   SpO2 98%   BMI 36.36 kg/mý   Body mass index is 36.36 kg/mý.    Recent Vitals         2/6/2023 6/6/2023 8/22/2023       BP: 134/84 130/82 124/82     Pulse: 78 72 84     Temp: 97.1 øF (36.2 øC) 97.5 øF (36.4 øC) 96.9 øF (36.1 øC)     Weight: 92.5 kg (204 lb) 91.2 kg (201 lb) 90.2 kg (198 lb 12.8 oz)     BMI (Calculated): 37.3 36.8 36.4  "          Wt Readings from Last 15 Encounters:   08/22/23 1417 90.2 kg (198 lb 12.8 oz)   06/06/23 1350 91.2 kg (201 lb)   02/06/23 1517 92.5 kg (204 lb)   12/07/22 1112 89.8 kg (198 lb)   11/09/22 1043 88.5 kg (195 lb)   10/12/22 0908 90.9 kg (200 lb 6.4 oz)   10/06/22 0817 88.5 kg (195 lb)   08/31/22 1342 87.5 kg (193 lb)   08/17/22 1113 86.2 kg (190 lb)   07/25/22 1045 89.8 kg (198 lb)   07/20/22 1332 87.5 kg (193 lb)   06/30/22 1404 88 kg (194 lb)   04/11/22 1057 88.5 kg (195 lb)   01/20/22 1455 88.9 kg (196 lb)   07/07/21 1023 87.1 kg (192 lb)       Physical Exam  GENERAL:  Well nourished/developed in no acute distress.   SKIN: Turgor excellent, without wound, rash, lesion.   HEENT: Normal cephalic without trauma.  Pupils equal round reactive to light. Extraocular motions full without nystagmus.   External canals nonobstructive nontender without reddness. Tymphatic membranes sharri with shanika structures intact.   Oral cavity without growths, exudates, and moist.  Posterior pharynx without mass, obstruction, redness.  No thyromegaly, mass, tenderness, lymphadenopathy and supple.  CV: Regular rhythm.  No murmur, gallop, trace LE edema. Posterior pulses intact.  No carotid bruits.  CHEST: No chest wall tenderness or mass.   LUNGS: Symmetric motion with clear to auscultation.   ABD: Soft, nontender without mass.   ORTHO: Symmetric extremities without swelling/point tenderness; even anterior R ankle.  Full gross range of motion.  NEURO: CN 2-12 grossly intact.  Symmetric facies and UE/LE. 3/5 strength throughout. 1/4 x bicep knee equal reflexes.  Nonfocal use extremities. Speech clear.  Reduced minimal light touch with monofilament, vibratory sensation with tuning fork; equal toes/distal feet.    PSYCH: Oriented x 3.  Pleasant calm, well kept.  Purposeful/directed conservation with intact short/long gross memory.    Assessment & Plan     1. Mixed hyperlipidemia    2. Anticoagulated-PE/cardiac arrest/(xarelto) coumadin     3. HTN (hypertension), benign    4. Elevated fasting glucose    5. Esophageal dysphagia    6. Peptic disease    7. Hypopotassemia-diuretic    8. Anemia, unspecified type    9. Depression, unspecified depression type    10. Primary generalized (osteo)arthritis    11. Multiple chronic diseases    12. Right hip pain    13. Menopause    14. Encounter for screening mammogram for breast cancer    15. Diarrhea, unspecified type        Data review above:   Discussions/medical decisions/reviews:  BP ok  Other vitals ok  Recent Vitals         2/6/2023 6/6/2023 8/22/2023       BP: 134/84 130/82 124/82     Pulse: 78 72 84     Temp: 97.1 øF (36.2 øC) 97.5 øF (36.4 øC) 96.9 øF (36.1 øC)     Weight: 92.5 kg (204 lb) 91.2 kg (201 lb) 90.2 kg (198 lb 12.8 oz)     BMI (Calculated): 37.3 36.8 36.4           DM/A1c 5.7 8.15.23  DM/BS 96 8.15.23  Lipid  8.15.23; -lipitor 20  PSA NA  CBC ok 8.15.23-no iron  Iron 70N 8.15.23-no iron  B12 1291-8.15.23 oral  Folate 18.5 8.15.23  Renal ok 8.15.23  Liver ok 8.15.23  Vit D 47 8.15.23  Thyroid TSH, fT4 8.15.23    Screening reviewed/updated bone density next year mammogram  Vaccines discussed shingles free  Refer back to GI: ? IBS, ? Lactose intolerance but primarily dysphagia again (last EGD 2021)  Trial bentyl AC  Plain film R hip to start  Pro/con coumadin today-continue    Data review above:   Rx: reviewed and decisions:   Rx new/changes:   New Medications Ordered This Visit   Medications    dicyclomine (BENTYL) 10 MG capsule     Sig: Take 1 capsule by mouth 3 (Three) Times a Day Before Meals.     Dispense:  45 capsule     Refill:  1     Orders placed:   LAB/Testing/Referrals: reviewed/orders:   Today: ok  Orders Placed This Encounter   Procedures    Mammo Screening Digital Tomosynthesis Bilateral With CAD    DEXA Bone Density Axial    XR Hip With or Without Pelvis 2 - 3 View Right    Ambulatory Referral to Gastroenterology     Chronic/recurrent labs above or change to:   Same  "    Immunization History   Administered Date(s) Administered    COVID-19 (MODERNA) 1st,2nd,3rd Dose Monovalent 01/22/2021, 04/16/2021, 12/15/2021    COVID-19 (MODERNA) Monovalent Original Booster 12/02/2022    Flu Vaccine Quad PF >36MO 10/09/2017    Fluad Quad 65+ 10/12/2020    Fluzone High-Dose 65+yrs 11/23/2021, 10/17/2022    Fluzone Quad >6mos (Multi-dose) 11/30/2016    Pneumococcal Conjugate 13-Valent (PCV13) 11/30/2016, 10/09/2017    Pneumococcal Polysaccharide (PPSV23) 07/01/2020    Tdap 11/02/2020     We advised/reaffirmed our support/suggestion for staying complete with covid- covid boosters, seasonal flu/yearly and any missing vaccine from list we supplied; we suggest contact with local health department office to review missing/needed vaccines and then bring nursing documentation for these vaccines to this office or call this information in. Shingles became \"free\" 1.1.23 for medicare insurance.    Health maintenance:   Body mass index is 36.36 kg/mý.     Tobacco use reviewed:   Lucy Sousa  reports that she has never smoked. She has never used smokeless tobacco..    There are no Patient Instructions on file for this visit.    Follow up: Return for lab/Dr Berg 2.7.24.  Future Appointments   Date Time Provider Department Center   9/11/2023  8:30 AM NURSE/MA [facility]   2/6/2024  8:10 AM [facility] [place] [place] [facility]   2/13/2024 11:00 AM Rosas Berg MD [facility]             "

## 2023-08-24 ENCOUNTER — HOSPITAL ENCOUNTER (OUTPATIENT)
Dept: GENERAL RADIOLOGY | Facility: HOSPITAL | Age: 73
Discharge: HOME | End: 2023-08-24
Admitting: FAMILY MEDICINE
Payer: MEDICARE

## 2023-08-24 ENCOUNTER — TELEPHONE (OUTPATIENT)
Dept: FAMILY MEDICINE CLINIC | Facility: CLINIC | Age: 73
End: 2023-08-24
Payer: MEDICARE

## 2023-08-24 DIAGNOSIS — M25.551 RIGHT HIP PAIN: Primary | ICD-10-CM

## 2023-08-24 DIAGNOSIS — M25.551 RIGHT HIP PAIN: ICD-10-CM

## 2023-08-24 PROCEDURE — 73502 X-RAY EXAM HIP UNI 2-3 VIEWS: CPT

## 2023-09-11 ENCOUNTER — CLINICAL SUPPORT (OUTPATIENT)
Dept: FAMILY MEDICINE CLINIC | Facility: CLINIC | Age: 73
End: 2023-09-11
Payer: MEDICARE

## 2023-09-11 DIAGNOSIS — Z79.01 ANTICOAGULATED: Primary | ICD-10-CM

## 2023-09-11 LAB — INR PPP: 2.7 (ref 0.9–1.1)

## 2023-09-11 PROCEDURE — 36416 COLLJ CAPILLARY BLOOD SPEC: CPT | Performed by: FAMILY MEDICINE

## 2023-09-11 PROCEDURE — 85610 PROTHROMBIN TIME: CPT | Performed by: FAMILY MEDICINE

## 2023-10-11 ENCOUNTER — CLINICAL SUPPORT (OUTPATIENT)
Dept: FAMILY MEDICINE CLINIC | Facility: CLINIC | Age: 73
End: 2023-10-11
Payer: MEDICARE

## 2023-10-11 DIAGNOSIS — Z79.01 ANTICOAGULATED: Primary | ICD-10-CM

## 2023-10-11 LAB — INR PPP: 2.5 (ref 0.9–1.1)

## 2023-10-12 ENCOUNTER — OFFICE VISIT (OUTPATIENT)
Dept: GASTROENTEROLOGY | Facility: CLINIC | Age: 73
End: 2023-10-12
Payer: MEDICARE

## 2023-10-12 ENCOUNTER — TELEPHONE (OUTPATIENT)
Dept: FAMILY MEDICINE CLINIC | Facility: CLINIC | Age: 73
End: 2023-10-12
Payer: MEDICARE

## 2023-10-12 VITALS
TEMPERATURE: 94.1 F | BODY MASS INDEX: 36.22 KG/M2 | HEIGHT: 62 IN | HEART RATE: 70 BPM | WEIGHT: 196.8 LBS | DIASTOLIC BLOOD PRESSURE: 80 MMHG | OXYGEN SATURATION: 97 % | SYSTOLIC BLOOD PRESSURE: 130 MMHG

## 2023-10-12 DIAGNOSIS — Z79.01 ANTICOAGULATED ON COUMADIN: ICD-10-CM

## 2023-10-12 DIAGNOSIS — R13.19 ESOPHAGEAL DYSPHAGIA: Primary | ICD-10-CM

## 2023-10-12 DIAGNOSIS — I10 HTN (HYPERTENSION), BENIGN: ICD-10-CM

## 2023-10-12 DIAGNOSIS — Z78.9 NONSMOKER: ICD-10-CM

## 2023-10-12 NOTE — H&P (VIEW-ONLY)
Lucy Sousa  1950    10/12/2023  Chief Complaint   Patient presents with    GI Problem     Dysphagisa,peptic disease     Subjective   HPI  Lucy Sousa is a 73 y.o. female who presents with a complaint of dysphagia for several months ongoing persistent upper esophageal region. Worse with meat and solids. She had had this before and dilatation did help her. No nausea or vomiting. No wt loss. No melena. No BRBPR.    Past Medical History:   Diagnosis Date    Cancer     skin squamous cell    DJD (degenerative joint disease)     GERD (gastroesophageal reflux disease)     HTN (hypertension)     Hx of colonic polyp     Pulmonary embolism     post surgical with cardiac arrest     Past Surgical History:   Procedure Laterality Date    BREAST SURGERY      breast reduction    CHOLECYSTECTOMY      COLONOSCOPY  04/01/2019    Diverticulosis otherwise normal exam repeat in 5 years    COLONOSCOPY N/A 06/02/2021    Diverticulosis in the sigmoid colon.    COLONOSCOPY W/ POLYPECTOMY  02/13/2014    Hyperplastic polyp at 20 cm, Diverticulosis repeat exam in 5 years    COLPOCLEISIS N/A 10/25/2022    Procedure: COLPOCLEISIS, MID-URETHRAL SLING WITH CYSTOSCOPY;  Surgeon: Kaci King MD;  Location:  [facility];  Service: Obstetrics/Gynecology;  Laterality: N/A;    EKOS CATHETER PLACEMENT Bilateral 11/17/2016    Procedure: Ekos catheter placement;  Surgeon: Daniel Underwood MD;  Location:  [facility];  Service:     EKOS CATHETER REMOVAL Bilateral 11/18/2016    Procedure: Ekos catheter removal with stent placement;  Surgeon: Daniel Underwood MD;  Location:  [facility];  Service:     ENDOSCOPY  01/22/2010    Negative endoscopy noting a healed gastric ulcer    ENDOSCOPY  10/21/2009    Superficial mucosal erosion, chronic active gastritis    ENDOSCOPY  04/01/2019    Distal esophageal ring dilated    ENDOSCOPY N/A 06/02/2021    HH, dilated    FLAP HEAD/NECK Right 10/26/2020    Procedure:  POSSIBLE FLAP;  Surgeon: Vadim Le MD;  Location:  [facility];  Service: ENT;  Laterality: Right;    FOOT SURGERY      HEAD/NECK LESION/CYST EXCISION Right 10/26/2020    Procedure: 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 with transposition flap;  Surgeon: Vadim Le MD;  Location:  [facility];  Service: ENT;  Laterality: Right;    HERNIA REPAIR      MID-URETHRAL SLING WITH CYSTOSCOPY N/A 10/25/2022    Procedure: MID-URETHRAL SLING WITH CYSTOSCOPY;  Surgeon: Kaci King MD;  Location:  [facility];  Service: Obstetrics/Gynecology;  Laterality: N/A;    REPLACEMENT TOTAL KNEE      TOTAL ABDOMINAL HYSTERECTOMY      SHUN bilateral oophorectomy for bleeding    TUBAL ABDOMINAL LIGATION         Outpatient Medications Marked as Taking for the 10/12/23 encounter (Office Visit) with Kathy Lainez APRN   Medication Sig Dispense Refill    atorvastatin (LIPITOR) 20 MG tablet TAKE 1 TABLET BY MOUTH DAILY. 90 tablet 3    Cholecalciferol (Vitamin D3) 25 MCG (1000 UT) capsule Take 1 capsule by mouth Daily.      cyanocobalamin (VITAMIN B-12) 50 MCG tablet tablet Take 1 tablet by mouth Daily.      esomeprazole (nexIUM) 40 MG capsule TAKE ONE CAPSULE TWICE DAILY GENERIC FOR NEXIUM 180 capsule 2    furosemide (LASIX) 20 MG tablet TAKE 1 TABLET BY MOUTH DAILY. 90 tablet 3    losartan (COZAAR) 50 MG tablet TAKE ONE TABLET DAILY GENERIC FOR COZAAR 90 tablet 2    Multiple Vitamin (MULTI VITAMIN DAILY PO) Take 1 tablet by mouth Daily.      nystatin (MYCOSTATIN) 708152 UNIT/GM cream Apply 1 application topically to the appropriate area as directed 2 (Two) Times a Day As Needed (skin yeast rash). 30 g 1    potassium chloride (K-DUR,KLOR-CON) 20 MEQ CR tablet TAKE ONE TABLET TWICE DAILY 180 tablet 2    triamterene-hydrochlorothiazide (DYAZIDE) 37.5-25 MG per capsule TAKE ONE CAPSULE EVERY MORNING GENERIC FOR DYAZIDE 90 capsule 1    warfarin (COUMADIN) 4 MG tablet Take 1 tablet by mouth Every Night. 90  tablet 3    Zinc 50 MG capsule Take 1 tablet/day by mouth Daily.       Allergies   Allergen Reactions    Macrobid [Nitrofurantoin Macrocrystal] Rash     Social History     Socioeconomic History    Marital status:      Spouse name: Nima    Number of children: 1    Years of education: 13   Tobacco Use    Smoking status: Never    Smokeless tobacco: Never   Vaping Use    Vaping Use: Never used   Substance and Sexual Activity    Alcohol use: No    Drug use: No    Sexual activity: Not Currently     Partners: Male     Birth control/protection: Post-menopausal     Family History   Problem Relation Age of Onset    Coronary artery disease Father     Coronary artery disease Mother     Diabetes Brother     Hypertension Brother     Colon polyps Brother     Breast cancer Maternal Grandmother     Heart disease Maternal Grandfather     Breast cancer Maternal Cousin     Colon cancer Neg Hx      Health Maintenance   Topic Date Due    ZOSTER VACCINE (1 of 2) Never done    DXA SCAN  10/31/2020    INFLUENZA VACCINE  08/01/2023    COVID-19 Vaccine (5 - 2023-24 season) 09/01/2023    ANNUAL WELLNESS VISIT  02/06/2024    BMI FOLLOWUP  02/06/2024    LIPID PANEL  08/15/2024    COLORECTAL CANCER SCREENING  06/02/2028    TDAP/TD VACCINES (2 - Td or Tdap) 11/02/2030    HEPATITIS C SCREENING  Completed    Pneumococcal Vaccine 65+  Completed     Review of Systems   Constitutional:  Negative for activity change, appetite change, chills, diaphoresis, fatigue, fever and unexpected weight change.   HENT:  Positive for trouble swallowing. Negative for ear pain, hearing loss, mouth sores, sore throat and voice change.    Eyes: Negative.    Respiratory:  Negative for cough, choking, shortness of breath and wheezing.    Cardiovascular:  Negative for chest pain and palpitations.   Gastrointestinal:  Negative for abdominal pain, blood in stool, constipation, diarrhea, nausea and vomiting.   Endocrine: Negative for cold intolerance and heat  "intolerance.   Genitourinary:  Negative for decreased urine volume, dysuria, frequency, hematuria and urgency.   Musculoskeletal:  Negative for back pain, gait problem and myalgias.   Skin:  Negative for color change, pallor and rash.   Allergic/Immunologic: Negative for food allergies and immunocompromised state.   Neurological:  Negative for dizziness, tremors, seizures, syncope, weakness, light-headedness, numbness and headaches.   Hematological:  Negative for adenopathy. Does not bruise/bleed easily.   Psychiatric/Behavioral:  Negative for agitation and confusion. The patient is not nervous/anxious.    All other systems reviewed and are negative.    Objective   Vitals:    10/12/23 1010   BP: 130/80   BP Location: Left arm   Pulse: 70   Temp: 94.1 °F (34.5 °C)   TempSrc: Temporal   SpO2: 97%   Weight: 89.3 kg (196 lb 12.8 oz)   Height: 157.5 cm (62.01\")     Body mass index is 35.99 kg/m².  Physical Exam  Constitutional:       Appearance: She is well-developed.   HENT:      Head: Normocephalic and atraumatic.   Eyes:      Pupils: Pupils are equal, round, and reactive to light.   Neck:      Trachea: No tracheal deviation.   Cardiovascular:      Rate and Rhythm: Normal rate and regular rhythm.      Heart sounds: Normal heart sounds. No murmur heard.     No friction rub. No gallop.   Pulmonary:      Effort: Pulmonary effort is normal. No respiratory distress.      Breath sounds: Normal breath sounds. No wheezing or rales.   Chest:      Chest wall: No tenderness.   Abdominal:      General: Bowel sounds are normal. There is no distension.      Palpations: Abdomen is soft. Abdomen is not rigid.      Tenderness: There is no abdominal tenderness. There is no guarding or rebound.   Musculoskeletal:         General: No tenderness or deformity. Normal range of motion.      Cervical back: Normal range of motion and neck supple.   Skin:     General: Skin is warm and dry.      Coloration: Skin is not pale.      Findings: No " rash.   Neurological:      Mental Status: She is alert and oriented to person, place, and time.      Deep Tendon Reflexes: Reflexes are normal and symmetric.   Psychiatric:         Behavior: Behavior normal.         Thought Content: Thought content normal.         Judgment: Judgment normal.       Assessment & Plan   Diagnoses and all orders for this visit:    1. Esophageal dysphagia (Primary)  -     Case Request; Standing    2. Anticoagulated on Coumadin  Comments:  Coumadin due to hx of PE managed by Dr Berg    3. Nonsmoker    4. HTN (hypertension), benign  Comments:  cont Bp medication the day of procedure    Other orders  -     Implement Anesthesia Orders Day of Procedure; Standing  -     Obtain Informed Consent; Standing  -     Follow Anesthesia Guidelines / Protocol; Future  -     Obtain Informed Consent; Future      ESOPHAGOGASTRODUODENOSCOPY WITH ANESTHESIA (N/A)  Part of this note may be an electronic transcription/translation of spoken language to printed text using the Dragon Dictation System.  Body mass index is 35.99 kg/m².  No follow-ups on file.           All risks, benefits, alternatives, and indications of colonoscopy and/or Endoscopy procedure have been discussed with the patient. Risks to include perforation of the colon requiring possible surgery or colostomy, risk of bleeding from biopsies or removal of colon tissue, possibility of missing a colon polyp or cancer, or adverse drug reaction.  Benefits to include the diagnosis and management of disease of the colon and rectum. Alternatives to include barium enema, radiographic evaluation, lab testing or no intervention. Pt verbalizes understanding and agrees.     Kathy Lainez, LANRE  10/12/2023  10:33 CDT          If you smoke or use tobacco, 4 minutes reading provided  Steps to Quit Smoking  Smoking tobacco can be harmful to your health and can affect almost every organ in your body. Smoking puts you, and those around you, at risk for  developing many serious chronic diseases. Quitting smoking is difficult, but it is one of the best things that you can do for your health. It is never too late to quit.  What are the benefits of quitting smoking?  When you quit smoking, you lower your risk of developing serious diseases and conditions, such as:  Lung cancer or lung disease, such as COPD.  Heart disease.  Stroke.  Heart attack.  Infertility.  Osteoporosis and bone fractures.  Additionally, symptoms such as coughing, wheezing, and shortness of breath may get better when you quit. You may also find that you get sick less often because your body is stronger at fighting off colds and infections. If you are pregnant, quitting smoking can help to reduce your chances of having a baby of low birth weight.  How do I get ready to quit?  When you decide to quit smoking, create a plan to make sure that you are successful. Before you quit:  Pick a date to quit. Set a date within the next two weeks to give you time to prepare.  Write down the reasons why you are quitting. Keep this list in places where you will see it often, such as on your bathroom mirror or in your car or wallet.  Identify the people, places, things, and activities that make you want to smoke (triggers) and avoid them. Make sure to take these actions:  Throw away all cigarettes at home, at work, and in your car.  Throw away smoking accessories, such as ashtrays and lighters.  Clean your car and make sure to empty the ashtray.  Clean your home, including curtains and carpets.  Tell your family, friends, and coworkers that you are quitting. Support from your loved ones can make quitting easier.  Talk with your health care provider about your options for quitting smoking.  Find out what treatment options are covered by your health insurance.  What strategies can I use to quit smoking?  Talk with your healthcare provider about different strategies to quit smoking. Some strategies include:  Quitting  smoking altogether instead of gradually lessening how much you smoke over a period of time. Research shows that quitting “cold turkey” is more successful than gradually quitting.  Attending in-person counseling to help you build problem-solving skills. You are more likely to have success in quitting if you attend several counseling sessions. Even short sessions of 10 minutes can be effective.  Finding resources and support systems that can help you to quit smoking and remain smoke-free after you quit. These resources are most helpful when you use them often. They can include:  Online chats with a counselor.  Telephone quitlines.  Printed self-help materials.  Support groups or group counseling.  Text messaging programs.  Mobile phone applications.  Taking medicines to help you quit smoking. (If you are pregnant or breastfeeding, talk with your health care provider first.) Some medicines contain nicotine and some do not. Both types of medicines help with cravings, but the medicines that include nicotine help to relieve withdrawal symptoms. Your health care provider may recommend:  Nicotine patches, gum, or lozenges.  Nicotine inhalers or sprays.  Non-nicotine medicine that is taken by mouth.  Talk with your health care provider about combining strategies, such as taking medicines while you are also receiving in-person counseling. Using these two strategies together makes you more likely to succeed in quitting than if you used either strategy on its own.  If you are pregnant or breastfeeding, talk with your health care provider about finding counseling or other support strategies to quit smoking. Do not take medicine to help you quit smoking unless told to do so by your health care provider.  What things can I do to make it easier to quit?  Quitting smoking might feel overwhelming at first, but there is a lot that you can do to make it easier. Take these important actions:  Reach out to your family and friends and ask  that they support and encourage you during this time. Call telephone quitlines, reach out to support groups, or work with a counselor for support.  Ask people who smoke to avoid smoking around you.  Avoid places that trigger you to smoke, such as bars, parties, or smoke-break areas at work.  Spend time around people who do not smoke.  Lessen stress in your life, because stress can be a smoking trigger for some people. To lessen stress, try:  Exercising regularly.  Deep-breathing exercises.  Yoga.  Meditating.  Performing a body scan. This involves closing your eyes, scanning your body from head to toe, and noticing which parts of your body are particularly tense. Purposefully relax the muscles in those areas.  Download or purchase mobile phone or tablet apps (applications) that can help you stick to your quit plan by providing reminders, tips, and encouragement. There are many free apps, such as QuitGuide from the CDC (Centers for Disease Control and Prevention). You can find other support for quitting smoking (smoking cessation) through smokefree.gov and other websites.  How will I feel when I quit smoking?  Within the first 24 hours of quitting smoking, you may start to feel some withdrawal symptoms. These symptoms are usually most noticeable 2-3 days after quitting, but they usually do not last beyond 2-3 weeks. Changes or symptoms that you might experience include:  Mood swings.  Restlessness, anxiety, or irritation.  Difficulty concentrating.  Dizziness.  Strong cravings for sugary foods in addition to nicotine.  Mild weight gain.  Constipation.  Nausea.  Coughing or a sore throat.  Changes in how your medicines work in your body.  A depressed mood.  Difficulty sleeping (insomnia).  After the first 2-3 weeks of quitting, you may start to notice more positive results, such as:  Improved sense of smell and taste.  Decreased coughing and sore throat.  Slower heart rate.  Lower blood pressure.  Clearer skin.  The  ability to breathe more easily.  Fewer sick days.  Quitting smoking is very challenging for most people. Do not get discouraged if you are not successful the first time. Some people need to make many attempts to quit before they achieve long-term success. Do your best to stick to your quit plan, and talk with your health care provider if you have any questions or concerns.  This information is not intended to replace advice given to you by your health care provider. Make sure you discuss any questions you have with your health care provider.  Document Released: 12/12/2002 Document Revised: 08/15/2017 Document Reviewed: 05/03/2016  [employer] Interactive Patient Education © 2017 [employer]

## 2023-10-12 NOTE — PROGRESS NOTES
Lucy Sousa  1950    10/12/2023  Chief Complaint   Patient presents with    GI Problem     Dysphagisa,peptic disease     Subjective   HPI  Lucy Sousa is a 73 y.o. female who presents with a complaint of dysphagia for several months ongoing persistent upper esophageal region. Worse with meat and solids. She had had this before and dilatation did help her. No nausea or vomiting. No wt loss. No melena. No BRBPR.    Past Medical History:   Diagnosis Date    Cancer     skin squamous cell    DJD (degenerative joint disease)     GERD (gastroesophageal reflux disease)     HTN (hypertension)     Hx of colonic polyp     Pulmonary embolism     post surgical with cardiac arrest     Past Surgical History:   Procedure Laterality Date    BREAST SURGERY      breast reduction    CHOLECYSTECTOMY      COLONOSCOPY  04/01/2019    Diverticulosis otherwise normal exam repeat in 5 years    COLONOSCOPY N/A 06/02/2021    Diverticulosis in the sigmoid colon.    COLONOSCOPY W/ POLYPECTOMY  02/13/2014    Hyperplastic polyp at 20 cm, Diverticulosis repeat exam in 5 years    COLPOCLEISIS N/A 10/25/2022    Procedure: COLPOCLEISIS, MID-URETHRAL SLING WITH CYSTOSCOPY;  Surgeon: Kaci King MD;  Location:  [facility];  Service: Obstetrics/Gynecology;  Laterality: N/A;    EKOS CATHETER PLACEMENT Bilateral 11/17/2016    Procedure: Ekos catheter placement;  Surgeon: Daniel Underwood MD;  Location:  [facility];  Service:     EKOS CATHETER REMOVAL Bilateral 11/18/2016    Procedure: Ekos catheter removal with stent placement;  Surgeon: Daniel Underwood MD;  Location:  [facility];  Service:     ENDOSCOPY  01/22/2010    Negative endoscopy noting a healed gastric ulcer    ENDOSCOPY  10/21/2009    Superficial mucosal erosion, chronic active gastritis    ENDOSCOPY  04/01/2019    Distal esophageal ring dilated    ENDOSCOPY N/A 06/02/2021    HH, dilated    FLAP HEAD/NECK Right 10/26/2020    Procedure:  POSSIBLE FLAP;  Surgeon: Vadim Le MD;  Location:  [facility];  Service: ENT;  Laterality: Right;    FOOT SURGERY      HEAD/NECK LESION/CYST EXCISION Right 10/26/2020    Procedure: 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 with transposition flap;  Surgeon: Vadim Le MD;  Location:  [facility];  Service: ENT;  Laterality: Right;    HERNIA REPAIR      MID-URETHRAL SLING WITH CYSTOSCOPY N/A 10/25/2022    Procedure: MID-URETHRAL SLING WITH CYSTOSCOPY;  Surgeon: Kaci King MD;  Location:  [facility];  Service: Obstetrics/Gynecology;  Laterality: N/A;    REPLACEMENT TOTAL KNEE      TOTAL ABDOMINAL HYSTERECTOMY      SHUN bilateral oophorectomy for bleeding    TUBAL ABDOMINAL LIGATION         Outpatient Medications Marked as Taking for the 10/12/23 encounter (Office Visit) with Kathy Lainez APRN   Medication Sig Dispense Refill    atorvastatin (LIPITOR) 20 MG tablet TAKE 1 TABLET BY MOUTH DAILY. 90 tablet 3    Cholecalciferol (Vitamin D3) 25 MCG (1000 UT) capsule Take 1 capsule by mouth Daily.      cyanocobalamin (VITAMIN B-12) 50 MCG tablet tablet Take 1 tablet by mouth Daily.      esomeprazole (nexIUM) 40 MG capsule TAKE ONE CAPSULE TWICE DAILY GENERIC FOR NEXIUM 180 capsule 2    furosemide (LASIX) 20 MG tablet TAKE 1 TABLET BY MOUTH DAILY. 90 tablet 3    losartan (COZAAR) 50 MG tablet TAKE ONE TABLET DAILY GENERIC FOR COZAAR 90 tablet 2    Multiple Vitamin (MULTI VITAMIN DAILY PO) Take 1 tablet by mouth Daily.      nystatin (MYCOSTATIN) 617207 UNIT/GM cream Apply 1 application topically to the appropriate area as directed 2 (Two) Times a Day As Needed (skin yeast rash). 30 g 1    potassium chloride (K-DUR,KLOR-CON) 20 MEQ CR tablet TAKE ONE TABLET TWICE DAILY 180 tablet 2    triamterene-hydrochlorothiazide (DYAZIDE) 37.5-25 MG per capsule TAKE ONE CAPSULE EVERY MORNING GENERIC FOR DYAZIDE 90 capsule 1    warfarin (COUMADIN) 4 MG tablet Take 1 tablet by mouth Every Night. 90  tablet 3    Zinc 50 MG capsule Take 1 tablet/day by mouth Daily.       Allergies   Allergen Reactions    Macrobid [Nitrofurantoin Macrocrystal] Rash     Social History     Socioeconomic History    Marital status:      Spouse name: Nima    Number of children: 1    Years of education: 13   Tobacco Use    Smoking status: Never    Smokeless tobacco: Never   Vaping Use    Vaping Use: Never used   Substance and Sexual Activity    Alcohol use: No    Drug use: No    Sexual activity: Not Currently     Partners: Male     Birth control/protection: Post-menopausal     Family History   Problem Relation Age of Onset    Coronary artery disease Father     Coronary artery disease Mother     Diabetes Brother     Hypertension Brother     Colon polyps Brother     Breast cancer Maternal Grandmother     Heart disease Maternal Grandfather     Breast cancer Maternal Cousin     Colon cancer Neg Hx      Health Maintenance   Topic Date Due    ZOSTER VACCINE (1 of 2) Never done    DXA SCAN  10/31/2020    INFLUENZA VACCINE  08/01/2023    COVID-19 Vaccine (5 - 2023-24 season) 09/01/2023    ANNUAL WELLNESS VISIT  02/06/2024    BMI FOLLOWUP  02/06/2024    LIPID PANEL  08/15/2024    COLORECTAL CANCER SCREENING  06/02/2028    TDAP/TD VACCINES (2 - Td or Tdap) 11/02/2030    HEPATITIS C SCREENING  Completed    Pneumococcal Vaccine 65+  Completed     Review of Systems   Constitutional:  Negative for activity change, appetite change, chills, diaphoresis, fatigue, fever and unexpected weight change.   HENT:  Positive for trouble swallowing. Negative for ear pain, hearing loss, mouth sores, sore throat and voice change.    Eyes: Negative.    Respiratory:  Negative for cough, choking, shortness of breath and wheezing.    Cardiovascular:  Negative for chest pain and palpitations.   Gastrointestinal:  Negative for abdominal pain, blood in stool, constipation, diarrhea, nausea and vomiting.   Endocrine: Negative for cold intolerance and heat  "intolerance.   Genitourinary:  Negative for decreased urine volume, dysuria, frequency, hematuria and urgency.   Musculoskeletal:  Negative for back pain, gait problem and myalgias.   Skin:  Negative for color change, pallor and rash.   Allergic/Immunologic: Negative for food allergies and immunocompromised state.   Neurological:  Negative for dizziness, tremors, seizures, syncope, weakness, light-headedness, numbness and headaches.   Hematological:  Negative for adenopathy. Does not bruise/bleed easily.   Psychiatric/Behavioral:  Negative for agitation and confusion. The patient is not nervous/anxious.    All other systems reviewed and are negative.    Objective   Vitals:    10/12/23 1010   BP: 130/80   BP Location: Left arm   Pulse: 70   Temp: 94.1 øF (34.5 øC)   TempSrc: Temporal   SpO2: 97%   Weight: 89.3 kg (196 lb 12.8 oz)   Height: 157.5 cm (62.01\")     Body mass index is 35.99 kg/mý.  Physical Exam  Constitutional:       Appearance: She is well-developed.   HENT:      Head: Normocephalic and atraumatic.   Eyes:      Pupils: Pupils are equal, round, and reactive to light.   Neck:      Trachea: No tracheal deviation.   Cardiovascular:      Rate and Rhythm: Normal rate and regular rhythm.      Heart sounds: Normal heart sounds. No murmur heard.     No friction rub. No gallop.   Pulmonary:      Effort: Pulmonary effort is normal. No respiratory distress.      Breath sounds: Normal breath sounds. No wheezing or rales.   Chest:      Chest wall: No tenderness.   Abdominal:      General: Bowel sounds are normal. There is no distension.      Palpations: Abdomen is soft. Abdomen is not rigid.      Tenderness: There is no abdominal tenderness. There is no guarding or rebound.   Musculoskeletal:         General: No tenderness or deformity. Normal range of motion.      Cervical back: Normal range of motion and neck supple.   Skin:     General: Skin is warm and dry.      Coloration: Skin is not pale.      Findings: No " rash.   Neurological:      Mental Status: She is alert and oriented to person, place, and time.      Deep Tendon Reflexes: Reflexes are normal and symmetric.   Psychiatric:         Behavior: Behavior normal.         Thought Content: Thought content normal.         Judgment: Judgment normal.       Assessment & Plan   Diagnoses and all orders for this visit:    1. Esophageal dysphagia (Primary)  -     Case Request; Standing    2. Anticoagulated on Coumadin  Comments:  Coumadin due to hx of PE managed by Dr Berg    3. Nonsmoker    4. HTN (hypertension), benign  Comments:  cont Bp medication the day of procedure    Other orders  -     Implement Anesthesia Orders Day of Procedure; Standing  -     Obtain Informed Consent; Standing  -     Follow Anesthesia Guidelines / Protocol; Future  -     Obtain Informed Consent; Future      ESOPHAGOGASTRODUODENOSCOPY WITH ANESTHESIA (N/A)  Part of this note may be an electronic transcription/translation of spoken language to printed text using the Dragon Dictation System.  Body mass index is 35.99 kg/mý.  No follow-ups on file.           All risks, benefits, alternatives, and indications of colonoscopy and/or Endoscopy procedure have been discussed with the patient. Risks to include perforation of the colon requiring possible surgery or colostomy, risk of bleeding from biopsies or removal of colon tissue, possibility of missing a colon polyp or cancer, or adverse drug reaction.  Benefits to include the diagnosis and management of disease of the colon and rectum. Alternatives to include barium enema, radiographic evaluation, lab testing or no intervention. Pt verbalizes understanding and agrees.     Kathy Lainez, LANRE  10/12/2023  10:33 CDT          If you smoke or use tobacco, 4 minutes reading provided  Steps to Quit Smoking  Smoking tobacco can be harmful to your health and can affect almost every organ in your body. Smoking puts you, and those around you, at risk for  developing many serious chronic diseases. Quitting smoking is difficult, but it is one of the best things that you can do for your health. It is never too late to quit.  What are the benefits of quitting smoking?  When you quit smoking, you lower your risk of developing serious diseases and conditions, such as:  Lung cancer or lung disease, such as COPD.  Heart disease.  Stroke.  Heart attack.  Infertility.  Osteoporosis and bone fractures.  Additionally, symptoms such as coughing, wheezing, and shortness of breath may get better when you quit. You may also find that you get sick less often because your body is stronger at fighting off colds and infections. If you are pregnant, quitting smoking can help to reduce your chances of having a baby of low birth weight.  How do I get ready to quit?  When you decide to quit smoking, create a plan to make sure that you are successful. Before you quit:  Pick a date to quit. Set a date within the next two weeks to give you time to prepare.  Write down the reasons why you are quitting. Keep this list in places where you will see it often, such as on your bathroom mirror or in your car or wallet.  Identify the people, places, things, and activities that make you want to smoke (triggers) and avoid them. Make sure to take these actions:  Throw away all cigarettes at home, at work, and in your car.  Throw away smoking accessories, such as ashtrays and lighters.  Clean your car and make sure to empty the ashtray.  Clean your home, including curtains and carpets.  Tell your family, friends, and coworkers that you are quitting. Support from your loved ones can make quitting easier.  Talk with your health care provider about your options for quitting smoking.  Find out what treatment options are covered by your health insurance.  What strategies can I use to quit smoking?  Talk with your healthcare provider about different strategies to quit smoking. Some strategies include:  Quitting  "smoking altogether instead of gradually lessening how much you smoke over a period of time. Research shows that quitting "cold turkey" is more successful than gradually quitting.  Attending in-person counseling to help you build problem-solving skills. You are more likely to have success in quitting if you attend several counseling sessions. Even short sessions of 10 minutes can be effective.  Finding resources and support systems that can help you to quit smoking and remain smoke-free after you quit. These resources are most helpful when you use them often. They can include:  Online chats with a counselor.  Telephone quitlines.  Printed self-help materials.  Support groups or group counseling.  Text messaging programs.  Mobile phone applications.  Taking medicines to help you quit smoking. (If you are pregnant or breastfeeding, talk with your health care provider first.) Some medicines contain nicotine and some do not. Both types of medicines help with cravings, but the medicines that include nicotine help to relieve withdrawal symptoms. Your health care provider may recommend:  Nicotine patches, gum, or lozenges.  Nicotine inhalers or sprays.  Non-nicotine medicine that is taken by mouth.  Talk with your health care provider about combining strategies, such as taking medicines while you are also receiving in-person counseling. Using these two strategies together makes you more likely to succeed in quitting than if you used either strategy on its own.  If you are pregnant or breastfeeding, talk with your health care provider about finding counseling or other support strategies to quit smoking. Do not take medicine to help you quit smoking unless told to do so by your health care provider.  What things can I do to make it easier to quit?  Quitting smoking might feel overwhelming at first, but there is a lot that you can do to make it easier. Take these important actions:  Reach out to your family and friends and ask " that they support and encourage you during this time. Call telephone quitlines, reach out to support groups, or work with a counselor for support.  Ask people who smoke to avoid smoking around you.  Avoid places that trigger you to smoke, such as bars, parties, or smoke-break areas at work.  Spend time around people who do not smoke.  Lessen stress in your life, because stress can be a smoking trigger for some people. To lessen stress, try:  Exercising regularly.  Deep-breathing exercises.  Yoga.  Meditating.  Performing a body scan. This involves closing your eyes, scanning your body from head to toe, and noticing which parts of your body are particularly tense. Purposefully relax the muscles in those areas.  Download or purchase mobile phone or tablet apps (applications) that can help you stick to your quit plan by providing reminders, tips, and encouragement. There are many free apps, such as QuitGuide from the CDC (Centers for Disease Control and Prevention). You can find other support for quitting smoking (smoking cessation) through smokefree.gov and other websites.  How will I feel when I quit smoking?  Within the first 24 hours of quitting smoking, you may start to feel some withdrawal symptoms. These symptoms are usually most noticeable 2-3 days after quitting, but they usually do not last beyond 2-3 weeks. Changes or symptoms that you might experience include:  Mood swings.  Restlessness, anxiety, or irritation.  Difficulty concentrating.  Dizziness.  Strong cravings for sugary foods in addition to nicotine.  Mild weight gain.  Constipation.  Nausea.  Coughing or a sore throat.  Changes in how your medicines work in your body.  A depressed mood.  Difficulty sleeping (insomnia).  After the first 2-3 weeks of quitting, you may start to notice more positive results, such as:  Improved sense of smell and taste.  Decreased coughing and sore throat.  Slower heart rate.  Lower blood pressure.  Clearer skin.  The  ability to breathe more easily.  Fewer sick days.  Quitting smoking is very challenging for most people. Do not get discouraged if you are not successful the first time. Some people need to make many attempts to quit before they achieve long-term success. Do your best to stick to your quit plan, and talk with your health care provider if you have any questions or concerns.  This information is not intended to replace advice given to you by your health care provider. Make sure you discuss any questions you have with your health care provider.  Document Released: 12/12/2002 Document Revised: 08/15/2017 Document Reviewed: 05/03/2016  [employer] Patient Education c 2017 [employer]   oral

## 2023-10-16 NOTE — TELEPHONE ENCOUNTER
Patient states she believes that she has done this before and done well, endo is scheduled for the 9th, and will stop it from the 4th to the 9th

## 2023-10-16 NOTE — TELEPHONE ENCOUNTER
No; question was when she stopped in the past did she do the shots (like we recently did on another patient)

## 2023-10-18 ENCOUNTER — TELEPHONE (OUTPATIENT)
Dept: FAMILY MEDICINE CLINIC | Facility: CLINIC | Age: 73
End: 2023-10-18

## 2023-10-18 NOTE — TELEPHONE ENCOUNTER
Hub staff attempted to follow warm transfer process and was unsuccessful     Caller: Lucy Sousa    Relationship to patient: Self    Best call back number: [phone]     Patient is needing: RETURNING MISSED CALL

## 2023-11-03 ENCOUNTER — CLINICAL SUPPORT (OUTPATIENT)
Dept: FAMILY MEDICINE CLINIC | Facility: CLINIC | Age: 73
End: 2023-11-03
Payer: MEDICARE

## 2023-11-03 DIAGNOSIS — Z79.01 ANTICOAGULATED: Primary | ICD-10-CM

## 2023-11-03 LAB — INR PPP: 1.8 (ref 0.9–1.1)

## 2023-11-09 ENCOUNTER — ANESTHESIA EVENT (OUTPATIENT)
Dept: GASTROENTEROLOGY | Facility: HOSPITAL | Age: 73
End: 2023-11-09
Payer: MEDICARE

## 2023-11-09 ENCOUNTER — ANESTHESIA (OUTPATIENT)
Dept: GASTROENTEROLOGY | Facility: HOSPITAL | Age: 73
End: 2023-11-09
Payer: MEDICARE

## 2023-11-09 ENCOUNTER — HOSPITAL ENCOUNTER (OUTPATIENT)
Facility: HOSPITAL | Age: 73
Setting detail: HOSPITAL OUTPATIENT SURGERY
Discharge: HOME | End: 2023-11-09
Attending: INTERNAL MEDICINE | Admitting: INTERNAL MEDICINE
Payer: MEDICARE

## 2023-11-09 VITALS
HEIGHT: 64 IN | HEART RATE: 55 BPM | WEIGHT: 203 LBS | TEMPERATURE: 96.5 F | DIASTOLIC BLOOD PRESSURE: 71 MMHG | BODY MASS INDEX: 34.66 KG/M2 | OXYGEN SATURATION: 100 % | RESPIRATION RATE: 15 BRPM | SYSTOLIC BLOOD PRESSURE: 133 MMHG

## 2023-11-09 PROCEDURE — 25010000002 PROPOFOL 10 MG/ML EMULSION: Performed by: NURSE ANESTHETIST, CERTIFIED REGISTERED

## 2023-11-09 PROCEDURE — 25810000003 SODIUM CHLORIDE 0.9 % SOLUTION: Performed by: ANESTHESIOLOGY

## 2023-11-09 PROCEDURE — 43235 EGD DIAGNOSTIC BRUSH WASH: CPT | Performed by: INTERNAL MEDICINE

## 2023-11-09 PROCEDURE — 43450 DILATE ESOPHAGUS 1/MULT PASS: CPT | Performed by: INTERNAL MEDICINE

## 2023-11-09 RX ORDER — SODIUM CHLORIDE 9 MG/ML
100 INJECTION, SOLUTION INTRAVENOUS CONTINUOUS
Status: CANCELLED | OUTPATIENT
Start: 2023-11-09

## 2023-11-09 RX ORDER — LIDOCAINE HYDROCHLORIDE 20 MG/ML
INJECTION, SOLUTION EPIDURAL; INFILTRATION; INTRACAUDAL; PERINEURAL AS NEEDED
Status: DISCONTINUED | OUTPATIENT
Start: 2023-11-09 | End: 2023-11-09 | Stop reason: SURG

## 2023-11-09 RX ORDER — LIDOCAINE HYDROCHLORIDE 10 MG/ML
0.5 INJECTION, SOLUTION EPIDURAL; INFILTRATION; INTRACAUDAL; PERINEURAL ONCE AS NEEDED
Status: CANCELLED | OUTPATIENT
Start: 2023-11-09

## 2023-11-09 RX ORDER — SODIUM CHLORIDE 0.9 % (FLUSH) 0.9 %
10 SYRINGE (ML) INJECTION AS NEEDED
Status: CANCELLED | OUTPATIENT
Start: 2023-11-09

## 2023-11-09 RX ORDER — SODIUM CHLORIDE 9 MG/ML
500 INJECTION, SOLUTION INTRAVENOUS CONTINUOUS PRN
Status: DISCONTINUED | OUTPATIENT
Start: 2023-11-09 | End: 2023-11-09 | Stop reason: HOSPADM

## 2023-11-09 RX ORDER — SODIUM CHLORIDE 9 MG/ML
40 INJECTION, SOLUTION INTRAVENOUS AS NEEDED
Status: CANCELLED | OUTPATIENT
Start: 2023-11-09

## 2023-11-09 RX ORDER — SODIUM CHLORIDE 0.9 % (FLUSH) 0.9 %
10 SYRINGE (ML) INJECTION AS NEEDED
Status: DISCONTINUED | OUTPATIENT
Start: 2023-11-09 | End: 2023-11-09 | Stop reason: HOSPADM

## 2023-11-09 RX ORDER — PROPOFOL 10 MG/ML
VIAL (ML) INTRAVENOUS AS NEEDED
Status: DISCONTINUED | OUTPATIENT
Start: 2023-11-09 | End: 2023-11-09 | Stop reason: SURG

## 2023-11-09 RX ORDER — SODIUM CHLORIDE 0.9 % (FLUSH) 0.9 %
10 SYRINGE (ML) INJECTION EVERY 12 HOURS SCHEDULED
Status: CANCELLED | OUTPATIENT
Start: 2023-11-09

## 2023-11-09 RX ADMIN — PROPOFOL INJECTABLE EMULSION 40 MG: 10 INJECTION, EMULSION INTRAVENOUS at 11:21

## 2023-11-09 RX ADMIN — LIDOCAINE HYDROCHLORIDE 100 MG: 20 INJECTION, SOLUTION EPIDURAL; INFILTRATION; INTRACAUDAL; PERINEURAL at 11:19

## 2023-11-09 RX ADMIN — SODIUM CHLORIDE 500 ML: 9 INJECTION, SOLUTION INTRAVENOUS at 10:07

## 2023-11-09 RX ADMIN — PROPOFOL INJECTABLE EMULSION 60 MG: 10 INJECTION, EMULSION INTRAVENOUS at 11:19

## 2023-11-09 NOTE — ANESTHESIA PREPROCEDURE EVALUATION
Anesthesia Evaluation     Patient summary reviewed and Nursing notes reviewed   no history of anesthetic complications:   NPO Solid Status: > 8 hours             Airway   Mallampati: II  TM distance: >3 FB  Neck ROM: full  Dental      Pulmonary    (+) pulmonary embolism (caused cardiac arrest),  (-) COPD, asthma, sleep apnea, not a smoker  Cardiovascular   Exercise tolerance: good (4-7 METS)    PT is on anticoagulation therapy    (+) hypertension, CHF , DVT, hyperlipidemia  (-) pacemaker, past MI, angina, cardiac stents      Neuro/Psych  (-) seizures, TIA, CVA  GI/Hepatic/Renal/Endo    (+) obesity  (-) GERD, liver disease, no renal disease, diabetes    Musculoskeletal     Abdominal    Substance History      OB/GYN          Other      history of cancer                      Anesthesia Plan    ASA 3     MAC     intravenous induction     Anesthetic plan, risks, benefits, and alternatives have been provided, discussed and informed consent has been obtained with: patient.        CODE STATUS:

## 2023-11-09 NOTE — ANESTHESIA POSTPROCEDURE EVALUATION
Patient: Lucy Sousa    Procedure Summary       Date: 11/09/23 Room / Location: [facility] ENDOSCOPY 4 / [facility]    Anesthesia Start: 1117 Anesthesia Stop: 1126    Procedure: ESOPHAGOGASTRODUODENOSCOPY WITH ANESTHESIA Diagnosis:       Esophageal dysphagia      (Esophageal dysphagia [R13.19])    Surgeons: Heath Mckeon MD Provider: Allen Atkins CRNA    Anesthesia Type: MAC ASA Status: 3            Anesthesia Type: MAC    Vitals  No vitals data found for the desired time range.          Post Anesthesia Care and Evaluation    Patient location during evaluation: PHASE II  Patient participation: complete - patient participated  Level of consciousness: awake  Pain score: 0  Pain management: adequate    Airway patency: patent  Anesthetic complications: No anesthetic complications  PONV Status: none  Cardiovascular status: acceptable  Respiratory status: acceptable  Hydration status: acceptable    Comments: .v

## 2023-11-09 NOTE — INTERVAL H&P NOTE
H&P reviewed. The patient was examined and there are no changes to the H&P.      The following major R/B/A were discussed with the patient, however the list is not all inclusive . Risk:  Bleeding (immediate and delayed), perforation (rupture or tear), reaction to medication, missed lesion/cancer, pain during the procedure, infection, need for surgery, need for ostomy, need for mechanical ventilation (breathing machine), death.  Benefits: removal of polyp/tissue, burn/clip/or inject to stop bleeding, removal of foreign body, dilate any stricture.  Alternatives: Xray or CT, surgery, do nothing with associated risk   The patient was given time to ask question and received explanation, and agrees to proceed as per History and Physical.   No guarantee given or expressed.

## 2023-11-10 RX ORDER — FUROSEMIDE 20 MG/1
TABLET ORAL
Qty: 90 TABLET | Refills: 3 | Status: SHIPPED | OUTPATIENT
Start: 2023-11-10

## 2023-11-13 ENCOUNTER — TELEPHONE (OUTPATIENT)
Dept: FAMILY MEDICINE CLINIC | Facility: CLINIC | Age: 73
End: 2023-11-13

## 2023-11-13 ENCOUNTER — CLINICAL SUPPORT (OUTPATIENT)
Dept: FAMILY MEDICINE CLINIC | Facility: CLINIC | Age: 73
End: 2023-11-13
Payer: MEDICARE

## 2023-11-13 DIAGNOSIS — Z79.01 ANTICOAGULATED: Primary | ICD-10-CM

## 2023-11-13 LAB — INR PPP: 1.3 (ref 0.9–1.1)

## 2023-11-13 PROCEDURE — 85610 PROTHROMBIN TIME: CPT | Performed by: FAMILY MEDICINE

## 2023-11-13 PROCEDURE — 36416 COLLJ CAPILLARY BLOOD SPEC: CPT | Performed by: FAMILY MEDICINE

## 2023-11-13 RX ORDER — WARFARIN SODIUM 4 MG/1
TABLET ORAL
Qty: 90 TABLET | Refills: 3 | Status: SHIPPED | OUTPATIENT
Start: 2023-11-13

## 2023-11-13 NOTE — TELEPHONE ENCOUNTER
Note from patient     She got name of med her brother was using; coleserclam 625 mg      Call patient; I am not sure of her spelling

## 2023-11-14 ENCOUNTER — TELEPHONE (OUTPATIENT)
Dept: FAMILY MEDICINE CLINIC | Facility: CLINIC | Age: 73
End: 2023-11-14

## 2023-11-14 RX ORDER — COLESEVELAM 180 1/1
1875 TABLET ORAL 2 TIMES DAILY WITH MEALS
Qty: 60 TABLET | Refills: 3 | Status: SHIPPED | OUTPATIENT
Start: 2023-11-14

## 2023-11-14 NOTE — TELEPHONE ENCOUNTER
Caller: Lucy Sousa    Relationship: Self    Best call back number: [phone]     What is the best time to reach you: ANY    Who are you requesting to speak with (clinical staff, provider,  specific staff member): AMANDA     What was the call regarding:     PATIENT IS REQUESTING TO SPEAK AMANDA REGARDING A MEDICATION.

## 2023-11-14 NOTE — TELEPHONE ENCOUNTER
625 mg  1 bid # 60 x 3  (Lets start with lower dose and see how this works)  Cannot take anything within 4 hours of this  Extra INR (apt 3-4 days after starting)

## 2023-11-14 NOTE — TELEPHONE ENCOUNTER
Welchol/colesevelam    This is primarily used for help with lowering cholestrol    What is she wanting to use this for?   Diarrhea?

## 2023-11-14 NOTE — TELEPHONE ENCOUNTER
Confirmed with patient this is the medication she wants. She stated her brother had problems with diarrhea and his GI doctor put him on this

## 2023-11-14 NOTE — TELEPHONE ENCOUNTER
Spoke to patient and she stated he may have been colesevelam. She stated it was for IBS but doesn't have the text her brother sent her anymore.

## 2023-11-15 ENCOUNTER — CLINICAL SUPPORT (OUTPATIENT)
Dept: FAMILY MEDICINE CLINIC | Facility: CLINIC | Age: 73
End: 2023-11-15
Payer: MEDICARE

## 2023-11-15 DIAGNOSIS — Z79.01 ANTICOAGULATED: Primary | ICD-10-CM

## 2023-11-15 LAB — INR PPP: 1.7 (ref 0.9–1.1)

## 2023-11-15 RX ORDER — RESPIRATORY SYNCYTIAL VIRUS VACCINE 120MCG/0.5
KIT INTRAMUSCULAR
Qty: 1 EACH | Refills: 0 | Status: SHIPPED | OUTPATIENT
Start: 2023-11-15

## 2023-11-20 ENCOUNTER — CLINICAL SUPPORT (OUTPATIENT)
Dept: FAMILY MEDICINE CLINIC | Facility: CLINIC | Age: 73
End: 2023-11-20
Payer: MEDICARE

## 2023-11-20 DIAGNOSIS — Z79.01 ANTICOAGULATED: Primary | ICD-10-CM

## 2023-11-20 LAB — INR PPP: 2.4 (ref 0.9–1.1)

## 2023-11-20 PROCEDURE — 36416 COLLJ CAPILLARY BLOOD SPEC: CPT | Performed by: FAMILY MEDICINE

## 2023-11-20 PROCEDURE — 85610 PROTHROMBIN TIME: CPT | Performed by: FAMILY MEDICINE

## 2023-12-04 ENCOUNTER — CLINICAL SUPPORT (OUTPATIENT)
Dept: FAMILY MEDICINE CLINIC | Facility: CLINIC | Age: 73
End: 2023-12-04
Payer: MEDICARE

## 2023-12-04 ENCOUNTER — TELEPHONE (OUTPATIENT)
Dept: FAMILY MEDICINE CLINIC | Facility: CLINIC | Age: 73
End: 2023-12-04

## 2023-12-04 DIAGNOSIS — Z79.01 ANTICOAGULATED: Primary | ICD-10-CM

## 2023-12-04 LAB — INR PPP: 2.7 (ref 0.9–1.1)

## 2023-12-04 PROCEDURE — 36416 COLLJ CAPILLARY BLOOD SPEC: CPT | Performed by: FAMILY MEDICINE

## 2023-12-04 PROCEDURE — 85610 PROTHROMBIN TIME: CPT | Performed by: FAMILY MEDICINE

## 2023-12-04 NOTE — TELEPHONE ENCOUNTER
Caller: Lucy Sousa    Relationship: Self    Best call back number: [phone]    Who are you requesting to speak with         Do you know the name of the person who called:     LUCY    What was the call regarding:     NEEDS TO HAVE SCHEDULED A NURSE APPOINTMENT FOR A RECHECK ON INR 1 MONTH FROM TODAY'S VISIT 12.04.23    Is it okay if the provider responds through MyChart:     NO    PLEASE CALL PATIENT

## 2024-01-04 ENCOUNTER — CLINICAL SUPPORT (OUTPATIENT)
Dept: FAMILY MEDICINE CLINIC | Facility: CLINIC | Age: 74
End: 2024-01-04
Payer: MEDICARE

## 2024-01-04 DIAGNOSIS — Z79.01 ANTICOAGULATED: Primary | ICD-10-CM

## 2024-01-04 LAB — INR PPP: 3.2 (ref 0.9–1.1)

## 2024-01-11 ENCOUNTER — CLINICAL SUPPORT (OUTPATIENT)
Dept: FAMILY MEDICINE CLINIC | Facility: CLINIC | Age: 74
End: 2024-01-11
Payer: MEDICARE

## 2024-01-11 DIAGNOSIS — Z79.01 ANTICOAGULATED: Primary | ICD-10-CM

## 2024-01-11 LAB — INR PPP: 1.8 (ref 0.9–1.1)

## 2024-01-25 ENCOUNTER — CLINICAL SUPPORT (OUTPATIENT)
Dept: FAMILY MEDICINE CLINIC | Facility: CLINIC | Age: 74
End: 2024-01-25
Payer: MEDICARE

## 2024-01-25 DIAGNOSIS — Z79.01 ANTICOAGULATED: Primary | ICD-10-CM

## 2024-01-25 LAB — INR PPP: 2.3 (ref 0.9–1.1)

## 2024-02-06 ENCOUNTER — TELEPHONE (OUTPATIENT)
Dept: FAMILY MEDICINE CLINIC | Facility: CLINIC | Age: 74
End: 2024-02-06
Payer: MEDICARE

## 2024-02-06 DIAGNOSIS — E78.2 MIXED HYPERLIPIDEMIA: ICD-10-CM

## 2024-02-06 DIAGNOSIS — R73.01 ELEVATED FASTING GLUCOSE: ICD-10-CM

## 2024-02-06 DIAGNOSIS — I10 HTN (HYPERTENSION), BENIGN: Primary | Chronic | ICD-10-CM

## 2024-02-06 DIAGNOSIS — E04.1 THYROID NODULE: ICD-10-CM

## 2024-02-07 LAB
ALBUMIN SERPL-MCNC: 4.1 G/DL (ref 3.5–5.2)
ALBUMIN/GLOB SERPL: 2.1 G/DL
ALP SERPL-CCNC: 67 U/L (ref 39–117)
ALT SERPL-CCNC: 12 U/L (ref 1–33)
AST SERPL-CCNC: 18 U/L (ref 1–32)
BASOPHILS # BLD AUTO: 0.04 10*3/MM3 (ref 0–0.2)
BASOPHILS NFR BLD AUTO: 0.9 % (ref 0–1.5)
BILIRUB SERPL-MCNC: 0.4 MG/DL (ref 0–1.2)
BUN SERPL-MCNC: 12 MG/DL (ref 8–23)
BUN/CREAT SERPL: 16.4 (ref 7–25)
CALCIUM SERPL-MCNC: 9.4 MG/DL (ref 8.6–10.5)
CHLORIDE SERPL-SCNC: 104 MMOL/L (ref 98–107)
CO2 SERPL-SCNC: 27.4 MMOL/L (ref 22–29)
CREAT SERPL-MCNC: 0.73 MG/DL (ref 0.57–1)
EGFRCR SERPLBLD CKD-EPI 2021: 85.4 ML/MIN/1.73
EOSINOPHIL # BLD AUTO: 0.18 10*3/MM3 (ref 0–0.4)
EOSINOPHIL NFR BLD AUTO: 4.1 % (ref 0.3–6.2)
ERYTHROCYTE [DISTWIDTH] IN BLOOD BY AUTOMATED COUNT: 12.9 % (ref 12.3–15.4)
GLOBULIN SER CALC-MCNC: 2 GM/DL
GLUCOSE SERPL-MCNC: 85 MG/DL (ref 65–99)
HBA1C MFR BLD: 5.9 % (ref 4.8–5.6)
HCT VFR BLD AUTO: 43.3 % (ref 34–46.6)
HGB BLD-MCNC: 14.2 G/DL (ref 12–15.9)
IMM GRANULOCYTES # BLD AUTO: 0 10*3/MM3 (ref 0–0.05)
IMM GRANULOCYTES NFR BLD AUTO: 0 % (ref 0–0.5)
LYMPHOCYTES # BLD AUTO: 1.76 10*3/MM3 (ref 0.7–3.1)
LYMPHOCYTES NFR BLD AUTO: 40.4 % (ref 19.6–45.3)
MCH RBC QN AUTO: 33.2 PG (ref 26.6–33)
MCHC RBC AUTO-ENTMCNC: 32.8 G/DL (ref 31.5–35.7)
MCV RBC AUTO: 101.2 FL (ref 79–97)
MONOCYTES # BLD AUTO: 0.41 10*3/MM3 (ref 0.1–0.9)
MONOCYTES NFR BLD AUTO: 9.4 % (ref 5–12)
NEUTROPHILS # BLD AUTO: 1.97 10*3/MM3 (ref 1.7–7)
NEUTROPHILS NFR BLD AUTO: 45.2 % (ref 42.7–76)
NRBC BLD AUTO-RTO: 0 /100 WBC (ref 0–0.2)
PLATELET # BLD AUTO: 307 10*3/MM3 (ref 140–450)
POTASSIUM SERPL-SCNC: 4 MMOL/L (ref 3.5–5.2)
PROT SERPL-MCNC: 6.1 G/DL (ref 6–8.5)
RBC # BLD AUTO: 4.28 10*6/MM3 (ref 3.77–5.28)
SODIUM SERPL-SCNC: 139 MMOL/L (ref 136–145)
T4 FREE SERPL-MCNC: 1.12 NG/DL (ref 0.93–1.7)
TSH SERPL DL<=0.005 MIU/L-ACNC: 4.64 UIU/ML (ref 0.27–4.2)
WBC # BLD AUTO: 4.36 10*3/MM3 (ref 3.4–10.8)

## 2024-02-13 ENCOUNTER — OFFICE VISIT (OUTPATIENT)
Dept: FAMILY MEDICINE CLINIC | Facility: CLINIC | Age: 74
End: 2024-02-13
Payer: MEDICARE

## 2024-02-13 ENCOUNTER — TELEPHONE (OUTPATIENT)
Dept: FAMILY MEDICINE CLINIC | Facility: CLINIC | Age: 74
End: 2024-02-13

## 2024-02-13 VITALS
BODY MASS INDEX: 34.66 KG/M2 | WEIGHT: 203 LBS | OXYGEN SATURATION: 99 % | DIASTOLIC BLOOD PRESSURE: 82 MMHG | HEIGHT: 64 IN | SYSTOLIC BLOOD PRESSURE: 124 MMHG | TEMPERATURE: 98.1 F | HEART RATE: 77 BPM | RESPIRATION RATE: 18 BRPM

## 2024-02-13 DIAGNOSIS — D64.9 ANEMIA, UNSPECIFIED TYPE: ICD-10-CM

## 2024-02-13 DIAGNOSIS — F32.A DEPRESSION, UNSPECIFIED DEPRESSION TYPE: Chronic | ICD-10-CM

## 2024-02-13 DIAGNOSIS — R69 MULTIPLE CHRONIC DISEASES: ICD-10-CM

## 2024-02-13 DIAGNOSIS — R73.01 ELEVATED FASTING GLUCOSE: ICD-10-CM

## 2024-02-13 DIAGNOSIS — M15.0 PRIMARY GENERALIZED (OSTEO)ARTHRITIS: ICD-10-CM

## 2024-02-13 DIAGNOSIS — I10 HTN (HYPERTENSION), BENIGN: Chronic | ICD-10-CM

## 2024-02-13 DIAGNOSIS — Z00.00 WELLNESS EXAMINATION: Primary | ICD-10-CM

## 2024-02-13 DIAGNOSIS — I50.9 CONGESTIVE HEART FAILURE, UNSPECIFIED HF CHRONICITY, UNSPECIFIED HEART FAILURE TYPE: Chronic | ICD-10-CM

## 2024-02-13 DIAGNOSIS — E87.6 HYPOPOTASSEMIA: ICD-10-CM

## 2024-02-13 DIAGNOSIS — R25.1 TREMOR: ICD-10-CM

## 2024-02-13 RX ORDER — PRIMIDONE 50 MG/1
50 TABLET ORAL 2 TIMES DAILY
Qty: 60 TABLET | Refills: 1 | Status: SHIPPED | OUTPATIENT
Start: 2024-02-13

## 2024-02-13 RX ORDER — WARFARIN SODIUM 3 MG/1
3 TABLET ORAL DAILY
Qty: 30 TABLET | Refills: 0 | Status: SHIPPED | OUTPATIENT
Start: 2024-02-13

## 2024-02-14 DIAGNOSIS — I10 HTN (HYPERTENSION), BENIGN: Chronic | ICD-10-CM

## 2024-02-14 RX ORDER — TRIAMTERENE AND HYDROCHLOROTHIAZIDE 37.5; 25 MG/1; MG/1
CAPSULE ORAL
Qty: 90 CAPSULE | Refills: 1 | Status: SHIPPED | OUTPATIENT
Start: 2024-02-14

## 2024-02-20 ENCOUNTER — CLINICAL SUPPORT (OUTPATIENT)
Dept: FAMILY MEDICINE CLINIC | Facility: CLINIC | Age: 74
End: 2024-02-20
Payer: MEDICARE

## 2024-02-20 DIAGNOSIS — Z79.01 ANTICOAGULATED: Primary | ICD-10-CM

## 2024-02-20 LAB — INR PPP: 1.7 (ref 0.9–1.1)

## 2024-02-21 ENCOUNTER — TELEPHONE (OUTPATIENT)
Dept: FAMILY MEDICINE CLINIC | Facility: CLINIC | Age: 74
End: 2024-02-21
Payer: MEDICARE

## 2024-02-21 NOTE — TELEPHONE ENCOUNTER
Called [facility] to have Dexa sent to clinic. Please leave in box until this has been received.

## 2024-02-22 NOTE — TELEPHONE ENCOUNTER
Bone density scanned in   No abnormal movements No abnormal movements No abnormal movements No abnormal movements No abnormal movements No abnormal movements No abnormal movements No abnormal movements No abnormal movements No abnormal movements No abnormal movements No abnormal movements No abnormal movements No abnormal movements No abnormal movements No abnormal movements No abnormal movements No abnormal movements No abnormal movements No abnormal movements No abnormal movements No abnormal movements No abnormal movements No abnormal movements No abnormal movements No abnormal movements No abnormal movements No abnormal movements No abnormal movements No abnormal movements No abnormal movements No abnormal movements No abnormal movements No abnormal movements No abnormal movements No abnormal movements No abnormal movements No abnormal movements No abnormal movements No abnormal movements No abnormal movements No abnormal movements No abnormal movements No abnormal movements No abnormal movements No abnormal movements No abnormal movements No abnormal movements No abnormal movements No abnormal movements No abnormal movements No abnormal movements No abnormal movements No abnormal movements No abnormal movements No abnormal movements No abnormal movements No abnormal movements No abnormal movements No abnormal movements No abnormal movements No abnormal movements No abnormal movements No abnormal movements No abnormal movements No abnormal movements No abnormal movements No abnormal movements No abnormal movements

## 2024-02-26 ENCOUNTER — OFFICE VISIT (OUTPATIENT)
Dept: FAMILY MEDICINE CLINIC | Facility: CLINIC | Age: 74
End: 2024-02-26
Payer: MEDICARE

## 2024-02-26 VITALS
SYSTOLIC BLOOD PRESSURE: 130 MMHG | HEART RATE: 100 BPM | TEMPERATURE: 97.1 F | OXYGEN SATURATION: 94 % | BODY MASS INDEX: 33.73 KG/M2 | HEIGHT: 64 IN | DIASTOLIC BLOOD PRESSURE: 90 MMHG | WEIGHT: 197.6 LBS | RESPIRATION RATE: 18 BRPM

## 2024-02-26 DIAGNOSIS — R10.9 ABDOMINAL PAIN, UNSPECIFIED ABDOMINAL LOCATION: ICD-10-CM

## 2024-02-26 DIAGNOSIS — R39.15 URGENCY OF URINATION: ICD-10-CM

## 2024-02-26 DIAGNOSIS — R42 DIZZINESS: Primary | ICD-10-CM

## 2024-02-26 DIAGNOSIS — R51.9 NONINTRACTABLE HEADACHE, UNSPECIFIED CHRONICITY PATTERN, UNSPECIFIED HEADACHE TYPE: ICD-10-CM

## 2024-02-26 LAB
EXPIRATION DATE: NORMAL
FLUAV AG UPPER RESP QL IA.RAPID: NOT DETECTED
FLUBV AG UPPER RESP QL IA.RAPID: NOT DETECTED
INTERNAL CONTROL: NORMAL
Lab: NORMAL
SARS-COV-2 AG UPPER RESP QL IA.RAPID: NOT DETECTED

## 2024-02-26 PROCEDURE — 3075F SYST BP GE 130 - 139MM HG: CPT | Performed by: NURSE PRACTITIONER

## 2024-02-26 PROCEDURE — 3080F DIAST BP >= 90 MM HG: CPT | Performed by: NURSE PRACTITIONER

## 2024-02-26 PROCEDURE — 99213 OFFICE O/P EST LOW 20 MIN: CPT | Performed by: NURSE PRACTITIONER

## 2024-02-26 PROCEDURE — 87428 SARSCOV & INF VIR A&B AG IA: CPT | Performed by: NURSE PRACTITIONER

## 2024-02-26 RX ORDER — AMOXICILLIN AND CLAVULANATE POTASSIUM 875; 125 MG/1; MG/1
1 TABLET, FILM COATED ORAL 2 TIMES DAILY
Qty: 20 TABLET | Refills: 0 | Status: SHIPPED | OUTPATIENT
Start: 2024-02-26 | End: 2024-03-07

## 2024-02-26 RX ORDER — METHYLPREDNISOLONE 4 MG/1
TABLET ORAL
Qty: 1 EACH | Refills: 0 | Status: SHIPPED | OUTPATIENT
Start: 2024-02-26

## 2024-02-26 NOTE — PROGRESS NOTES
Subjective   Chief Complaint:  Cough    History of Present Illness:  This 74 y.o. female was seen in the office today. The patient presents today with cough, sinus pressure, worse on the right. Reports feeling exhausted since about 3:00 PM on Saturday, 02/24/2024. She reports having a slight bit of diarrhea, little bit of lower abdominal discomfort, but main symptoms are within the sinuses. She also reports little bit dizzy.    Allergies   Allergen Reactions    Macrobid [Nitrofurantoin Macrocrystal] Rash      Current Outpatient Medications on File Prior to Visit   Medication Sig    Abrysvo 120 MCG/0.5ML reconstituted solution injection INJECT 0.5 ML INTRAMUSCULARLY AS DIRECTED ONCE    atorvastatin (LIPITOR) 20 MG tablet TAKE 1 TABLET BY MOUTH DAILY.    Cholecalciferol (Vitamin D3) 25 MCG (1000 UT) capsule Take 1 capsule by mouth Daily.    colesevelam (WELCHOL) 625 MG tablet Take 3 tablets by mouth 2 (Two) Times a Day With Meals.    cyanocobalamin (VITAMIN B-12) 50 MCG tablet tablet Take 1 tablet by mouth Daily.    esomeprazole (nexIUM) 40 MG capsule TAKE ONE CAPSULE TWICE DAILY GENERIC FOR NEXIUM    furosemide (LASIX) 20 MG tablet TAKE 1 TABLET BY MOUTH DAILY.    losartan (COZAAR) 50 MG tablet TAKE ONE TABLET DAILY GENERIC FOR COZAAR    Multiple Vitamin (MULTI VITAMIN DAILY PO) Take 1 tablet by mouth Daily.    nystatin (MYCOSTATIN) 750419 UNIT/GM cream Apply 1 application topically to the appropriate area as directed 2 (Two) Times a Day As Needed (skin yeast rash).    potassium chloride (K-DUR,KLOR-CON) 20 MEQ CR tablet TAKE ONE TABLET TWICE DAILY    primidone (Mysoline) 50 MG tablet Take 1 tablet by mouth 2 (Two) Times a Day.    triamterene-hydrochlorothiazide (DYAZIDE) 37.5-25 MG per capsule TAKE ONE CAPSULE EVERY MORNING GENERIC FOR DYAZIDE    warfarin (Coumadin) 3 MG tablet Take 1 tablet by mouth Daily.    warfarin (COUMADIN) 4 MG tablet TAKE ONE TABLET AT BEDTIME GENERIC FOR COUMADIN    Zinc 50 MG capsule Take  1 tablet/day by mouth Daily.     No current facility-administered medications on file prior to visit.      Past Medical, Surgical, Social, and Family History:  Past Medical History:   Diagnosis Date    Cancer     skin squamous cell    DJD (degenerative joint disease)     GERD (gastroesophageal reflux disease)     HTN (hypertension)     Hx of colonic polyp     Nausea and vomiting 04/21/2021    Pulmonary embolism     post surgical with cardiac arrest     Past Surgical History:   Procedure Laterality Date    BREAST SURGERY      breast reduction    CHOLECYSTECTOMY      COLONOSCOPY  04/01/2019    Diverticulosis otherwise normal exam repeat in 5 years    COLONOSCOPY N/A 06/02/2021    Diverticulosis in the sigmoid colon.    COLONOSCOPY W/ POLYPECTOMY  02/13/2014    Hyperplastic polyp at 20 cm, Diverticulosis repeat exam in 5 years    COLPOCLEISIS N/A 10/25/2022    Procedure: COLPOCLEISIS, MID-URETHRAL SLING WITH CYSTOSCOPY;  Surgeon: Kaci King MD;  Location: [facility] OR;  Service: Obstetrics/Gynecology;  Laterality: N/A;    EKOS CATHETER PLACEMENT Bilateral 11/17/2016    Procedure: Ekos catheter placement;  Surgeon: Daniel Underwood MD;  Location:  [facility] CATH INVASIVE LOCATION;  Service:     EKOS CATHETER REMOVAL Bilateral 11/18/2016    Procedure: Ekos catheter removal with stent placement;  Surgeon: Daniel Underwood MD;  Location: [facility] CATH INVASIVE LOCATION;  Service:     ENDOSCOPY  01/22/2010    Negative endoscopy noting a healed gastric ulcer    ENDOSCOPY  10/21/2009    Superficial mucosal erosion, chronic active gastritis    ENDOSCOPY  04/01/2019    Distal esophageal ring dilated    ENDOSCOPY N/A 06/02/2021    HH, dilated    ENDOSCOPY N/A 11/9/2023    Procedure: ESOPHAGOGASTRODUODENOSCOPY WITH ANESTHESIA;  Surgeon: Heath Mckeon MD;  Location: [facility] ENDOSCOPY;  Service: Gastroenterology;  Laterality: N/A;  pre dysphagia  post stricture  Dr. 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 with transposition flap;  Surgeon: Vadim Le MD;  Location:  [facility] OR;  Service: ENT;  Laterality: Right;    HERNIA REPAIR      MID-URETHRAL SLING WITH CYSTOSCOPY N/A 10/25/2022    Procedure: MID-URETHRAL SLING WITH CYSTOSCOPY;  Surgeon: Kaci King MD;  Location:  [facility] OR;  Service: Obstetrics/Gynecology;  Laterality: N/A;    REPLACEMENT TOTAL KNEE      TOTAL ABDOMINAL HYSTERECTOMY      SHUN bilateral oophorectomy for bleeding    TUBAL ABDOMINAL LIGATION       Social History     Socioeconomic History    Marital status:      Spouse name: Nima    Number of children: 1    Years of education: 13   Tobacco Use    Smoking status: Never    Smokeless tobacco: Never   Vaping Use    Vaping Use: Never used   Substance and Sexual Activity    Alcohol use: No    Drug use: No    Sexual activity: Defer     Family History   Problem Relation Age of Onset    Coronary artery disease Father     Coronary artery disease Mother     Diabetes Brother     Hypertension Brother     Colon polyps Brother     Breast cancer Maternal Grandmother     Heart disease Maternal Grandfather     Breast cancer Maternal Cousin     Colon cancer Neg Hx        Prior Visit Notes/Records, Lab, Imaging, and Diagnostic Results Reviewed:  A1C:  Lab Results - Last 18 Months   Lab Units 02/06/24  0820 08/15/23  0839 02/03/23  0804   HEMOGLOBIN A1C % 5.90* 5.70* 5.80*     GLUCOSE:  Lab Results - Last 18 Months   Lab Units 02/06/24  0820 08/15/23  0839 02/03/23  0804 10/12/22  0926   GLUCOSE mg/dL 85 96 97 106*     LIPID:  Lab Results - Last 18 Months   Lab Units 08/15/23  0839   CHOLESTEROL mg/dL 217*   LDL CHOL mg/dL 139*   HDL CHOL mg/dL 45   TRIGLYCERIDES mg/dL 182*     PSA:No results for input(s):  "\"PSA\" in the last 32062 hours.  CBC:  Lab Results - Last 18 Months   Lab Units 02/06/24  0820 08/15/23  0839 02/03/23  0804 10/12/22  0926   WBC 10*3/mm3 4.36 4.60 5.59 4.52   HEMOGLOBIN g/dL 14.2 14.5 14.7 14.1   HEMATOCRIT % 43.3 42.6 43.6 42.5   PLATELETS 10*3/mm3 307 293 287 280   IRON mcg/dL  --  70  --   --       BMP/CMP:  Lab Results - Last 18 Months   Lab Units 02/06/24  0820 08/15/23  0839 02/03/23  0804 10/12/22  0926   SODIUM mmol/L 139 141 139 141   POTASSIUM mmol/L 4.0 3.7 3.5 3.7   CHLORIDE mmol/L 104 102 101 104   CO2 mmol/L 27.4 27.3 30.0* 30.0*   GLUCOSE mg/dL 85 96 97 106*   BUN mg/dL 12 10 19 16   CREATININE mg/dL 0.73 0.83 0.94 0.92   EGFR RESULT mL/min/1.73 85.4 73.6 63.4  --    CALCIUM mg/dL 9.4 9.7 10.0 9.8     HEPATIC:  Lab Results - Last 18 Months   Lab Units 02/06/24  0820 08/15/23  0839 02/03/23  0804   ALT (SGPT) U/L 12 17 21   AST (SGOT) U/L 18 17 21   ALK PHOS U/L 67 66 74     Vit D:  Lab Results - Last 18 Months   Lab Units 08/15/23  0839   VIT D 25 HYDROXY ng/ml 47.4     THYROID:  Lab Results - Last 18 Months   Lab Units 02/06/24  0820 08/15/23  0839 02/03/23  0804   TSH uIU/mL 4.640* 4.120 4.500*   FREE T4 ng/dL 1.12 1.07 1.12     BMI Trend:  BMI Readings from Last 10 Encounters:   02/26/24 33.92 kg/m²   02/13/24 34.84 kg/m²   11/09/23 34.84 kg/m²   10/12/23 35.99 kg/m²   08/22/23 36.36 kg/m²   06/06/23 36.76 kg/m²   02/06/23 37.31 kg/m²   12/07/22 36.21 kg/m²   11/09/22 34.55 kg/m²   10/12/22 35.51 kg/m²     Objective   Vital Signs  /90 (BP Location: Left arm, Patient Position: Sitting, Cuff Size: Large Adult)   Pulse 100   Temp 97.1 °F (36.2 °C) (Infrared)   Resp 18   Ht 162.6 cm (64\")   Wt 89.6 kg (197 lb 9.6 oz)   SpO2 94%   BMI 33.92 kg/m²   Physical Exam  Vitals reviewed.   Constitutional:       General: She is not in acute distress.     Appearance: Normal appearance.   HENT:      Left Ear: Tympanic membrane is not erythematous.      Ears:      Comments: Left ear " post TM fluid congested     Nose:      Left Sinus: Frontal sinus tenderness present.      Mouth/Throat:      Pharynx: Posterior oropharyngeal erythema present.      Comments: Postnasal drip  Cardiovascular:      Rate and Rhythm: Normal rate and regular rhythm.   Pulmonary:      Effort: Pulmonary effort is normal.      Breath sounds: Normal breath sounds.   Abdominal:      Palpations: Abdomen is soft.      Tenderness: There is no abdominal tenderness.     Assessment & Plan   Diagnoses and all orders for this visit:    1. Dizziness (Primary)  -     POCT SARS-CoV-2 Antigen VISHNU + Flu    2. Nonintractable headache, unspecified chronicity pattern, unspecified headache type  -     POCT SARS-CoV-2 Antigen VISHNU + Flu    3. Abdominal pain, unspecified abdominal location  -     POCT SARS-CoV-2 Antigen VISHNU + Flu    4. Urgency of urination  -     UA / M With / Rflx Culture([facility] ONLY) - Urine, Clean Catch    Other orders  -     amoxicillin-clavulanate (AUGMENTIN) 875-125 MG per tablet; Take 1 tablet by mouth 2 (Two) Times a Day for 10 days.  Dispense: 20 tablet; Refill: 0  -     methylPREDNISolone (MEDROL) 4 MG dose pack; Take as directed on package instructions.  Dispense: 1 each; Refill: 0    Discussions & Anticipatory Guidance:  Advised and educated plan of care.    The patient will Return for follow-up as needed.  Advised antibiotics and UA to follow. Covid testing is negative.    I have personally performed the services described in this document as transcribed by the above individual, and it is both accurate and complete.    Transcribed from ambient dictation for LANRE Hernandez by Kaity Ramirez.  02/26/24   13:57 CST    Electronically signed by LANRE Rocha 02/26/24, 1:57 PM CST.

## 2024-02-29 ENCOUNTER — TELEPHONE (OUTPATIENT)
Dept: FAMILY MEDICINE CLINIC | Facility: CLINIC | Age: 74
End: 2024-02-29
Payer: MEDICARE

## 2024-02-29 LAB
APPEARANCE UR: CLEAR
BACTERIA #/AREA URNS HPF: NORMAL /HPF
BACTERIA UR CULT: ABNORMAL
BACTERIA UR CULT: ABNORMAL
BILIRUB UR QL STRIP: NEGATIVE
CASTS URNS QL MICRO: NORMAL /LPF
COLOR UR: YELLOW
EPI CELLS #/AREA URNS HPF: NORMAL /HPF (ref 0–10)
GLUCOSE UR QL STRIP: NEGATIVE
HGB UR QL STRIP: NEGATIVE
KETONES UR QL STRIP: NEGATIVE
LEUKOCYTE ESTERASE UR QL STRIP: NEGATIVE
MICRO URNS: ABNORMAL
NITRITE UR QL STRIP: POSITIVE
OTHER ANTIBIOTIC SUSC ISLT: ABNORMAL
PH UR STRIP: 7 [PH] (ref 5–7.5)
PROT UR QL STRIP: NEGATIVE
RBC #/AREA URNS HPF: NORMAL /HPF (ref 0–2)
SP GR UR STRIP: 1.01 (ref 1–1.03)
URINALYSIS REFLEX: ABNORMAL
UROBILINOGEN UR STRIP-MCNC: 0.2 MG/DL (ref 0.2–1)
WBC #/AREA URNS HPF: NORMAL /HPF (ref 0–5)

## 2024-02-29 NOTE — PROGRESS NOTES
Reviewed results - [employer] message sent.  If not seen in 3 days (3 day alert set), will send to pool to call the message.      Electronically signed by LANRE Hernandez, 02/29/24, 4:17 PM CST.

## 2024-03-01 ENCOUNTER — CLINICAL SUPPORT (OUTPATIENT)
Dept: FAMILY MEDICINE CLINIC | Facility: CLINIC | Age: 74
End: 2024-03-01
Payer: MEDICARE

## 2024-03-01 DIAGNOSIS — Z79.01 ANTICOAGULATED: Primary | ICD-10-CM

## 2024-03-01 LAB — INR PPP: 2 (ref 0.9–1.1)

## 2024-03-15 ENCOUNTER — OFFICE VISIT (OUTPATIENT)
Dept: FAMILY MEDICINE CLINIC | Facility: CLINIC | Age: 74
End: 2024-03-15
Payer: MEDICARE

## 2024-03-15 ENCOUNTER — CLINICAL SUPPORT (OUTPATIENT)
Dept: FAMILY MEDICINE CLINIC | Facility: CLINIC | Age: 74
End: 2024-03-15
Payer: MEDICARE

## 2024-03-15 VITALS
OXYGEN SATURATION: 98 % | DIASTOLIC BLOOD PRESSURE: 90 MMHG | HEIGHT: 64 IN | BODY MASS INDEX: 33.9 KG/M2 | TEMPERATURE: 97.1 F | SYSTOLIC BLOOD PRESSURE: 130 MMHG | HEART RATE: 66 BPM | RESPIRATION RATE: 18 BRPM | WEIGHT: 198.6 LBS

## 2024-03-15 DIAGNOSIS — R10.9 LEFT FLANK PAIN: Primary | ICD-10-CM

## 2024-03-15 DIAGNOSIS — Z79.01 ANTICOAGULATED: ICD-10-CM

## 2024-03-15 DIAGNOSIS — N30.00 ACUTE CYSTITIS WITHOUT HEMATURIA: ICD-10-CM

## 2024-03-15 DIAGNOSIS — Z79.01 ANTICOAGULATED: Primary | ICD-10-CM

## 2024-03-15 LAB — INR PPP: 1.8 (ref 0.9–1.1)

## 2024-03-15 RX ORDER — WARFARIN SODIUM 3 MG/1
3 TABLET ORAL DAILY
Qty: 30 TABLET | Refills: 5 | Status: SHIPPED | OUTPATIENT
Start: 2024-03-15

## 2024-03-15 NOTE — PROGRESS NOTES
Subjective   Chief Complaint:  Reevaluation of UTI and left ear pain.    History of Present Illness:  This 74 y.o. female was seen in the office today. She reports left ear pain. She has finished antibiotics for UTI, but reports having left lower quadrant pain wrapping around to the left flank.    Review of Systems    Allergies   Allergen Reactions    Macrobid [Nitrofurantoin Macrocrystal] Rash      Current Outpatient Medications on File Prior to Visit   Medication Sig    Abrysvo 120 MCG/0.5ML reconstituted solution injection INJECT 0.5 ML INTRAMUSCULARLY AS DIRECTED ONCE    atorvastatin (LIPITOR) 20 MG tablet TAKE 1 TABLET BY MOUTH DAILY.    Cholecalciferol (Vitamin D3) 25 MCG (1000 UT) capsule Take 1 capsule by mouth Daily.    colesevelam (WELCHOL) 625 MG tablet Take 3 tablets by mouth 2 (Two) Times a Day With Meals.    cyanocobalamin (VITAMIN B-12) 50 MCG tablet tablet Take 1 tablet by mouth Daily.    esomeprazole (nexIUM) 40 MG capsule TAKE ONE CAPSULE TWICE DAILY GENERIC FOR NEXIUM    furosemide (LASIX) 20 MG tablet TAKE 1 TABLET BY MOUTH DAILY.    losartan (COZAAR) 50 MG tablet TAKE ONE TABLET DAILY GENERIC FOR COZAAR    methylPREDNISolone (MEDROL) 4 MG dose pack Take as directed on package instructions.    Multiple Vitamin (MULTI VITAMIN DAILY PO) Take 1 tablet by mouth Daily.    nystatin (MYCOSTATIN) 211104 UNIT/GM cream Apply 1 application topically to the appropriate area as directed 2 (Two) Times a Day As Needed (skin yeast rash).    potassium chloride (K-DUR,KLOR-CON) 20 MEQ CR tablet TAKE ONE TABLET TWICE DAILY    primidone (Mysoline) 50 MG tablet Take 1 tablet by mouth 2 (Two) Times a Day.    triamterene-hydrochlorothiazide (DYAZIDE) 37.5-25 MG per capsule TAKE ONE CAPSULE EVERY MORNING GENERIC FOR DYAZIDE    warfarin (COUMADIN) 4 MG tablet TAKE ONE TABLET AT BEDTIME GENERIC FOR COUMADIN    Zinc 50 MG capsule Take 1 tablet/day by mouth Daily.    [DISCONTINUED] warfarin (Coumadin) 3 MG tablet Take 1  tablet by mouth Daily.     No current facility-administered medications on file prior to visit.      Past Medical, Surgical, Social, and Family History:  Past Medical History:   Diagnosis Date    Cancer     skin squamous cell    DJD (degenerative joint disease)     GERD (gastroesophageal reflux disease)     HTN (hypertension)     Hx of colonic polyp     Nausea and vomiting 04/21/2021    Pulmonary embolism     post surgical with cardiac arrest     Past Surgical History:   Procedure Laterality Date    BREAST SURGERY      breast reduction    CHOLECYSTECTOMY      COLONOSCOPY  04/01/2019    Diverticulosis otherwise normal exam repeat in 5 years    COLONOSCOPY N/A 06/02/2021    Diverticulosis in the sigmoid colon.    COLONOSCOPY W/ POLYPECTOMY  02/13/2014    Hyperplastic polyp at 20 cm, Diverticulosis repeat exam in 5 years    COLPOCLEISIS N/A 10/25/2022    Procedure: COLPOCLEISIS, MID-URETHRAL SLING WITH CYSTOSCOPY;  Surgeon: Kaci King MD;  Location: [facility] OR;  Service: Obstetrics/Gynecology;  Laterality: N/A;    EKOS CATHETER PLACEMENT Bilateral 11/17/2016    Procedure: Ekos catheter placement;  Surgeon: Daniel Underwood MD;  Location: [facility] CATH INVASIVE LOCATION;  Service:     EKOS CATHETER REMOVAL Bilateral 11/18/2016    Procedure: Ekos catheter removal with stent placement;  Surgeon: Daniel Underwood MD;  Location: [facility] CATH INVASIVE LOCATION;  Service:     ENDOSCOPY  01/22/2010    Negative endoscopy noting a healed gastric ulcer    ENDOSCOPY  10/21/2009    Superficial mucosal erosion, chronic active gastritis    ENDOSCOPY  04/01/2019    Distal esophageal ring dilated    ENDOSCOPY N/A 06/02/2021    HH, dilated    ENDOSCOPY N/A 11/9/2023    Procedure: ESOPHAGOGASTRODUODENOSCOPY WITH ANESTHESIA;  Surgeon: Heath Mckeon MD;  Location: [facility] ENDOSCOPY;  Service: Gastroenterology;  Laterality: N/A;  pre dysphagia  post stricture  Dr. 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 with transposition flap;  Surgeon: Vadim Le MD;  Location:  [facility] OR;  Service: ENT;  Laterality: Right;    HERNIA REPAIR      MID-URETHRAL SLING WITH CYSTOSCOPY N/A 10/25/2022    Procedure: MID-URETHRAL SLING WITH CYSTOSCOPY;  Surgeon: Kaci King MD;  Location:  [facility] OR;  Service: Obstetrics/Gynecology;  Laterality: N/A;    REPLACEMENT TOTAL KNEE      TOTAL ABDOMINAL HYSTERECTOMY      SHUN bilateral oophorectomy for bleeding    TUBAL ABDOMINAL LIGATION       Social History     Socioeconomic History    Marital status:      Spouse name: Nima    Number of children: 1    Years of education: 13   Tobacco Use    Smoking status: Never    Smokeless tobacco: Never   Vaping Use    Vaping status: Never Used   Substance and Sexual Activity    Alcohol use: No    Drug use: No    Sexual activity: Defer     Family History   Problem Relation Age of Onset    Coronary artery disease Father     Coronary artery disease Mother     Diabetes Brother     Hypertension Brother     Colon polyps Brother     Breast cancer Maternal Grandmother     Heart disease Maternal Grandfather     Breast cancer Maternal Cousin     Colon cancer Neg Hx        Prior Visit Notes/Records, Lab, Imaging, and Diagnostic Results Reviewed:  A1C:  Lab Results - Last 18 Months   Lab Units 02/06/24  0820 08/15/23  0839 02/03/23  0804   HEMOGLOBIN A1C % 5.90* 5.70* 5.80*     GLUCOSE:  Lab Results - Last 18 Months   Lab Units 02/06/24  0820 08/15/23  0839 02/03/23  0804 10/12/22  0926   GLUCOSE mg/dL 85 96 97 106*     LIPID:  Lab Results - Last 18 Months   Lab Units 08/15/23  0839   CHOLESTEROL mg/dL 217*   LDL CHOL mg/dL 139*   HDL CHOL mg/dL 45   TRIGLYCERIDES mg/dL 182*     PSA:No results for input(s):  "\"PSA\" in the last 22029 hours.  CBC:  Lab Results - Last 18 Months   Lab Units 02/06/24  0820 08/15/23  0839 02/03/23  0804 10/12/22  0926   WBC 10*3/mm3 4.36 4.60 5.59 4.52   HEMOGLOBIN g/dL 14.2 14.5 14.7 14.1   HEMATOCRIT % 43.3 42.6 43.6 42.5   PLATELETS 10*3/mm3 307 293 287 280   IRON mcg/dL  --  70  --   --       BMP/CMP:  Lab Results - Last 18 Months   Lab Units 02/06/24  0820 08/15/23  0839 02/03/23  0804 10/12/22  0926   SODIUM mmol/L 139 141 139 141   POTASSIUM mmol/L 4.0 3.7 3.5 3.7   CHLORIDE mmol/L 104 102 101 104   CO2 mmol/L 27.4 27.3 30.0* 30.0*   GLUCOSE mg/dL 85 96 97 106*   BUN mg/dL 12 10 19 16   CREATININE mg/dL 0.73 0.83 0.94 0.92   EGFR RESULT mL/min/1.73 85.4 73.6 63.4  --    CALCIUM mg/dL 9.4 9.7 10.0 9.8     HEPATIC:  Lab Results - Last 18 Months   Lab Units 02/06/24  0820 08/15/23  0839 02/03/23  0804   ALT (SGPT) U/L 12 17 21   AST (SGOT) U/L 18 17 21   ALK PHOS U/L 67 66 74     Vit D:  Lab Results - Last 18 Months   Lab Units 08/15/23  0839   VIT D 25 HYDROXY ng/ml 47.4     THYROID:  Lab Results - Last 18 Months   Lab Units 02/06/24  0820 08/15/23  0839 02/03/23  0804   TSH uIU/mL 4.640* 4.120 4.500*   FREE T4 ng/dL 1.12 1.07 1.12     BMI Trend:  BMI Readings from Last 10 Encounters:   03/15/24 34.09 kg/m²   02/26/24 33.92 kg/m²   02/13/24 34.84 kg/m²   11/09/23 34.84 kg/m²   10/12/23 35.99 kg/m²   08/22/23 36.36 kg/m²   06/06/23 36.76 kg/m²   02/06/23 37.31 kg/m²   12/07/22 36.21 kg/m²   11/09/22 34.55 kg/m²     Objective   Vital Signs  /90 (BP Location: Left arm, Patient Position: Sitting, Cuff Size: Large Adult)   Pulse 66   Temp 97.1 °F (36.2 °C) (Infrared)   Resp 18   Ht 162.6 cm (64\")   Wt 90.1 kg (198 lb 9.6 oz)   SpO2 98%   BMI 34.09 kg/m²   Physical Exam  Vitals reviewed.   Constitutional:       General: She is not in acute distress.     Appearance: Normal appearance. She is obese.   HENT:      Ears:      Comments: Bilateral ear exam normal.   Neck:      Comments: " Palpable tenderness over the left carotid artery.  Cardiovascular:      Rate and Rhythm: Normal rate and regular rhythm.   Pulmonary:      Effort: Pulmonary effort is normal.      Breath sounds: Normal breath sounds.   Abdominal:      Comments: Point tenderness left lower quadrant.       Assessment & Plan   Diagnoses and all orders for this visit:    1. Left flank pain (Primary)  -     UA / M With / Rflx Culture([facility] ONLY) - Urine, Clean Catch  -     CT Abdomen Pelvis Without Contrast; Future    2. Acute cystitis without hematuria    3. Anticoagulated [Z79.01]    Other orders  -     warfarin (Coumadin) 3 MG tablet; Take 1 tablet by mouth Daily.  Dispense: 30 tablet; Refill: 5    Discussions & Anticipatory Guidance:  Advised and educated plan of care.    The patient will Return for follow-up as needed pending results - we will call.   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 We will repeat her UA today and proceed with a CAT scan of the abdomen and pelvis.    Electronically signed by LANRE Hernandez, 03/15/24, 10:07 AM CDT.

## 2024-03-15 NOTE — PROGRESS NOTES
Patient presented to office for PT INR check. PT 21.1 INR 1.8. Per LANRE Rocha instructed patient to take coumadin 3mg alternating with 4 mg. Patient stated understanding.

## 2024-03-20 LAB
APPEARANCE UR: ABNORMAL
BACTERIA #/AREA URNS HPF: ABNORMAL /[HPF]
BACTERIA UR CULT: ABNORMAL
BILIRUB UR QL STRIP: NEGATIVE
CASTS URNS QL MICRO: ABNORMAL /LPF
COLOR UR: YELLOW
EPI CELLS #/AREA URNS HPF: >10 /HPF (ref 0–10)
GLUCOSE UR QL STRIP: NEGATIVE
HGB UR QL STRIP: ABNORMAL
KETONES UR QL STRIP: ABNORMAL
LEUKOCYTE ESTERASE UR QL STRIP: ABNORMAL
MICRO URNS: ABNORMAL
NITRITE UR QL STRIP: POSITIVE
OTHER ANTIBIOTIC SUSC ISLT: ABNORMAL
PH UR STRIP: 5.5 [PH] (ref 5–7.5)
PROT UR QL STRIP: ABNORMAL
RBC #/AREA URNS HPF: ABNORMAL /HPF (ref 0–2)
SP GR UR STRIP: >=1.03 (ref 1–1.03)
URINALYSIS REFLEX: ABNORMAL
UROBILINOGEN UR STRIP-MCNC: 0.2 MG/DL (ref 0.2–1)
WBC #/AREA URNS HPF: >30 /HPF (ref 0–5)

## 2024-03-20 RX ORDER — CIPROFLOXACIN 500 MG/1
500 TABLET, FILM COATED ORAL 2 TIMES DAILY
Qty: 14 TABLET | Refills: 0 | Status: SHIPPED | OUTPATIENT
Start: 2024-03-20 | End: 2024-03-27

## 2024-03-26 ENCOUNTER — HOSPITAL ENCOUNTER (OUTPATIENT)
Dept: CT IMAGING | Facility: HOSPITAL | Age: 74
Discharge: HOME | End: 2024-03-26
Admitting: NURSE PRACTITIONER
Payer: MEDICARE

## 2024-03-26 DIAGNOSIS — R10.9 LEFT FLANK PAIN: ICD-10-CM

## 2024-03-26 PROCEDURE — 74176 CT ABD & PELVIS W/O CONTRAST: CPT

## 2024-03-27 NOTE — PROGRESS NOTES
Reviewed results - [employer] message sent.  If not seen in 3 days (3 day alert set), will send to pool to call the message.      Electronically signed by LANRE Hernandez, 03/27/24, 10:53 AM CDT.

## 2024-04-15 ENCOUNTER — CLINICAL SUPPORT (OUTPATIENT)
Dept: FAMILY MEDICINE CLINIC | Facility: CLINIC | Age: 74
End: 2024-04-15
Payer: MEDICARE

## 2024-04-15 DIAGNOSIS — Z79.01 ANTICOAGULATED: Primary | ICD-10-CM

## 2024-04-15 LAB — INR PPP: 1.6 (ref 0.9–1.1)

## 2024-04-15 PROCEDURE — 85610 PROTHROMBIN TIME: CPT | Performed by: FAMILY MEDICINE

## 2024-04-15 PROCEDURE — 36416 COLLJ CAPILLARY BLOOD SPEC: CPT | Performed by: FAMILY MEDICINE

## 2024-04-15 NOTE — PROGRESS NOTES
Patient present to office for PT/INR, PT: 18.9 INR:1.6 current dosing is 3,4.    Per Dr. Berg dosing changed to 4,4,3 and repeat in 1 week. Patient voiced understanding.

## 2024-04-17 ENCOUNTER — OFFICE VISIT (OUTPATIENT)
Dept: FAMILY MEDICINE CLINIC | Facility: CLINIC | Age: 74
End: 2024-04-17
Payer: MEDICARE

## 2024-04-17 VITALS
OXYGEN SATURATION: 96 % | TEMPERATURE: 96.9 F | WEIGHT: 201.4 LBS | HEART RATE: 64 BPM | DIASTOLIC BLOOD PRESSURE: 70 MMHG | BODY MASS INDEX: 34.38 KG/M2 | SYSTOLIC BLOOD PRESSURE: 110 MMHG | RESPIRATION RATE: 18 BRPM | HEIGHT: 64 IN

## 2024-04-17 DIAGNOSIS — G89.29 CHRONIC NONINTRACTABLE HEADACHE, UNSPECIFIED HEADACHE TYPE: ICD-10-CM

## 2024-04-17 DIAGNOSIS — R51.9 CHRONIC NONINTRACTABLE HEADACHE, UNSPECIFIED HEADACHE TYPE: ICD-10-CM

## 2024-04-17 DIAGNOSIS — R09.89 OTHER SPECIFIED SYMPTOMS AND SIGNS INVOLVING THE CIRCULATORY AND RESPIRATORY SYSTEMS: ICD-10-CM

## 2024-04-17 DIAGNOSIS — R06.09 DYSPNEA ON EXERTION: Primary | ICD-10-CM

## 2024-04-17 PROCEDURE — 3078F DIAST BP <80 MM HG: CPT | Performed by: NURSE PRACTITIONER

## 2024-04-17 PROCEDURE — 3074F SYST BP LT 130 MM HG: CPT | Performed by: NURSE PRACTITIONER

## 2024-04-17 PROCEDURE — 99214 OFFICE O/P EST MOD 30 MIN: CPT | Performed by: NURSE PRACTITIONER

## 2024-04-17 PROCEDURE — 1160F RVW MEDS BY RX/DR IN RCRD: CPT | Performed by: NURSE PRACTITIONER

## 2024-04-17 PROCEDURE — 1159F MED LIST DOCD IN RCRD: CPT | Performed by: NURSE PRACTITIONER

## 2024-04-17 RX ORDER — ALBUTEROL SULFATE 90 UG/1
2 AEROSOL, METERED RESPIRATORY (INHALATION) EVERY 4 HOURS PRN
Qty: 8 G | Refills: 0 | Status: SHIPPED | OUTPATIENT
Start: 2024-04-17

## 2024-04-17 NOTE — PROGRESS NOTES
Subjective   Chief Complaint:  Medical checkup    History of Present Illness  The patient presents for evaluation of multiple medical concerns.    The patient's cholesterol levels are well-regulated. She has observed a correlation between fried and greasy foods, which exacerbate her condition. Consequently, she is making efforts to reduce her consumption of fried foods.    The patient's x-ray revealed scarring in the bases of her lungs, leading her to question if this could be related to her previous pulmonary embolism. She reports experiencing shortness of breath, particularly when ascending or descending stairs, compared to her previous state. She has a history of childhood asthma, but was informed that she had outgrown it. The patient has a history of pulmonary embolism.    The patient expresses concern about her diverticulitis. She has a history of cholecystectomy and is aware of the need to avoid certain foods due to her use of warfarin.    The patient occasionally experiences discomfort in her arteries, accompanied by headaches. She has a history of sinus issues above her eye, but the current headaches are localized to the posterior aspect of her head. Tylenol occasionally alleviates her cervical headaches.    Additional history given in conversation:     Her mother had a stroke and heart attack. Her maternal grandfather and aunt both passed away with abdominal aneurysms. Her maternal grandmother had an aneurysm in the brain.    Past Medical, Surgical, Social, and Family History:  Allergies   Allergen Reactions    Macrobid [Nitrofurantoin Macrocrystal] Rash      Current Outpatient Medications on File Prior to Visit   Medication Sig    atorvastatin (LIPITOR) 20 MG tablet TAKE 1 TABLET BY MOUTH DAILY.    Cholecalciferol (Vitamin D3) 25 MCG (1000 UT) capsule Take 1 capsule by mouth Daily.    colesevelam (WELCHOL) 625 MG tablet Take 3 tablets by mouth 2 (Two) Times a Day With Meals.    cyanocobalamin (VITAMIN  B-12) 50 MCG tablet tablet Take 1 tablet by mouth Daily.    esomeprazole (nexIUM) 40 MG capsule TAKE ONE CAPSULE TWICE DAILY GENERIC FOR NEXIUM    furosemide (LASIX) 20 MG tablet TAKE 1 TABLET BY MOUTH DAILY.    losartan (COZAAR) 50 MG tablet TAKE ONE TABLET DAILY GENERIC FOR COZAAR    Multiple Vitamin (MULTI VITAMIN DAILY PO) Take 1 tablet by mouth Daily.    nystatin (MYCOSTATIN) 647594 UNIT/GM cream Apply 1 application topically to the appropriate area as directed 2 (Two) Times a Day As Needed (skin yeast rash).    potassium chloride (K-DUR,KLOR-CON) 20 MEQ CR tablet TAKE ONE TABLET TWICE DAILY    primidone (Mysoline) 50 MG tablet Take 1 tablet by mouth 2 (Two) Times a Day.    triamterene-hydrochlorothiazide (DYAZIDE) 37.5-25 MG per capsule TAKE ONE CAPSULE EVERY MORNING GENERIC FOR DYAZIDE    warfarin (Coumadin) 3 MG tablet Take 1 tablet by mouth Daily.    warfarin (COUMADIN) 4 MG tablet TAKE ONE TABLET AT BEDTIME GENERIC FOR COUMADIN    Zinc 50 MG capsule Take 1 tablet/day by mouth Daily.     No current facility-administered medications on file prior to visit.      Past Medical History:   Diagnosis Date    Cancer     skin squamous cell    DJD (degenerative joint disease)     GERD (gastroesophageal reflux disease)     HTN (hypertension)     Hx of colonic polyp     Nausea and vomiting 04/21/2021    Pulmonary embolism     post surgical with cardiac arrest     Past Surgical History:   Procedure Laterality Date    BREAST SURGERY      breast reduction    CHOLECYSTECTOMY      COLONOSCOPY  04/01/2019    Diverticulosis otherwise normal exam repeat in 5 years    COLONOSCOPY N/A 06/02/2021    Diverticulosis in the sigmoid colon.    COLONOSCOPY W/ POLYPECTOMY  02/13/2014    Hyperplastic polyp at 20 cm, Diverticulosis repeat exam in 5 years    COLPOCLEISIS N/A 10/25/2022    Procedure: COLPOCLEISIS, MID-URETHRAL SLING WITH CYSTOSCOPY;  Surgeon: Kaci King MD;  Location: [facility] OR;  Service: Obstetrics/Gynecology;   Laterality: N/A;    EKOS CATHETER PLACEMENT Bilateral 11/17/2016    Procedure: Ekos catheter placement;  Surgeon: Daniel Underwood MD;  Location:  [facility] CATH INVASIVE LOCATION;  Service:     EKOS CATHETER REMOVAL Bilateral 11/18/2016    Procedure: Ekos catheter removal with stent placement;  Surgeon: Daniel Underwood MD;  Location:  [facility] CATH INVASIVE LOCATION;  Service:     ENDOSCOPY  01/22/2010    Negative endoscopy noting a healed gastric ulcer    ENDOSCOPY  10/21/2009    Superficial mucosal erosion, chronic active gastritis    ENDOSCOPY  04/01/2019    Distal esophageal ring dilated    ENDOSCOPY N/A 06/02/2021    HH, dilated    ENDOSCOPY N/A 11/9/2023    Procedure: ESOPHAGOGASTRODUODENOSCOPY WITH ANESTHESIA;  Surgeon: Heath Mckeon MD;  Location:  [facility] ENDOSCOPY;  Service: Gastroenterology;  Laterality: N/A;  pre dysphagia  post stricture  Dr. 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 with transposition flap;  Surgeon: Vadim Le MD;  Location:  [facility] OR;  Service: ENT;  Laterality: Right;    HERNIA REPAIR      MID-URETHRAL SLING WITH CYSTOSCOPY N/A 10/25/2022    Procedure: MID-URETHRAL SLING WITH CYSTOSCOPY;  Surgeon: Kaci King MD;  Location:  [facility] OR;  Service: Obstetrics/Gynecology;  Laterality: N/A;    REPLACEMENT TOTAL KNEE      TOTAL ABDOMINAL HYSTERECTOMY      SHUN bilateral oophorectomy for bleeding    TUBAL ABDOMINAL LIGATION       Social History     Socioeconomic History    Marital status:      Spouse name: Nima    Number of children: 1    Years of education: 13   Tobacco Use    Smoking status: Never    Smokeless tobacco: Never   Vaping Use    Vaping status: Never Used   Substance and Sexual Activity     "Alcohol use: No    Drug use: No    Sexual activity: Defer     Family History   Problem Relation Age of Onset    Coronary artery disease Father     Coronary artery disease Mother     Diabetes Brother     Hypertension Brother     Colon polyps Brother     Breast cancer Maternal Grandmother     Heart disease Maternal Grandfather     Breast cancer Maternal Cousin     Colon cancer Neg Hx        Prior Visit Notes/Records, Lab, Imaging, and Diagnostic Results Reviewed:  A1C:  Lab Results - Last 18 Months   Lab Units 02/06/24  0820 08/15/23  0839 02/03/23  0804   HEMOGLOBIN A1C % 5.90* 5.70* 5.80*     GLUCOSE:  Lab Results - Last 18 Months   Lab Units 02/06/24  0820 08/15/23  0839 02/03/23  0804   GLUCOSE mg/dL 85 96 97     LIPID:  Lab Results - Last 18 Months   Lab Units 08/15/23  0839   CHOLESTEROL mg/dL 217*   LDL CHOL mg/dL 139*   HDL CHOL mg/dL 45   TRIGLYCERIDES mg/dL 182*     PSA:No results for input(s): \"PSA\" in the last 08657 hours.  CBC:  Lab Results - Last 18 Months   Lab Units 02/06/24  0820 08/15/23  0839 02/03/23  0804   WBC 10*3/mm3 4.36 4.60 5.59   HEMOGLOBIN g/dL 14.2 14.5 14.7   HEMATOCRIT % 43.3 42.6 43.6   PLATELETS 10*3/mm3 307 293 287   IRON mcg/dL  --  70  --       BMP/CMP:  Lab Results - Last 18 Months   Lab Units 02/06/24  0820 08/15/23  0839 02/03/23  0804   SODIUM mmol/L 139 141 139   POTASSIUM mmol/L 4.0 3.7 3.5   CHLORIDE mmol/L 104 102 101   CO2 mmol/L 27.4 27.3 30.0*   GLUCOSE mg/dL 85 96 97   BUN mg/dL 12 10 19   CREATININE mg/dL 0.73 0.83 0.94   EGFR RESULT mL/min/1.73 85.4 73.6 63.4   CALCIUM mg/dL 9.4 9.7 10.0     HEPATIC:  Lab Results - Last 18 Months   Lab Units 02/06/24  0820 08/15/23  0839 02/03/23  0804   ALT (SGPT) U/L 12 17 21   AST (SGOT) U/L 18 17 21   ALK PHOS U/L 67 66 74     Vit D:  Lab Results - Last 18 Months   Lab Units 08/15/23  0839   VIT D 25 HYDROXY ng/ml 47.4     THYROID:  Lab Results - Last 18 Months   Lab Units 02/06/24  0820 08/15/23  0839 02/03/23  0804   TSH uIU/mL " "4.640* 4.120 4.500*   FREE T4 ng/dL 1.12 1.07 1.12     BMI Trend:  BMI Readings from Last 10 Encounters:   04/17/24 34.57 kg/m²   03/15/24 34.09 kg/m²   02/26/24 33.92 kg/m²   02/13/24 34.84 kg/m²   11/09/23 34.84 kg/m²   10/12/23 35.99 kg/m²   08/22/23 36.36 kg/m²   06/06/23 36.76 kg/m²   02/06/23 37.31 kg/m²   12/07/22 36.21 kg/m²     Objective   Vital Signs  /70 (BP Location: Left arm, Patient Position: Sitting, Cuff Size: Large Adult)   Pulse 64   Temp 96.9 °F (36.1 °C) (Infrared)   Resp 18   Ht 162.6 cm (64\")   Wt 91.4 kg (201 lb 6.4 oz)   SpO2 96%   BMI 34.57 kg/m²   Physical Exam  Vitals reviewed.   Constitutional:       General: She is not in acute distress.     Appearance: Normal appearance. She is obese.   Cardiovascular:      Rate and Rhythm: Normal rate and regular rhythm.   Pulmonary:      Effort: Pulmonary effort is normal.      Breath sounds: Normal breath sounds.       Assessment & Plan   Diagnoses and all orders for this visit:    1. Dyspnea on exertion (Primary)  -     Cancel: Complete PFT - Pre & Post Bronchodilator; Future  -     Spirometry - Pre & Post Bronchodilator; Future    2. Chronic nonintractable headache, unspecified headache type  -     US Carotid Bilateral; Future    3. Other specified symptoms and signs involving the circulatory and respiratory systems  -     US Carotid Bilateral; Future    Other orders  -     albuterol sulfate  (90 Base) MCG/ACT inhaler; Inhale 2 puffs Every 4 (Four) Hours As Needed for Wheezing or Shortness of Air.  Dispense: 8 g; Refill: 0    Discussions & Anticipatory Guidance:  Advised and educated plan of care.      The patient will Return for follow-up as needed pending results - we will call.      Patient or patient representative verbalized consent for the use of Ambient Listening during the visit with  LANRE Hernandez for chart documentation. 4/18/2024  09:22 CDT    Electronically signed by LANRE Hernandez, 04/17/24, 9:22 AM " CDT.

## 2024-04-23 ENCOUNTER — CLINICAL SUPPORT (OUTPATIENT)
Dept: FAMILY MEDICINE CLINIC | Facility: CLINIC | Age: 74
End: 2024-04-23
Payer: MEDICARE

## 2024-04-23 DIAGNOSIS — Z79.01 ANTICOAGULATED: Primary | ICD-10-CM

## 2024-04-23 LAB — INR PPP: 2.3 (ref 0.9–1.1)

## 2024-04-23 PROCEDURE — 36416 COLLJ CAPILLARY BLOOD SPEC: CPT | Performed by: NURSE PRACTITIONER

## 2024-04-23 PROCEDURE — 85610 PROTHROMBIN TIME: CPT | Performed by: NURSE PRACTITIONER

## 2024-05-03 ENCOUNTER — TELEPHONE (OUTPATIENT)
Dept: FAMILY MEDICINE CLINIC | Facility: CLINIC | Age: 74
End: 2024-05-03

## 2024-05-03 DIAGNOSIS — R30.0 DYSURIA: Primary | ICD-10-CM

## 2024-05-03 NOTE — TELEPHONE ENCOUNTER
Ok to start with UA.  See if she wants treatment before culture.    Electronically signed by LANRE Hernandez, 05/03/24, 10:24 AM CDT.

## 2024-05-03 NOTE — TELEPHONE ENCOUNTER
Caller: Lucy Sousa    Relationship to patient: Self    Best call back number: [phone]     Chief complaint: POSSIBLE UTI    Type of visit: URINE TEST LAB     Requested date: 5.3.24        Additional notes: UNABLE TO TRANSFER

## 2024-05-05 LAB
APPEARANCE UR: CLEAR
BACTERIA #/AREA URNS HPF: ABNORMAL /[HPF]
BACTERIA UR CULT: NORMAL
BACTERIA UR CULT: NORMAL
BILIRUB UR QL STRIP: NEGATIVE
CASTS URNS QL MICRO: ABNORMAL /LPF
COLOR UR: YELLOW
EPI CELLS #/AREA URNS HPF: >10 /HPF (ref 0–10)
GLUCOSE UR QL STRIP: NEGATIVE
HGB UR QL STRIP: NEGATIVE
KETONES UR QL STRIP: NEGATIVE
LEUKOCYTE ESTERASE UR QL STRIP: ABNORMAL
MICRO URNS: ABNORMAL
NITRITE UR QL STRIP: NEGATIVE
PH UR STRIP: 7 [PH] (ref 5–7.5)
PROT UR QL STRIP: NEGATIVE
RBC #/AREA URNS HPF: ABNORMAL /HPF (ref 0–2)
SP GR UR STRIP: 1.01 (ref 1–1.03)
URINALYSIS REFLEX: ABNORMAL
UROBILINOGEN UR STRIP-MCNC: 0.2 MG/DL (ref 0.2–1)
WBC #/AREA URNS HPF: ABNORMAL /HPF (ref 0–5)

## 2024-05-06 NOTE — PROGRESS NOTES
Reviewed results - [employer] message sent.  If not seen in 3 days (3 day alert set), will send to pool to call the message.      Electronically signed by LANRE Hernandez, 05/06/24, 7:56 AM CDT.   Provider Procedure Text (D): After obtaining clear surgical margins the defect was repaired by another provider.

## 2024-05-07 ENCOUNTER — CLINICAL SUPPORT (OUTPATIENT)
Dept: FAMILY MEDICINE CLINIC | Facility: CLINIC | Age: 74
End: 2024-05-07
Payer: MEDICARE

## 2024-05-07 DIAGNOSIS — Z79.01 ANTICOAGULATED: Primary | ICD-10-CM

## 2024-05-07 LAB — INR PPP: 1.8 (ref 0.9–1.1)

## 2024-05-07 PROCEDURE — 36416 COLLJ CAPILLARY BLOOD SPEC: CPT | Performed by: FAMILY MEDICINE

## 2024-05-07 PROCEDURE — 85610 PROTHROMBIN TIME: CPT | Performed by: FAMILY MEDICINE

## 2024-05-10 ENCOUNTER — HOSPITAL ENCOUNTER (OUTPATIENT)
Dept: PULMONOLOGY | Facility: HOSPITAL | Age: 74
Discharge: HOME | End: 2024-05-10
Payer: MEDICARE

## 2024-05-10 DIAGNOSIS — R06.09 DYSPNEA ON EXERTION: ICD-10-CM

## 2024-05-10 PROCEDURE — 94060 EVALUATION OF WHEEZING: CPT

## 2024-05-10 RX ORDER — ALBUTEROL SULFATE 2.5 MG/3ML
2.5 SOLUTION RESPIRATORY (INHALATION) ONCE
Status: COMPLETED | OUTPATIENT
Start: 2024-05-10 | End: 2024-05-10

## 2024-05-10 RX ADMIN — ALBUTEROL SULFATE 2.5 MG: 2.5 SOLUTION RESPIRATORY (INHALATION) at 10:52

## 2024-05-17 DIAGNOSIS — R25.1 TREMOR: ICD-10-CM

## 2024-05-17 DIAGNOSIS — E87.6 HYPOPOTASSEMIA: ICD-10-CM

## 2024-05-20 RX ORDER — POTASSIUM CHLORIDE 20 MEQ/1
TABLET, EXTENDED RELEASE ORAL
Qty: 180 TABLET | Refills: 2 | Status: SHIPPED | OUTPATIENT
Start: 2024-05-20

## 2024-05-20 RX ORDER — PRIMIDONE 50 MG/1
50 TABLET ORAL 2 TIMES DAILY
Qty: 60 TABLET | Refills: 1 | Status: SHIPPED | OUTPATIENT
Start: 2024-05-20

## 2024-05-20 RX ORDER — LOSARTAN POTASSIUM 50 MG/1
TABLET ORAL
Qty: 90 TABLET | Refills: 2 | Status: SHIPPED | OUTPATIENT
Start: 2024-05-20

## 2024-05-20 NOTE — TELEPHONE ENCOUNTER
Rx Refill Note  Requested Prescriptions     Pending Prescriptions Disp Refills    losartan (COZAAR) 50 MG tablet [Pharmacy Med Name: LOSARTAN POTASSIUM 50MG TABLET] 90 tablet 2     Sig: TAKE ONE TABLET DAILY GENERIC FOR COZAAR    potassium chloride (KLOR-CON M20) 20 MEQ CR tablet [Pharmacy Med Name: POTASSIUM CHLORIDE ER 20MEQ ER TABLET ER] 180 tablet 2     Sig: TAKE ONE TABLET TWICE DAILY    primidone (MYSOLINE) 50 MG tablet [Pharmacy Med Name: PRIMIDONE 50MG TABLET] 60 tablet 1     Sig: TAKE 1 TABLET BY MOUTH 2 (TWO) TIMES A DAY.      Last office visit with prescribing clinician: 2/13/2024   Last telemedicine visit with prescribing clinician: Visit date not found   Next office visit with prescribing clinician: 8/15/2024     Rosendo Keene MA  05/20/24, 08:16 CDT

## 2024-05-28 ENCOUNTER — CLINICAL SUPPORT (OUTPATIENT)
Dept: FAMILY MEDICINE CLINIC | Facility: CLINIC | Age: 74
End: 2024-05-28
Payer: MEDICARE

## 2024-05-28 ENCOUNTER — HOSPITAL ENCOUNTER (OUTPATIENT)
Dept: ULTRASOUND IMAGING | Facility: HOSPITAL | Age: 74
Discharge: HOME | End: 2024-05-28
Admitting: NURSE PRACTITIONER
Payer: MEDICARE

## 2024-05-28 DIAGNOSIS — G89.29 CHRONIC NONINTRACTABLE HEADACHE, UNSPECIFIED HEADACHE TYPE: ICD-10-CM

## 2024-05-28 DIAGNOSIS — R51.9 CHRONIC NONINTRACTABLE HEADACHE, UNSPECIFIED HEADACHE TYPE: ICD-10-CM

## 2024-05-28 DIAGNOSIS — Z79.01 ANTICOAGULATED: Primary | ICD-10-CM

## 2024-05-28 DIAGNOSIS — R09.89 OTHER SPECIFIED SYMPTOMS AND SIGNS INVOLVING THE CIRCULATORY AND RESPIRATORY SYSTEMS: ICD-10-CM

## 2024-05-28 LAB
INR PPP: 2.1 (ref 0.9–1.1)
PROTHROMBIN TIME: 25.7 SECONDS

## 2024-05-28 PROCEDURE — 36416 COLLJ CAPILLARY BLOOD SPEC: CPT | Performed by: FAMILY MEDICINE

## 2024-05-28 PROCEDURE — 85610 PROTHROMBIN TIME: CPT | Performed by: FAMILY MEDICINE

## 2024-05-28 PROCEDURE — 93880 EXTRACRANIAL BILAT STUDY: CPT

## 2024-05-30 NOTE — PROGRESS NOTES
Reviewed results - [employer] message sent.  If not seen in 3 days (3 day alert set), will send to pool to call the message.      Electronically signed by LANRE Hernandez, 05/30/24, 12:03 PM CDT.

## 2024-06-28 ENCOUNTER — CLINICAL SUPPORT (OUTPATIENT)
Dept: FAMILY MEDICINE CLINIC | Facility: CLINIC | Age: 74
End: 2024-06-28
Payer: MEDICARE

## 2024-06-28 DIAGNOSIS — Z79.01 ANTICOAGULATED: Primary | ICD-10-CM

## 2024-06-28 LAB — INR PPP: 2.6 (ref 0.9–1.1)

## 2024-06-28 PROCEDURE — 85610 PROTHROMBIN TIME: CPT | Performed by: NURSE PRACTITIONER

## 2024-06-28 PROCEDURE — 36416 COLLJ CAPILLARY BLOOD SPEC: CPT | Performed by: NURSE PRACTITIONER

## 2024-07-29 ENCOUNTER — CLINICAL SUPPORT (OUTPATIENT)
Dept: FAMILY MEDICINE CLINIC | Facility: CLINIC | Age: 74
End: 2024-07-29
Payer: MEDICARE

## 2024-07-29 DIAGNOSIS — Z79.01 ANTICOAGULATED: Primary | ICD-10-CM

## 2024-07-29 LAB
INR PPP: 2.2 (ref 0.9–1.1)
PROTHROMBIN TIME: 27 SECONDS

## 2024-07-29 PROCEDURE — 85610 PROTHROMBIN TIME: CPT | Performed by: FAMILY MEDICINE

## 2024-07-29 PROCEDURE — 36416 COLLJ CAPILLARY BLOOD SPEC: CPT | Performed by: FAMILY MEDICINE

## 2024-08-15 ENCOUNTER — OFFICE VISIT (OUTPATIENT)
Dept: FAMILY MEDICINE CLINIC | Facility: CLINIC | Age: 74
End: 2024-08-15
Payer: MEDICARE

## 2024-08-15 VITALS
OXYGEN SATURATION: 96 % | HEART RATE: 80 BPM | DIASTOLIC BLOOD PRESSURE: 82 MMHG | HEIGHT: 64 IN | SYSTOLIC BLOOD PRESSURE: 124 MMHG | BODY MASS INDEX: 32.71 KG/M2 | WEIGHT: 191.6 LBS

## 2024-08-15 DIAGNOSIS — Z79.01 ANTICOAGULATED: ICD-10-CM

## 2024-08-15 DIAGNOSIS — E55.9 VITAMIN D DEFICIENCY: ICD-10-CM

## 2024-08-15 DIAGNOSIS — E89.40 SURGICAL MENOPAUSE: Chronic | ICD-10-CM

## 2024-08-15 DIAGNOSIS — E04.1 THYROID NODULE: ICD-10-CM

## 2024-08-15 DIAGNOSIS — I50.9 CONGESTIVE HEART FAILURE, UNSPECIFIED HF CHRONICITY, UNSPECIFIED HEART FAILURE TYPE: Chronic | ICD-10-CM

## 2024-08-15 DIAGNOSIS — I10 HTN (HYPERTENSION), BENIGN: Chronic | ICD-10-CM

## 2024-08-15 DIAGNOSIS — M15.0 PRIMARY GENERALIZED (OSTEO)ARTHRITIS: ICD-10-CM

## 2024-08-15 DIAGNOSIS — Z78.9 STATIN INTOLERANCE: ICD-10-CM

## 2024-08-15 DIAGNOSIS — R69 MULTIPLE CHRONIC DISEASES: ICD-10-CM

## 2024-08-15 DIAGNOSIS — E87.6 HYPOKALEMIA: ICD-10-CM

## 2024-08-15 DIAGNOSIS — R73.01 ELEVATED FASTING GLUCOSE: ICD-10-CM

## 2024-08-15 DIAGNOSIS — E78.2 MIXED HYPERLIPIDEMIA: ICD-10-CM

## 2024-08-15 DIAGNOSIS — F32.A DEPRESSION, UNSPECIFIED DEPRESSION TYPE: Chronic | ICD-10-CM

## 2024-08-15 DIAGNOSIS — E87.6 HYPOPOTASSEMIA: ICD-10-CM

## 2024-08-15 NOTE — PROGRESS NOTES
ZENAIDA”.   Subjective   Lucy Sousa is a 74 y.o. female presenting with chief complaint of:   Chief Complaint   Patient presents with    Hyperlipidemia    Hypertension     AWV   Last Completed Annual Wellness Visit            ANNUAL WELLNESS VISIT (Yearly) Next due on 2/13/2025 02/13/2024  Level of Service: ND PPPS, SUBSEQ VISIT    02/06/2023  Level of Service: ND PPPS, SUBSEQ VISIT    01/20/2022  Level of Service: ND PPPS, SUBSEQ VISIT    01/20/2022  Done    07/01/2020  Level of Service: ND PPPS, SUBSEQ VISIT    Only the first 5 history entries have been loaded, but more history exists.                     History of Present Illness :  Alone.      Has multiple chronic problems to consider that might have a bearing on today's issues; not an interval appointment.       Chronic/acute problems reviewed today:   1. Vitamin D deficiency chronic/variable up/down with past labs and/or risk to run low especially in winter. Lab monitored.      2. Elevated fasting glucose Chronic/stable. No problem/pattern hypoglycemia/hyperglycemia manifest by poly- dypsia, phagia, uria, or sweats, diaphoretic episodes, syncope/near.     3. HTN (hypertension), benign Chronic/stable. Stable here past/and occ home blood pressures.  No significant chest pain, SOB, LE edema, orthopnea, near syncope, dizziness/light headness.   Recent Vitals         3/15/2024 4/17/2024 8/15/2024       BP: 130/90 110/70 124/82     Pulse: 66 64 80     Temp: 97.1 °F (36.2 °C) 96.9 °F (36.1 °C) --     Weight: 90.1 kg (198 lb 9.6 oz) 91.4 kg (201 lb 6.4 oz) 86.9 kg (191 lb 9.6 oz)     BMI (Calculated): 34.1 34.6 32.9              4. Thyroid nodule 5.2022-12m previous thyroid nodule; no neck pain    5.4.22 thyroid US  IMPRESSION:  1. Few thyroid nodules described in detail above. The largest is present  within the mid to inferior right thyroid lobe consistent with a TI RADS  level 3 lesion for which follow-up imaging assessment recommended to  document  stability at 1, 3, and 5 years.       5. Congestive heart failure, unspecified HF chronicity, unspecified heart failure type Chronic/stable.  Denies significant sob, orthopnea, leg edema, weight gain.  Aware of influence diet/salt and watching weight at home.       6. Hypokalemia same   7. Hypopotassemia-diuretic Chronic/variable high or low K as uses diuretic; has to have lab monitoring.  No significant fatigue or muscle cramps     8. Anticoagulated-PE/cardiac arrest/(xarelto) coumadin Chronic/stable reason for stopping or use of.  Denies bleeding issues; especially epistaxis, melena, hematochezia.  Upper arms/others do not significantly bruise easily.  No significant bleeding or falls.      9. Depression, unspecified depression type past significant  mood swings, down moods, nervousness, difficulty with concentration to function home/work.  Others close have not been concerned.  No suicide ideation/intent.  Rx no longer needed      10. Primary generalized (osteo)arthritis Chronic/stable.  Various on/off joint pains/soreness/stiffness.  Particular joint problems with various.  No joint swelling.  Treats mainly with reduced activity, Rx listed, Tylenol. No  NSAIDs, and no injections.      11. Multiple chronic diseases Has multiple chronic problems with increased risks for management (involves polypharmacy, multiple providers, many required labs/imaging/ to manage)     12. Surgical menopause: 1146-[id number]-0 to 5 Chronic/stable.  Last bone density/if below.   Has not needed Rx.  Ok further bone density screening when due.      13. Statin intolerance she is decided she does not want to take a statin; maybe some muscle aches but more just a concern of safety   14. Mixed hyperlipidemia Chronic/stable: prefers no statin.  Prefers lifestyle over Rx;  with diet-exercise advised and lab moitored.  Offered PCSK9 and declined.        Has an/another acute issue today: none.    The following portions of the patient's history  were reviewed and updated as appropriate: allergies, current medications, past family history, past medical history, past social history, past surgical history, and problem list.      Current Outpatient Medications:     Cholecalciferol (Vitamin D3) 25 MCG (1000 UT) capsule, Take 1 capsule by mouth Daily., Disp: , Rfl:     cyanocobalamin (VITAMIN B-12) 50 MCG tablet tablet, Take 1 tablet by mouth Daily., Disp: , Rfl:     furosemide (LASIX) 20 MG tablet, TAKE 1 TABLET BY MOUTH DAILY., Disp: 90 tablet, Rfl: 3    losartan (COZAAR) 50 MG tablet, TAKE ONE TABLET DAILY GENERIC FOR COZAAR, Disp: 90 tablet, Rfl: 2    Multiple Vitamin (MULTI VITAMIN DAILY PO), Take 1 tablet by mouth Daily., Disp: , Rfl:     nystatin (MYCOSTATIN) 441750 UNIT/GM cream, Apply 1 application topically to the appropriate area as directed 2 (Two) Times a Day As Needed (skin yeast rash)., Disp: 30 g, Rfl: 1    potassium chloride (KLOR-CON M20) 20 MEQ CR tablet, TAKE ONE TABLET TWICE DAILY, Disp: 180 tablet, Rfl: 2    triamterene-hydrochlorothiazide (DYAZIDE) 37.5-25 MG per capsule, TAKE ONE CAPSULE EVERY MORNING GENERIC FOR DYAZIDE, Disp: 90 capsule, Rfl: 1    warfarin (Coumadin) 3 MG tablet, Take 1 tablet by mouth Daily., Disp: 30 tablet, Rfl: 5    warfarin (COUMADIN) 4 MG tablet, TAKE ONE TABLET AT BEDTIME GENERIC FOR COUMADIN, Disp: 90 tablet, Rfl: 3    Zinc 50 MG capsule, Take 1 tablet/day by mouth Daily., Disp: , Rfl:     No problems with medications now; stopped several    Allergies   Allergen Reactions    Statins Myalgia    Macrobid [Nitrofurantoin Macrocrystal] Rash       Review of Systems  GENERAL:  Active/slower with limits, speed, stamina for age and exertional fatigue. Sleep is ok. No fever now.  SKIN: No rash/skin lesion of concern.   ENDO:  No syncope, near or diaphoretic sweaty spells.  HEENT: No recent head injury; or change in occ headache.   No vision change.  No significant hearing loss.  Ears without pain/drainage.  No sore  throat.  No significant nasal/sinus congestion/drainage. No epistaxis.  CHEST: No chest wall tenderness or mass.  Infrequent cough, without wheeze.  No SOB; no hemoptysis.  CV: No chest pain; same occ palpitations and minimal ankle edema.  GI: No heartburn, dysphagia.  No abdominal pain, constipation; stools trend loose.   No rectal bleeding, or melena.    :  Voids without dysuria, or incontinence to completion but urgency, leakage, frequency.  ORTHO: No painful/swollen joints but various on /off sore. .  No change occ/on-off  sore neck or back.  No acute neck or back pain without recent injury.  NEURO: Still on/off mild dizzy; no vertigo.  No weakness of extremities except above.  New UE numbness/paresthesias.   PSYCH: No memory loss.  Mood good but variable anxious, depressed but/and not suicidal.  Tries to tolerate stress .   Screening:  Gyne: SHUN+CATE/Ari/SARAH/[id number]  Mammogram: 1.22.24-elodia  Bone density:   10.31.18/bone d-deca/[facility]/Ts L1 +1.9, hip +0.3: 10.31.18-strong encourage -says done/not in system  2.21.24/bone d-deca/[facility]/T hip -0.1/2.21.24/2y  Low dose CT chest:Tobacco-smoker/none: NA  GI:   Colon-div///6.2.21/7y  EGD-hh///6.2.21  EGD-, schatzki-dilated///11.8.23/prn  Prostate: NA  Usual lab order  6m CBC, CMP, TSH, fT4  12m CBC, CMP, LIPID, TSH, fT4, Vit D iron, ferritin, B12, folate     Copy/paste function used for ROS/exam AND each area of these were reviewed, updated, confirmed and supplemented as needed.  Data reviewed:   Recent admit/ER/MD visits: 2.13.24    1. Wellness examination-done-no gyne    2. HTN (hypertension), benign    3. Elevated fasting glucose    4. Hypopotassemia-diuretic    5. Anemia, unspecified type    6. Depression, unspecified depression type    7. Primary generalized (osteo)arthritis    8. Multiple chronic diseases    9. Congestive heart failure, unspecified HF chronicity, unspecified heart failure type    10. Tremor          Issues that are  new, uncontrolled, or required review HPI/ROS/exam and decisions beyond wellness today: (ie: requiring the service of a physician and/or not likely to resolve independently without clinical intervention.)   tremor     Discussions/medical decisions/reviews:  BP ok  Other vitals ok  Recent Vitals           11/9/2023 11/9/2023 2/13/2024          BP: 138/72 133/71 124/82       Pulse: 62 55 77       Temp: -- -- 98.1 °F (36.7 °C)       Weight: -- -- 92.1 kg (203 lb)       BMI (Calculated): -- -- 34.8               DM/A1c 5.9 2.6.24  DM/BS 85 2.6.24  Lipid  8.15.23 lipitor 20  PSA NA  CBC ok 2.6.24  Iron 70 8.15.23  B12  1291 8.15.23  Folate 18.5 8.15.23  Renal ok 2.6.24  Liver ok 2.6.24  Vit D 47 8.15.23  Thyroid TSH 4.6H, fT4 1.12N 2.6.24; none     Wellness/or annual done   Screening reviewed/updated   Vaccines discussed (see below)   Tremor differential; parkinson/others.  Few if any other features for parkinson so since mom had tremor (no parkinson): start mysoline.  Rx bid but start 50 hs and stay with that till no sedation AND only increase if tremor not improved  $/xarelto/insurance plans discussed     Follow up: Return for INR next week; then lab/return 6m.         Future Appointments   Date Time Provider Department Center   2/20/2024  8:30 AM NURSE/MA PC [facility]   8/9/2024  8:30 AM [facility]   8/15/2024  8:30 AM Rosas Berg MD [facility]      Data review above:   Rx: reviewed and decisions:   Rx new/changes: none       New Medications Ordered This Visit   Medications    primidone (Mysoline) 50 MG tablet       Sig: Take 1 tablet by mouth 2 (Two) Times a Day.       Dispense:  60 tablet       Refill:  1        Last cardiac testing:   Echo:   Results for orders placed during the hospital encounter of 09/22/17    Adult Stress Echo Only    Interpretation Summary  · Normal stress echo with no significant echocardiographic evidence for myocardial  ischemia.  · The patient reported shortness of breath during the stress test. The patient experienced no angina during the stress test.  · There was no ST segment deviation noted during stress.  · Fair exercise tolerance.    Radiology considered:   US Carotid Bilateral    Result Date: 5/29/2024  Impression: 1. There is less than 50% stenosis of the right internal carotid artery. 2. There is less than 50% stenosis of the left internal carotid artery. 3. Antegrade flow is demonstrated in bilateral vertebral arteries.  Comments: Bilateral carotid vertebral arterial duplex scan was performed.  Grayscale imaging shows intimal thickening and calcified elements at the carotid bifurcation. The right internal carotid artery peak systolic velocity is 113.5 cm/sec. The end-diastolic velocity is 38.7 cm/sec. The right ICA/CCA ratio is approximately 1.9. These findings correlate with less than 50% stenosis of the right internal carotid artery.  Grayscale imaging shows intimal thickening and calcified elements at the carotid bifurcation. The left internal carotid artery peak systolic velocity is 94.1 cm/sec. The end-diastolic velocity is 37 cm/sec. The left ICA/CCA ratio is approximately 1.2. These findings correlate with less than 50% stenosis of the left internal carotid artery.  Antegrade flow is demonstrated in bilateral vertebral arteries.   This report was signed and finalized on 5/29/2024 4:28 PM by Dr. Corey Alcantara MD.       Lab Results:  Results for orders placed or performed in visit on 08/09/24   Comprehensive Metabolic Panel    Specimen: Blood   Result Value Ref Range    Glucose 104 (H) 70 - 99 mg/dL    BUN 16 8 - 27 mg/dL    Creatinine 0.83 0.57 - 1.00 mg/dL    EGFR Result 73 >59 mL/min/1.73    BUN/Creatinine Ratio 19 12 - 28    Sodium 139 134 - 144 mmol/L    Potassium 3.7 3.5 - 5.2 mmol/L    Chloride 100 96 - 106 mmol/L    Total CO2 25 20 - 29 mmol/L    Calcium 9.8 8.7 - 10.3 mg/dL    Total Protein 6.4 6.0 - 8.5  g/dL    Albumin 4.2 3.8 - 4.8 g/dL    Globulin 2.2 1.5 - 4.5 g/dL    Total Bilirubin 0.4 0.0 - 1.2 mg/dL    Alkaline Phosphatase 65 44 - 121 IU/L    AST (SGOT) 19 0 - 40 IU/L    ALT (SGPT) 15 0 - 32 IU/L   Hemoglobin A1c    Specimen: Blood   Result Value Ref Range    Hemoglobin A1C 5.8 (H) 4.8 - 5.6 %   Lipid Panel    Specimen: Blood   Result Value Ref Range    Total Cholesterol 235 (H) 100 - 199 mg/dL    Triglycerides 165 (H) 0 - 149 mg/dL    HDL Cholesterol 48 >39 mg/dL    VLDL Cholesterol Johny 30 5 - 40 mg/dL    LDL Chol Calc (NIH) 157 (H) 0 - 99 mg/dL   TSH    Specimen: Blood   Result Value Ref Range    TSH 3.060 0.450 - 4.500 uIU/mL   T4, Free    Specimen: Blood   Result Value Ref Range    Free T4 1.11 0.82 - 1.77 ng/dL   Vitamin D,25-Hydroxy    Specimen: Blood   Result Value Ref Range    25 Hydroxy, Vitamin D 29.2 (L) 30.0 - 100.0 ng/mL   Iron Profile    Specimen: Blood   Result Value Ref Range    TIBC 311 250 - 450 ug/dL    UIBC 209 118 - 369 ug/dL    Iron 102 27 - 139 ug/dL    Iron Saturation 33 15 - 55 %   Ferritin    Specimen: Blood   Result Value Ref Range    Ferritin 107 15 - 150 ng/mL   Vitamin B12 & Folate    Specimen: Blood   Result Value Ref Range    Vitamin B-12 1,227 232 - 1,245 pg/mL    Folate >20.0 >3.0 ng/mL   Microalbumin / Creatinine Urine Ratio - Urine, Clean Catch    Specimen: Urine, Clean Catch   Result Value Ref Range    Creatinine, Urine 233.1 Not Estab. mg/dL    Microalbumin, Urine 7.5 Not Estab. ug/mL    Microalbumin/Creatinine Ratio 3 0 - 29 mg/g creat   CBC & Differential    Specimen: Blood   Result Value Ref Range    WBC 5.4 3.4 - 10.8 x10E3/uL    RBC 4.27 3.77 - 5.28 x10E6/uL    Hemoglobin 14.2 11.1 - 15.9 g/dL    Hematocrit 43.0 34.0 - 46.6 %     (H) 79 - 97 fL    MCH 33.3 (H) 26.6 - 33.0 pg    MCHC 33.0 31.5 - 35.7 g/dL    RDW 12.5 11.7 - 15.4 %    Platelets 278 150 - 450 x10E3/uL    Neutrophil Rel % 59 Not Estab. %    Lymphocyte Rel % 29 Not Estab. %    Monocyte Rel % 8 Not  "Estab. %    Eosinophil Rel % 3 Not Estab. %    Basophil Rel % 1 Not Estab. %    Neutrophils Absolute 3.2 1.4 - 7.0 x10E3/uL    Lymphocytes Absolute 1.5 0.7 - 3.1 x10E3/uL    Monocytes Absolute 0.4 0.1 - 0.9 x10E3/uL    Eosinophils Absolute 0.2 0.0 - 0.4 x10E3/uL    Basophils Absolute 0.1 0.0 - 0.2 x10E3/uL    Immature Granulocyte Rel % 0 Not Estab. %    Immature Grans Absolute 0.0 0.0 - 0.1 x10E3/uL       A1C:  Lab Results - Last 18 Months   Lab Units 08/09/24 0759 02/06/24  0820 08/15/23  0839   HEMOGLOBIN A1C % 5.8* 5.90* 5.70*     GLUCOSE:  Lab Results - Last 18 Months   Lab Units 08/09/24 0759 02/06/24  0820 08/15/23  0839   GLUCOSE mg/dL 104* 85 96     LIPID:  Lab Results - Last 18 Months   Lab Units 08/09/24  0759 08/15/23  0839   CHOLESTEROL mg/dL 235* 217*   LDL CHOL mg/dL 157* 139*   HDL CHOL mg/dL 48 45   TRIGLYCERIDES mg/dL 165* 182*     PSA:No results found for: \"PSA\"    CBC:  Lab Results - Last 18 Months   Lab Units 08/09/24 0759 02/06/24  0820 08/15/23  0839   WBC x10E3/uL 5.4 4.36 4.60   HEMOGLOBIN g/dL 14.2 14.2 14.5   HEMATOCRIT % 43.0 43.3 42.6   PLATELETS x10E3/uL 278 307 293   IRON ug/dL 102  --  70   VITAMIN B 12 pg/mL 1,227  --  1,291*   FOLATE ng/mL >20.0  --  18.50     BMP/CMP:  Lab Results - Last 18 Months   Lab Units 08/09/24 0759 02/06/24  0820 08/15/23  0839   SODIUM mmol/L 139 139 141   POTASSIUM mmol/L 3.7 4.0 3.7   CHLORIDE mmol/L 100 104 102   CO2 mmol/L 25 27.4 27.3   GLUCOSE mg/dL 104* 85 96   BUN mg/dL 16 12 10   CREATININE mg/dL 0.83 0.73 0.83   EGFR RESULT mL/min/1.73 73 85.4 73.6   CALCIUM mg/dL 9.8 9.4 9.7       HEPATIC:  Lab Results - Last 18 Months   Lab Units 08/09/24  0759 02/06/24  0820 08/15/23  0839   ALT (SGPT) IU/L 15 12 17   AST (SGOT) IU/L 19 18 17   ALK PHOS IU/L 65 67 66     HEPATITIS C ANTIBODY:   Lab Results   Component Value Date/Time    HEPCVIRUSABY <0.1 08/31/2017 07:18 AM     Vit D:  Lab Results - Last 18 Months   Lab Units 08/09/24  0759 08/15/23  0839 " "  VIT D 25 HYDROXY ng/mL 29.2* 47.4     THYROID:  Lab Results - Last 18 Months   Lab Units 08/09/24  0759 02/06/24  0820 08/15/23  0839   TSH uIU/mL 3.060 4.640* 4.120   FREE T4 ng/dL 1.11 1.12 1.07       Objective   /82   Pulse 80   Ht 162.6 cm (64\")   Wt 86.9 kg (191 lb 9.6 oz)   SpO2 96%   BMI 32.89 kg/m²   Body mass index is 32.89 kg/m².    Recent Vitals         2/26/2024 3/15/2024 4/17/2024       BP: 130/90 130/90 110/70     Pulse: 100 66 64     Temp: 97.1 °F (36.2 °C) 97.1 °F (36.2 °C) 96.9 °F (36.1 °C)     Weight: 89.6 kg (197 lb 9.6 oz) 90.1 kg (198 lb 9.6 oz) 91.4 kg (201 lb 6.4 oz)     BMI (Calculated): 33.9 34.1 34.6             Physical Exam    GENERAL:  Well nourished/developed in no acute distress.   SKIN: Turgor excellent, without wound, rash, lesion.   HEENT: Normal cephalic without trauma.  Pupils equal round reactive to light. Extraocular motions full without nystagmus.   External canals nonobstructive nontender without reddness. Tymphatic membranes sharri with shanika structures intact.   Oral cavity without growths, exudates, and moist.  Posterior pharynx without mass, obstruction, redness.  No thyromegaly, mass, tenderness, lymphadenopathy and supple.  CV: Regular rhythm.  No murmur, gallop, trace LE edema. Posterior pulses intact.  No carotid bruits.  CHEST: No chest wall tenderness or mass.   LUNGS: Symmetric motion with clear to auscultation.   ABD: Soft, nontender without mass.   ORTHO: Symmetric extremities without swelling/point tenderness; even anterior R ankle.  Full gross range of motion.  NEURO: CN 2-12 grossly intact.  Symmetric facies and UE/LE. 3/5 strength throughout. 1/4 x bicep knee equal reflexes.  Nonfocal use extremities. Speech clear.  Reduced minimal light touch with monofilament, vibratory sensation with tuning fork; equal toes/distal feet.    PSYCH: Oriented x 3.  Pleasant calm, well kept.  Purposeful/directed conservation with intact short/long gross " memory.    Assessment & Plan     1. Vitamin D deficiency    2. Elevated fasting glucose    3. HTN (hypertension), benign    4. Thyroid nodule 5.2022-12m    5. Congestive heart failure, unspecified HF chronicity, unspecified heart failure type    6. Hypokalemia    7. Hypopotassemia-diuretic    8. Anticoagulated-PE/cardiac arrest/(xarelto) coumadin    9. Depression, unspecified depression type    10. Primary generalized (osteo)arthritis    11. Multiple chronic diseases    12. Surgical menopause: [id number]-1 to 5    13. Statin intolerance    14. Mixed hyperlipidemia        Data review above:   Discussions/medical decisions/reviews:  Recent Vitals         2/26/2024 3/15/2024 4/17/2024       BP: 130/90 130/90 110/70     Pulse: 100 66 64     Temp: 97.1 °F (36.2 °C) 97.1 °F (36.2 °C) 96.9 °F (36.1 °C)     Weight: 89.6 kg (197 lb 9.6 oz) 90.1 kg (198 lb 9.6 oz) 91.4 kg (201 lb 6.4 oz)     BMI (Calculated): 33.9 34.1 34.6             Wt Readings from Last 15 Encounters:   08/15/24 0831 86.9 kg (191 lb 9.6 oz)   04/17/24 0850 91.4 kg (201 lb 6.4 oz)   03/15/24 0933 90.1 kg (198 lb 9.6 oz)   02/26/24 1308 89.6 kg (197 lb 9.6 oz)   02/13/24 1124 92.1 kg (203 lb)   11/09/23 0950 92.1 kg (203 lb)   10/12/23 1010 89.3 kg (196 lb 12.8 oz)   08/22/23 1417 90.2 kg (198 lb 12.8 oz)   06/06/23 1350 91.2 kg (201 lb)   02/06/23 1517 92.5 kg (204 lb)   12/07/22 1112 89.8 kg (198 lb)   11/09/22 1043 88.5 kg (195 lb)   10/12/22 0908 90.9 kg (200 lb 6.4 oz)   10/06/22 0817 88.5 kg (195 lb)   08/31/22 1342 87.5 kg (193 lb)       BP ok  Other vitals ok  Weight down  DM/A1c 5.8 8.9.24  DM/ 8.9.24  Lipid  8.9.24; lipitor 20  PSA NA  CBC ok 8.9.24  Iron 102 8.9.24  B12 1227 8.9.24  Folate > 20 8.9.24  Renal ok 8.9.24  Liver ok 8.9.24  Vit D 29 8.9.24  Thyroid TSH, fT4 ok 8.9.24-none    Screening reviewed/updated   Vaccines discussed (see below)   Accepting her decision to go without several meds; but glad she feels better  ?  "Long term no statin-maybe have to start back someday  No history of seizure disorder-clarify if insurance needs that  Needs thyroid US; (seen after she left; will call)    Discussed upcoming [facility] of me/Dr Berg.  Discussed new physician, Dr Delon Renteria coming as replacement. Decision to: stay at /[facility].    Follow up: Return for lab/return 6m .  Future Appointments   Date Time Provider Department Center   8/29/2024  9:00 AM NURSE/[facility]ANNA HUERT[facility]   2/17/2025  8:45 AM Arvin Renteria MD [facility]     Data review above:   Rx: reviewed and decisions:   Rx new/changes: none  No orders of the defined types were placed in this encounter.    Orders placed:   LAB/Testing/Referrals: reviewed/orders:   Today:   No orders of the defined types were placed in this encounter.    Chronic/recurrent labs above or change to:   Same     Immunization History   Administered Date(s) Administered    ABRYSVO (RSV, 60+ or pregnant women 32-36 wks) 11/01/2023    COVID-19 (MODERNA) 1st,2nd,3rd Dose Monovalent 01/22/2021, 04/16/2021, 12/15/2021    COVID-19 (MODERNA) Monovalent Original Booster 12/02/2022    COVID-19 (UNSPECIFIED) 10/23/2023    Flu Vaccine Quad PF >36MO 10/09/2017    Fluad Quad 65+ 10/12/2020    Fluzone High-Dose 65+yrs 11/23/2021, 10/17/2022    Fluzone Quad >6mos (Multi-dose) 11/30/2016    Influenza, Unspecified 10/23/2023    Pneumococcal Conjugate 13-Valent (PCV13) 11/30/2016, 10/09/2017    Pneumococcal Polysaccharide (PPSV23) 07/01/2020    Tdap 11/02/2020     We advised/reaffirmed our support/suggestion for staying complete with covid- covid boosters, seasonal flu/yearly and any missing vaccine from list we supplied; when cannot be given here we suggest contact with local health department office or pharmacy to review missing/needed vaccines and then bring nursing documentation for these vaccines to this office or call this information in. Shingles became \"free\" 1.1.23 for medicare " insurance.    Health maintenance:   Body mass index is 32.89 kg/m².         Tobacco use reviewed:     Lucy Sousa  reports that she has never smoked. She has never used smokeless tobacco.       There are no Patient Instructions on file for this visit.

## 2024-08-22 DIAGNOSIS — E04.1 THYROID NODULE: Primary | ICD-10-CM

## 2024-08-26 DIAGNOSIS — I10 HTN (HYPERTENSION), BENIGN: Chronic | ICD-10-CM

## 2024-08-26 DIAGNOSIS — R25.1 TREMOR: ICD-10-CM

## 2024-08-26 RX ORDER — PRIMIDONE 50 MG/1
50 TABLET ORAL 2 TIMES DAILY
Qty: 60 TABLET | Refills: 1 | OUTPATIENT
Start: 2024-08-26

## 2024-08-26 RX ORDER — FUROSEMIDE 20 MG
TABLET ORAL
Qty: 90 TABLET | Refills: 3 | Status: SHIPPED | OUTPATIENT
Start: 2024-08-26

## 2024-08-26 RX ORDER — TRIAMTERENE AND HYDROCHLOROTHIAZIDE 37.5; 25 MG/1; MG/1
CAPSULE ORAL
Qty: 90 CAPSULE | Refills: 1 | Status: SHIPPED | OUTPATIENT
Start: 2024-08-26

## 2024-08-29 ENCOUNTER — CLINICAL SUPPORT (OUTPATIENT)
Dept: FAMILY MEDICINE CLINIC | Facility: CLINIC | Age: 74
End: 2024-08-29
Payer: MEDICARE

## 2024-08-29 DIAGNOSIS — Z79.01 ANTICOAGULATED: Primary | ICD-10-CM

## 2024-08-29 LAB — INR PPP: 2.4 (ref 0.9–1.1)

## 2024-08-29 PROCEDURE — 36416 COLLJ CAPILLARY BLOOD SPEC: CPT | Performed by: FAMILY MEDICINE

## 2024-08-29 PROCEDURE — 85610 PROTHROMBIN TIME: CPT | Performed by: FAMILY MEDICINE

## 2024-09-04 ENCOUNTER — HOSPITAL ENCOUNTER (OUTPATIENT)
Dept: ULTRASOUND IMAGING | Facility: HOSPITAL | Age: 74
Discharge: HOME | End: 2024-09-04
Admitting: NURSE PRACTITIONER
Payer: MEDICARE

## 2024-09-04 DIAGNOSIS — E04.1 THYROID NODULE: ICD-10-CM

## 2024-09-04 PROCEDURE — 76536 US EXAM OF HEAD AND NECK: CPT

## 2024-09-04 NOTE — PROGRESS NOTES
Reviewed results - [employer] message sent.  If not seen in 3 days (3 day alert set), will send to pool to call the message.      Electronically signed by LANRE Hernandez, 09/04/24, 1:54 PM CDT.

## 2024-09-30 ENCOUNTER — CLINICAL SUPPORT (OUTPATIENT)
Dept: FAMILY MEDICINE CLINIC | Facility: CLINIC | Age: 74
End: 2024-09-30
Payer: MEDICARE

## 2024-09-30 DIAGNOSIS — Z79.01 ANTICOAGULATED: Primary | ICD-10-CM

## 2024-09-30 LAB
INR PPP: 3.2 (ref 0.9–1.1)
PROTHROMBIN TIME: 38.1 SECONDS

## 2024-09-30 PROCEDURE — 36416 COLLJ CAPILLARY BLOOD SPEC: CPT

## 2024-09-30 PROCEDURE — 85610 PROTHROMBIN TIME: CPT

## 2024-10-07 ENCOUNTER — CLINICAL SUPPORT (OUTPATIENT)
Dept: FAMILY MEDICINE CLINIC | Facility: CLINIC | Age: 74
End: 2024-10-07
Payer: MEDICARE

## 2024-10-07 DIAGNOSIS — Z79.01 ANTICOAGULATED: Primary | ICD-10-CM

## 2024-10-07 LAB — INR PPP: 2.7 (ref 0.9–1.1)

## 2024-10-07 PROCEDURE — 85610 PROTHROMBIN TIME: CPT

## 2024-10-07 PROCEDURE — 36416 COLLJ CAPILLARY BLOOD SPEC: CPT

## 2024-10-14 ENCOUNTER — CLINICAL SUPPORT (OUTPATIENT)
Dept: FAMILY MEDICINE CLINIC | Facility: CLINIC | Age: 74
End: 2024-10-14
Payer: MEDICARE

## 2024-10-14 DIAGNOSIS — I46.9 CARDIAC ARREST: Primary | ICD-10-CM

## 2024-10-14 LAB — INR PPP: 1.9 (ref 0.9–1.1)

## 2024-10-14 PROCEDURE — 85610 PROTHROMBIN TIME: CPT

## 2024-10-14 PROCEDURE — 36416 COLLJ CAPILLARY BLOOD SPEC: CPT

## 2024-10-25 ENCOUNTER — TELEPHONE (OUTPATIENT)
Age: 74
End: 2024-10-25

## 2024-10-25 NOTE — TELEPHONE ENCOUNTER
[facility] is checking to see if Dr Alfonso received a medication request.    Call back number is [phone]

## 2024-10-28 ENCOUNTER — CLINICAL SUPPORT (OUTPATIENT)
Dept: FAMILY MEDICINE CLINIC | Facility: CLINIC | Age: 74
End: 2024-10-28
Payer: MEDICARE

## 2024-10-28 ENCOUNTER — TELEPHONE (OUTPATIENT)
Dept: FAMILY MEDICINE CLINIC | Facility: CLINIC | Age: 74
End: 2024-10-28

## 2024-10-28 DIAGNOSIS — Z79.01 ANTICOAGULATED: Primary | ICD-10-CM

## 2024-10-28 LAB — INR PPP: 2.5 (ref 0.9–1.1)

## 2024-10-28 PROCEDURE — 85610 PROTHROMBIN TIME: CPT

## 2024-10-28 PROCEDURE — 36416 COLLJ CAPILLARY BLOOD SPEC: CPT

## 2024-10-28 NOTE — TELEPHONE ENCOUNTER
Caller: Lucy Sousa    Relationship: Self    Best call back number: [phone]     Caller requesting test results: SELF    What test was performed: PTINR TEST    When was the test performed: 10-28-24    Where was the test performed: OFFICE    Additional notes: NEEDS RESULTS SO PATIENT KNOWS HOW MUCH MEDICATION TO TAKE

## 2024-10-29 ENCOUNTER — TELEPHONE (OUTPATIENT)
Dept: FAMILY MEDICINE CLINIC | Facility: CLINIC | Age: 74
End: 2024-10-29

## 2024-10-29 NOTE — TELEPHONE ENCOUNTER
[facility] STAFF ATTEMPTED TO FOLLOW WARM TRANSFER PROCESS. OFFICE DID NOT ANSWER.       Caller: Lucy Sousa    Relationship: Self    Best call back number: [phone]    Who are you requesting to speak with (clinical staff, provider,  specific staff member):         Do you know the name of the person who called:     LUCY    What was the call regarding:     NEEDING TO REPEAT INR BLOOD WORK - 2 WEEKS, NOT TOMORROW    Is it okay if the provider responds through MyChart: NO

## 2024-11-11 ENCOUNTER — CLINICAL SUPPORT (OUTPATIENT)
Dept: FAMILY MEDICINE CLINIC | Facility: CLINIC | Age: 74
End: 2024-11-11
Payer: MEDICARE

## 2024-11-11 DIAGNOSIS — Z79.01 ANTICOAGULATED: Primary | ICD-10-CM

## 2024-11-11 LAB — INR PPP: 2.6 (ref 0.9–1.1)

## 2024-11-11 PROCEDURE — 85610 PROTHROMBIN TIME: CPT

## 2024-11-11 PROCEDURE — 36416 COLLJ CAPILLARY BLOOD SPEC: CPT

## 2024-11-25 ENCOUNTER — CLINICAL SUPPORT (OUTPATIENT)
Dept: FAMILY MEDICINE CLINIC | Facility: CLINIC | Age: 74
End: 2024-11-25
Payer: MEDICARE

## 2024-11-25 DIAGNOSIS — I46.9 CARDIAC ARREST: Primary | ICD-10-CM

## 2024-11-25 LAB — INR PPP: 2.3 (ref 0.9–1.1)

## 2024-11-25 PROCEDURE — 36416 COLLJ CAPILLARY BLOOD SPEC: CPT

## 2024-11-25 PROCEDURE — 85610 PROTHROMBIN TIME: CPT

## 2024-12-09 ENCOUNTER — CLINICAL SUPPORT (OUTPATIENT)
Dept: FAMILY MEDICINE CLINIC | Facility: CLINIC | Age: 74
End: 2024-12-09
Payer: MEDICARE

## 2024-12-09 DIAGNOSIS — I46.9 CARDIAC ARREST: Primary | ICD-10-CM

## 2024-12-09 LAB — INR PPP: 2.9 (ref 0.9–1.1)

## 2024-12-09 PROCEDURE — 85610 PROTHROMBIN TIME: CPT

## 2024-12-09 PROCEDURE — 36416 COLLJ CAPILLARY BLOOD SPEC: CPT

## 2025-01-14 ENCOUNTER — TELEPHONE (OUTPATIENT)
Dept: FAMILY MEDICINE CLINIC | Facility: CLINIC | Age: 75
End: 2025-01-14

## 2025-01-14 ENCOUNTER — OFFICE VISIT (OUTPATIENT)
Dept: FAMILY MEDICINE CLINIC | Facility: CLINIC | Age: 75
End: 2025-01-14
Payer: MEDICARE

## 2025-01-14 VITALS
HEART RATE: 71 BPM | HEIGHT: 64 IN | WEIGHT: 195 LBS | SYSTOLIC BLOOD PRESSURE: 138 MMHG | OXYGEN SATURATION: 99 % | DIASTOLIC BLOOD PRESSURE: 84 MMHG | BODY MASS INDEX: 33.29 KG/M2

## 2025-01-14 DIAGNOSIS — Z79.01 WARFARIN ANTICOAGULATION: ICD-10-CM

## 2025-01-14 DIAGNOSIS — K21.9 GASTROESOPHAGEAL REFLUX DISEASE WITHOUT ESOPHAGITIS: ICD-10-CM

## 2025-01-14 DIAGNOSIS — R30.0 DYSURIA: ICD-10-CM

## 2025-01-14 DIAGNOSIS — Z86.711 HISTORY OF PULMONARY EMBOLISM: ICD-10-CM

## 2025-01-14 DIAGNOSIS — Z71.85 IMMUNIZATION COUNSELING: Primary | ICD-10-CM

## 2025-01-14 DIAGNOSIS — E87.6 HYPOPOTASSEMIA: ICD-10-CM

## 2025-01-14 DIAGNOSIS — E78.2 MIXED HYPERLIPIDEMIA: ICD-10-CM

## 2025-01-14 DIAGNOSIS — N30.00 ACUTE CYSTITIS WITHOUT HEMATURIA: ICD-10-CM

## 2025-01-14 DIAGNOSIS — R30.0 DYSURIA: Primary | ICD-10-CM

## 2025-01-14 DIAGNOSIS — I10 HTN (HYPERTENSION), BENIGN: Chronic | ICD-10-CM

## 2025-01-14 LAB
BILIRUB BLD-MCNC: NEGATIVE MG/DL
CLARITY, POC: ABNORMAL
COLOR UR: ABNORMAL
GLUCOSE UR STRIP-MCNC: NEGATIVE MG/DL
INR PPP: 2.5 (ref 0.9–1.1)
KETONES UR QL: NEGATIVE
LEUKOCYTE EST, POC: ABNORMAL
NITRITE UR-MCNC: POSITIVE MG/ML
PH UR: 6 [PH] (ref 5–8)
PROT UR STRIP-MCNC: ABNORMAL MG/DL
RBC # UR STRIP: NEGATIVE /UL
SP GR UR: 1.02 (ref 1–1.03)
UROBILINOGEN UR QL: ABNORMAL

## 2025-01-14 PROCEDURE — 1126F AMNT PAIN NOTED NONE PRSNT: CPT

## 2025-01-14 PROCEDURE — 81003 URINALYSIS AUTO W/O SCOPE: CPT

## 2025-01-14 PROCEDURE — 3075F SYST BP GE 130 - 139MM HG: CPT

## 2025-01-14 PROCEDURE — 99214 OFFICE O/P EST MOD 30 MIN: CPT

## 2025-01-14 PROCEDURE — 36416 COLLJ CAPILLARY BLOOD SPEC: CPT

## 2025-01-14 PROCEDURE — 85610 PROTHROMBIN TIME: CPT

## 2025-01-14 PROCEDURE — 3079F DIAST BP 80-89 MM HG: CPT

## 2025-01-14 RX ORDER — TRIAMTERENE AND HYDROCHLOROTHIAZIDE 37.5; 25 MG/1; MG/1
1 CAPSULE ORAL EVERY MORNING
Qty: 90 CAPSULE | Refills: 1 | Status: SHIPPED | OUTPATIENT
Start: 2025-01-14

## 2025-01-14 RX ORDER — POTASSIUM CHLORIDE 1500 MG/1
20 TABLET, EXTENDED RELEASE ORAL 2 TIMES DAILY
Qty: 180 TABLET | Refills: 2 | Status: SHIPPED | OUTPATIENT
Start: 2025-01-14

## 2025-01-14 RX ORDER — FUROSEMIDE 20 MG/1
20 TABLET ORAL DAILY
Qty: 90 TABLET | Refills: 3 | Status: SHIPPED | OUTPATIENT
Start: 2025-01-14

## 2025-01-14 RX ORDER — LOSARTAN POTASSIUM 50 MG/1
50 TABLET ORAL DAILY
Qty: 90 TABLET | Refills: 2 | Status: SHIPPED | OUTPATIENT
Start: 2025-01-14

## 2025-01-14 RX ORDER — SULFAMETHOXAZOLE AND TRIMETHOPRIM 800; 160 MG/1; MG/1
1 TABLET ORAL 2 TIMES DAILY
Qty: 10 TABLET | Refills: 0 | Status: SHIPPED | OUTPATIENT
Start: 2025-01-14

## 2025-01-14 RX ORDER — ROSUVASTATIN CALCIUM 5 MG/1
5 TABLET, COATED ORAL DAILY
Qty: 90 TABLET | Refills: 1 | Status: SHIPPED | OUTPATIENT
Start: 2025-01-14

## 2025-01-14 RX ORDER — OMEPRAZOLE 40 MG/1
40 CAPSULE, DELAYED RELEASE ORAL DAILY
Qty: 90 CAPSULE | Refills: 1 | Status: SHIPPED | OUTPATIENT
Start: 2025-01-14

## 2025-01-14 NOTE — PROGRESS NOTES
Chief Complaint  Hyperlipidemia and Hypertension    Subjective      Lucy Sousa presents to [facility]  History of Present Illness  The patient is a 75-year-old female who presents today for follow-up.    She has expressed interest in receiving the pneumonia and RSV vaccines, as her  recently received his. She typically aligns her vaccination schedule with her 's but is uncertain if these vaccines are administered annually. She has a history of pulmonary embolism and is unsure of her previous pneumonia vaccination status. She received her RSV vaccine in 2023 and her influenza vaccine approximately 3 weeks ago at [facility].    During her last consultation with Dr. Berg, he suggested considering a return to Xarelto 20 mg, a medication she had previously responded well to. She has been experiencing difficulties in achieving stable levels on warfarin. Given her history of pulmonary embolism, Dr. Berg recommended Xarelto as a potentially more effective treatment option. Her insurance prescription plan has changed, and she is now covered under an [employer] prescription plan through [employer] which provides a 90-day supply of Xarelto for $ 141. She experienced an unprovoked pulmonary embolism in 2016 while at work, which was managed by Dr. Underwood.    She is currently on a daily regimen of Dyazide and takes potassium twice daily. She is considering reducing her potassium intake to once daily, as her potassium levels have consistently remained within the normal range during blood tests.    She has not been prescribed any medication for cholesterol management in the past. She admits to recent dietary indiscretions, particularly with sweets, even though she does not typically consume them. She recalls experiencing muscle cramps while on atorvastatin 20 mg, but it was a brief episode and occurred quite some time ago.    She has been experiencing significant  "gastrointestinal issues, including weight loss due to frequent bowel movements following meals. This has led to anxiety about leaving her home due to fear of sudden stomach pain and the need for immediate access to a bathroom. She underwent a CT scan without contrast, but the radiologist recommended a repeat scan with contrast for more comprehensive findings. She was referred to a gastroenterologist at [facility] and underwent a colonoscopy, which yielded normal results. Despite this, her symptoms persisted, leading to a second colonoscopy, which also returned normal results. She was advised to increase her intake of Mylanta.    MEDICATIONS  Current: Warfarin, Lasix, Dyazide, Cozaar, potassium.  Past: Xarelto, atorvastatin.    IMMUNIZATIONS  She received the RSV vaccine in 2023. She received her influenza vaccine about 3 weeks ago at [facility]. She is up to date on her pneumonia vaccine.      Objective   Vital Signs:  /84   Pulse 71   Ht 162.6 cm (64\")   Wt 88.5 kg (195 lb)   SpO2 99%   BMI 33.47 kg/m²   Estimated body mass index is 33.47 kg/m² as calculated from the following:    Height as of this encounter: 162.6 cm (64\").    Weight as of this encounter: 88.5 kg (195 lb).            Physical Exam     Physical Exam        Result Review :  The following labs/imaging/notes were reviewed and discussed with the patient by Arvin Renteria MD on 01/14/2025:   Latest Reference Range & Units 08/09/24 07:59   Sodium 134 - 144 mmol/L 139   Potassium 3.5 - 5.2 mmol/L 3.7   Chloride 96 - 106 mmol/L 100   CO2 20 - 29 mmol/L 25   BUN 8 - 27 mg/dL 16   Creatinine 0.57 - 1.00 mg/dL 0.83   BUN/Creatinine Ratio 12 - 28  19   EGFR Result >59 mL/min/1.73 73   Glucose 70 - 99 mg/dL 104 (H)   Calcium 8.7 - 10.3 mg/dL 9.8   Alkaline Phosphatase 44 - 121 IU/L 65   Total Protein 6.0 - 8.5 g/dL 6.4   Albumin 3.8 - 4.8 g/dL 4.2   AST (SGOT) 0 - 40 IU/L 19   ALT (SGPT) 0 - 32 IU/L 15   Total Bilirubin 0.0 - 1.2 mg/dL 0.4 "   Hemoglobin A1C 4.8 - 5.6 % 5.8 (H)   TSH Baseline 0.450 - 4.500 uIU/mL 3.060   Free T4 0.82 - 1.77 ng/dL 1.11   Iron 27 - 139 ug/dL 102   Ferritin 15 - 150 ng/mL 107   Iron Saturation 15 - 55 % 33   TIBC 250 - 450 ug/dL 311   UIBC 118 - 369 ug/dL 209   Vitamin B-12 232 - 1,245 pg/mL 1,227   Folate >3.0 ng/mL >20.0   Total Cholesterol 100 - 199 mg/dL 235 (H)   HDL Cholesterol >39 mg/dL 48   LDL Cholesterol  0 - 99 mg/dL 157 (H)   Triglycerides 0 - 149 mg/dL 165 (H)   VLDL Cholesterol Johny 5 - 40 mg/dL 30   25 Hydroxy, Vitamin D 30.0 - 100.0 ng/mL 29.2 (L)   Globulin 1.5 - 4.5 g/dL 2.2   WBC 3.4 - 10.8 x10E3/uL 5.4   RBC 3.77 - 5.28 x10E6/uL 4.27   Hemoglobin 11.1 - 15.9 g/dL 14.2   Hematocrit 34.0 - 46.6 % 43.0   Platelets 150 - 450 x10E3/uL 278   RDW 11.7 - 15.4 % 12.5   MCV 79 - 97 fL 101 (H)   MCH 26.6 - 33.0 pg 33.3 (H)   MCHC 31.5 - 35.7 g/dL 33.0   Neutrophil Rel % Not Estab. % 59   Lymphocyte Rel % Not Estab. % 29   Monocyte Rel % Not Estab. % 8   Eosinophil Rel % Not Estab. % 3   Basophil Rel % Not Estab. % 1   Immature Granulocyte Rel % Not Estab. % 0   Neutrophils Absolute 1.4 - 7.0 x10E3/uL 3.2   Lymphocytes Absolute 0.7 - 3.1 x10E3/uL 1.5   Monocytes Absolute 0.1 - 0.9 x10E3/uL 0.4   Eosinophils Absolute 0.0 - 0.4 x10E3/uL 0.2   Basophils Absolute 0.0 - 0.2 x10E3/uL 0.1   Immature Grans, Absolute 0.0 - 0.1 x10E3/uL 0.0   (H): Data is abnormally high  (L): Data is abnormally low     Latest Reference Range & Units 09/30/24 08:57 10/07/24 00:00 10/14/24 00:00 10/28/24 00:00 11/11/24 00:00 11/25/24 00:00 12/09/24 00:00   INR 0.90 - 1.10  3.2 ! 2.70 ! 1.90 ! 2.50 ! 2.60 ! 2.30 ! 2.90 !   !: Data is abnormal        Assessment      Diagnoses and all orders for this visit:    1. Immunization counseling (Primary)    2. Warfarin anticoagulation    3. HTN (hypertension), benign  -     triamterene-hydrochlorothiazide (DYAZIDE) 37.5-25 MG per capsule; Take 1 capsule by mouth Every Morning.  Dispense: 90 capsule;  Refill: 1    4. Hypopotassemia  -     potassium chloride (KLOR-CON M20) 20 MEQ CR tablet; Take 1 tablet by mouth 2 (Two) Times a Day.  Dispense: 180 tablet; Refill: 2    5. History of PE-ekos tx    6. Mixed hyperlipidemia    Other orders  -     rivaroxaban (XARELTO) 20 MG tablet; Take 1 tablet by mouth Daily.  Dispense: 90 tablet; Refill: 1  -     furosemide (LASIX) 20 MG tablet; Take 1 tablet by mouth Daily.  Dispense: 90 tablet; Refill: 3  -     losartan (COZAAR) 50 MG tablet; Take 1 tablet by mouth Daily.  Dispense: 90 tablet; Refill: 2  -     rosuvastatin (Crestor) 5 MG tablet; Take 1 tablet by mouth Daily.  Dispense: 90 tablet; Refill: 1             Assessment & Plan  1. Health maintenance.  Her immunization status is current, with the RSV vaccine administered in 2023 and the pneumococcal vaccine received in 2020. She also received her influenza vaccine approximately 3 weeks ago at [facility].    2. Pulmonary embolism.  She had a pulmonary embolism in 2016. She has been experiencing difficulties in achieving stable levels on warfarin. A prescription for Xarelto 20 mg will be provided, and she will discontinue warfarin. Her INR will be checked today to ensure it is below 3 before starting Xarelto. If her INR is above 3, she will continue warfarin until it decreases below 3. The prescription will be sent to [facility] on [facility].    3. Medication management.  Refills for Lasix, Dyazide, Cozaar, and potassium will be provided. She will reduce her potassium intake from 2 tablets to 1 tablet daily due to stable potassium levels. The prescriptions will be sent to [facility] on [facility].    4. Hypercholesterolemia.  Her cholesterol levels have been consistently elevated over the past few years. She had muscle cramps with atorvastatin in the past. She will be initiated on rosuvastatin 5 mg daily. If she experiences muscle cramps, she will inform the provider. Alternative treatments such as pravastatin or  coenzyme Q10 every other day will be considered if rosuvastatin is not tolerated.    5. Gastroesophageal reflux disease.  Her symptoms, including burning in the throat postprandially, are indicative of acid reflux. She will be prescribed omeprazole 40 mg to be taken once daily. Dietary modifications, including avoiding fatty and high-acidity foods, and maintaining an upright position post meals, are recommended. If symptoms persist after 3 months of omeprazole therapy, a referral to a gastroenterologist for further evaluation, including endoscopy, will be considered.    Follow-up  The patient will follow up in 3 months for a Medicare wellness visit.    PROCEDURE  The patient underwent a colonoscopy, which yielded normal results. A second colonoscopy was performed, which also returned normal results.    No orders of the defined types were placed in this encounter.      Follow Up   Return in about 3 months (around 4/14/2025) for Medicare Wellness, hx of PE w/ anticoag change, HLD w/ new statin, .  Patient was given instructions and counseling regarding her condition or for health maintenance advice. Please see specific information pulled into the AVS if appropriate.         Patient or patient representative verbalized consent for the use of Ambient Listening during the visit with  Arvin Renteria MD for chart documentation. 1/14/2025  12:03 CST

## 2025-01-17 LAB
BACTERIA UR CULT: ABNORMAL
BACTERIA UR CULT: ABNORMAL
OTHER ANTIBIOTIC SUSC ISLT: ABNORMAL

## 2025-01-21 ENCOUNTER — TELEPHONE (OUTPATIENT)
Dept: FAMILY MEDICINE CLINIC | Facility: CLINIC | Age: 75
End: 2025-01-21

## 2025-01-21 DIAGNOSIS — Z12.31 ENCOUNTER FOR SCREENING MAMMOGRAM FOR MALIGNANT NEOPLASM OF BREAST: ICD-10-CM

## 2025-01-21 DIAGNOSIS — Z12.39 ENCOUNTER FOR SCREENING FOR MALIGNANT NEOPLASM OF BREAST, UNSPECIFIED SCREENING MODALITY: Primary | ICD-10-CM

## 2025-01-21 NOTE — TELEPHONE ENCOUNTER
Caller: [facility]    Relationship: Other    Best call back number: [phone]     What orders are you requesting (i.e. lab or imaging): IMAGING    In what timeframe would the patient need to come in: PRIOR TO APPOINTMENT ON 01.23.2025 AT 8:00AM    Where will you receive your lab/imaging services: [facility]     Additional notes: PATIENT IS NEEDING A ORDER SENT FOR A SCREENING MAMMOGRAM.

## 2025-01-22 ENCOUNTER — TELEPHONE (OUTPATIENT)
Dept: FAMILY MEDICINE CLINIC | Facility: CLINIC | Age: 75
End: 2025-01-22
Payer: MEDICARE

## 2025-04-11 ENCOUNTER — TELEPHONE (OUTPATIENT)
Dept: FAMILY MEDICINE CLINIC | Facility: CLINIC | Age: 75
End: 2025-04-11

## 2025-04-12 ENCOUNTER — HOSPITAL ENCOUNTER (EMERGENCY)
Facility: HOSPITAL | Age: 75
Discharge: HOME | End: 2025-04-12
Payer: MEDICARE

## 2025-04-12 ENCOUNTER — APPOINTMENT (OUTPATIENT)
Dept: GENERAL RADIOLOGY | Facility: HOSPITAL | Age: 75
End: 2025-04-12
Payer: MEDICARE

## 2025-04-12 VITALS
BODY MASS INDEX: 31.07 KG/M2 | RESPIRATION RATE: 18 BRPM | SYSTOLIC BLOOD PRESSURE: 104 MMHG | HEIGHT: 64 IN | DIASTOLIC BLOOD PRESSURE: 52 MMHG | WEIGHT: 182 LBS | HEART RATE: 68 BPM | OXYGEN SATURATION: 95 % | TEMPERATURE: 98 F

## 2025-04-12 DIAGNOSIS — H65.93 FLUID LEVEL BEHIND TYMPANIC MEMBRANE OF BOTH EARS: ICD-10-CM

## 2025-04-12 DIAGNOSIS — J01.10 ACUTE NON-RECURRENT FRONTAL SINUSITIS: ICD-10-CM

## 2025-04-12 DIAGNOSIS — R31.9 URINARY TRACT INFECTION WITH HEMATURIA, SITE UNSPECIFIED: Primary | ICD-10-CM

## 2025-04-12 DIAGNOSIS — N39.0 URINARY TRACT INFECTION WITH HEMATURIA, SITE UNSPECIFIED: Primary | ICD-10-CM

## 2025-04-12 LAB
ALBUMIN SERPL-MCNC: 3.5 G/DL (ref 3.5–5.2)
ALBUMIN/GLOB SERPL: 1 G/DL
ALP SERPL-CCNC: 73 U/L (ref 39–117)
ALT SERPL W P-5'-P-CCNC: 22 U/L (ref 1–33)
ANION GAP SERPL CALCULATED.3IONS-SCNC: 16 MMOL/L (ref 5–15)
AST SERPL-CCNC: 33 U/L (ref 1–32)
B PARAPERT DNA SPEC QL NAA+PROBE: NOT DETECTED
B PERT DNA SPEC QL NAA+PROBE: NOT DETECTED
BACTERIA UR QL AUTO: ABNORMAL /HPF
BASOPHILS # BLD AUTO: 0.02 10*3/MM3 (ref 0–0.2)
BASOPHILS NFR BLD AUTO: 0.2 % (ref 0–1.5)
BILIRUB SERPL-MCNC: 0.7 MG/DL (ref 0–1.2)
BILIRUB UR QL STRIP: NEGATIVE
BUN SERPL-MCNC: 12 MG/DL (ref 8–23)
BUN/CREAT SERPL: 14.6 (ref 7–25)
C PNEUM DNA NPH QL NAA+NON-PROBE: NOT DETECTED
CALCIUM SPEC-SCNC: 9.2 MG/DL (ref 8.6–10.5)
CHLORIDE SERPL-SCNC: 93 MMOL/L (ref 98–107)
CK SERPL-CCNC: 106 U/L (ref 20–180)
CLARITY UR: ABNORMAL
CO2 SERPL-SCNC: 24 MMOL/L (ref 22–29)
COLOR UR: ABNORMAL
CREAT SERPL-MCNC: 0.82 MG/DL (ref 0.57–1)
D-LACTATE SERPL-SCNC: 1.2 MMOL/L (ref 0.5–2)
DEPRECATED RDW RBC AUTO: 43.9 FL (ref 37–54)
EGFRCR SERPLBLD CKD-EPI 2021: 73.8 ML/MIN/1.73
EOSINOPHIL # BLD AUTO: 0 10*3/MM3 (ref 0–0.4)
EOSINOPHIL NFR BLD AUTO: 0 % (ref 0.3–6.2)
ERYTHROCYTE [DISTWIDTH] IN BLOOD BY AUTOMATED COUNT: 12.7 % (ref 12.3–15.4)
FLUAV SUBTYP SPEC NAA+PROBE: NOT DETECTED
FLUBV RNA ISLT QL NAA+PROBE: NOT DETECTED
GEN 5 1HR TROPONIN T REFLEX: 22 NG/L
GLOBULIN UR ELPH-MCNC: 3.5 GM/DL
GLUCOSE SERPL-MCNC: 136 MG/DL (ref 65–99)
GLUCOSE UR STRIP-MCNC: NEGATIVE MG/DL
HADV DNA SPEC NAA+PROBE: NOT DETECTED
HCOV 229E RNA SPEC QL NAA+PROBE: NOT DETECTED
HCOV HKU1 RNA SPEC QL NAA+PROBE: NOT DETECTED
HCOV NL63 RNA SPEC QL NAA+PROBE: NOT DETECTED
HCOV OC43 RNA SPEC QL NAA+PROBE: NOT DETECTED
HCT VFR BLD AUTO: 39.6 % (ref 34–46.6)
HGB BLD-MCNC: 13.9 G/DL (ref 12–15.9)
HGB UR QL STRIP.AUTO: ABNORMAL
HMPV RNA NPH QL NAA+NON-PROBE: NOT DETECTED
HPIV1 RNA ISLT QL NAA+PROBE: NOT DETECTED
HPIV2 RNA SPEC QL NAA+PROBE: NOT DETECTED
HPIV3 RNA NPH QL NAA+PROBE: NOT DETECTED
HPIV4 P GENE NPH QL NAA+PROBE: NOT DETECTED
HYALINE CASTS UR QL AUTO: ABNORMAL /LPF
IMM GRANULOCYTES # BLD AUTO: 0.07 10*3/MM3 (ref 0–0.05)
IMM GRANULOCYTES NFR BLD AUTO: 0.6 % (ref 0–0.5)
KETONES UR QL STRIP: ABNORMAL
LEUKOCYTE ESTERASE UR QL STRIP.AUTO: ABNORMAL
LYMPHOCYTES # BLD AUTO: 0.66 10*3/MM3 (ref 0.7–3.1)
LYMPHOCYTES NFR BLD AUTO: 5.2 % (ref 19.6–45.3)
M PNEUMO IGG SER IA-ACNC: NOT DETECTED
MAGNESIUM SERPL-MCNC: 2 MG/DL (ref 1.6–2.4)
MCH RBC QN AUTO: 32.8 PG (ref 26.6–33)
MCHC RBC AUTO-ENTMCNC: 35.1 G/DL (ref 31.5–35.7)
MCV RBC AUTO: 93.4 FL (ref 79–97)
MONOCYTES # BLD AUTO: 1.46 10*3/MM3 (ref 0.1–0.9)
MONOCYTES NFR BLD AUTO: 11.6 % (ref 5–12)
NEUTROPHILS NFR BLD AUTO: 10.43 10*3/MM3 (ref 1.7–7)
NEUTROPHILS NFR BLD AUTO: 82.4 % (ref 42.7–76)
NITRITE UR QL STRIP: POSITIVE
NRBC BLD AUTO-RTO: 0 /100 WBC (ref 0–0.2)
PH UR STRIP.AUTO: 6 [PH] (ref 5–8)
PLATELET # BLD AUTO: 191 10*3/MM3 (ref 140–450)
PMV BLD AUTO: 9.1 FL (ref 6–12)
POTASSIUM SERPL-SCNC: 3.8 MMOL/L (ref 3.5–5.2)
PROT SERPL-MCNC: 7 G/DL (ref 6–8.5)
PROT UR QL STRIP: ABNORMAL
RBC # BLD AUTO: 4.24 10*6/MM3 (ref 3.77–5.28)
RBC # UR STRIP: ABNORMAL /HPF
REF LAB TEST METHOD: ABNORMAL
RHINOVIRUS RNA SPEC NAA+PROBE: NOT DETECTED
RSV RNA NPH QL NAA+NON-PROBE: NOT DETECTED
SARS-COV-2 RNA RESP QL NAA+PROBE: NOT DETECTED
SODIUM SERPL-SCNC: 133 MMOL/L (ref 136–145)
SP GR UR STRIP: 1.02 (ref 1–1.03)
SQUAMOUS #/AREA URNS HPF: ABNORMAL /HPF
TROPONIN T % DELTA: -4
TROPONIN T NUMERIC DELTA: -1 NG/L
TROPONIN T SERPL HS-MCNC: 23 NG/L
UROBILINOGEN UR QL STRIP: ABNORMAL
WBC # UR STRIP: ABNORMAL /HPF
WBC NRBC COR # BLD AUTO: 12.64 10*3/MM3 (ref 3.4–10.8)

## 2025-04-12 PROCEDURE — 85025 COMPLETE CBC W/AUTO DIFF WBC: CPT

## 2025-04-12 PROCEDURE — 93010 ELECTROCARDIOGRAM REPORT: CPT | Performed by: HOSPITALIST

## 2025-04-12 PROCEDURE — 36415 COLL VENOUS BLD VENIPUNCTURE: CPT

## 2025-04-12 PROCEDURE — 84484 ASSAY OF TROPONIN QUANT: CPT

## 2025-04-12 PROCEDURE — 87154 CUL TYP ID BLD PTHGN 6+ TRGT: CPT

## 2025-04-12 PROCEDURE — 80053 COMPREHEN METABOLIC PANEL: CPT

## 2025-04-12 PROCEDURE — 99284 EMERGENCY DEPT VISIT MOD MDM: CPT

## 2025-04-12 PROCEDURE — 0202U NFCT DS 22 TRGT SARS-COV-2: CPT

## 2025-04-12 PROCEDURE — 87086 URINE CULTURE/COLONY COUNT: CPT

## 2025-04-12 PROCEDURE — 87088 URINE BACTERIA CULTURE: CPT

## 2025-04-12 PROCEDURE — 25010000002 CEFTRIAXONE PER 250 MG

## 2025-04-12 PROCEDURE — 83605 ASSAY OF LACTIC ACID: CPT

## 2025-04-12 PROCEDURE — 82550 ASSAY OF CK (CPK): CPT

## 2025-04-12 PROCEDURE — 96365 THER/PROPH/DIAG IV INF INIT: CPT

## 2025-04-12 PROCEDURE — 81001 URINALYSIS AUTO W/SCOPE: CPT

## 2025-04-12 PROCEDURE — 87186 SC STD MICRODIL/AGAR DIL: CPT

## 2025-04-12 PROCEDURE — 93005 ELECTROCARDIOGRAM TRACING: CPT

## 2025-04-12 PROCEDURE — 71045 X-RAY EXAM CHEST 1 VIEW: CPT

## 2025-04-12 PROCEDURE — 83735 ASSAY OF MAGNESIUM: CPT

## 2025-04-12 PROCEDURE — 87040 BLOOD CULTURE FOR BACTERIA: CPT

## 2025-04-12 RX ORDER — CEFDINIR 300 MG/1
300 CAPSULE ORAL 2 TIMES DAILY
Qty: 20 CAPSULE | Refills: 0 | Status: SHIPPED | OUTPATIENT
Start: 2025-04-12 | End: 2025-04-22

## 2025-04-12 RX ORDER — ACETAMINOPHEN 500 MG
1000 TABLET ORAL ONCE
Status: COMPLETED | OUTPATIENT
Start: 2025-04-12 | End: 2025-04-12

## 2025-04-12 RX ORDER — METHYLPREDNISOLONE 4 MG/1
TABLET ORAL
Qty: 1 EACH | Refills: 0 | Status: SHIPPED | OUTPATIENT
Start: 2025-04-12

## 2025-04-12 RX ADMIN — ACETAMINOPHEN TAB 500 MG 1000 MG: 500 TAB at 17:22

## 2025-04-12 RX ADMIN — CEFTRIAXONE SODIUM 2000 MG: 2 INJECTION, POWDER, FOR SOLUTION INTRAMUSCULAR; INTRAVENOUS at 18:31

## 2025-04-13 LAB
BACTERIA BLD CULT: ABNORMAL
BOTTLE TYPE: ABNORMAL
QT INTERVAL: 354 MS
QTC INTERVAL: 418 MS

## 2025-04-14 ENCOUNTER — RESULTS FOLLOW-UP (OUTPATIENT)
Dept: EMERGENCY DEPT | Facility: HOSPITAL | Age: 75
End: 2025-04-14
Payer: MEDICARE

## 2025-04-14 LAB — BACTERIA SPEC AEROBE CULT: ABNORMAL

## 2025-04-15 LAB
BACTERIA SPEC AEROBE CULT: ABNORMAL
GRAM STN SPEC: ABNORMAL
GRAM STN SPEC: ABNORMAL
ISOLATED FROM: ABNORMAL

## 2025-04-17 ENCOUNTER — OFFICE VISIT (OUTPATIENT)
Dept: FAMILY MEDICINE CLINIC | Facility: CLINIC | Age: 75
End: 2025-04-17
Payer: MEDICARE

## 2025-04-17 VITALS
HEIGHT: 64 IN | OXYGEN SATURATION: 99 % | DIASTOLIC BLOOD PRESSURE: 82 MMHG | BODY MASS INDEX: 31.92 KG/M2 | HEART RATE: 92 BPM | WEIGHT: 187 LBS | SYSTOLIC BLOOD PRESSURE: 138 MMHG

## 2025-04-17 DIAGNOSIS — N30.00 ACUTE CYSTITIS WITHOUT HEMATURIA: ICD-10-CM

## 2025-04-17 DIAGNOSIS — J01.10 ACUTE NON-RECURRENT FRONTAL SINUSITIS: ICD-10-CM

## 2025-04-17 DIAGNOSIS — Z09 HOSPITAL DISCHARGE FOLLOW-UP: Primary | ICD-10-CM

## 2025-04-17 DIAGNOSIS — E87.1 HYPONATREMIA: ICD-10-CM

## 2025-04-17 NOTE — PROGRESS NOTES
"Chief Complaint  URI (Hospital follow up for ear infection, uti, fever, sinus infection at [facility].)    Subjective      Lucy Sousa presents to [facility]  History of Present Illness  The patient is a 75-year-old female who presents today for a hospital follow-up.    She began experiencing severe headaches on Tuesday, initially attributing them to sinus issues. Despite taking Tylenol, her symptoms escalated to include fever and chills by Friday. She was unable to eat anything and reported ear pain, nausea, and a lack of appetite. Her  took her to the ER on Saturday. At the ER, she was diagnosed with a urinary tract infection (UTI), sinus infection, ear infection, and mild stomach irritation. She was treated with a steroid and antibiotic regimen, which she will complete tomorrow. She received a Rocephin injection in the ER and was prescribed cefdinir. Her fever and chills have since subsided, although she occasionally wakes up feeling clammy. Her appetite remains diminished, and she continues to experience headaches that radiate into her eye. She also reports difficulty concentrating and maintaining balance, which she was informed could be due to her ear infection. Her headaches are intermittent and can be managed with Tylenol, but her balance issues persist. She has not been anywhere except for a family  yesterday. She did not have a sore throat but had a scratchy throat from the drainage. She continues to experience drainage from her ears, which she believes is improving. She has not experienced any hearing loss. She has 2 doses of cefdinir remaining and 1 dose of the steroid.      Objective   Vital Signs:  /82   Pulse 92   Ht 162.6 cm (64\")   Wt 84.8 kg (187 lb)   SpO2 99%   BMI 32.10 kg/m²   Estimated body mass index is 32.1 kg/m² as calculated from the following:    Height as of this encounter: 162.6 cm (64\").    Weight as of this encounter: 84.8 kg " (187 lb).        Physical Exam  Constitutional:       General: She is not in acute distress.     Appearance: She is not ill-appearing.   HENT:      Head:      Comments: SM noted to be erythematous and full.   Cardiovascular:      Rate and Rhythm: Normal rate and regular rhythm.      Heart sounds: Normal heart sounds. No murmur heard.     No friction rub. No gallop.   Pulmonary:      Effort: Pulmonary effort is normal. No respiratory distress.      Breath sounds: Normal breath sounds. No wheezing, rhonchi or rales.   Abdominal:      General: Abdomen is flat. Bowel sounds are normal. There is no distension.      Tenderness: There is no abdominal tenderness.   Skin:     General: Skin is warm and dry.          Physical Exam  Ears: Left ear appears slightly full and red. No discharge observed.      Result Review :  The following labs/imaging/notes were reviewed and discussed with the patient by Arvin Renteria MD on 04/17/2025:    ED note fron 4/12/25:  Patient is a 75-year-old female who presents to the ED today with her  complaining of sinus pressure, fever, chills, bilateral earache, and generalized weakness since last Tuesday. Her weakness really began about 2 days ago. They came to the hospital today because she became overwhelmingly weak when she was standing in the shower which caused her to have to sit down on her bottom. She states she just generally feels unwell. On exam she is well-appearing, nontoxic. Temp is 100.4 here in the ED. Lung sounds are clear throughout bilaterally, there is tenderness noted over the frontal sinuses, throat is WNL, there are bilateral middle ear effusions noted. She has been using Flonase at home without relief. PMH is significant for skin cancer, degenerative joint disease, GERD, hypertension, and pulmonary embolism. Differential diagnoses: Sinusitis, viral URI, pneumonia, UTI, electrolyte imbalance, arrhythmia, and other.     XR is unremarkable for any acute concerning finding    Respiratory panel negative.  Blood cultures pending.  Lactic normal.  CK and magnesium are normal.  CBC reveals mild leukocytosis at 12.64, normal H&H and platelets.  CMP reveals normal renal function, sodium is just barely low at 133.  Triage staff ordered troponins, initial is 23 with a repeat of 22.  She denies chest pain.  UA is positive for 4+ bacteria, too numerous to count white cells, moderate blood, protein, moderate leuks, and nitrite positive.  We have treated her with 2 g of IV Rocephin here in the ED and are going to send her home with cefdinir.      Latest Reference Range & Units 04/12/25 17:09 04/12/25 17:16   Creatine Kinase 20 - 180 U/L  106   HS Troponin T <14 ng/L  23 (H)   Sodium 136 - 145 mmol/L  133 (L)   Potassium 3.5 - 5.2 mmol/L  3.8   Chloride 98 - 107 mmol/L  93 (L)   CO2 22.0 - 29.0 mmol/L  24.0   Anion Gap 5.0 - 15.0 mmol/L  16.0 (H)   BUN 8 - 23 mg/dL  12   Creatinine 0.57 - 1.00 mg/dL  0.82   BUN/Creatinine Ratio 7.0 - 25.0   14.6   eGFR >60.0 mL/min/1.73  73.8   Glucose 65 - 99 mg/dL  136 (H)   Calcium 8.6 - 10.5 mg/dL  9.2   Magnesium 1.6 - 2.4 mg/dL  2.0   Alkaline Phosphatase 39 - 117 U/L  73   Total Protein 6.0 - 8.5 g/dL  7.0   Albumin 3.5 - 5.2 g/dL  3.5   Globulin gm/dL  3.5   A/G Ratio g/dL  1.0   AST (SGOT) 1 - 32 U/L  33 (H)   ALT (SGPT) 1 - 33 U/L  22   Total Bilirubin 0.0 - 1.2 mg/dL  0.7   WBC 3.40 - 10.80 10*3/mm3  12.64 (H)   RBC 3.77 - 5.28 10*6/mm3  4.24   Hemoglobin 12.0 - 15.9 g/dL  13.9   Hematocrit 34.0 - 46.6 %  39.6   Platelets 140 - 450 10*3/mm3  191   RDW 12.3 - 15.4 %  12.7   MCV 79.0 - 97.0 fL  93.4   MCH 26.6 - 33.0 pg  32.8   MCHC 31.5 - 35.7 g/dL  35.1   MPV 6.0 - 12.0 fL  9.1   RDW-SD 37.0 - 54.0 fl  43.9   Neutrophil Rel % 42.7 - 76.0 %  82.4 (H)   Lymphocyte Rel % 19.6 - 45.3 %  5.2 (L)   Monocyte Rel % 5.0 - 12.0 %  11.6   Eosinophil Rel % 0.3 - 6.2 %  0.0 (L)   Basophil Rel % 0.0 - 1.5 %  0.2   Immature Granulocyte Rel % 0.0 - 0.5 %  0.6 (H)    Neutrophils Absolute 1.70 - 7.00 10*3/mm3  10.43 (H)   Lymphocytes Absolute 0.70 - 3.10 10*3/mm3  0.66 (L)   Monocytes Absolute 0.10 - 0.90 10*3/mm3  1.46 (H)   Eosinophils Absolute 0.00 - 0.40 10*3/mm3  0.00   Basophils Absolute 0.00 - 0.20 10*3/mm3  0.02   Immature Grans, Absolute 0.00 - 0.05 10*3/mm3  0.07 (H)   nRBC 0.0 - 0.2 /100 WBC  0.0   Color, UA Yellow, Straw  Dark Yellow !    Appearance, UA Clear  Cloudy !    Specific Gravity, UA 1.005 - 1.030  1.019    pH, UA 5.0 - 8.0  6.0    Glucose Negative  Negative    Ketones, UA Negative  15 mg/dL (1+) !    Blood, UA Negative  Moderate (2+) !    Nitrite, UA Negative  Positive !    Leukocytes, UA Negative  Moderate (2+) !    Protein, UA Negative  100 mg/dL (2+) !    Bilirubin, UA Negative  Negative    Urobilinogen, UA 0.2 - 1.0 E.U./dL  1.0 E.U./dL    RBC, UA None Seen, 0-2 /HPF 11-20 !    WBC, UA None Seen, 0-2 /HPF Too Numerous to Count !    Bacteria, UA None Seen /HPF 4+ !    Squamous Epithelial Cells, UA None Seen, 0-2 /HPF 3-6 !    Hyaline Casts, UA None Seen /LPF 3-6    Methodology:  Automated Microscopy    (H): Data is abnormally high  (L): Data is abnormally low  !: Data is abnormal    Amoxicillin + Clavulanate 8 ug/ml Susceptible      Ampicillin >=32 ug/ml Resistant     Ampicillin + Sulbactam 16 ug/ml Intermediate     Cefazolin (Non Urine) 2 ug/ml Susceptible     Cefepime <=0.12 ug/ml Susceptible     Ceftazidime <=0.5 ug/ml Susceptible     Ceftriaxone <=0.25 ug/ml Susceptible     Gentamicin <=1 ug/ml Susceptible     Levofloxacin <=0.12 ug/ml Susceptible     Piperacillin + Tazobactam <=4 ug/ml Susceptible     Trimethoprim + Sulfamethoxazole >=320 ug/ml Resistant      Assessment      Diagnoses and all orders for this visit:    1. Hospital discharge follow-up (Primary)  -     Basic metabolic panel; Future    2. Acute cystitis without hematuria  -     Basic metabolic panel; Future    3. Acute non-recurrent frontal sinusitis  -     Basic metabolic panel;  Future    4. Hyponatremia    Other orders  -     amoxicillin-clavulanate (AUGMENTIN) 875-125 MG per tablet; Take 1 tablet by mouth 2 (Two) Times a Day.  Dispense: 14 tablet; Refill: 0             Assessment & Plan  1. Post-hospitalization follow-up.  - Hospitalized due to a urinary tract infection (UTI), sinus infection, ear infection, and stomach irritation.  - Treated with cefdinir and a steroid, which she will complete tomorrow.  - Sodium levels were slightly low during her hospital stay, likely due to stress response and infection.  - A basic metabolic panel (BMP) will be ordered today to monitor her sodium levels.    2. Urinary Tract Infection (UTI).  - Treated with cefdinir, which should be effective as the culture shows sensitivity to cephalosporins.  - Advised to complete the antibiotic course.  - If symptoms persist, contact the office for a different antibiotic.  - Resistant to ampicillin and Bactrim, but susceptible to cephalosporins.    3. Sinus Infection.  - Treated with cefdinir and a steroid.  - If symptoms do not improve after completing the antibiotic course, contact the office for a different antibiotic.  - Augmentin (amoxicillin/clavulanic acid) recommended for sinus infections.  - Prescription for a 7-day course of Augmentin will be sent to [facility] pharmacy, to be taken twice daily with food. Advised to take it with yogurt or a probiotic if needed.    4. Ear Infection.  - Treated with cefdinir and a steroid.  - If symptoms such as ear pain, headaches, or balance issues persist, contact the office for further evaluation.  - Left ear appears full and red, with pressure but no discharge.    Follow-up  - Follow-up on 05/13/2025 for Medicare wellness visit.    Orders Placed This Encounter   Procedures    Basic metabolic panel     Standing Status:   Future     Expected Date:   4/22/2025     Expiration Date:   7/17/2026     Release to patient:   Routine Release [[id number]]       Follow Up   No  follow-ups on file.  Patient was given instructions and counseling regarding her condition or for health maintenance advice. Please see specific information pulled into the AVS if appropriate.         Patient or patient representative verbalized consent for the use of Ambient Listening during the visit with  Arvin Renteria MD for chart documentation. 4/17/2025  10:21 CDT

## 2025-05-13 ENCOUNTER — OFFICE VISIT (OUTPATIENT)
Dept: FAMILY MEDICINE CLINIC | Facility: CLINIC | Age: 75
End: 2025-05-13
Payer: MEDICARE

## 2025-05-13 VITALS
OXYGEN SATURATION: 98 % | DIASTOLIC BLOOD PRESSURE: 80 MMHG | HEIGHT: 64 IN | SYSTOLIC BLOOD PRESSURE: 134 MMHG | HEART RATE: 74 BPM | BODY MASS INDEX: 31.92 KG/M2 | WEIGHT: 187 LBS

## 2025-05-13 DIAGNOSIS — E87.1 HYPONATREMIA: ICD-10-CM

## 2025-05-13 DIAGNOSIS — Z79.01 ANTICOAGULATED: ICD-10-CM

## 2025-05-13 DIAGNOSIS — I10 HTN (HYPERTENSION), BENIGN: Primary | Chronic | ICD-10-CM

## 2025-05-13 DIAGNOSIS — E78.2 MIXED HYPERLIPIDEMIA: ICD-10-CM

## 2025-05-13 PROBLEM — Z78.9 STATIN INTOLERANCE: Status: RESOLVED | Noted: 2024-08-15 | Resolved: 2025-05-13

## 2025-05-13 PROBLEM — Z01.89 LABORATORY TEST: Status: RESOLVED | Noted: 2017-09-01 | Resolved: 2025-05-13

## 2025-05-13 PROBLEM — Z00.00 WELLNESS EXAMINATION: Status: RESOLVED | Noted: 2017-03-01 | Resolved: 2025-05-13

## 2025-05-13 PROBLEM — R10.84 GENERALIZED ABDOMINAL PAIN: Status: RESOLVED | Noted: 2021-03-27 | Resolved: 2025-05-13

## 2025-05-13 PROBLEM — K52.9 CHRONIC DIARRHEA: Status: RESOLVED | Noted: 2019-08-22 | Resolved: 2025-05-13

## 2025-05-13 PROBLEM — R13.19 ESOPHAGEAL DYSPHAGIA: Status: RESOLVED | Noted: 2019-02-14 | Resolved: 2025-05-13

## 2025-05-13 PROBLEM — R73.01 ELEVATED FASTING GLUCOSE: Status: RESOLVED | Noted: 2022-07-25 | Resolved: 2025-05-13

## 2025-05-13 PROBLEM — R11.2 NAUSEA AND VOMITING: Status: RESOLVED | Noted: 2021-04-21 | Resolved: 2025-05-13

## 2025-05-13 PROBLEM — Z86.0100 HX OF COLONIC POLYPS: Status: RESOLVED | Noted: 2019-02-14 | Resolved: 2025-05-13

## 2025-05-13 LAB
ALBUMIN SERPL-MCNC: 4.3 G/DL (ref 3.5–5.2)
ALBUMIN/GLOB SERPL: 1.7 G/DL
ALP SERPL-CCNC: 68 U/L (ref 39–117)
ALT SERPL-CCNC: 17 U/L (ref 1–33)
AST SERPL-CCNC: 18 U/L (ref 1–32)
BILIRUB SERPL-MCNC: 0.5 MG/DL (ref 0–1.2)
BUN SERPL-MCNC: 13 MG/DL (ref 8–23)
BUN/CREAT SERPL: 15.9 (ref 7–25)
CALCIUM SERPL-MCNC: 10.2 MG/DL (ref 8.6–10.5)
CHLORIDE SERPL-SCNC: 100 MMOL/L (ref 98–107)
CO2 SERPL-SCNC: 28.5 MMOL/L (ref 22–29)
CREAT SERPL-MCNC: 0.82 MG/DL (ref 0.57–1)
EGFRCR SERPLBLD CKD-EPI 2021: 73.8 ML/MIN/1.73
GLOBULIN SER CALC-MCNC: 2.6 GM/DL
GLUCOSE SERPL-MCNC: 108 MG/DL (ref 65–99)
POTASSIUM SERPL-SCNC: 4 MMOL/L (ref 3.5–5.2)
PROT SERPL-MCNC: 6.9 G/DL (ref 6–8.5)
SODIUM SERPL-SCNC: 141 MMOL/L (ref 136–145)

## 2025-07-07 ENCOUNTER — TELEPHONE (OUTPATIENT)
Dept: FAMILY MEDICINE CLINIC | Facility: CLINIC | Age: 75
End: 2025-07-07

## 2025-07-07 RX ORDER — OMEPRAZOLE 40 MG/1
40 CAPSULE, DELAYED RELEASE ORAL DAILY
Qty: 90 CAPSULE | Refills: 0 | Status: SHIPPED | OUTPATIENT
Start: 2025-07-07

## 2025-07-08 ENCOUNTER — OFFICE VISIT (OUTPATIENT)
Dept: FAMILY MEDICINE CLINIC | Facility: CLINIC | Age: 75
End: 2025-07-08
Payer: MEDICARE

## 2025-07-08 VITALS
HEIGHT: 64 IN | DIASTOLIC BLOOD PRESSURE: 74 MMHG | SYSTOLIC BLOOD PRESSURE: 132 MMHG | BODY MASS INDEX: 32.27 KG/M2 | WEIGHT: 189 LBS | OXYGEN SATURATION: 99 % | HEART RATE: 76 BPM

## 2025-07-08 DIAGNOSIS — I10 HTN (HYPERTENSION), BENIGN: Chronic | ICD-10-CM

## 2025-07-08 DIAGNOSIS — R30.0 DYSURIA: Primary | ICD-10-CM

## 2025-07-08 LAB
BILIRUB BLD-MCNC: NEGATIVE MG/DL
CLARITY, POC: CLEAR
COLOR UR: YELLOW
GLUCOSE UR STRIP-MCNC: NEGATIVE MG/DL
KETONES UR QL: NEGATIVE
LEUKOCYTE EST, POC: NEGATIVE
NITRITE UR-MCNC: NEGATIVE MG/ML
PH UR: 7 [PH] (ref 5–8)
PROT UR STRIP-MCNC: NEGATIVE MG/DL
RBC # UR STRIP: NEGATIVE /UL
SP GR UR: 1.01 (ref 1–1.03)
UROBILINOGEN UR QL: NORMAL

## 2025-07-08 RX ORDER — RIVAROXABAN 20 MG/1
20 TABLET, FILM COATED ORAL DAILY
Qty: 90 TABLET | Refills: 0 | Status: SHIPPED | OUTPATIENT
Start: 2025-07-08

## 2025-07-08 RX ORDER — TRIAMTERENE AND HYDROCHLOROTHIAZIDE 37.5; 25 MG/1; MG/1
1 CAPSULE ORAL EVERY MORNING
Qty: 90 CAPSULE | Refills: 0 | Status: SHIPPED | OUTPATIENT
Start: 2025-07-08

## 2025-07-08 RX ORDER — ROSUVASTATIN CALCIUM 5 MG/1
5 TABLET, COATED ORAL DAILY
Qty: 90 TABLET | Refills: 0 | Status: SHIPPED | OUTPATIENT
Start: 2025-07-08

## 2025-07-08 NOTE — PROGRESS NOTES
"Chief Complaint  Urinary Tract Infection (Urgency, pressure in lower abdomin, itching symptoms began Friday.)    Subjective      Lucy Sousa presents to [facility]  History of Present Illness  The patient is a 75-year-old female who presents for follow-up.    She reports experiencing symptoms consistent with a urinary tract infection (UTI), including frequent urination, discomfort in the lower abdomen, and itching. She has been taking a diuretic, which she believes may be contributing to her symptoms. She has not experienced any burning sensation during urination or any discharge. She has not recently taken any antibiotics. She has been increasing her water intake over the past three days, which seems to have alleviated some of her symptoms. She is not currently sexually active.     She also mentions that she has always experienced some tenderness in her vaginal area. She recalls that after a previous procedure involving a lift and mesh, she was informed that she might experience recurrent UTIs or yeast infections, which she did.    PAST SURGICAL HISTORY:  Previous procedure involving a lift and mesh.      Objective   Vital Signs:  /74   Pulse 76   Ht 162.6 cm (64\")   Wt 85.7 kg (189 lb)   SpO2 99%   BMI 32.44 kg/m²   Estimated body mass index is 32.44 kg/m² as calculated from the following:    Height as of this encounter: 162.6 cm (64\").    Weight as of this encounter: 85.7 kg (189 lb).        Physical Exam     Physical Exam  Genitourinary: Mild tenderness in the vaginal area      Result Review :  The following labs/imaging/notes were reviewed and discussed with the patient by Arvin Renteria MD on 07/08/2025:            Assessment      Diagnoses and all orders for this visit:    1. Dysuria (Primary)  -     POCT urinalysis dipstick, multipro  -     UA / M With / Rflx Culture([facility] ONLY) - Urine, Clean Catch    Other orders  -     Microscopic Examination -         "     Assessment & Plan  1. Suspected urinary tract infection (UTI).  - Symptoms include frequent urination, discomfort in the lower abdomen, and itching.  - Urinalysis results show nitrites are negative, leukocyte esterase is negative, pH is normal, and blood is normal, indicating no current UTI.  - A culture and sensitivity test will be conducted to rule out any underlying infection.  - No antibiotic will be prescribed at this time. If symptoms worsen or new symptoms develop, she should inform the clinic immediately.    Orders Placed This Encounter   Procedures    UA / M With / Rflx Culture([facility] ONLY) - Urine, Clean Catch     Release to patient:   Routine Release [[id number]]    Microscopic Examination -    POCT urinalysis dipstick, multipro     Release to patient:   Routine Release [[id number]]       Follow Up   No follow-ups on file.  Patient was given instructions and counseling regarding her condition or for health maintenance advice. Please see specific information pulled into the AVS if appropriate.         Patient or patient representative verbalized consent for the use of Ambient Listening during the visit with  Arvin Renteria MD for chart documentation. 7/13/2025  16:21 CDT

## 2025-07-09 LAB
APPEARANCE UR: CLEAR
BACTERIA #/AREA URNS HPF: NORMAL /[HPF]
BILIRUB UR QL STRIP: NEGATIVE
CASTS URNS QL MICRO: NORMAL /LPF
COLOR UR: YELLOW
EPI CELLS #/AREA URNS HPF: NORMAL /HPF (ref 0–10)
GLUCOSE UR QL STRIP: NEGATIVE
HGB UR QL STRIP: NEGATIVE
KETONES UR QL STRIP: NEGATIVE
LEUKOCYTE ESTERASE UR QL STRIP: NEGATIVE
MICRO URNS: NORMAL
MICRO URNS: NORMAL
NITRITE UR QL STRIP: NEGATIVE
PH UR STRIP: 7 [PH] (ref 5–7.5)
PROT UR QL STRIP: NEGATIVE
RBC #/AREA URNS HPF: NORMAL /HPF (ref 0–2)
SP GR UR STRIP: 1.01 (ref 1–1.03)
URINALYSIS REFLEX: NORMAL
UROBILINOGEN UR STRIP-MCNC: 0.2 MG/DL (ref 0.2–1)
WBC #/AREA URNS HPF: NORMAL /HPF (ref 0–5)

## 2025-07-22 ENCOUNTER — OFFICE VISIT (OUTPATIENT)
Dept: FAMILY MEDICINE CLINIC | Facility: CLINIC | Age: 75
End: 2025-07-22
Payer: MEDICARE

## 2025-07-22 VITALS
SYSTOLIC BLOOD PRESSURE: 112 MMHG | OXYGEN SATURATION: 97 % | BODY MASS INDEX: 31.76 KG/M2 | WEIGHT: 186 LBS | DIASTOLIC BLOOD PRESSURE: 75 MMHG | HEART RATE: 88 BPM | HEIGHT: 64 IN

## 2025-07-22 DIAGNOSIS — R53.83 FATIGUE, UNSPECIFIED TYPE: Primary | ICD-10-CM

## 2025-07-22 DIAGNOSIS — R68.83 CHILLS: ICD-10-CM

## 2025-07-22 DIAGNOSIS — R51.9 NONINTRACTABLE HEADACHE, UNSPECIFIED CHRONICITY PATTERN, UNSPECIFIED HEADACHE TYPE: ICD-10-CM

## 2025-07-22 NOTE — PROGRESS NOTES
Chief Complaint  Flu Symptoms (Fever, chills, headache with eye sight blurry, dizziness, fatigue, vomiting and diarrhea symptoms began last Wednesday.)    Subjective      Lucy Sousa presents to [facility]  History of Present Illness  The patient is a 75-year-old female who presents today due to concerns for influenza-like symptoms, nausea, vomiting, and malaise.    She has been experiencing these symptoms since Wednesday, which began with chills during a lunch outing. Despite taking Tylenol, the chills persisted, leading her to believe she might have a viral infection. Today, she experienced a mild flare-up but has not had severe chills. She also reports constant coldness, loss of appetite, and a persistent headache described as a hammering sensation in her eyeball that radiates down her face, into her jaw, and down her throat. Her vision has been slightly blurry and wobbly. She has been maintaining hydration with water, apple juice, and orange juice. She has had mild diarrhea but no vomiting. She took Pepto-Bismol, which seemed to alleviate her symptoms. She has noticed blood when brushing her teeth, but this was not present today. A home COVID-19 test was negative. She reports no recent tick bites. She has experienced fever and skin warmth, but her thermometer is not working. She has been taking DayQuil and Tylenol 3 to 4 times daily, which she believes has prevented her condition from worsening. She reports feeling weak and lacking energy. She has neck stiffness, which she attributes to the chills. She has had some confusion, such as forgetting conversations, but it has not been severe. She had a fall on Saturday morning while watering plants, resulting in scraped knees, a swollen ankle, and a bruised arm. She reports that her legs gave out on her, which is a new symptom. She is considering using Ensure to maintain her blood sugar levels as she does not feel hungry. She has  "not had a CT scan of her head in a long time.    She has a history of thyroid nodules and plans to discuss an ultrasound of her thyroid and carotid arteries at her next appointment.    She has a history of pulmonary embolism.    FAMILY HISTORY  Her mother had a history of stroke.      Objective   Vital Signs:  /75   Pulse 88   Ht 162.6 cm (64\")   Wt 84.4 kg (186 lb)   SpO2 97%   BMI 31.93 kg/m²   Estimated body mass index is 31.93 kg/m² as calculated from the following:    Height as of this encounter: 162.6 cm (64\").    Weight as of this encounter: 84.4 kg (186 lb).        Physical Exam     Physical Exam  General: Appears weak and fatigued.  Neurological: Alert, oriented to person, place, and time. No focal deficits.  Head: Tenderness noted on palpation of the occipital region.  Neck: Stiffness noted, tenderness on palpation of posterior neck.  Extremities: Swelling noted in the right ankle, bruising on knees and arm.      Result Review :  The following labs/imaging/notes were reviewed and discussed with the patient by Arvin Renteria MD on 07/22/2025:            Assessment      Diagnoses and all orders for this visit:    1. Fatigue, unspecified type (Primary)  -     CBC & Differential  -     Comprehensive Metabolic Panel  -     C-reactive Protein  -     Sedimentation Rate  -     TSH Rfx On Abnormal To Free T4  -     Vitamin B12 & Folate  -     Magnesium    2. Chills  -     CBC & Differential  -     Comprehensive Metabolic Panel  -     C-reactive Protein  -     Sedimentation Rate  -     TSH Rfx On Abnormal To Free T4  -     Vitamin B12 & Folate  -     Magnesium    3. Nonintractable headache, unspecified chronicity pattern, unspecified headache type  -     CBC & Differential  -     Comprehensive Metabolic Panel  -     C-reactive Protein  -     Sedimentation Rate  -     TSH Rfx On Abnormal To Free T4  -     Vitamin B12 & Folate  -     Magnesium             Assessment & Plan  1. Influenza-like symptoms.  - " Reports experiencing chills, persistent headaches, eye pain, and malaise since 07/16/2025. Symptoms have somewhat improved but persist, with significant weakness and difficulty eating.  Symptoms likely associated with viral illness.  However headache and neck stiffness does raise concern for potential meningitis, though this is less likely.  No focal neurological deficits were noted on exam.  Patient was afebrile.  No physical findings of confusion.  - Taking DayQuil and Tylenol three to four times a day.    - CBC, metabolic panel, C-reactive protein, sedimentation rate, magnesium, TSH, and B12 tests ordered to evaluate for infection, inflammation, thyroid function, and vitamin B12 levels.  - Advised to continue DayQuil and Tylenol as needed. If symptoms worsen or significant changes occur, she should inform the clinic immediately.  Patient encouraged to notify the clinic if she experiences fever or any alterations in mental status.    2. Nausea and vomiting.  - Reports feeling nauseous but has not experienced vomiting. Drinking fluids like water, apple juice, and orange juice.  - Took Pepto-Bismol, which helped calm her stomach.  - Advised to continue staying hydrated and eat small, frequent meals. If nausea worsens or vomiting occurs, she should inform the clinic.    3. Diarrhea.  - Reports having mild diarrhea but no more than expected given her reduced food intake.  - Advised to continue staying hydrated and monitor symptoms.  - If diarrhea worsens, she should inform the clinic.      5. Neck stiffness.  - Reports stiffness in her neck, likely related to chills and overall malaise.  - If neck stiffness worsens or is accompanied by other symptoms like confusion or severe headache, she should inform the clinic immediately as this raises concern for meningitis.      8. Thyroid nodule.  - History of a thyroid nodule and concerned about potential thyroid issues contributing to symptoms.  - TSH test ordered to evaluate  thyroid function. If thyroid function is abnormal, further evaluation, including a possible ultrasound, will be considered.    Follow-up: The patient will follow up in 1 week.    Orders Placed This Encounter   Procedures    Comprehensive Metabolic Panel     Release to patient:   Routine Release [[id number]]    C-reactive Protein     Release to patient:   Routine Release [[id number]]    Sedimentation Rate     Release to patient:   Routine Release [[id number]]    TSH Rfx On Abnormal To Free T4     Release to patient:   Routine Release [[id number]]    Vitamin B12 & Folate     Release to patient:   Routine Release [[id number]]    Magnesium     Release to patient:   Routine Release [[id number]]    CBC & Differential     Manual Differential:   No     Release to patient:   Routine Release [[id number]]       Follow Up   Return in about 1 week (around 7/29/2025) for Fatigue, malaise w/ labs, .  Patient was given instructions and counseling regarding her condition or for health maintenance advice. Please see specific information pulled into the AVS if appropriate.         Patient or patient representative verbalized consent for the use of Ambient Listening during the visit with  Arvin Renteria MD for chart documentation. 7/27/2025  17:11 CDT

## 2025-07-23 LAB
ALBUMIN SERPL-MCNC: 3.8 G/DL (ref 3.5–5.2)
ALBUMIN/GLOB SERPL: 1.4 G/DL
ALP SERPL-CCNC: 82 U/L (ref 39–117)
ALT SERPL-CCNC: 14 U/L (ref 1–33)
AST SERPL-CCNC: 14 U/L (ref 1–32)
BASOPHILS # BLD AUTO: 0.05 10*3/MM3 (ref 0–0.2)
BASOPHILS NFR BLD AUTO: 0.5 % (ref 0–1.5)
BILIRUB SERPL-MCNC: 0.4 MG/DL (ref 0–1.2)
BUN SERPL-MCNC: 10 MG/DL (ref 8–23)
BUN/CREAT SERPL: 10.9 (ref 7–25)
CALCIUM SERPL-MCNC: 9.5 MG/DL (ref 8.6–10.5)
CHLORIDE SERPL-SCNC: 102 MMOL/L (ref 98–107)
CO2 SERPL-SCNC: 26 MMOL/L (ref 22–29)
CREAT SERPL-MCNC: 0.92 MG/DL (ref 0.57–1)
CRP SERPL-MCNC: 19.05 MG/DL (ref 0–0.5)
EGFRCR SERPLBLD CKD-EPI 2021: 64.3 ML/MIN/1.73
EOSINOPHIL # BLD AUTO: 0.06 10*3/MM3 (ref 0–0.4)
EOSINOPHIL NFR BLD AUTO: 0.6 % (ref 0.3–6.2)
ERYTHROCYTE [DISTWIDTH] IN BLOOD BY AUTOMATED COUNT: 12.4 % (ref 12.3–15.4)
ERYTHROCYTE [SEDIMENTATION RATE] IN BLOOD BY WESTERGREN METHOD: 27 MM/HR (ref 0–30)
FOLATE SERPL-MCNC: >20 NG/ML (ref 4.78–24.2)
GLOBULIN SER CALC-MCNC: 2.8 GM/DL
GLUCOSE SERPL-MCNC: 117 MG/DL (ref 65–99)
HCT VFR BLD AUTO: 42.1 % (ref 34–46.6)
HGB BLD-MCNC: 13.9 G/DL (ref 12–15.9)
IMM GRANULOCYTES # BLD AUTO: 0.05 10*3/MM3 (ref 0–0.05)
IMM GRANULOCYTES NFR BLD AUTO: 0.5 % (ref 0–0.5)
LYMPHOCYTES # BLD AUTO: 1.68 10*3/MM3 (ref 0.7–3.1)
LYMPHOCYTES NFR BLD AUTO: 16.4 % (ref 19.6–45.3)
MAGNESIUM SERPL-MCNC: 2.2 MG/DL (ref 1.6–2.4)
MCH RBC QN AUTO: 32.9 PG (ref 26.6–33)
MCHC RBC AUTO-ENTMCNC: 33 G/DL (ref 31.5–35.7)
MCV RBC AUTO: 99.8 FL (ref 79–97)
MONOCYTES # BLD AUTO: 1.15 10*3/MM3 (ref 0.1–0.9)
MONOCYTES NFR BLD AUTO: 11.2 % (ref 5–12)
NEUTROPHILS # BLD AUTO: 7.27 10*3/MM3 (ref 1.7–7)
NEUTROPHILS NFR BLD AUTO: 70.8 % (ref 42.7–76)
NRBC BLD AUTO-RTO: 0 /100 WBC (ref 0–0.2)
PLATELET # BLD AUTO: 304 10*3/MM3 (ref 140–450)
POTASSIUM SERPL-SCNC: 3.9 MMOL/L (ref 3.5–5.2)
PROT SERPL-MCNC: 6.6 G/DL (ref 6–8.5)
RBC # BLD AUTO: 4.22 10*6/MM3 (ref 3.77–5.28)
SODIUM SERPL-SCNC: 140 MMOL/L (ref 136–145)
TSH SERPL DL<=0.005 MIU/L-ACNC: 2.47 UIU/ML (ref 0.27–4.2)
VIT B12 SERPL-MCNC: >2000 PG/ML (ref 211–946)
WBC # BLD AUTO: 10.26 10*3/MM3 (ref 3.4–10.8)

## 2025-07-25 NOTE — TELEPHONE ENCOUNTER
Caller: Lucy Sousa    Relationship: Self    Best call back number:     [phone]        What medication are you requesting: FEVER REDUCER, SOMETHING FOR BELOW SYMPTOMS    What are your current symptoms: FEVER, CONGESTION, CHILLS AND EAR PAIN      If a prescription is needed, what is your preferred pharmacy and phone number:    [facility]     Additional notes: PT SAYS SHE HAS BEEN TAKING 500 MG TYLENOL, BUT WAS ASKING IF SOMETHING CAN BE CALLED IN. PT UNDERSTANDS SHE MAY NEED TO BE SEEN IN THE OFFICE BEFORE ANYTHING CAN BE CALLED IN. PLEASE CALL TO ADVISE.         
Pt did not want to make an appt.  
normal

## 2025-07-29 ENCOUNTER — OFFICE VISIT (OUTPATIENT)
Dept: FAMILY MEDICINE CLINIC | Facility: CLINIC | Age: 75
End: 2025-07-29
Payer: MEDICARE

## 2025-07-29 VITALS
DIASTOLIC BLOOD PRESSURE: 78 MMHG | HEIGHT: 64 IN | HEART RATE: 75 BPM | OXYGEN SATURATION: 98 % | WEIGHT: 186 LBS | SYSTOLIC BLOOD PRESSURE: 128 MMHG | BODY MASS INDEX: 31.76 KG/M2

## 2025-07-29 DIAGNOSIS — H57.11 ACUTE RIGHT EYE PAIN: ICD-10-CM

## 2025-07-29 DIAGNOSIS — G44.209 TENSION-TYPE HEADACHE, NOT INTRACTABLE, UNSPECIFIED CHRONICITY PATTERN: Primary | ICD-10-CM

## 2025-07-29 DIAGNOSIS — M25.572 ACUTE LEFT ANKLE PAIN: ICD-10-CM

## 2025-07-29 DIAGNOSIS — E04.1 THYROID NODULE: ICD-10-CM

## 2025-07-30 ENCOUNTER — HOSPITAL ENCOUNTER (OUTPATIENT)
Dept: ULTRASOUND IMAGING | Facility: HOSPITAL | Age: 75
Discharge: HOME | End: 2025-07-30
Payer: MEDICARE

## 2025-07-30 LAB
CRP SERPL-MCNC: 0.68 MG/DL (ref 0–0.5)
ERYTHROCYTE [SEDIMENTATION RATE] IN BLOOD BY WESTERGREN METHOD: 24 MM/HR (ref 0–30)

## 2025-07-30 PROCEDURE — 76536 US EXAM OF HEAD AND NECK: CPT

## 2025-07-31 DIAGNOSIS — G44.209 TENSION-TYPE HEADACHE, NOT INTRACTABLE, UNSPECIFIED CHRONICITY PATTERN: ICD-10-CM

## 2025-07-31 DIAGNOSIS — M25.572 ACUTE LEFT ANKLE PAIN: ICD-10-CM

## 2025-07-31 DIAGNOSIS — E04.1 THYROID NODULE: ICD-10-CM

## 2025-07-31 DIAGNOSIS — H57.11 ACUTE RIGHT EYE PAIN: ICD-10-CM

## 2025-08-12 ENCOUNTER — OFFICE VISIT (OUTPATIENT)
Age: 75
End: 2025-08-12
Payer: MEDICARE

## 2025-08-12 DIAGNOSIS — M25.572 ACUTE LEFT ANKLE PAIN: Primary | ICD-10-CM

## 2025-08-12 DIAGNOSIS — S99.912A INJURY OF LEFT ANKLE, INITIAL ENCOUNTER: ICD-10-CM

## 2025-08-12 PROCEDURE — 1090F PRES/ABSN URINE INCON ASSESS: CPT | Performed by: NURSE PRACTITIONER

## 2025-08-12 PROCEDURE — 99203 OFFICE O/P NEW LOW 30 MIN: CPT | Performed by: NURSE PRACTITIONER

## 2025-08-12 PROCEDURE — 1159F MED LIST DOCD IN RCRD: CPT | Performed by: NURSE PRACTITIONER

## 2025-08-12 PROCEDURE — 4004F PT TOBACCO SCREEN RCVD TLK: CPT | Performed by: NURSE PRACTITIONER

## 2025-08-12 PROCEDURE — G8399 PT W/DXA RESULTS DOCUMENT: HCPCS | Performed by: NURSE PRACTITIONER

## 2025-08-12 PROCEDURE — G8427 DOCREV CUR MEDS BY ELIG CLIN: HCPCS | Performed by: NURSE PRACTITIONER

## 2025-08-12 PROCEDURE — 1123F ACP DISCUSS/DSCN MKR DOCD: CPT | Performed by: NURSE PRACTITIONER

## 2025-08-12 PROCEDURE — G8421 BMI NOT CALCULATED: HCPCS | Performed by: NURSE PRACTITIONER

## 2025-08-12 RX ORDER — OMEPRAZOLE 40 MG/1
40 CAPSULE, DELAYED RELEASE ORAL DAILY
COMMUNITY
Start: 2025-07-07

## 2025-08-12 RX ORDER — MELOXICAM 7.5 MG/1
7.5 TABLET ORAL DAILY
Qty: 30 TABLET | Refills: 0 | Status: SHIPPED | OUTPATIENT
Start: 2025-08-12

## 2025-08-12 RX ORDER — PREDNISONE 10 MG/1
10 TABLET ORAL 2 TIMES DAILY
Qty: 10 TABLET | Refills: 0 | Status: SHIPPED | OUTPATIENT
Start: 2025-08-12 | End: 2025-08-17

## 2025-08-12 RX ORDER — ROSUVASTATIN CALCIUM 5 MG/1
5 TABLET, COATED ORAL DAILY
COMMUNITY
Start: 2025-07-08

## 2025-08-12 RX ORDER — POTASSIUM CHLORIDE 1500 MG/1
20 TABLET, EXTENDED RELEASE ORAL 2 TIMES DAILY
COMMUNITY
Start: 2025-01-14

## 2025-08-26 ENCOUNTER — OFFICE VISIT (OUTPATIENT)
Age: 75
End: 2025-08-26
Payer: MEDICARE

## 2025-08-26 DIAGNOSIS — S99.912A INJURY OF LEFT ANKLE, INITIAL ENCOUNTER: ICD-10-CM

## 2025-08-26 DIAGNOSIS — M25.572 ACUTE LEFT ANKLE PAIN: Primary | ICD-10-CM

## 2025-08-26 PROCEDURE — 1123F ACP DISCUSS/DSCN MKR DOCD: CPT | Performed by: NURSE PRACTITIONER

## 2025-08-26 PROCEDURE — 1159F MED LIST DOCD IN RCRD: CPT | Performed by: NURSE PRACTITIONER

## 2025-08-26 PROCEDURE — G8427 DOCREV CUR MEDS BY ELIG CLIN: HCPCS | Performed by: NURSE PRACTITIONER

## 2025-08-26 PROCEDURE — 99213 OFFICE O/P EST LOW 20 MIN: CPT | Performed by: NURSE PRACTITIONER

## 2025-08-26 PROCEDURE — 4004F PT TOBACCO SCREEN RCVD TLK: CPT | Performed by: NURSE PRACTITIONER

## 2025-08-26 PROCEDURE — G8399 PT W/DXA RESULTS DOCUMENT: HCPCS | Performed by: NURSE PRACTITIONER

## 2025-08-26 PROCEDURE — 1090F PRES/ABSN URINE INCON ASSESS: CPT | Performed by: NURSE PRACTITIONER

## 2025-08-26 PROCEDURE — G8421 BMI NOT CALCULATED: HCPCS | Performed by: NURSE PRACTITIONER

## (undated) DEVICE — PK TURNOVER RM ADV

## (undated) DEVICE — CLTH CLENS READYCLEANSE PERI CARE PK/5

## (undated) DEVICE — SUT ETHLN 6/0 P3 18IN 1698G

## (undated) DEVICE — CONMED SCOPE SAVER BITE BLOCK, 20X27 MM: Brand: SCOPE SAVER

## (undated) DEVICE — ANTIBACTERIAL UNDYED BRAIDED (POLYGLACTIN 910), SYNTHETIC ABSORBABLE SUTURE: Brand: COATED VICRYL

## (undated) DEVICE — FRCP BX RADJAW4 NDL 2.8 240 STD OG

## (undated) DEVICE — Device: Brand: DEFENDO AIR/WATER/SUCTION AND BIOPSY VALVE

## (undated) DEVICE — SUT MNCRYL 4/0 P3 18IN UD MCP494G

## (undated) DEVICE — CUFF,BP,DISP,1 TUBE,ADULT,HP: Brand: MEDLINE

## (undated) DEVICE — GLV SURG SENSICARE W/ALOE PF LF SZ6 STRL

## (undated) DEVICE — BAPTIST TURNOVER KIT: Brand: MEDLINE INDUSTRIES, INC.

## (undated) DEVICE — YANKAUER,BULB TIP WITH VENT: Brand: ARGYLE

## (undated) DEVICE — PREP SOL POVIDONE/IODINE BT 4OZ

## (undated) DEVICE — SYR CONTRL PRESS/LO FIX/M/LL W/THMB/RNG 10ML

## (undated) DEVICE — NDL HYPO PRECISIONGLIDE REG 22G 1 1/2

## (undated) DEVICE — CYSTO/BLADDER IRRIGATION SET, REGULATING CLAMP

## (undated) DEVICE — SENSR O2 OXIMAX FNGR A/ 18IN NONSTR

## (undated) DEVICE — MASK,OXYGEN,MED CONC,ADLT,7' TUB, UC: Brand: PENDING

## (undated) DEVICE — CATHETER,FOLEY,SILI-ELAST,LTX,20FR,10ML: Brand: MEDLINE

## (undated) DEVICE — THE CHANNEL CLEANING BRUSH IS A NYLON FLEXI BRUSH ATTACHED TO A FLEXIBLE PLASTIC SHEATH DESIGNED TO SAFELY REMOVE DEBRIS FROM FLEXIBLE ENDOSCOPES.

## (undated) DEVICE — ELECTRD BLD EZ CLN MOD XLNG 2.75IN

## (undated) DEVICE — TBG SMPL FLTR LINE NASL 02/C02 A/ BX/100

## (undated) DEVICE — GLV SURG BIOGEL M LTX PF 7 1/2

## (undated) DEVICE — SUT ETHLN 5/0 P3 18IN 698H

## (undated) DEVICE — PK ENT HD AND NK 30

## (undated) DEVICE — SPNG GZ WOVN 4X4IN 12PLY 10/BX STRL

## (undated) DEVICE — DRAINBAG,ANTI-REFLUX TOWER,L/F,2000ML,LL: Brand: MEDLINE

## (undated) DEVICE — PAD MAJOR LITHOTOMY: Brand: MEDLINE INDUSTRIES, INC.